# Patient Record
Sex: MALE | Race: WHITE | NOT HISPANIC OR LATINO | Employment: PART TIME | URBAN - METROPOLITAN AREA
[De-identification: names, ages, dates, MRNs, and addresses within clinical notes are randomized per-mention and may not be internally consistent; named-entity substitution may affect disease eponyms.]

---

## 2017-02-08 ENCOUNTER — HOSPITAL ENCOUNTER (OUTPATIENT)
Dept: ONCOLOGY | Facility: CLINIC | Age: 57
Discharge: HOME | End: 2017-02-08
Attending: INTERNAL MEDICINE | Admitting: INTERNAL MEDICINE

## 2017-03-07 ENCOUNTER — HOSPITAL ENCOUNTER (OUTPATIENT)
Dept: LAB | Facility: HOSPITAL | Age: 57
Discharge: HOME | End: 2017-03-07
Attending: FAMILY MEDICINE | Admitting: FAMILY MEDICINE

## 2017-03-07 LAB
ALBUMIN SERPL-MCNC: 2.4 G/DL (ref 3.5–4.8)
ALBUMIN/GLOB SERPL: 0.6 {RATIO} (ref 1–1.7)
ALP SERPL-CCNC: 163 IU/L (ref 32–91)
ALT SERPL-CCNC: 24 IU/L (ref 17–63)
ANION GAP SERPL CALC-SCNC: 12.5 MMOL/L (ref 10–20)
AST SERPL-CCNC: 24 IU/L (ref 15–41)
BILIRUB SERPL-MCNC: 0.5 MG/DL (ref 0.3–1.2)
BNP SERPL-MCNC: 3078 PG/ML
BUN SERPL-MCNC: 57 MG/DL (ref 8–20)
BUN/CREAT SERPL: 30 (ref 6.2–20.3)
CALCIUM SERPL-MCNC: 8.6 MG/DL (ref 8.9–10.3)
CHLORIDE SERPL-SCNC: 99 MMOL/L (ref 101–111)
CONV CO2: 25 MMOL/L (ref 22–32)
CONV TOTAL PROTEIN: 6.1 G/DL (ref 6.1–7.9)
CREAT UR-MCNC: 1.9 MG/DL (ref 0.7–1.2)
GLOBULIN UR ELPH-MCNC: 3.7 G/DL (ref 2.5–3.8)
GLUCOSE SERPL-MCNC: 257 MG/DL (ref 65–99)
POTASSIUM SERPL-SCNC: 4.5 MMOL/L (ref 3.6–5.1)
SODIUM SERPL-SCNC: 132 MMOL/L (ref 136–144)

## 2017-04-04 ENCOUNTER — HOSPITAL ENCOUNTER (OUTPATIENT)
Dept: LAB | Facility: HOSPITAL | Age: 57
Setting detail: SPECIMEN
Discharge: HOME | End: 2017-04-04
Attending: INTERNAL MEDICINE | Admitting: INTERNAL MEDICINE

## 2017-04-04 LAB
ALBUMIN SERPL-MCNC: 2.9 G/DL (ref 3.5–4.8)
ALBUMIN/GLOB SERPL: 0.7 {RATIO} (ref 1–1.7)
ALP SERPL-CCNC: 121 IU/L (ref 32–91)
ALT SERPL-CCNC: 23 IU/L (ref 17–63)
ANION GAP SERPL CALC-SCNC: 14.1 MMOL/L (ref 10–20)
AST SERPL-CCNC: 24 IU/L (ref 15–41)
BILIRUB SERPL-MCNC: 0.4 MG/DL (ref 0.3–1.2)
BUN SERPL-MCNC: 60 MG/DL (ref 8–20)
BUN/CREAT SERPL: 30 (ref 6.2–20.3)
CALCIUM SERPL-MCNC: 9.1 MG/DL (ref 8.9–10.3)
CHLORIDE SERPL-SCNC: 100 MMOL/L (ref 101–111)
CHOLEST SERPL-MCNC: 149 MG/DL
CHOLEST/HDLC SERPL: 3.7 {RATIO}
CONV CO2: 27 MMOL/L (ref 22–32)
CONV LDL CHOLESTEROL DIRECT: 98 MG/DL (ref 0–100)
CONV TOTAL PROTEIN: 6.8 G/DL (ref 6.1–7.9)
CREAT UR-MCNC: 2 MG/DL (ref 0.7–1.2)
GLOBULIN UR ELPH-MCNC: 3.9 G/DL (ref 2.5–3.8)
GLUCOSE SERPL-MCNC: 156 MG/DL (ref 65–99)
HDLC SERPL-MCNC: 40 MG/DL
LDLC/HDLC SERPL: 2.4 {RATIO}
LIPID INTERPRETATION: NORMAL
POTASSIUM SERPL-SCNC: 4.1 MMOL/L (ref 3.6–5.1)
SODIUM SERPL-SCNC: 137 MMOL/L (ref 136–144)
TRIGL SERPL-MCNC: 78 MG/DL
TSH SERPL-ACNC: 3.31 UIU/ML (ref 0.34–5.6)
VLDLC SERPL CALC-MCNC: 10.8 MG/DL

## 2017-04-19 ENCOUNTER — HOSPITAL ENCOUNTER (OUTPATIENT)
Dept: LAB | Facility: HOSPITAL | Age: 57
Discharge: HOME | End: 2017-04-19
Attending: INTERNAL MEDICINE | Admitting: INTERNAL MEDICINE

## 2017-04-19 LAB
ALBUMIN SERPL-MCNC: 2.9 G/DL (ref 3.5–4.8)
ALBUMIN/GLOB SERPL: 1 {RATIO} (ref 1–1.7)
ALP SERPL-CCNC: 118 IU/L (ref 32–91)
ALT SERPL-CCNC: 49 IU/L (ref 17–63)
ANION GAP SERPL CALC-SCNC: 13.5 MMOL/L (ref 10–20)
AST SERPL-CCNC: 50 IU/L (ref 15–41)
BASOPHILS # BLD AUTO: 0 10*3/UL (ref 0–0.2)
BASOPHILS NFR BLD AUTO: 1 % (ref 0–2)
BILIRUB SERPL-MCNC: 0.6 MG/DL (ref 0.3–1.2)
BUN SERPL-MCNC: 59 MG/DL (ref 8–20)
BUN/CREAT SERPL: 31.1 (ref 6.2–20.3)
CALCIUM SERPL-MCNC: 9 MG/DL (ref 8.9–10.3)
CHLORIDE SERPL-SCNC: 88 MMOL/L (ref 101–111)
CONV CO2: 29 MMOL/L (ref 22–32)
CONV TOTAL PROTEIN: 5.9 G/DL (ref 6.1–7.9)
CREAT 24H UR-MCNC: 32.4 MG/DL
CREAT UR-MCNC: 1.9 MG/DL (ref 0.7–1.2)
DIFFERENTIAL METHOD BLD: (no result)
EOSINOPHIL # BLD AUTO: 0.1 10*3/UL (ref 0–0.3)
EOSINOPHIL # BLD AUTO: 3 % (ref 0–3)
ERYTHROCYTE [DISTWIDTH] IN BLOOD BY AUTOMATED COUNT: 20.8 % (ref 11.5–14.5)
GLOBULIN UR ELPH-MCNC: 3 G/DL (ref 2.5–3.8)
GLUCOSE SERPL-MCNC: 325 MG/DL (ref 65–99)
HCT VFR BLD AUTO: 34.4 % (ref 40–54)
HGB BLD-MCNC: 11.6 G/DL (ref 14–18)
LYMPHOCYTES # BLD AUTO: 0.6 10*3/UL (ref 0.8–4.8)
LYMPHOCYTES NFR BLD AUTO: 11 % (ref 18–42)
MAGNESIUM SERPL-MCNC: 2 MG/DL (ref 1.8–2.5)
MCH RBC QN AUTO: 29.1 PG (ref 26–32)
MCHC RBC AUTO-ENTMCNC: 33.8 G/DL (ref 32–36)
MCV RBC AUTO: 86.3 FL (ref 80–94)
MONOCYTES # BLD AUTO: 0.6 10*3/UL (ref 0.1–1.3)
MONOCYTES NFR BLD AUTO: 12 % (ref 2–11)
NEUTROPHILS # BLD AUTO: 3.7 10*3/UL (ref 2.3–8.6)
NEUTROPHILS NFR BLD AUTO: 73 % (ref 50–75)
NRBC BLD AUTO-RTO: 0 /100{WBCS}
NRBC/RBC NFR BLD MANUAL: 0 10*3/UL
PHOSPHATE SERPL-MCNC: 3 MG/DL (ref 2.4–4.7)
PLATELET # BLD AUTO: 221 10*3/UL (ref 150–450)
PMV BLD AUTO: 9 FL (ref 7.4–10.4)
POTASSIUM SERPL-SCNC: 3.5 MMOL/L (ref 3.6–5.1)
PROT UR-MCNC: 76 MG/DL
PTH-INTACT SERPL-MCNC: 49 PG/ML (ref 11–72)
RBC # BLD AUTO: 3.99 10*6/UL (ref 4.6–6)
SODIUM SERPL-SCNC: 127 MMOL/L (ref 136–144)
WBC # BLD AUTO: 5.1 10*3/UL (ref 4.5–11.5)

## 2017-05-10 ENCOUNTER — HOSPITAL ENCOUNTER (OUTPATIENT)
Dept: ONCOLOGY | Facility: CLINIC | Age: 57
Discharge: HOME | End: 2017-05-10
Attending: INTERNAL MEDICINE | Admitting: INTERNAL MEDICINE

## 2017-05-10 LAB
ANION GAP SERPL CALC-SCNC: 11.3 MMOL/L (ref 10–20)
BUN SERPL-MCNC: 53 MG/DL (ref 8–20)
BUN/CREAT SERPL: 27.9 (ref 6.2–20.3)
CALCIUM SERPL-MCNC: 9.1 MG/DL (ref 8.9–10.3)
CHLORIDE SERPL-SCNC: 101 MMOL/L (ref 101–111)
CONV CO2: 26 MMOL/L (ref 22–32)
CREAT UR-MCNC: 1.9 MG/DL (ref 0.7–1.2)
FERRITIN SERPL-MCNC: 78 NG/ML (ref 24–336)
FOLATE SERPL-MCNC: >24.8 NG/ML (ref 5.9–24.8)
GLUCOSE SERPL-MCNC: 155 MG/DL (ref 65–99)
IRON SATN MFR SERPL: 15 % (ref 20–50)
IRON SERPL-MCNC: 53 UG/DL (ref 45–182)
LDH SERPL-CCNC: 186 IU/L (ref 98–192)
MAGNESIUM UR-MCNC: 2.28 % (ref 0.5–1.5)
POTASSIUM SERPL-SCNC: 4.3 MMOL/L (ref 3.6–5.1)
RETICS/RBC NFR MANUAL: 0.07 10*6/UL
SODIUM SERPL-SCNC: 134 MMOL/L (ref 136–144)
TIBC SERPL-MCNC: 344 UG/DL (ref 228–428)
VIT B12 SERPL-MCNC: 882 PG/ML (ref 180–914)

## 2017-05-12 LAB
ALBUMIN SERPL-MCNC: 3.2 G/DL (ref 3.5–4.8)
ALPHA1 GLOB FLD ELPH-MCNC: 0.2 GM/DL (ref 0.1–0.4)
ALPHA2 GLOB SERPL ELPH-MCNC: 0.6 GM/DL (ref 0.5–1)
B-GLOBULIN SERPL ELPH-MCNC: 0.8 GM/DL (ref 0.7–1.4)
CONV TOTAL PROTEIN: 5.9 G/DL (ref 6.1–7.9)
GAMMA GLOB SERPL ELPH-MCNC: 1.1 GM/DL (ref 0.6–1.6)
HAPTOGLOB SERPL-MCNC: 62 MG/DL (ref 36–195)
INSULIN SERPL-ACNC: ABNORMAL U[IU]/ML

## 2017-05-15 LAB — PROT PATTERN SERPL IFE-IMP: NORMAL

## 2017-05-31 ENCOUNTER — HOSPITAL ENCOUNTER (OUTPATIENT)
Dept: ONCOLOGY | Facility: CLINIC | Age: 57
Discharge: HOME | End: 2017-05-31
Attending: INTERNAL MEDICINE | Admitting: INTERNAL MEDICINE

## 2017-08-30 ENCOUNTER — HOSPITAL ENCOUNTER (OUTPATIENT)
Dept: LAB | Facility: HOSPITAL | Age: 57
Discharge: HOME | End: 2017-08-30
Attending: INTERNAL MEDICINE | Admitting: INTERNAL MEDICINE

## 2017-08-30 LAB
ALBUMIN SERPL-MCNC: 2.1 G/DL (ref 3.5–4.8)
ALBUMIN/GLOB SERPL: 0.6 {RATIO} (ref 1–1.7)
ALP SERPL-CCNC: 194 IU/L (ref 32–91)
ALT SERPL-CCNC: 20 IU/L (ref 17–63)
ANION GAP SERPL CALC-SCNC: 12.1 MMOL/L (ref 10–20)
AST SERPL-CCNC: 24 IU/L (ref 15–41)
BILIRUB SERPL-MCNC: 0.4 MG/DL (ref 0.3–1.2)
BILIRUB UR QL STRIP: NEGATIVE MG/DL
BUN SERPL-MCNC: 45 MG/DL (ref 8–20)
BUN/CREAT SERPL: 26.5 (ref 6.2–20.3)
CALCIUM SERPL-MCNC: 8.3 MG/DL (ref 8.9–10.3)
CASTS URNS QL MICRO: ABNORMAL /[LPF]
CHLORIDE SERPL-SCNC: 96 MMOL/L (ref 101–111)
COLOR UR: YELLOW
CONV BACTERIA IN URINE MICRO: NEGATIVE
CONV CLARITY OF URINE: CLEAR
CONV CO2: 28 MMOL/L (ref 22–32)
CONV HYALINE CASTS IN URINE MICRO: 1 /[LPF] (ref 0–5)
CONV PROTEIN IN URINE BY AUTOMATED TEST STRIP: 100 MG/DL
CONV SMALL ROUND CELLS: ABNORMAL /[HPF]
CONV TOTAL PROTEIN: 5.8 G/DL (ref 6.1–7.9)
CONV UROBILINOGEN IN URINE BY AUTOMATED TEST STRIP: 0.2 MG/DL
CREAT 24H UR-MCNC: 38 MG/DL
CREAT UR-MCNC: 1.7 MG/DL (ref 0.7–1.2)
CULTURE INDICATED?: ABNORMAL
ERYTHROCYTE [DISTWIDTH] IN BLOOD BY AUTOMATED COUNT: 15.9 % (ref 11.5–14.5)
GLOBULIN UR ELPH-MCNC: 3.7 G/DL (ref 2.5–3.8)
GLUCOSE SERPL-MCNC: 294 MG/DL (ref 65–99)
GLUCOSE UR QL: 250 MG/DL
HCT VFR BLD AUTO: 33.9 % (ref 40–54)
HGB BLD-MCNC: 11.2 G/DL (ref 14–18)
HGB UR QL STRIP: NEGATIVE
KETONES UR QL STRIP: NEGATIVE MG/DL
LEUKOCYTE ESTERASE UR QL STRIP: NEGATIVE
MAGNESIUM SERPL-MCNC: 1.9 MG/DL (ref 1.8–2.5)
MCH RBC QN AUTO: 28.8 PG (ref 26–32)
MCHC RBC AUTO-ENTMCNC: 33.2 G/DL (ref 32–36)
MCV RBC AUTO: 86.6 FL (ref 80–94)
NITRITE UR QL STRIP: NEGATIVE
PH UR STRIP.AUTO: 7 [PH] (ref 4.5–8)
PHOSPHATE SERPL-MCNC: 3.8 MG/DL (ref 2.4–4.7)
PLATELET # BLD AUTO: 223 10*3/UL (ref 150–450)
PMV BLD AUTO: 9 FL (ref 7.4–10.4)
POTASSIUM SERPL-SCNC: 4.1 MMOL/L (ref 3.6–5.1)
PROT UR-MCNC: ABNORMAL MG/DL
PROT/CREAT UR: 11.6 MG/MG (ref 0–22)
PTH-INTACT SERPL-MCNC: 100 PG/ML (ref 11–72)
RBC # BLD AUTO: 3.91 10*6/UL (ref 4.6–6)
RBC #/AREA URNS HPF: 4 /[HPF] (ref 0–3)
SODIUM SERPL-SCNC: 132 MMOL/L (ref 136–144)
SP GR UR: 1.01 (ref 1–1.03)
SPERM URNS QL MICRO: ABNORMAL /[HPF]
SQUAMOUS SPT QL MICRO: 0 /[HPF] (ref 0–5)
UNIDENT CRYS URNS QL MICRO: ABNORMAL /[HPF]
WBC # BLD AUTO: 6.4 10*3/UL (ref 4.5–11.5)
WBC #/AREA URNS HPF: 1 /[HPF] (ref 0–5)
YEAST SPEC QL WET PREP: ABNORMAL /[HPF]

## 2017-10-03 ENCOUNTER — HOSPITAL ENCOUNTER (OUTPATIENT)
Dept: LAB | Facility: HOSPITAL | Age: 57
Setting detail: SPECIMEN
Discharge: HOME | End: 2017-10-03
Attending: INTERNAL MEDICINE | Admitting: INTERNAL MEDICINE

## 2017-10-03 LAB
ALBUMIN SERPL-MCNC: 2.3 G/DL (ref 3.5–4.8)
ALBUMIN/GLOB SERPL: 0.6 {RATIO} (ref 1–1.7)
ALP SERPL-CCNC: 218 IU/L (ref 32–91)
ALT SERPL-CCNC: 17 IU/L (ref 17–63)
ANION GAP SERPL CALC-SCNC: 13.9 MMOL/L (ref 10–20)
AST SERPL-CCNC: 23 IU/L (ref 15–41)
BILIRUB SERPL-MCNC: 0.6 MG/DL (ref 0.3–1.2)
BUN SERPL-MCNC: 42 MG/DL (ref 8–20)
BUN/CREAT SERPL: 24.7 (ref 6.2–20.3)
CALCIUM SERPL-MCNC: 8.7 MG/DL (ref 8.9–10.3)
CHLORIDE SERPL-SCNC: 99 MMOL/L (ref 101–111)
CONV CO2: 27 MMOL/L (ref 22–32)
CONV TOTAL PROTEIN: 6.3 G/DL (ref 6.1–7.9)
CREAT UR-MCNC: 1.7 MG/DL (ref 0.7–1.2)
GLOBULIN UR ELPH-MCNC: 4 G/DL (ref 2.5–3.8)
GLUCOSE SERPL-MCNC: 152 MG/DL (ref 65–99)
POTASSIUM SERPL-SCNC: 3.9 MMOL/L (ref 3.6–5.1)
SODIUM SERPL-SCNC: 136 MMOL/L (ref 136–144)
TSH SERPL-ACNC: 4.01 UIU/ML (ref 0.34–5.6)

## 2017-10-06 ENCOUNTER — OFFICE (AMBULATORY)
Dept: URBAN - METROPOLITAN AREA CLINIC 64 | Facility: CLINIC | Age: 57
End: 2017-10-06

## 2017-10-06 VITALS
WEIGHT: 168 LBS | SYSTOLIC BLOOD PRESSURE: 121 MMHG | HEART RATE: 79 BPM | HEIGHT: 69 IN | DIASTOLIC BLOOD PRESSURE: 75 MMHG

## 2017-10-06 DIAGNOSIS — R14.0 ABDOMINAL DISTENSION (GASEOUS): ICD-10-CM

## 2017-10-06 DIAGNOSIS — K59.00 CONSTIPATION, UNSPECIFIED: ICD-10-CM

## 2017-10-06 PROCEDURE — 99213 OFFICE O/P EST LOW 20 MIN: CPT | Performed by: INTERNAL MEDICINE

## 2017-11-01 ENCOUNTER — ON CAMPUS - OUTPATIENT (AMBULATORY)
Dept: URBAN - METROPOLITAN AREA HOSPITAL 2 | Facility: HOSPITAL | Age: 57
End: 2017-11-01

## 2017-11-01 VITALS
RESPIRATION RATE: 15 BRPM | HEIGHT: 69 IN | RESPIRATION RATE: 14 BRPM | SYSTOLIC BLOOD PRESSURE: 118 MMHG | HEART RATE: 79 BPM | DIASTOLIC BLOOD PRESSURE: 102 MMHG | HEART RATE: 71 BPM | SYSTOLIC BLOOD PRESSURE: 87 MMHG | OXYGEN SATURATION: 94 % | DIASTOLIC BLOOD PRESSURE: 61 MMHG | HEART RATE: 69 BPM | OXYGEN SATURATION: 100 % | RESPIRATION RATE: 16 BRPM | DIASTOLIC BLOOD PRESSURE: 89 MMHG | SYSTOLIC BLOOD PRESSURE: 133 MMHG | SYSTOLIC BLOOD PRESSURE: 132 MMHG | SYSTOLIC BLOOD PRESSURE: 110 MMHG | RESPIRATION RATE: 18 BRPM | RESPIRATION RATE: 19 BRPM | SYSTOLIC BLOOD PRESSURE: 126 MMHG | HEART RATE: 81 BPM | SYSTOLIC BLOOD PRESSURE: 102 MMHG | SYSTOLIC BLOOD PRESSURE: 91 MMHG | HEART RATE: 89 BPM | HEART RATE: 68 BPM | DIASTOLIC BLOOD PRESSURE: 70 MMHG | HEART RATE: 72 BPM | SYSTOLIC BLOOD PRESSURE: 142 MMHG | HEART RATE: 91 BPM | WEIGHT: 163 LBS | OXYGEN SATURATION: 99 % | HEART RATE: 86 BPM | HEART RATE: 76 BPM | DIASTOLIC BLOOD PRESSURE: 74 MMHG | DIASTOLIC BLOOD PRESSURE: 60 MMHG | SYSTOLIC BLOOD PRESSURE: 161 MMHG | HEART RATE: 74 BPM | DIASTOLIC BLOOD PRESSURE: 88 MMHG | SYSTOLIC BLOOD PRESSURE: 108 MMHG | HEART RATE: 85 BPM | OXYGEN SATURATION: 92 % | HEART RATE: 67 BPM | DIASTOLIC BLOOD PRESSURE: 66 MMHG | OXYGEN SATURATION: 98 % | SYSTOLIC BLOOD PRESSURE: 103 MMHG | DIASTOLIC BLOOD PRESSURE: 86 MMHG | SYSTOLIC BLOOD PRESSURE: 90 MMHG | DIASTOLIC BLOOD PRESSURE: 69 MMHG | RESPIRATION RATE: 17 BRPM | DIASTOLIC BLOOD PRESSURE: 58 MMHG | SYSTOLIC BLOOD PRESSURE: 96 MMHG | OXYGEN SATURATION: 97 % | DIASTOLIC BLOOD PRESSURE: 63 MMHG | TEMPERATURE: 97.4 F

## 2017-11-01 DIAGNOSIS — I86.4 GASTRIC VARICES: ICD-10-CM

## 2017-11-01 DIAGNOSIS — K22.10 ULCER OF ESOPHAGUS WITHOUT BLEEDING: ICD-10-CM

## 2017-11-01 DIAGNOSIS — K92.89 OTHER SPECIFIED DISEASES OF THE DIGESTIVE SYSTEM: ICD-10-CM

## 2017-11-01 DIAGNOSIS — K59.00 CONSTIPATION, UNSPECIFIED: ICD-10-CM

## 2017-11-01 DIAGNOSIS — R10.84 GENERALIZED ABDOMINAL PAIN: ICD-10-CM

## 2017-11-01 DIAGNOSIS — R14.0 ABDOMINAL DISTENSION (GASEOUS): ICD-10-CM

## 2017-11-01 PROCEDURE — 43235 EGD DIAGNOSTIC BRUSH WASH: CPT | Performed by: INTERNAL MEDICINE

## 2017-11-01 PROCEDURE — 45378 DIAGNOSTIC COLONOSCOPY: CPT | Performed by: INTERNAL MEDICINE

## 2017-11-01 RX ADMIN — HYOSCYAMINE SULFATE 0.12 MG: 0.12 TABLET, ORALLY DISINTEGRATING ORAL; SUBLINGUAL at 15:06

## 2017-11-01 RX ADMIN — PROPOFOL: 10 INJECTION, EMULSION INTRAVENOUS at 13:53

## 2017-11-08 ENCOUNTER — HOSPITAL ENCOUNTER (OUTPATIENT)
Dept: ULTRASOUND IMAGING | Facility: HOSPITAL | Age: 57
Discharge: HOME | End: 2017-11-08
Attending: INTERNAL MEDICINE | Admitting: INTERNAL MEDICINE

## 2017-11-27 ENCOUNTER — OFFICE (AMBULATORY)
Dept: URBAN - METROPOLITAN AREA CLINIC 64 | Facility: CLINIC | Age: 57
End: 2017-11-27

## 2017-11-27 VITALS
SYSTOLIC BLOOD PRESSURE: 130 MMHG | WEIGHT: 163 LBS | HEIGHT: 69 IN | HEART RATE: 85 BPM | DIASTOLIC BLOOD PRESSURE: 84 MMHG

## 2017-11-27 DIAGNOSIS — R10.84 GENERALIZED ABDOMINAL PAIN: ICD-10-CM

## 2017-11-27 DIAGNOSIS — I86.4 GASTRIC VARICES: ICD-10-CM

## 2017-11-27 DIAGNOSIS — R14.0 ABDOMINAL DISTENSION (GASEOUS): ICD-10-CM

## 2017-11-27 DIAGNOSIS — K59.00 CONSTIPATION, UNSPECIFIED: ICD-10-CM

## 2017-11-27 LAB
CBC WITH DIFFERENTIAL/PLATELET: BASO (ABSOLUTE): 0 X10E3/UL (ref 0–0.2)
CBC WITH DIFFERENTIAL/PLATELET: BASOS: 1 %
CBC WITH DIFFERENTIAL/PLATELET: EOS (ABSOLUTE): 0.2 X10E3/UL (ref 0–0.4)
CBC WITH DIFFERENTIAL/PLATELET: EOS: 2 %
CBC WITH DIFFERENTIAL/PLATELET: HEMATOCRIT: 33 % — LOW (ref 37.5–51)
CBC WITH DIFFERENTIAL/PLATELET: HEMATOLOGY COMMENTS: (no result)
CBC WITH DIFFERENTIAL/PLATELET: HEMOGLOBIN: 10.6 G/DL — LOW (ref 12.6–17.7)
CBC WITH DIFFERENTIAL/PLATELET: IMMATURE CELLS: (no result)
CBC WITH DIFFERENTIAL/PLATELET: IMMATURE GRANS (ABS): 0 X10E3/UL (ref 0–0.1)
CBC WITH DIFFERENTIAL/PLATELET: IMMATURE GRANULOCYTES: 0 %
CBC WITH DIFFERENTIAL/PLATELET: LYMPHS (ABSOLUTE): 1 X10E3/UL (ref 0.7–3.1)
CBC WITH DIFFERENTIAL/PLATELET: LYMPHS: 12 %
CBC WITH DIFFERENTIAL/PLATELET: MCH: 27.1 PG (ref 26.6–33)
CBC WITH DIFFERENTIAL/PLATELET: MCHC: 32.1 G/DL (ref 31.5–35.7)
CBC WITH DIFFERENTIAL/PLATELET: MCV: 84 FL (ref 79–97)
CBC WITH DIFFERENTIAL/PLATELET: MONOCYTES(ABSOLUTE): 0.7 X10E3/UL (ref 0.1–0.9)
CBC WITH DIFFERENTIAL/PLATELET: MONOCYTES: 9 %
CBC WITH DIFFERENTIAL/PLATELET: NEUTROPHILS (ABSOLUTE): 6.2 X10E3/UL (ref 1.4–7)
CBC WITH DIFFERENTIAL/PLATELET: NEUTROPHILS: 76 %
CBC WITH DIFFERENTIAL/PLATELET: NRBC: (no result)
CBC WITH DIFFERENTIAL/PLATELET: PLATELETS: 265 X10E3/UL (ref 150–379)
CBC WITH DIFFERENTIAL/PLATELET: RBC: 3.91 X10E6/UL — LOW (ref 4.14–5.8)
CBC WITH DIFFERENTIAL/PLATELET: RDW: 19.6 % — HIGH (ref 12.3–15.4)
CBC WITH DIFFERENTIAL/PLATELET: WBC: 8.2 X10E3/UL (ref 3.4–10.8)
COMP. METABOLIC PANEL (14): A/G RATIO: 1 — LOW (ref 1.2–2.2)
COMP. METABOLIC PANEL (14): ALBUMIN, SERUM: 3.1 G/DL — LOW (ref 3.5–5.5)
COMP. METABOLIC PANEL (14): ALKALINE PHOSPHATASE, S: 213 IU/L — HIGH (ref 39–117)
COMP. METABOLIC PANEL (14): ALT (SGPT): 22 IU/L (ref 0–44)
COMP. METABOLIC PANEL (14): AST (SGOT): 33 IU/L (ref 0–40)
COMP. METABOLIC PANEL (14): BILIRUBIN, TOTAL: 0.5 MG/DL (ref 0–1.2)
COMP. METABOLIC PANEL (14): BUN/CREATININE RATIO: 26 — HIGH (ref 9–20)
COMP. METABOLIC PANEL (14): BUN: 46 MG/DL — HIGH (ref 6–24)
COMP. METABOLIC PANEL (14): CALCIUM, SERUM: 8.5 MG/DL — LOW (ref 8.7–10.2)
COMP. METABOLIC PANEL (14): CARBON DIOXIDE, TOTAL: 25 MMOL/L (ref 18–29)
COMP. METABOLIC PANEL (14): CHLORIDE, SERUM: 97 MMOL/L (ref 96–106)
COMP. METABOLIC PANEL (14): CREATININE, SERUM: 1.77 MG/DL — HIGH (ref 0.76–1.27)
COMP. METABOLIC PANEL (14): EGFR IF AFRICN AM: 49 ML/MIN/1.73 — LOW (ref 59–?)
COMP. METABOLIC PANEL (14): EGFR IF NONAFRICN AM: 42 ML/MIN/1.73 — LOW (ref 59–?)
COMP. METABOLIC PANEL (14): GLOBULIN, TOTAL: 3.1 G/DL (ref 1.5–4.5)
COMP. METABOLIC PANEL (14): GLUCOSE, SERUM: 202 MG/DL — HIGH (ref 65–99)
COMP. METABOLIC PANEL (14): POTASSIUM, SERUM: 5 MMOL/L (ref 3.5–5.2)
COMP. METABOLIC PANEL (14): PROTEIN, TOTAL, SERUM: 6.2 G/DL (ref 6–8.5)
COMP. METABOLIC PANEL (14): SODIUM, SERUM: 137 MMOL/L (ref 134–144)
PROTHROMBIN TIME (PT): INR: 1.1 (ref 0.8–1.2)
PROTHROMBIN TIME (PT): PROTHROMBIN TIME: 11.5 SEC (ref 9.1–12)

## 2017-11-27 PROCEDURE — 99214 OFFICE O/P EST MOD 30 MIN: CPT | Performed by: INTERNAL MEDICINE

## 2017-11-29 ENCOUNTER — HOSPITAL ENCOUNTER (OUTPATIENT)
Dept: NUCLEAR MEDICINE | Facility: HOSPITAL | Age: 57
Discharge: HOME | End: 2017-11-29
Attending: INTERNAL MEDICINE | Admitting: INTERNAL MEDICINE

## 2017-12-06 ENCOUNTER — HOSPITAL ENCOUNTER (OUTPATIENT)
Dept: LAB | Facility: HOSPITAL | Age: 57
Discharge: HOME | End: 2017-12-06
Attending: INTERNAL MEDICINE | Admitting: INTERNAL MEDICINE

## 2017-12-06 LAB
ALBUMIN SERPL-MCNC: 2.6 G/DL (ref 3.5–4.8)
ALBUMIN/GLOB SERPL: 0.7 {RATIO} (ref 1–1.7)
ALP SERPL-CCNC: 166 IU/L (ref 32–91)
ALT SERPL-CCNC: 23 IU/L (ref 17–63)
ANION GAP SERPL CALC-SCNC: 12.4 MMOL/L (ref 10–20)
AST SERPL-CCNC: 33 IU/L (ref 15–41)
BASOPHILS # BLD AUTO: 0 10*3/UL (ref 0–0.2)
BASOPHILS NFR BLD AUTO: 1 % (ref 0–2)
BILIRUB SERPL-MCNC: 0.5 MG/DL (ref 0.3–1.2)
BUN SERPL-MCNC: 41 MG/DL (ref 8–20)
BUN/CREAT SERPL: 22.8 (ref 6.2–20.3)
CALCIUM SERPL-MCNC: 8.6 MG/DL (ref 8.9–10.3)
CHLORIDE SERPL-SCNC: 97 MMOL/L (ref 101–111)
CONV CO2: 26 MMOL/L (ref 22–32)
CONV TOTAL PROTEIN: 6.1 G/DL (ref 6.1–7.9)
CREAT UR-MCNC: 1.8 MG/DL (ref 0.7–1.2)
DIFFERENTIAL METHOD BLD: (no result)
EOSINOPHIL # BLD AUTO: 0.2 10*3/UL (ref 0–0.3)
EOSINOPHIL # BLD AUTO: 3 % (ref 0–3)
ERYTHROCYTE [DISTWIDTH] IN BLOOD BY AUTOMATED COUNT: 21.4 % (ref 11.5–14.5)
GLOBULIN UR ELPH-MCNC: 3.5 G/DL (ref 2.5–3.8)
GLUCOSE SERPL-MCNC: 147 MG/DL (ref 65–99)
HCT VFR BLD AUTO: 33.9 % (ref 40–54)
HGB BLD-MCNC: 11 G/DL (ref 14–18)
LYMPHOCYTES # BLD AUTO: 0.8 10*3/UL (ref 0.8–4.8)
LYMPHOCYTES NFR BLD AUTO: 14 % (ref 18–42)
MAGNESIUM SERPL-MCNC: 1.7 MG/DL (ref 1.8–2.5)
MCH RBC QN AUTO: 27.9 PG (ref 26–32)
MCHC RBC AUTO-ENTMCNC: 32.5 G/DL (ref 32–36)
MCV RBC AUTO: 85.9 FL (ref 80–94)
MONOCYTES # BLD AUTO: 0.7 10*3/UL (ref 0.1–1.3)
MONOCYTES NFR BLD AUTO: 12 % (ref 2–11)
NEUTROPHILS # BLD AUTO: 3.9 10*3/UL (ref 2.3–8.6)
NEUTROPHILS NFR BLD AUTO: 70 % (ref 50–75)
NRBC BLD AUTO-RTO: 0 /100{WBCS}
NRBC/RBC NFR BLD MANUAL: 0 10*3/UL
PHOSPHATE SERPL-MCNC: 3.9 MG/DL (ref 2.4–4.7)
PLATELET # BLD AUTO: 205 10*3/UL (ref 150–450)
PMV BLD AUTO: 8.6 FL (ref 7.4–10.4)
POTASSIUM SERPL-SCNC: 4.4 MMOL/L (ref 3.6–5.1)
PTH-INTACT SERPL-MCNC: 126 PG/ML (ref 11–72)
RBC # BLD AUTO: 3.95 10*6/UL (ref 4.6–6)
SODIUM SERPL-SCNC: 131 MMOL/L (ref 136–144)
WBC # BLD AUTO: 5.5 10*3/UL (ref 4.5–11.5)

## 2018-01-02 ENCOUNTER — OFFICE (AMBULATORY)
Dept: URBAN - METROPOLITAN AREA CLINIC 64 | Facility: CLINIC | Age: 58
End: 2018-01-02

## 2018-01-02 VITALS
SYSTOLIC BLOOD PRESSURE: 109 MMHG | HEART RATE: 77 BPM | WEIGHT: 154 LBS | DIASTOLIC BLOOD PRESSURE: 68 MMHG | HEIGHT: 69 IN

## 2018-01-02 DIAGNOSIS — R14.0 ABDOMINAL DISTENSION (GASEOUS): ICD-10-CM

## 2018-01-02 DIAGNOSIS — K59.00 CONSTIPATION, UNSPECIFIED: ICD-10-CM

## 2018-01-02 PROCEDURE — 99214 OFFICE O/P EST MOD 30 MIN: CPT | Performed by: INTERNAL MEDICINE

## 2018-01-02 RX ORDER — ALUMINUM ZIRCONIUM OCTACHLOROHYDREX GLY 16 G/100G
4 GEL TOPICAL
Qty: 30 | Refills: 2 | Status: COMPLETED
Start: 2018-01-02 | End: 2018-02-27

## 2018-01-02 RX ORDER — POLYETHYLENE GLYCOL 3350 17 G/17G
8.5 POWDER, FOR SOLUTION ORAL
Refills: 0 | Status: COMPLETED
End: 2018-01-02

## 2018-01-24 ENCOUNTER — HOSPITAL ENCOUNTER (OUTPATIENT)
Dept: LAB | Facility: HOSPITAL | Age: 58
Discharge: HOME | End: 2018-01-24
Attending: INTERNAL MEDICINE | Admitting: INTERNAL MEDICINE

## 2018-01-24 LAB
ALBUMIN SERPL-MCNC: 3.5 G/DL (ref 3.5–4.8)
ALBUMIN/GLOB SERPL: 1.1 {RATIO} (ref 1–1.7)
ALP SERPL-CCNC: 112 IU/L (ref 32–91)
ALT SERPL-CCNC: 51 IU/L (ref 17–63)
ANION GAP SERPL CALC-SCNC: 12.2 MMOL/L (ref 10–20)
AST SERPL-CCNC: 48 IU/L (ref 15–41)
BILIRUB SERPL-MCNC: 0.8 MG/DL (ref 0.3–1.2)
BUN SERPL-MCNC: 67 MG/DL (ref 8–20)
BUN/CREAT SERPL: 25.8 (ref 6.2–20.3)
CALCIUM SERPL-MCNC: 9.3 MG/DL (ref 8.9–10.3)
CHLORIDE SERPL-SCNC: 98 MMOL/L (ref 101–111)
CONV CO2: 26 MMOL/L (ref 22–32)
CONV TOTAL PROTEIN: 6.7 G/DL (ref 6.1–7.9)
CREAT 24H UR-MCNC: 42 MG/DL
CREAT UR-MCNC: 2.6 MG/DL (ref 0.7–1.2)
ERYTHROCYTE [DISTWIDTH] IN BLOOD BY AUTOMATED COUNT: 18 % (ref 11.5–14.5)
GLOBULIN UR ELPH-MCNC: 3.2 G/DL (ref 2.5–3.8)
GLUCOSE SERPL-MCNC: 200 MG/DL (ref 65–99)
HCT VFR BLD AUTO: 33 % (ref 40–54)
HGB BLD-MCNC: 11.2 G/DL (ref 14–18)
MAGNESIUM SERPL-MCNC: 2 MG/DL (ref 1.8–2.5)
MCH RBC QN AUTO: 29.3 PG (ref 26–32)
MCHC RBC AUTO-ENTMCNC: 33.9 G/DL (ref 32–36)
MCV RBC AUTO: 86.4 FL (ref 80–94)
PHOSPHATE SERPL-MCNC: 4.6 MG/DL (ref 2.4–4.7)
PLATELET # BLD AUTO: 248 10*3/UL (ref 150–450)
PMV BLD AUTO: 8.2 FL (ref 7.4–10.4)
POTASSIUM SERPL-SCNC: 5.2 MMOL/L (ref 3.6–5.1)
PROT UR-MCNC: 45 MG/DL
PROT/CREAT UR: 1.1 MG/MG (ref 0–22)
RBC # BLD AUTO: 3.82 10*6/UL (ref 4.6–6)
SODIUM SERPL-SCNC: 131 MMOL/L (ref 136–144)
WBC # BLD AUTO: 5.1 10*3/UL (ref 4.5–11.5)

## 2018-02-27 ENCOUNTER — OFFICE (AMBULATORY)
Dept: URBAN - METROPOLITAN AREA CLINIC 64 | Facility: CLINIC | Age: 58
End: 2018-02-27
Payer: COMMERCIAL

## 2018-02-27 VITALS
WEIGHT: 159 LBS | SYSTOLIC BLOOD PRESSURE: 132 MMHG | HEIGHT: 69 IN | HEART RATE: 91 BPM | DIASTOLIC BLOOD PRESSURE: 84 MMHG

## 2018-02-27 DIAGNOSIS — R14.0 ABDOMINAL DISTENSION (GASEOUS): ICD-10-CM

## 2018-02-27 DIAGNOSIS — K59.00 CONSTIPATION, UNSPECIFIED: ICD-10-CM

## 2018-02-27 PROCEDURE — 99213 OFFICE O/P EST LOW 20 MIN: CPT | Performed by: INTERNAL MEDICINE

## 2018-04-02 ENCOUNTER — HOSPITAL ENCOUNTER (OUTPATIENT)
Dept: LAB | Facility: HOSPITAL | Age: 58
Setting detail: SPECIMEN
Discharge: HOME | End: 2018-04-02
Attending: INTERNAL MEDICINE | Admitting: INTERNAL MEDICINE

## 2018-04-02 LAB
ALBUMIN SERPL-MCNC: 3.9 G/DL (ref 3.5–4.8)
ALBUMIN/GLOB SERPL: 1.3 {RATIO} (ref 1–1.7)
ALP SERPL-CCNC: 102 IU/L (ref 32–91)
ALT SERPL-CCNC: 25 IU/L (ref 17–63)
ANION GAP SERPL CALC-SCNC: 12 MMOL/L (ref 10–20)
AST SERPL-CCNC: 32 IU/L (ref 15–41)
BILIRUB SERPL-MCNC: 0.4 MG/DL (ref 0.3–1.2)
BUN SERPL-MCNC: 48 MG/DL (ref 8–20)
BUN/CREAT SERPL: 20.9 (ref 6.2–20.3)
CALCIUM SERPL-MCNC: 9.4 MG/DL (ref 8.9–10.3)
CHLORIDE SERPL-SCNC: 101 MMOL/L (ref 101–111)
CHOLEST SERPL-MCNC: 157 MG/DL
CHOLEST/HDLC SERPL: 3.7 {RATIO}
CONV CO2: 27 MMOL/L (ref 22–32)
CONV LDL CHOLESTEROL DIRECT: 97 MG/DL (ref 0–100)
CONV TOTAL PROTEIN: 7 G/DL (ref 6.1–7.9)
CREAT UR-MCNC: 2.3 MG/DL (ref 0.7–1.2)
GLOBULIN UR ELPH-MCNC: 3.1 G/DL (ref 2.5–3.8)
GLUCOSE SERPL-MCNC: 91 MG/DL (ref 65–99)
HDLC SERPL-MCNC: 42 MG/DL
LDLC/HDLC SERPL: 2.3 {RATIO}
LIPID INTERPRETATION: NORMAL
POTASSIUM SERPL-SCNC: 4 MMOL/L (ref 3.6–5.1)
SODIUM SERPL-SCNC: 136 MMOL/L (ref 136–144)
TRIGL SERPL-MCNC: 105 MG/DL
VLDLC SERPL CALC-MCNC: 17.6 MG/DL

## 2018-04-18 ENCOUNTER — HOSPITAL ENCOUNTER (OUTPATIENT)
Dept: LAB | Facility: HOSPITAL | Age: 58
Discharge: HOME | End: 2018-04-18
Attending: INTERNAL MEDICINE | Admitting: INTERNAL MEDICINE

## 2018-04-18 LAB
ALBUMIN SERPL-MCNC: 3.5 G/DL (ref 3.5–4.8)
ALBUMIN/GLOB SERPL: 1.2 {RATIO} (ref 1–1.7)
ALP SERPL-CCNC: 96 IU/L (ref 32–91)
ALT SERPL-CCNC: 25 IU/L (ref 17–63)
ANION GAP SERPL CALC-SCNC: 10.2 MMOL/L (ref 10–20)
AST SERPL-CCNC: 31 IU/L (ref 15–41)
BILIRUB SERPL-MCNC: 0.6 MG/DL (ref 0.3–1.2)
BILIRUB UR QL STRIP: NEGATIVE MG/DL
BUN SERPL-MCNC: 51 MG/DL (ref 8–20)
BUN/CREAT SERPL: 22.2 (ref 6.2–20.3)
CALCIUM SERPL-MCNC: 9.3 MG/DL (ref 8.9–10.3)
CASTS URNS QL MICRO: ABNORMAL /[LPF]
CHLORIDE SERPL-SCNC: 104 MMOL/L (ref 101–111)
COLOR UR: YELLOW
CONV BACTERIA IN URINE MICRO: NEGATIVE
CONV CLARITY OF URINE: CLEAR
CONV CO2: 27 MMOL/L (ref 22–32)
CONV HYALINE CASTS IN URINE MICRO: 0 /[LPF] (ref 0–5)
CONV PROTEIN IN URINE BY AUTOMATED TEST STRIP: 100 MG/DL
CONV SMALL ROUND CELLS: ABNORMAL /[HPF]
CONV TOTAL PROTEIN: 6.5 G/DL (ref 6.1–7.9)
CONV UROBILINOGEN IN URINE BY AUTOMATED TEST STRIP: 0.2 MG/DL
CREAT 24H UR-MCNC: 41.6 MG/DL
CREAT UR-MCNC: 2.3 MG/DL (ref 0.7–1.2)
CULTURE INDICATED?: ABNORMAL
ERYTHROCYTE [DISTWIDTH] IN BLOOD BY AUTOMATED COUNT: 13.1 % (ref 11.5–14.5)
GLOBULIN UR ELPH-MCNC: 3 G/DL (ref 2.5–3.8)
GLUCOSE SERPL-MCNC: 86 MG/DL (ref 65–99)
GLUCOSE UR QL: NEGATIVE MG/DL
HCT VFR BLD AUTO: 34.6 % (ref 40–54)
HGB BLD-MCNC: 11.7 G/DL (ref 14–18)
HGB UR QL STRIP: NEGATIVE
KETONES UR QL STRIP: NEGATIVE MG/DL
LEUKOCYTE ESTERASE UR QL STRIP: NEGATIVE
MAGNESIUM SERPL-MCNC: 2 MG/DL (ref 1.8–2.5)
MCH RBC QN AUTO: 32.6 PG (ref 26–32)
MCHC RBC AUTO-ENTMCNC: 33.9 G/DL (ref 32–36)
MCV RBC AUTO: 96.2 FL (ref 80–94)
NITRITE UR QL STRIP: NEGATIVE
PH UR STRIP.AUTO: 6 [PH] (ref 4.5–8)
PHOSPHATE SERPL-MCNC: 4.3 MG/DL (ref 2.4–4.7)
PLATELET # BLD AUTO: 248 10*3/UL (ref 150–450)
PMV BLD AUTO: 8.3 FL (ref 7.4–10.4)
POTASSIUM SERPL-SCNC: 4.2 MMOL/L (ref 3.6–5.1)
PROT UR-MCNC: 65 MG/DL
PROT/CREAT UR: 1.6 MG/MG (ref 0–22)
RBC # BLD AUTO: 3.6 10*6/UL (ref 4.6–6)
RBC #/AREA URNS HPF: 1 /[HPF] (ref 0–3)
SODIUM SERPL-SCNC: 137 MMOL/L (ref 136–144)
SP GR UR: 1.01 (ref 1–1.03)
SPERM URNS QL MICRO: ABNORMAL /[HPF]
SQUAMOUS SPT QL MICRO: 0 /[HPF] (ref 0–5)
UNIDENT CRYS URNS QL MICRO: ABNORMAL /[HPF]
WBC # BLD AUTO: 6.3 10*3/UL (ref 4.5–11.5)
WBC #/AREA URNS HPF: 0 /[HPF] (ref 0–5)
YEAST SPEC QL WET PREP: ABNORMAL /[HPF]

## 2018-07-19 ENCOUNTER — HOSPITAL ENCOUNTER (OUTPATIENT)
Dept: LAB | Facility: HOSPITAL | Age: 58
Discharge: HOME | End: 2018-07-19
Attending: INTERNAL MEDICINE | Admitting: INTERNAL MEDICINE

## 2018-07-19 LAB
ALBUMIN SERPL-MCNC: 3.6 G/DL (ref 3.5–4.8)
ALBUMIN/GLOB SERPL: 1.1 {RATIO} (ref 1–1.7)
ALP SERPL-CCNC: 87 IU/L (ref 32–91)
ALT SERPL-CCNC: 24 IU/L (ref 17–63)
ANION GAP SERPL CALC-SCNC: 12.7 MMOL/L (ref 10–20)
AST SERPL-CCNC: 32 IU/L (ref 15–41)
BILIRUB SERPL-MCNC: 0.6 MG/DL (ref 0.3–1.2)
BILIRUB UR QL STRIP: NEGATIVE MG/DL
BUN SERPL-MCNC: 60 MG/DL (ref 8–20)
BUN/CREAT SERPL: 25 (ref 6.2–20.3)
CALCIUM SERPL-MCNC: 9.1 MG/DL (ref 8.9–10.3)
CASTS URNS QL MICRO: ABNORMAL /[LPF]
CHLORIDE SERPL-SCNC: 100 MMOL/L (ref 101–111)
COLOR UR: YELLOW
CONV BACTERIA IN URINE MICRO: NEGATIVE
CONV CLARITY OF URINE: CLEAR
CONV CO2: 26 MMOL/L (ref 22–32)
CONV HYALINE CASTS IN URINE MICRO: 1 /[LPF] (ref 0–5)
CONV PROTEIN IN URINE BY AUTOMATED TEST STRIP: 100 MG/DL
CONV SMALL ROUND CELLS: ABNORMAL /[HPF]
CONV TOTAL PROTEIN: 6.8 G/DL (ref 6.1–7.9)
CONV UROBILINOGEN IN URINE BY AUTOMATED TEST STRIP: 0.2 MG/DL
CREAT 24H UR-MCNC: 40.4 MG/DL
CREAT UR-MCNC: 2.4 MG/DL (ref 0.7–1.2)
CULTURE INDICATED?: ABNORMAL
ERYTHROCYTE [DISTWIDTH] IN BLOOD BY AUTOMATED COUNT: 14.7 % (ref 11.5–14.5)
GLOBULIN UR ELPH-MCNC: 3.2 G/DL (ref 2.5–3.8)
GLUCOSE SERPL-MCNC: 93 MG/DL (ref 65–99)
GLUCOSE UR QL: NEGATIVE MG/DL
HCT VFR BLD AUTO: 35.3 % (ref 40–54)
HGB BLD-MCNC: 12 G/DL (ref 14–18)
HGB UR QL STRIP: NEGATIVE
KETONES UR QL STRIP: NEGATIVE MG/DL
LEUKOCYTE ESTERASE UR QL STRIP: NEGATIVE
MAGNESIUM SERPL-MCNC: 2.1 MG/DL (ref 1.8–2.5)
MCH RBC QN AUTO: 31.5 PG (ref 26–32)
MCHC RBC AUTO-ENTMCNC: 33.9 G/DL (ref 32–36)
MCV RBC AUTO: 92.9 FL (ref 80–94)
NITRITE UR QL STRIP: NEGATIVE
PH UR STRIP.AUTO: 5 [PH] (ref 4.5–8)
PHOSPHATE SERPL-MCNC: 4.3 MG/DL (ref 2.4–4.7)
PLATELET # BLD AUTO: 226 10*3/UL (ref 150–450)
PMV BLD AUTO: 8.5 FL (ref 7.4–10.4)
POTASSIUM SERPL-SCNC: 5.7 MMOL/L (ref 3.6–5.1)
PROT UR-MCNC: 44 MG/DL
PROT/CREAT UR: 1.1 MG/MG (ref 0–22)
RBC # BLD AUTO: 3.8 10*6/UL (ref 4.6–6)
RBC #/AREA URNS HPF: 0 /[HPF] (ref 0–3)
SODIUM SERPL-SCNC: 133 MMOL/L (ref 136–144)
SP GR UR: 1.01 (ref 1–1.03)
SPERM URNS QL MICRO: ABNORMAL /[HPF]
SQUAMOUS SPT QL MICRO: 0 /[HPF] (ref 0–5)
UNIDENT CRYS URNS QL MICRO: ABNORMAL /[HPF]
WBC # BLD AUTO: 5.6 10*3/UL (ref 4.5–11.5)
WBC #/AREA URNS HPF: 0 /[HPF] (ref 0–5)
YEAST SPEC QL WET PREP: ABNORMAL /[HPF]

## 2018-08-01 ENCOUNTER — HOSPITAL ENCOUNTER (OUTPATIENT)
Dept: LAB | Facility: HOSPITAL | Age: 58
Discharge: HOME | End: 2018-08-01
Attending: INTERNAL MEDICINE | Admitting: INTERNAL MEDICINE

## 2018-08-01 LAB
ANION GAP SERPL CALC-SCNC: 12.4 MMOL/L (ref 10–20)
BUN SERPL-MCNC: 53 MG/DL (ref 8–20)
BUN/CREAT SERPL: 25.2 (ref 6.2–20.3)
CALCIUM SERPL-MCNC: 9.3 MG/DL (ref 8.9–10.3)
CHLORIDE SERPL-SCNC: 102 MMOL/L (ref 101–111)
CONV CO2: 26 MMOL/L (ref 22–32)
CREAT UR-MCNC: 2.1 MG/DL (ref 0.7–1.2)
GLUCOSE SERPL-MCNC: 109 MG/DL (ref 65–99)
POTASSIUM SERPL-SCNC: 4.4 MMOL/L (ref 3.6–5.1)
SODIUM SERPL-SCNC: 136 MMOL/L (ref 136–144)

## 2018-08-08 ENCOUNTER — HOSPITAL ENCOUNTER (OUTPATIENT)
Dept: OTHER | Facility: HOSPITAL | Age: 58
Discharge: HOME | End: 2018-08-08
Attending: INTERNAL MEDICINE | Admitting: INTERNAL MEDICINE

## 2018-08-22 ENCOUNTER — HOSPITAL ENCOUNTER (OUTPATIENT)
Dept: LAB | Facility: HOSPITAL | Age: 58
Discharge: HOME | End: 2018-08-22
Attending: INTERNAL MEDICINE | Admitting: INTERNAL MEDICINE

## 2018-08-22 LAB
ANION GAP SERPL CALC-SCNC: 10.4 MMOL/L (ref 10–20)
BILIRUB UR QL STRIP: NEGATIVE MG/DL
BUN SERPL-MCNC: 62 MG/DL (ref 8–20)
BUN/CREAT SERPL: 28.2 (ref 6.2–20.3)
CALCIUM SERPL-MCNC: 9.1 MG/DL (ref 8.9–10.3)
CASTS URNS QL MICRO: ABNORMAL /[LPF]
CHLORIDE SERPL-SCNC: 101 MMOL/L (ref 101–111)
COLOR UR: YELLOW
CONV BACTERIA IN URINE MICRO: NEGATIVE
CONV CLARITY OF URINE: CLEAR
CONV CO2: 28 MMOL/L (ref 22–32)
CONV HYALINE CASTS IN URINE MICRO: 0 /[LPF] (ref 0–5)
CONV PROTEIN IN URINE BY AUTOMATED TEST STRIP: 30 MG/DL
CONV SMALL ROUND CELLS: ABNORMAL /[HPF]
CONV UROBILINOGEN IN URINE BY AUTOMATED TEST STRIP: 0.2 MG/DL
CREAT 24H UR-MCNC: 21.5 MG/DL
CREAT UR-MCNC: 2.2 MG/DL (ref 0.7–1.2)
CULTURE INDICATED?: ABNORMAL
GLUCOSE SERPL-MCNC: 74 MG/DL (ref 65–99)
GLUCOSE UR QL: NEGATIVE MG/DL
HGB UR QL STRIP: NEGATIVE
KETONES UR QL STRIP: NEGATIVE MG/DL
LEUKOCYTE ESTERASE UR QL STRIP: NEGATIVE
NITRITE UR QL STRIP: NEGATIVE
PH UR STRIP.AUTO: 6 [PH] (ref 4.5–8)
POTASSIUM SERPL-SCNC: 4.4 MMOL/L (ref 3.6–5.1)
PROT UR-MCNC: 36 MG/DL
PROT/CREAT UR: 1.7 MG/MG (ref 0–22)
RBC #/AREA URNS HPF: 1 /[HPF] (ref 0–3)
SODIUM SERPL-SCNC: 135 MMOL/L (ref 136–144)
SP GR UR: 1 (ref 1–1.03)
SPERM URNS QL MICRO: ABNORMAL /[HPF]
SQUAMOUS SPT QL MICRO: 0 /[HPF] (ref 0–5)
UNIDENT CRYS URNS QL MICRO: ABNORMAL /[HPF]
WBC #/AREA URNS HPF: 0 /[HPF] (ref 0–5)
YEAST SPEC QL WET PREP: ABNORMAL /[HPF]

## 2018-10-10 ENCOUNTER — HOSPITAL ENCOUNTER (OUTPATIENT)
Dept: LAB | Facility: HOSPITAL | Age: 58
Setting detail: SPECIMEN
Discharge: HOME | End: 2018-10-10
Attending: INTERNAL MEDICINE | Admitting: INTERNAL MEDICINE

## 2018-10-10 LAB
ALBUMIN SERPL-MCNC: 3.4 G/DL (ref 3.5–4.8)
ALBUMIN/GLOB SERPL: 1.1 {RATIO} (ref 1–1.7)
ALP SERPL-CCNC: 104 IU/L (ref 32–91)
ALT SERPL-CCNC: 27 IU/L (ref 17–63)
ANION GAP SERPL CALC-SCNC: 11 MMOL/L (ref 10–20)
AST SERPL-CCNC: 32 IU/L (ref 15–41)
BILIRUB SERPL-MCNC: 0.7 MG/DL (ref 0.3–1.2)
BUN SERPL-MCNC: 53 MG/DL (ref 8–20)
BUN/CREAT SERPL: 24.1 (ref 6.2–20.3)
CALCIUM SERPL-MCNC: 8.8 MG/DL (ref 8.9–10.3)
CHLORIDE SERPL-SCNC: 103 MMOL/L (ref 101–111)
CONV CO2: 27 MMOL/L (ref 22–32)
CONV TOTAL PROTEIN: 6.6 G/DL (ref 6.1–7.9)
CREAT UR-MCNC: 2.2 MG/DL (ref 0.7–1.2)
GLOBULIN UR ELPH-MCNC: 3.2 G/DL (ref 2.5–3.8)
GLUCOSE SERPL-MCNC: 88 MG/DL (ref 65–99)
HBA1C MFR BLD: 6.9 % (ref 0–5.6)
POTASSIUM SERPL-SCNC: 4 MMOL/L (ref 3.6–5.1)
SODIUM SERPL-SCNC: 137 MMOL/L (ref 136–144)

## 2018-11-14 ENCOUNTER — OFFICE (AMBULATORY)
Dept: URBAN - METROPOLITAN AREA CLINIC 64 | Facility: CLINIC | Age: 58
End: 2018-11-14

## 2018-11-14 VITALS
DIASTOLIC BLOOD PRESSURE: 97 MMHG | WEIGHT: 166 LBS | SYSTOLIC BLOOD PRESSURE: 145 MMHG | HEART RATE: 96 BPM | HEIGHT: 69 IN

## 2018-11-14 DIAGNOSIS — R14.0 ABDOMINAL DISTENSION (GASEOUS): ICD-10-CM

## 2018-11-14 DIAGNOSIS — R11.2 NAUSEA WITH VOMITING, UNSPECIFIED: ICD-10-CM

## 2018-11-14 DIAGNOSIS — Z72.0 TOBACCO USE: ICD-10-CM

## 2018-11-14 DIAGNOSIS — K59.00 CONSTIPATION, UNSPECIFIED: ICD-10-CM

## 2018-11-14 DIAGNOSIS — R10.10 UPPER ABDOMINAL PAIN, UNSPECIFIED: ICD-10-CM

## 2018-11-14 PROCEDURE — 99214 OFFICE O/P EST MOD 30 MIN: CPT | Performed by: NURSE PRACTITIONER

## 2018-11-14 RX ORDER — RIFAXIMIN 550 MG/1
1650 TABLET ORAL
Qty: 42 | Refills: 2 | Status: COMPLETED
Start: 2018-11-14 | End: 2018-12-26

## 2018-11-14 RX ORDER — ONDANSETRON HYDROCHLORIDE 4 MG/1
16 TABLET, FILM COATED ORAL
Qty: 40 | Refills: 0 | Status: COMPLETED
Start: 2018-11-14 | End: 2023-07-07

## 2018-11-20 ENCOUNTER — HOSPITAL ENCOUNTER (OUTPATIENT)
Dept: OTHER | Facility: HOSPITAL | Age: 58
Discharge: HOME | End: 2018-11-20
Attending: NURSE PRACTITIONER | Admitting: NURSE PRACTITIONER

## 2018-11-20 LAB
ALBUMIN SERPL-MCNC: 2.8 G/DL (ref 3.5–4.8)
ALBUMIN/GLOB SERPL: 1 {RATIO} (ref 1–1.7)
ALP SERPL-CCNC: 121 IU/L (ref 32–91)
ALT SERPL-CCNC: 55 IU/L (ref 17–63)
ANION GAP SERPL CALC-SCNC: 13.4 MMOL/L (ref 10–20)
AST SERPL-CCNC: 43 IU/L (ref 15–41)
BILIRUB SERPL-MCNC: 1 MG/DL (ref 0.3–1.2)
BUN SERPL-MCNC: 43 MG/DL (ref 8–20)
BUN/CREAT SERPL: 16.5 (ref 6.2–20.3)
CALCIUM SERPL-MCNC: 8.4 MG/DL (ref 8.9–10.3)
CHLORIDE SERPL-SCNC: 98 MMOL/L (ref 101–111)
CONV CO2: 27 MMOL/L (ref 22–32)
CONV TOTAL PROTEIN: 5.7 G/DL (ref 6.1–7.9)
CREAT 24H UR-MCNC: 86.3 MG/DL
CREAT UR-MCNC: 2.6 MG/DL (ref 0.7–1.2)
ERYTHROCYTE [DISTWIDTH] IN BLOOD BY AUTOMATED COUNT: 14.5 % (ref 11.5–14.5)
GLOBULIN UR ELPH-MCNC: 2.9 G/DL (ref 2.5–3.8)
GLUCOSE SERPL-MCNC: 180 MG/DL (ref 65–99)
HCT VFR BLD AUTO: 37 % (ref 40–54)
HGB BLD-MCNC: 12.1 G/DL (ref 14–18)
MAGNESIUM SERPL-MCNC: 2.1 MG/DL (ref 1.8–2.5)
MCH RBC QN AUTO: 30 PG (ref 26–32)
MCHC RBC AUTO-ENTMCNC: 32.8 G/DL (ref 32–36)
MCV RBC AUTO: 91.5 FL (ref 80–94)
PHOSPHATE SERPL-MCNC: 3.5 MG/DL (ref 2.4–4.7)
PLATELET # BLD AUTO: 245 10*3/UL (ref 150–450)
PMV BLD AUTO: 8.9 FL (ref 7.4–10.4)
POTASSIUM SERPL-SCNC: 4.4 MMOL/L (ref 3.6–5.1)
PROT UR-MCNC: ABNORMAL MG/DL
PROT/CREAT UR: 3.2 MG/MG (ref 0–22)
RBC # BLD AUTO: 4.04 10*6/UL (ref 4.6–6)
SODIUM SERPL-SCNC: 134 MMOL/L (ref 136–144)
WBC # BLD AUTO: 6.5 10*3/UL (ref 4.5–11.5)

## 2018-11-28 ENCOUNTER — HOSPITAL ENCOUNTER (OUTPATIENT)
Dept: CT IMAGING | Facility: HOSPITAL | Age: 58
Discharge: HOME | End: 2018-11-28
Attending: NURSE PRACTITIONER | Admitting: NURSE PRACTITIONER

## 2018-11-28 LAB
ALBUMIN SERPL-MCNC: 2.5 G/DL (ref 3.5–4.8)
ALBUMIN/GLOB SERPL: 0.8 {RATIO} (ref 1–1.7)
ALP SERPL-CCNC: 120 IU/L (ref 32–91)
ALT SERPL-CCNC: 20 IU/L (ref 17–63)
ANION GAP SERPL CALC-SCNC: 9.9 MMOL/L (ref 10–20)
AST SERPL-CCNC: 26 IU/L (ref 15–41)
BASOPHILS # BLD AUTO: 0.1 10*3/UL (ref 0–0.2)
BASOPHILS NFR BLD AUTO: 1 % (ref 0–2)
BILIRUB SERPL-MCNC: 0.6 MG/DL (ref 0.3–1.2)
BUN SERPL-MCNC: 32 MG/DL (ref 8–20)
BUN/CREAT SERPL: 15.2 (ref 6.2–20.3)
CALCIUM SERPL-MCNC: 8.4 MG/DL (ref 8.9–10.3)
CHLORIDE SERPL-SCNC: 102 MMOL/L (ref 101–111)
CONV CO2: 27 MMOL/L (ref 22–32)
CONV TOTAL PROTEIN: 5.7 G/DL (ref 6.1–7.9)
CREAT UR-MCNC: 2.1 MG/DL (ref 0.7–1.2)
DIFFERENTIAL METHOD BLD: (no result)
EOSINOPHIL # BLD AUTO: 0.3 10*3/UL (ref 0–0.3)
EOSINOPHIL # BLD AUTO: 4 % (ref 0–3)
ERYTHROCYTE [DISTWIDTH] IN BLOOD BY AUTOMATED COUNT: 14.2 % (ref 11.5–14.5)
GLOBULIN UR ELPH-MCNC: 3.2 G/DL (ref 2.5–3.8)
GLUCOSE SERPL-MCNC: 186 MG/DL (ref 65–99)
HCT VFR BLD AUTO: 35.4 % (ref 40–54)
HGB BLD-MCNC: 11.8 G/DL (ref 14–18)
LIPASE SERPL-CCNC: 27 U/L (ref 22–51)
LYMPHOCYTES # BLD AUTO: 0.6 10*3/UL (ref 0.8–4.8)
LYMPHOCYTES NFR BLD AUTO: 9 % (ref 18–42)
MCH RBC QN AUTO: 29.9 PG (ref 26–32)
MCHC RBC AUTO-ENTMCNC: 33.2 G/DL (ref 32–36)
MCV RBC AUTO: 89.9 FL (ref 80–94)
MONOCYTES # BLD AUTO: 0.8 10*3/UL (ref 0.1–1.3)
MONOCYTES NFR BLD AUTO: 12 % (ref 2–11)
NEUTROPHILS # BLD AUTO: 5.3 10*3/UL (ref 2.3–8.6)
NEUTROPHILS NFR BLD AUTO: 74 % (ref 50–75)
NRBC BLD AUTO-RTO: 0 /100{WBCS}
NRBC/RBC NFR BLD MANUAL: 0 10*3/UL
PLATELET # BLD AUTO: 194 10*3/UL (ref 150–450)
PMV BLD AUTO: 9.2 FL (ref 7.4–10.4)
POTASSIUM SERPL-SCNC: 3.9 MMOL/L (ref 3.6–5.1)
RBC # BLD AUTO: 3.94 10*6/UL (ref 4.6–6)
SODIUM SERPL-SCNC: 135 MMOL/L (ref 136–144)
WBC # BLD AUTO: 7.1 10*3/UL (ref 4.5–11.5)

## 2018-12-26 ENCOUNTER — OFFICE (AMBULATORY)
Dept: URBAN - METROPOLITAN AREA CLINIC 64 | Facility: CLINIC | Age: 58
End: 2018-12-26

## 2018-12-26 VITALS
WEIGHT: 170 LBS | HEART RATE: 92 BPM | SYSTOLIC BLOOD PRESSURE: 137 MMHG | DIASTOLIC BLOOD PRESSURE: 95 MMHG | HEIGHT: 69 IN

## 2018-12-26 DIAGNOSIS — L28.2 OTHER PRURIGO: ICD-10-CM

## 2018-12-26 DIAGNOSIS — R11.2 NAUSEA WITH VOMITING, UNSPECIFIED: ICD-10-CM

## 2018-12-26 DIAGNOSIS — R14.0 ABDOMINAL DISTENSION (GASEOUS): ICD-10-CM

## 2018-12-26 DIAGNOSIS — R93.3 ABNORMAL FINDINGS ON DIAGNOSTIC IMAGING OF OTHER PARTS OF DI: ICD-10-CM

## 2018-12-26 DIAGNOSIS — R10.10 UPPER ABDOMINAL PAIN, UNSPECIFIED: ICD-10-CM

## 2018-12-26 PROCEDURE — 99214 OFFICE O/P EST MOD 30 MIN: CPT | Performed by: NURSE PRACTITIONER

## 2018-12-26 RX ORDER — FAMOTIDINE 40 MG/1
TABLET, FILM COATED ORAL
Qty: 30 | Refills: 10 | Status: COMPLETED
Start: 2018-12-26 | End: 2023-07-07

## 2018-12-26 RX ORDER — PANTOPRAZOLE SODIUM 40 MG/1
40 TABLET, DELAYED RELEASE ORAL
Qty: 30 | Refills: 11 | Status: COMPLETED
Start: 2016-12-30 | End: 2018-12-26

## 2019-01-22 ENCOUNTER — HOSPITAL ENCOUNTER (OUTPATIENT)
Dept: LAB | Facility: HOSPITAL | Age: 59
Discharge: HOME | End: 2019-01-22
Attending: INTERNAL MEDICINE | Admitting: INTERNAL MEDICINE

## 2019-01-22 LAB
ALBUMIN SERPL-MCNC: 2.5 G/DL (ref 3.5–4.8)
ALBUMIN/GLOB SERPL: 0.8 {RATIO} (ref 1–1.7)
ALP SERPL-CCNC: 105 IU/L (ref 32–91)
ALT SERPL-CCNC: 21 IU/L (ref 17–63)
ANION GAP SERPL CALC-SCNC: 16.1 MMOL/L (ref 10–20)
AST SERPL-CCNC: 32 IU/L (ref 15–41)
BILIRUB SERPL-MCNC: 0.3 MG/DL (ref 0.3–1.2)
BUN SERPL-MCNC: 47 MG/DL (ref 8–20)
BUN/CREAT SERPL: 22.4 (ref 6.2–20.3)
CALCIUM SERPL-MCNC: 8.1 MG/DL (ref 8.9–10.3)
CHLORIDE SERPL-SCNC: 96 MMOL/L (ref 101–111)
CONV CO2: 24 MMOL/L (ref 22–32)
CONV TOTAL PROTEIN: 5.8 G/DL (ref 6.1–7.9)
CREAT 24H UR-MCNC: 35.5 MG/DL
CREAT UR-MCNC: 2.1 MG/DL (ref 0.7–1.2)
ERYTHROCYTE [DISTWIDTH] IN BLOOD BY AUTOMATED COUNT: 16.8 % (ref 11.5–14.5)
GLOBULIN UR ELPH-MCNC: 3.3 G/DL (ref 2.5–3.8)
GLUCOSE SERPL-MCNC: 222 MG/DL (ref 65–99)
HCT VFR BLD AUTO: 35.8 % (ref 40–54)
HGB BLD-MCNC: 11.4 G/DL (ref 14–18)
MAGNESIUM SERPL-MCNC: 2 MG/DL (ref 1.8–2.5)
MCH RBC QN AUTO: 26.8 PG (ref 26–32)
MCHC RBC AUTO-ENTMCNC: 31.9 G/DL (ref 32–36)
MCV RBC AUTO: 83.9 FL (ref 80–94)
PHOSPHATE SERPL-MCNC: 4.1 MG/DL (ref 2.4–4.7)
PLATELET # BLD AUTO: 201 10*3/UL (ref 150–450)
PMV BLD AUTO: 8.5 FL (ref 7.4–10.4)
POTASSIUM SERPL-SCNC: 4.1 MMOL/L (ref 3.6–5.1)
PROT UR-MCNC: 146 MG/DL
PROT/CREAT UR: 4.1 MG/MG (ref 0–22)
RBC # BLD AUTO: 4.26 10*6/UL (ref 4.6–6)
SODIUM SERPL-SCNC: 132 MMOL/L (ref 136–144)
WBC # BLD AUTO: 5.6 10*3/UL (ref 4.5–11.5)

## 2019-01-28 ENCOUNTER — OFFICE (AMBULATORY)
Dept: URBAN - METROPOLITAN AREA CLINIC 64 | Facility: CLINIC | Age: 59
End: 2019-01-28

## 2019-01-28 VITALS
WEIGHT: 165 LBS | DIASTOLIC BLOOD PRESSURE: 89 MMHG | HEART RATE: 89 BPM | SYSTOLIC BLOOD PRESSURE: 139 MMHG | HEIGHT: 69 IN

## 2019-01-28 DIAGNOSIS — R14.0 ABDOMINAL DISTENSION (GASEOUS): ICD-10-CM

## 2019-01-28 DIAGNOSIS — R43.2 PARAGEUSIA: ICD-10-CM

## 2019-01-28 DIAGNOSIS — R93.3 ABNORMAL FINDINGS ON DIAGNOSTIC IMAGING OF OTHER PARTS OF DI: ICD-10-CM

## 2019-01-28 DIAGNOSIS — K59.00 CONSTIPATION, UNSPECIFIED: ICD-10-CM

## 2019-01-28 DIAGNOSIS — R11.2 NAUSEA WITH VOMITING, UNSPECIFIED: ICD-10-CM

## 2019-01-28 DIAGNOSIS — R10.84 GENERALIZED ABDOMINAL PAIN: ICD-10-CM

## 2019-01-28 PROCEDURE — 99214 OFFICE O/P EST MOD 30 MIN: CPT | Performed by: INTERNAL MEDICINE

## 2019-01-28 RX ORDER — ZINC GLUCONATE 50 MG
TABLET ORAL
Qty: 30 | Refills: 4 | Status: COMPLETED
End: 2023-07-07

## 2019-04-10 ENCOUNTER — HOSPITAL ENCOUNTER (OUTPATIENT)
Dept: LAB | Facility: HOSPITAL | Age: 59
Setting detail: SPECIMEN
Discharge: HOME | End: 2019-04-10
Attending: INTERNAL MEDICINE | Admitting: INTERNAL MEDICINE

## 2019-04-10 LAB
ALBUMIN SERPL-MCNC: 3.1 G/DL (ref 3.5–4.8)
ALBUMIN/GLOB SERPL: 0.9 {RATIO} (ref 1–1.7)
ALP SERPL-CCNC: 92 IU/L (ref 32–91)
ALT SERPL-CCNC: 19 IU/L (ref 17–63)
ANION GAP SERPL CALC-SCNC: 18.2 MMOL/L (ref 10–20)
AST SERPL-CCNC: 22 IU/L (ref 15–41)
BASOPHILS # BLD AUTO: 0.1 10*3/UL (ref 0–0.2)
BASOPHILS NFR BLD AUTO: 2 % (ref 0–2)
BILIRUB SERPL-MCNC: 0.6 MG/DL (ref 0.3–1.2)
BUN SERPL-MCNC: 50 MG/DL (ref 8–20)
BUN/CREAT SERPL: 19.2 (ref 6.2–20.3)
CALCIUM SERPL-MCNC: 8.8 MG/DL (ref 8.9–10.3)
CHLORIDE SERPL-SCNC: 101 MMOL/L (ref 101–111)
CHOLEST SERPL-MCNC: 139 MG/DL
CHOLEST/HDLC SERPL: 3.5 {RATIO}
CONV CO2: 22 MMOL/L (ref 22–32)
CONV LDL CHOLESTEROL DIRECT: 91 MG/DL (ref 0–100)
CONV MICROALBUM.,U,RANDOM: ABNORMAL MG/L
CONV TOTAL PROTEIN: 6.6 G/DL (ref 6.1–7.9)
CREAT 24H UR-MCNC: 71.1 MG/DL
CREAT UR-MCNC: 2.6 MG/DL (ref 0.7–1.2)
DIFFERENTIAL METHOD BLD: (no result)
EOSINOPHIL # BLD AUTO: 0.2 10*3/UL (ref 0–0.3)
EOSINOPHIL # BLD AUTO: 4 % (ref 0–3)
ERYTHROCYTE [DISTWIDTH] IN BLOOD BY AUTOMATED COUNT: 21.9 % (ref 11.5–14.5)
GLOBULIN UR ELPH-MCNC: 3.5 G/DL (ref 2.5–3.8)
GLUCOSE SERPL-MCNC: 170 MG/DL (ref 65–99)
HCT VFR BLD AUTO: 37.2 % (ref 40–54)
HDLC SERPL-MCNC: 40 MG/DL
HGB BLD-MCNC: 12.1 G/DL (ref 14–18)
LDLC/HDLC SERPL: 2.3 {RATIO}
LIPID INTERPRETATION: NORMAL
LYMPHOCYTES # BLD AUTO: 1 10*3/UL (ref 0.8–4.8)
LYMPHOCYTES NFR BLD AUTO: 16 % (ref 18–42)
MAGNESIUM SERPL-MCNC: 2.1 MG/DL (ref 1.8–2.5)
MCH RBC QN AUTO: 28.8 PG (ref 26–32)
MCHC RBC AUTO-ENTMCNC: 32.6 G/DL (ref 32–36)
MCV RBC AUTO: 88.4 FL (ref 80–94)
MICROALBUMIN/CREAT UR: 1827 UG/MG
MONOCYTES # BLD AUTO: 0.6 10*3/UL (ref 0.1–1.3)
MONOCYTES NFR BLD AUTO: 10 % (ref 2–11)
NEUTROPHILS # BLD AUTO: 4.3 10*3/UL (ref 2.3–8.6)
NEUTROPHILS NFR BLD AUTO: 68 % (ref 50–75)
NRBC BLD AUTO-RTO: 0 /100{WBCS}
NRBC/RBC NFR BLD MANUAL: 0 10*3/UL
PHOSPHATE SERPL-MCNC: 5 MG/DL (ref 2.4–4.7)
PLATELET # BLD AUTO: 229 10*3/UL (ref 150–450)
PMV BLD AUTO: 8.7 FL (ref 7.4–10.4)
POTASSIUM SERPL-SCNC: 4.2 MMOL/L (ref 3.6–5.1)
RBC # BLD AUTO: 4.21 10*6/UL (ref 4.6–6)
SODIUM SERPL-SCNC: 137 MMOL/L (ref 136–144)
TRIGL SERPL-MCNC: 57 MG/DL
VLDLC SERPL CALC-MCNC: 8.6 MG/DL
WBC # BLD AUTO: 6.3 10*3/UL (ref 4.5–11.5)

## 2019-06-14 ENCOUNTER — HOSPITAL ENCOUNTER (OUTPATIENT)
Dept: CARDIOLOGY | Facility: HOSPITAL | Age: 59
Discharge: HOME | End: 2019-06-14
Attending: FAMILY MEDICINE | Admitting: FAMILY MEDICINE

## 2019-06-27 ENCOUNTER — CLINICAL SUPPORT NO REQUIREMENTS (OUTPATIENT)
Dept: CARDIOLOGY | Facility: CLINIC | Age: 59
End: 2019-06-27

## 2019-06-27 DIAGNOSIS — I42.0 CARDIOMYOPATHY, DILATED (HCC): Primary | ICD-10-CM

## 2019-06-27 DIAGNOSIS — Z95.810 ICD (IMPLANTABLE CARDIOVERTER-DEFIBRILLATOR), DUAL, IN SITU: ICD-10-CM

## 2019-06-27 PROCEDURE — 93295 DEV INTERROG REMOTE 1/2/MLT: CPT | Performed by: NURSE PRACTITIONER

## 2019-06-27 PROCEDURE — 93296 REM INTERROG EVL PM/IDS: CPT | Performed by: NURSE PRACTITIONER

## 2019-06-27 RX ORDER — TICAGRELOR 90 MG/1
TABLET ORAL
Qty: 180 TABLET | Refills: 3 | Status: SHIPPED | OUTPATIENT
Start: 2019-06-27 | End: 2020-07-13

## 2019-06-28 PROBLEM — Z95.810 ICD (IMPLANTABLE CARDIOVERTER-DEFIBRILLATOR), DUAL, IN SITU: Status: ACTIVE | Noted: 2019-06-28

## 2019-06-28 PROBLEM — I42.0 CARDIOMYOPATHY, DILATED: Status: ACTIVE | Noted: 2019-06-28

## 2019-06-28 NOTE — PROGRESS NOTES
REMOTE DEVICE INTERROGATION    Device Company: [employer]/S ICD    Battery Stable.  Time to SARTHAK very stable    Presenting EGM: Nonapplicable    Arrhythmia Logbook Reviewed: No VT or VF events    Continue remote device interrogations every 3 months.

## 2019-07-01 ENCOUNTER — TELEPHONE (OUTPATIENT)
Dept: CARDIOLOGY | Facility: CLINIC | Age: 59
End: 2019-07-01

## 2019-07-01 NOTE — TELEPHONE ENCOUNTER
Patient called because wants to verify the correct blood pressure medicines that he is supposed to be on. He said the mail order pharmacy dropped his meds off today and some of them didn't sound right.

## 2019-07-17 ENCOUNTER — LAB (OUTPATIENT)
Dept: LAB | Facility: HOSPITAL | Age: 59
End: 2019-07-17

## 2019-07-17 ENCOUNTER — TRANSCRIBE ORDERS (OUTPATIENT)
Dept: ADMINISTRATIVE | Facility: HOSPITAL | Age: 59
End: 2019-07-17

## 2019-07-17 DIAGNOSIS — N18.4 CHRONIC KIDNEY DISEASE, STAGE IV (SEVERE) (HCC): Primary | ICD-10-CM

## 2019-07-17 DIAGNOSIS — N18.4 CHRONIC KIDNEY DISEASE, STAGE IV (SEVERE) (HCC): ICD-10-CM

## 2019-07-17 LAB
ALBUMIN SERPL-MCNC: 3.4 G/DL (ref 3.5–4.8)
ALBUMIN/GLOB SERPL: 1 G/DL (ref 1–1.7)
ALP SERPL-CCNC: 72 U/L (ref 32–91)
ALT SERPL W P-5'-P-CCNC: 19 U/L (ref 17–63)
ANION GAP SERPL CALCULATED.3IONS-SCNC: 12.7 MMOL/L (ref 5–15)
AST SERPL-CCNC: 26 U/L (ref 15–41)
BASOPHILS # BLD AUTO: 0.1 10*3/MM3 (ref 0–0.2)
BASOPHILS NFR BLD AUTO: 1.2 % (ref 0–1.5)
BILIRUB SERPL-MCNC: 0.7 MG/DL (ref 0.3–1.2)
BUN BLD-MCNC: 44 MG/DL (ref 8–20)
BUN/CREAT SERPL: 14.2 (ref 6.2–20.3)
CALCIUM SPEC-SCNC: 8.9 MG/DL (ref 8.9–10.3)
CHLORIDE SERPL-SCNC: 104 MMOL/L (ref 101–111)
CO2 SERPL-SCNC: 25 MMOL/L (ref 22–32)
CREAT BLD-MCNC: 3.1 MG/DL (ref 0.7–1.2)
CREAT UR-MCNC: 80.5 MG/DL
DEPRECATED RDW RBC AUTO: 56.4 FL (ref 37–54)
EOSINOPHIL # BLD AUTO: 0.2 10*3/MM3 (ref 0–0.4)
EOSINOPHIL NFR BLD AUTO: 3.7 % (ref 0.3–6.2)
ERYTHROCYTE [DISTWIDTH] IN BLOOD BY AUTOMATED COUNT: 17.9 % (ref 12.3–15.4)
GFR SERPL CREATININE-BSD FRML MDRD: 21 ML/MIN/1.73
GLOBULIN UR ELPH-MCNC: 3.5 GM/DL (ref 2.5–3.8)
GLUCOSE BLD-MCNC: 103 MG/DL (ref 65–99)
HCT VFR BLD AUTO: 38 % (ref 37.5–51)
HGB BLD-MCNC: 12.5 G/DL (ref 13–17.7)
LYMPHOCYTES # BLD AUTO: 1.1 10*3/MM3 (ref 0.7–3.1)
LYMPHOCYTES NFR BLD AUTO: 17.2 % (ref 19.6–45.3)
MAGNESIUM SERPL-MCNC: 2.2 MG/DL (ref 1.8–2.5)
MCH RBC QN AUTO: 29.6 PG (ref 26.6–33)
MCHC RBC AUTO-ENTMCNC: 33 G/DL (ref 31.5–35.7)
MCV RBC AUTO: 89.8 FL (ref 79–97)
MONOCYTES # BLD AUTO: 0.6 10*3/MM3 (ref 0.1–0.9)
MONOCYTES NFR BLD AUTO: 9 % (ref 5–12)
NEUTROPHILS # BLD AUTO: 4.3 10*3/MM3 (ref 1.7–7)
NEUTROPHILS NFR BLD AUTO: 68.9 % (ref 42.7–76)
NRBC BLD AUTO-RTO: 0.1 /100 WBC (ref 0–0.2)
PHOSPHATE SERPL-MCNC: 4.2 MG/DL (ref 2.4–4.7)
PLATELET # BLD AUTO: 213 10*3/MM3 (ref 140–450)
PMV BLD AUTO: 8.5 FL (ref 6–12)
POTASSIUM BLD-SCNC: 3.7 MMOL/L (ref 3.6–5.1)
PROT SERPL-MCNC: 6.9 G/DL (ref 6.1–7.9)
PROT UR-MCNC: 98 MG/DL
PROT/CREAT UR: 1217.4 MG/G CREA (ref 0–200)
PTH-INTACT SERPL-MCNC: 64 PG/ML (ref 11–72)
RBC # BLD AUTO: 4.23 10*6/MM3 (ref 4.14–5.8)
SODIUM BLD-SCNC: 138 MMOL/L (ref 136–144)
WBC NRBC COR # BLD: 6.2 10*3/MM3 (ref 3.4–10.8)

## 2019-07-17 PROCEDURE — 84100 ASSAY OF PHOSPHORUS: CPT

## 2019-07-17 PROCEDURE — 83970 ASSAY OF PARATHORMONE: CPT

## 2019-07-17 PROCEDURE — 82570 ASSAY OF URINE CREATININE: CPT

## 2019-07-17 PROCEDURE — 36415 COLL VENOUS BLD VENIPUNCTURE: CPT

## 2019-07-17 PROCEDURE — 84156 ASSAY OF PROTEIN URINE: CPT

## 2019-07-17 PROCEDURE — 80053 COMPREHEN METABOLIC PANEL: CPT

## 2019-07-17 PROCEDURE — 85025 COMPLETE CBC W/AUTO DIFF WBC: CPT

## 2019-07-17 PROCEDURE — 83735 ASSAY OF MAGNESIUM: CPT

## 2019-07-22 RX ORDER — HYDRALAZINE HYDROCHLORIDE 25 MG/1
TABLET, FILM COATED ORAL
Qty: 180 TABLET | Refills: 1 | Status: SHIPPED | OUTPATIENT
Start: 2019-07-22 | End: 2019-07-31

## 2019-07-29 PROBLEM — E10.49 TYPE 1 DIABETES MELLITUS WITH OTHER DIABETIC NEUROLOGICAL COMPLICATION: Status: ACTIVE | Noted: 2017-01-03

## 2019-09-11 ENCOUNTER — OFFICE VISIT (OUTPATIENT)
Dept: CARDIOLOGY | Facility: CLINIC | Age: 59
End: 2019-09-11

## 2019-09-11 VITALS
HEART RATE: 61 BPM | BODY MASS INDEX: 25.18 KG/M2 | HEIGHT: 69 IN | WEIGHT: 170 LBS | DIASTOLIC BLOOD PRESSURE: 74 MMHG | SYSTOLIC BLOOD PRESSURE: 114 MMHG

## 2019-09-11 DIAGNOSIS — I42.0 CARDIOMYOPATHY, DILATED (HCC): Primary | ICD-10-CM

## 2019-09-11 DIAGNOSIS — I50.9 CONGESTIVE HEART FAILURE, UNSPECIFIED HF CHRONICITY, UNSPECIFIED HEART FAILURE TYPE (HCC): ICD-10-CM

## 2019-09-11 DIAGNOSIS — I25.119 CORONARY ARTERY DISEASE WITH ANGINA PECTORIS, UNSPECIFIED VESSEL OR LESION TYPE, UNSPECIFIED WHETHER NATIVE OR TRANSPLANTED HEART (HCC): ICD-10-CM

## 2019-09-11 DIAGNOSIS — Z95.810 ICD (IMPLANTABLE CARDIOVERTER-DEFIBRILLATOR), DUAL, IN SITU: ICD-10-CM

## 2019-09-11 DIAGNOSIS — E10.49 TYPE 1 DIABETES MELLITUS WITH OTHER DIABETIC NEUROLOGICAL COMPLICATION (HCC): ICD-10-CM

## 2019-09-11 PROCEDURE — 99214 OFFICE O/P EST MOD 30 MIN: CPT | Performed by: INTERNAL MEDICINE
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
## 2019-09-25 ENCOUNTER — LAB (OUTPATIENT)
Dept: LAB | Facility: HOSPITAL | Age: 59
End: 2019-09-25

## 2019-09-25 ENCOUNTER — TRANSCRIBE ORDERS (OUTPATIENT)
Dept: ADMINISTRATIVE | Facility: HOSPITAL | Age: 59
End: 2019-09-25

## 2019-09-25 DIAGNOSIS — N18.4 CHRONIC KIDNEY DISEASE, STAGE IV (SEVERE) (HCC): ICD-10-CM

## 2019-09-25 DIAGNOSIS — N18.4 CHRONIC KIDNEY DISEASE, STAGE IV (SEVERE) (HCC): Primary | ICD-10-CM

## 2019-09-25 LAB
ALBUMIN SERPL-MCNC: 3.5 G/DL (ref 3.5–4.8)
ALBUMIN/GLOB SERPL: 1 G/DL (ref 1–1.7)
ALP SERPL-CCNC: 80 U/L (ref 32–91)
ALT SERPL W P-5'-P-CCNC: 25 U/L (ref 17–63)
ANION GAP SERPL CALCULATED.3IONS-SCNC: 12.1 MMOL/L (ref 5–15)
AST SERPL-CCNC: 27 U/L (ref 15–41)
BACTERIA UR QL AUTO: ABNORMAL /HPF
BASOPHILS # BLD AUTO: 0.1 10*3/MM3 (ref 0–0.2)
BASOPHILS NFR BLD AUTO: 0.9 % (ref 0–1.5)
BILIRUB SERPL-MCNC: 0.7 MG/DL (ref 0.3–1.2)
BILIRUB UR QL STRIP: NEGATIVE
BUN BLD-MCNC: 43 MG/DL (ref 8–20)
BUN/CREAT SERPL: 15.4 (ref 6.2–20.3)
CALCIUM SPEC-SCNC: 8.7 MG/DL (ref 8.9–10.3)
CHLORIDE SERPL-SCNC: 102 MMOL/L (ref 101–111)
CLARITY UR: CLEAR
CO2 SERPL-SCNC: 27 MMOL/L (ref 22–32)
COLOR UR: YELLOW
CREAT BLD-MCNC: 2.8 MG/DL (ref 0.7–1.2)
CREAT UR-MCNC: 55.7 MG/DL
DEPRECATED RDW RBC AUTO: 54.7 FL (ref 37–54)
EOSINOPHIL # BLD AUTO: 0.3 10*3/MM3 (ref 0–0.4)
EOSINOPHIL NFR BLD AUTO: 5.1 % (ref 0.3–6.2)
ERYTHROCYTE [DISTWIDTH] IN BLOOD BY AUTOMATED COUNT: 17.1 % (ref 12.3–15.4)
GFR SERPL CREATININE-BSD FRML MDRD: 24 ML/MIN/1.73
GLOBULIN UR ELPH-MCNC: 3.5 GM/DL (ref 2.5–3.8)
GLUCOSE BLD-MCNC: 152 MG/DL (ref 65–99)
GLUCOSE UR STRIP-MCNC: NEGATIVE MG/DL
HCT VFR BLD AUTO: 36.7 % (ref 37.5–51)
HGB BLD-MCNC: 12.5 G/DL (ref 13–17.7)
HGB UR QL STRIP.AUTO: NEGATIVE
HYALINE CASTS UR QL AUTO: ABNORMAL /LPF
KETONES UR QL STRIP: NEGATIVE
LEUKOCYTE ESTERASE UR QL STRIP.AUTO: NEGATIVE
LYMPHOCYTES # BLD AUTO: 1.3 10*3/MM3 (ref 0.7–3.1)
LYMPHOCYTES NFR BLD AUTO: 19.6 % (ref 19.6–45.3)
MAGNESIUM SERPL-MCNC: 2.1 MG/DL (ref 1.8–2.5)
MCH RBC QN AUTO: 31.4 PG (ref 26.6–33)
MCHC RBC AUTO-ENTMCNC: 34 G/DL (ref 31.5–35.7)
MCV RBC AUTO: 92.3 FL (ref 79–97)
MONOCYTES # BLD AUTO: 0.8 10*3/MM3 (ref 0.1–0.9)
MONOCYTES NFR BLD AUTO: 12.3 % (ref 5–12)
NEUTROPHILS # BLD AUTO: 4.2 10*3/MM3 (ref 1.7–7)
NEUTROPHILS NFR BLD AUTO: 62.1 % (ref 42.7–76)
NITRITE UR QL STRIP: NEGATIVE
NRBC BLD AUTO-RTO: 0.1 /100 WBC (ref 0–0.2)
PH UR STRIP.AUTO: 6 [PH] (ref 5–8)
PHOSPHATE SERPL-MCNC: 3.9 MG/DL (ref 2.4–4.7)
PLATELET # BLD AUTO: 191 10*3/MM3 (ref 140–450)
PMV BLD AUTO: 8.6 FL (ref 6–12)
POTASSIUM BLD-SCNC: 4.1 MMOL/L (ref 3.6–5.1)
PROT SERPL-MCNC: 7 G/DL (ref 6.1–7.9)
PROT UR QL STRIP: ABNORMAL
PROT UR-MCNC: 61 MG/DL
PROT/CREAT UR: 1095.2 MG/G CREA (ref 0–200)
PTH-INTACT SERPL-MCNC: 155 PG/ML (ref 11–72)
RBC # BLD AUTO: 3.98 10*6/MM3 (ref 4.14–5.8)
RBC # UR: ABNORMAL /HPF
REF LAB TEST METHOD: ABNORMAL
SODIUM BLD-SCNC: 137 MMOL/L (ref 136–144)
SP GR UR STRIP: 1.01 (ref 1–1.03)
SQUAMOUS #/AREA URNS HPF: ABNORMAL /HPF
UROBILINOGEN UR QL STRIP: ABNORMAL
WBC NRBC COR # BLD: 6.8 10*3/MM3 (ref 3.4–10.8)
WBC UR QL AUTO: ABNORMAL /HPF

## 2019-09-25 PROCEDURE — 85025 COMPLETE CBC W/AUTO DIFF WBC: CPT

## 2019-09-25 PROCEDURE — 36415 COLL VENOUS BLD VENIPUNCTURE: CPT

## 2019-09-25 PROCEDURE — 84100 ASSAY OF PHOSPHORUS: CPT

## 2019-09-25 PROCEDURE — 83970 ASSAY OF PARATHORMONE: CPT

## 2019-09-25 PROCEDURE — 81001 URINALYSIS AUTO W/SCOPE: CPT

## 2019-09-25 PROCEDURE — 84156 ASSAY OF PROTEIN URINE: CPT

## 2019-09-25 PROCEDURE — 83735 ASSAY OF MAGNESIUM: CPT

## 2019-09-25 PROCEDURE — 80053 COMPREHEN METABOLIC PANEL: CPT

## 2019-09-25 PROCEDURE — 82570 ASSAY OF URINE CREATININE: CPT

## 2019-09-26 DIAGNOSIS — E10.49 TYPE 1 DIABETES MELLITUS WITH OTHER DIABETIC NEUROLOGICAL COMPLICATION (HCC): Primary | ICD-10-CM

## 2019-09-30 RX ORDER — METOPROLOL SUCCINATE 25 MG/1
TABLET, EXTENDED RELEASE ORAL
Qty: 90 TABLET | Refills: 4 | Status: SHIPPED | OUTPATIENT
Start: 2019-09-30 | End: 2020-11-11

## 2019-10-08 ENCOUNTER — LAB (OUTPATIENT)
Dept: LAB | Facility: HOSPITAL | Age: 59
End: 2019-10-08

## 2019-10-08 DIAGNOSIS — E10.49 TYPE 1 DIABETES MELLITUS WITH OTHER DIABETIC NEUROLOGICAL COMPLICATION (HCC): ICD-10-CM

## 2019-10-08 LAB
ALBUMIN SERPL-MCNC: 3.8 G/DL (ref 3.5–4.8)
ALBUMIN UR-MCNC: 556 MG/L
ALBUMIN/GLOB SERPL: 1.2 G/DL (ref 1–1.7)
ALP SERPL-CCNC: 85 U/L (ref 32–91)
ALT SERPL W P-5'-P-CCNC: 25 U/L (ref 17–63)
ANION GAP SERPL CALCULATED.3IONS-SCNC: 15 MMOL/L (ref 5–15)
ARTICHOKE IGE QN: 91 MG/DL (ref 0–100)
AST SERPL-CCNC: 27 U/L (ref 15–41)
BILIRUB SERPL-MCNC: 0.5 MG/DL (ref 0.3–1.2)
BUN BLD-MCNC: 52 MG/DL (ref 8–20)
BUN/CREAT SERPL: 17.3 (ref 6.2–20.3)
CALCIUM SPEC-SCNC: 9.3 MG/DL (ref 8.9–10.3)
CHLORIDE SERPL-SCNC: 100 MMOL/L (ref 101–111)
CHOLEST SERPL-MCNC: 140 MG/DL
CO2 SERPL-SCNC: 28 MMOL/L (ref 22–32)
CREAT BLD-MCNC: 3 MG/DL (ref 0.7–1.2)
CREAT UR-MCNC: 71 MG/DL
GFR SERPL CREATININE-BSD FRML MDRD: 22 ML/MIN/1.73
GLOBULIN UR ELPH-MCNC: 3.1 GM/DL (ref 2.5–3.8)
GLUCOSE BLD-MCNC: 87 MG/DL (ref 65–99)
HDLC SERPL QL: 3.5
HDLC SERPL-MCNC: 40 MG/DL
LDLC/HDLC SERPL: 1.88 {RATIO}
MICROALBUMIN/CREAT UR: 783.1 UG/MG
POTASSIUM BLD-SCNC: 4 MMOL/L (ref 3.6–5.1)
PROT SERPL-MCNC: 6.9 G/DL (ref 6.1–7.9)
SODIUM BLD-SCNC: 139 MMOL/L (ref 136–144)
TRIGL SERPL-MCNC: 125 MG/DL
VLDLC SERPL-MCNC: 25 MG/DL

## 2019-10-08 PROCEDURE — 82043 UR ALBUMIN QUANTITATIVE: CPT

## 2019-10-08 PROCEDURE — 83036 HEMOGLOBIN GLYCOSYLATED A1C: CPT

## 2019-10-08 PROCEDURE — 82570 ASSAY OF URINE CREATININE: CPT

## 2019-10-08 PROCEDURE — 36415 COLL VENOUS BLD VENIPUNCTURE: CPT

## 2019-10-08 PROCEDURE — 80061 LIPID PANEL: CPT

## 2019-10-08 PROCEDURE — 80053 COMPREHEN METABOLIC PANEL: CPT

## 2019-10-09 LAB — HBA1C MFR BLD: 7.2 % (ref 3.5–5.6)

## 2019-10-15 ENCOUNTER — OFFICE VISIT (OUTPATIENT)
Dept: ENDOCRINOLOGY | Facility: CLINIC | Age: 59
End: 2019-10-15

## 2019-10-15 VITALS
SYSTOLIC BLOOD PRESSURE: 120 MMHG | DIASTOLIC BLOOD PRESSURE: 68 MMHG | HEART RATE: 64 BPM | BODY MASS INDEX: 26.07 KG/M2 | WEIGHT: 176 LBS | OXYGEN SATURATION: 99 % | HEIGHT: 69 IN

## 2019-10-15 DIAGNOSIS — Z46.81 INSULIN PUMP TITRATION: ICD-10-CM

## 2019-10-15 DIAGNOSIS — E10.49 TYPE 1 DIABETES MELLITUS WITH OTHER DIABETIC NEUROLOGICAL COMPLICATION (HCC): Primary | ICD-10-CM

## 2019-10-15 DIAGNOSIS — E78.49 OTHER HYPERLIPIDEMIA: ICD-10-CM

## 2019-10-15 LAB — GLUCOSE BLDC GLUCOMTR-MCNC: 284 MG/DL (ref 70–130)

## 2019-10-15 PROCEDURE — 99214 OFFICE O/P EST MOD 30 MIN: CPT | Performed by: INTERNAL MEDICINE

## 2019-10-15 PROCEDURE — 90674 CCIIV4 VAC NO PRSV 0.5 ML IM: CPT | Performed by: INTERNAL MEDICINE

## 2019-10-15 PROCEDURE — 82962 GLUCOSE BLOOD TEST: CPT | Performed by: INTERNAL MEDICINE

## 2019-10-15 PROCEDURE — 90471 IMMUNIZATION ADMIN: CPT | Performed by: INTERNAL MEDICINE

## 2019-10-15 NOTE — PATIENT INSTRUCTIONS
Keep up the good work!  Flu shot given today.  Contact tech support for them to check if pump is working correctly.  Contin ue same pump settings for now.  Call if blood sugars are running under 100 or over 200.  F/u in 6 months, with labs prior.

## 2019-10-16 PROBLEM — Z46.81 INSULIN PUMP TITRATION: Status: ACTIVE | Noted: 2017-06-01

## 2019-10-16 PROBLEM — E78.49 OTHER HYPERLIPIDEMIA: Status: ACTIVE | Noted: 2019-10-16

## 2019-10-17 ENCOUNTER — TELEPHONE (OUTPATIENT)
Dept: ENDOCRINOLOGY | Facility: CLINIC | Age: 59
End: 2019-10-17

## 2019-10-17 NOTE — PROGRESS NOTES
[facility]        Patient Care Team:  Erika Hernandez MD as PCP - General  Erika Hernandez MD as PCP - Family Medicine    Chief Complaint:    Chief Complaint   Patient presents with   • Diabetes     type 1 12a         Subjective   Here for diabetes f/u  Blood sugars up & down  Thinks the pump is not delivering insulin correctly some times  Checks blood sugars 4x/d for the last 3 months. Adjusts insulin dose based on results  Exercise program: very active    Interval History:     Patient Complaints: none  Patient Denies:  Severe hypoglycemia  History taken from: patient    Review of Systems:   Review of Systems   Eyes: Negative for blurred vision.   Gastrointestinal: Negative for nausea.   Endocrine: Negative for polyuria.   Neurological: Negative for headache.     Gained 10 lb since last visit    Objective     Vital Signs      Vitals:    10/15/19 1320   BP: 120/68   Pulse: 64   SpO2: 99%         Physical Exam:     General Appearance:    Alert, cooperative, in no acute distress   Head:    Normocephalic, without obvious abnormality, atraumatic   Eyes:            Lids and lashes normal, conjunctivae and sclerae normal, no   icterus, no pallor, corneas clear, PERRLA   Throat:   No oral lesions,  oral mucosa moist. Missing teeth   Neck:   No adenopathy, supple,  no thyromegaly, no   carotid bruit   Lungs:     Clear    Heart:    Regular rhythm and normal rate   Chest Wall:    No abnormalities observed   Abdomen:     Normal bowel sounds, soft                 Extremities:   Hammer toes, trace edema, toe nails need trimming               Pulses:   Pulses palpable and equal bilaterally   Skin:   Dry   Neurologic:  DTR absent in ankles, absent vibratory sense in toes, able to feel the 10g monofilament, except in toes ( same as 1y ago )            Results Review:    I have reviewed the patient's new clinical results, labs & imaging.    Medication Review:   Prior to Admission medications    Medication Sig  Start Date End Date Taking? Authorizing Provider   acetone, urine, test (KETOSTIX) strip Take As Directed. 1/16/18  Yes Giacomo Cheema MD   AMITIZA 8 MCG capsule 1 capsule 2 (Two) Times a Day With Meals. 7/3/19  Yes Giacomo Cheema MD   aspirin (ADULT ASPIRIN EC LOW STRENGTH) 81 MG EC tablet 1 tablet Daily. 6/29/16  Yes Giacomo Cheema MD   B Complex Vitamins (VITAMIN B COMPLEX PO) 1 tablet Daily. 11/15/17  Yes Giacomo Cheema MD   BRILINTA 90 MG tablet tablet TAKE 1 TABLET TWICE A DAY 6/27/19  Yes Rachid Sheriff MD   bumetanide (BUMEX) 2 MG tablet 1 tablet 2 (Two) Times a Day. 7/9/19  Yes Giacomo Cheema MD   calcitriol (ROCALTROL) 0.25 MCG capsule 1 capsule. Take one every other day 4/17/19  Yes Giacomo Cheema MD   Cholecalciferol 1000 units capsule 1 capsule Daily. 5000units daily 12/7/16  Yes Giacomo Cheema MD   famotidine (PEPCID) 40 MG tablet 1 tablet Daily. 4/17/19  Yes Giacomo Cheema MD   glucose blood test strip 1 each by Other route As Needed ([employer] test strips.. use to check blood ugar four times daily). Use as instructed   Yes Giacomo Cheema MD   HUMALOG 100 UNIT/ML injection Take As Directed. Use in insulin pump max daily dose 50 units dx:e10.65 5/13/19  Yes Giacomo Cheema MD   hydrALAZINE (APRESOLINE) 25 MG tablet 1 tablet Every 12 (Twelve) Hours. 1/15/18  Yes Giacomo Cheema MD   isosorbide mononitrate (IMDUR) 30 MG 24 hr tablet 1 tablet Daily. 6/26/19  Yes Giacomo Cheema MD   metoprolol succinate XL (TOPROL-XL) 25 MG 24 hr tablet TAKE 1 TABLET DAILY 9/30/19  Yes Rachid Sheriff MD   Multiple Vitamin (DAILY-VITAMIN) tablet 1 tablet Daily. 10/10/17  Yes Giacomo Cheema MD   Omega-3 Fatty Acids (FISH OIL) 1000 MG capsule capsule 1 capsule Daily. 10/10/17  Yes Giacomo Cheema MD   simvastatin (ZOCOR) 40 MG tablet 1 tablet Daily. 5/29/15  Yes Giacomo Cheema MD   spironolactone (ALDACTONE) 25 MG tablet 1 tablet  Daily. Half tablet daily 2/1/18  Yes Provider, MD Giacomo       Lab Results (most recent)     Procedure Component Value Units Date/Time    POC Glucose Fingerstick [[id number]]  (Abnormal) Collected:  10/15/19 1318    Specimen:  Blood Updated:  10/15/19 1320     Glucose 284 mg/dL      Comment: ate@11am this morning, 2 hours ago               Lab Results   Component Value Date    HGBA1C 7.2 (H) 10/08/2019    HGBA1C 6.9 (H) 10/10/2018      Lab Results   Component Value Date    GLUCOSE 87 10/08/2019    BUN 52 (H) 10/08/2019    CREATININE 3.00 (H) 10/08/2019    EGFRIFNONA 22 (L) 10/08/2019    EGFRIFAFRI 35 (L) 07/16/2016    BCR 17.3 10/08/2019    K 4.0 10/08/2019    CO2 28.0 10/08/2019    CALCIUM 9.3 10/08/2019    ALBUMIN 3.80 10/08/2019    LABIL2 0.9 (L) 04/10/2019    AST 27 10/08/2019    ALT 25 10/08/2019    CHOL 140 10/08/2019    LDL 91 10/08/2019    HDL 40 10/08/2019    TRIG 125 10/08/2019     Lab Results   Component Value Date    TSH 4.01 10/03/2017       Assessment/Plan     Earnest was seen today for diabetes.    Diagnoses and all orders for this visit:    Type 1 diabetes mellitus with other diabetic neurological complication (CMS/[facility])  -     POC Glucose Fingerstick  -     Hemoglobin A1c; Future    Insulin pump titration    Other orders  -     Flucelvax Quad=>4Years (Vial)    Wearing an OmniPod insulin pump since 6/2017    Flu shot given today.  Contact tech support for them to check if pump is working correctly.  Continue same pump settings for now.  Call if blood sugars are running under 100 or over 200.  Will order DexCom CGMS.        Shirley Guido MD  10/16/19  10:14 PM

## 2019-10-21 ENCOUNTER — TELEPHONE (OUTPATIENT)
Dept: ENDOCRINOLOGY | Facility: CLINIC | Age: 59
End: 2019-10-21

## 2019-10-21 NOTE — TELEPHONE ENCOUNTER
Pt is five days short of insulin requesting a vial of insulin. Put a vial up for him. Pt notified.

## 2019-10-21 NOTE — TELEPHONE ENCOUNTER
THE PATIENT CALLED TO GET A REFILL FOR HIS HUMALOG  AT HIS LOCAL [facility].  PATIENT SAID HE NEEDED  FIVE DAYS WORTH OF MEDICATION.

## 2019-10-24 ENCOUNTER — APPOINTMENT (OUTPATIENT)
Dept: VASCULAR SURGERY | Facility: HOSPITAL | Age: 59
End: 2019-10-24

## 2019-10-24 PROCEDURE — G0463 HOSPITAL OUTPT CLINIC VISIT: HCPCS

## 2019-10-28 ENCOUNTER — TRANSCRIBE ORDERS (OUTPATIENT)
Dept: ADMINISTRATIVE | Facility: HOSPITAL | Age: 59
End: 2019-10-28

## 2019-10-28 DIAGNOSIS — I73.9 PERIPHERAL VASCULAR DISEASE, UNSPECIFIED (HCC): Primary | ICD-10-CM

## 2019-10-31 ENCOUNTER — TELEPHONE (OUTPATIENT)
Dept: ENDOCRINOLOGY | Facility: CLINIC | Age: 59
End: 2019-10-31

## 2019-11-13 ENCOUNTER — TELEPHONE (OUTPATIENT)
Dept: ENDOCRINOLOGY | Facility: CLINIC | Age: 59
End: 2019-11-13

## 2019-11-13 NOTE — TELEPHONE ENCOUNTER
Pt called on 11/8 saying he had a new PDM for his Omnipod set up. Suggested he could call back on 11/11 for assistance from Educator or Omnipod customer support might be able to help.   Did not hear back from pt  Called today to followup and no answer.  Left vm to call Paula to let us know if issues resolved or if he still needs assistance

## 2019-12-26 ENCOUNTER — CLINICAL SUPPORT NO REQUIREMENTS (OUTPATIENT)
Dept: CARDIOLOGY | Facility: CLINIC | Age: 59
End: 2019-12-26

## 2019-12-26 DIAGNOSIS — Z95.810 ICD (IMPLANTABLE CARDIOVERTER-DEFIBRILLATOR), DUAL, IN SITU: ICD-10-CM

## 2019-12-26 DIAGNOSIS — I42.0 CARDIOMYOPATHY, DILATED (HCC): Primary | ICD-10-CM

## 2020-01-09 PROCEDURE — 93296 REM INTERROG EVL PM/IDS: CPT | Performed by: NURSE PRACTITIONER

## 2020-01-09 PROCEDURE — 93295 DEV INTERROG REMOTE 1/2/MLT: CPT | Performed by: NURSE PRACTITIONER

## 2020-01-09 NOTE — PROGRESS NOTES
REMOTE DEVICE INTERROGATION    Remote device interrogation is reviewed.     Device Company: [employer]    Battery Stable.  Time to SARTHAK 65% battery remaining    Presenting EGM: Not available on this device    Arrhythmia Logbook Reviewed: No events    Device function appears Stable    Continue remote device interrogations every 3 months.

## 2020-01-10 RX ORDER — CALCIUM CARB/VITAMIN D3/VIT K1 500-100-40
TABLET,CHEWABLE ORAL
Qty: 10 EACH | Refills: 2 | Status: SHIPPED | OUTPATIENT
Start: 2020-01-10

## 2020-01-10 NOTE — TELEPHONE ENCOUNTER
Pt called asking for syringes if pump not working properly and needs to give injection with syringe  Phone note scanned

## 2020-01-20 RX ORDER — HYDRALAZINE HYDROCHLORIDE 25 MG/1
TABLET, FILM COATED ORAL
Qty: 180 TABLET | Refills: 4 | Status: SHIPPED | OUTPATIENT
Start: 2020-01-20 | End: 2020-11-10 | Stop reason: SDUPTHER

## 2020-01-22 ENCOUNTER — LAB (OUTPATIENT)
Dept: LAB | Facility: HOSPITAL | Age: 60
End: 2020-01-22

## 2020-01-22 ENCOUNTER — TRANSCRIBE ORDERS (OUTPATIENT)
Dept: ADMINISTRATIVE | Facility: HOSPITAL | Age: 60
End: 2020-01-22

## 2020-01-22 DIAGNOSIS — E10.49 TYPE 1 DIABETES MELLITUS WITH OTHER DIABETIC NEUROLOGICAL COMPLICATION (HCC): ICD-10-CM

## 2020-01-22 DIAGNOSIS — N18.30 CHRONIC KIDNEY DISEASE, STAGE III (MODERATE) (HCC): Primary | ICD-10-CM

## 2020-01-22 DIAGNOSIS — N18.30 CHRONIC KIDNEY DISEASE, STAGE III (MODERATE) (HCC): ICD-10-CM

## 2020-01-22 LAB
ALBUMIN SERPL-MCNC: 3.6 G/DL (ref 3.5–5.2)
ALBUMIN/GLOB SERPL: 1.1 G/DL
ALP SERPL-CCNC: 91 U/L (ref 39–117)
ALT SERPL W P-5'-P-CCNC: 18 U/L (ref 1–41)
ANION GAP SERPL CALCULATED.3IONS-SCNC: 14.1 MMOL/L (ref 5–15)
AST SERPL-CCNC: 24 U/L (ref 1–40)
BACTERIA UR QL AUTO: NORMAL /HPF
BILIRUB SERPL-MCNC: 0.2 MG/DL (ref 0.2–1.2)
BILIRUB UR QL STRIP: NEGATIVE
BUN BLD-MCNC: 48 MG/DL (ref 6–20)
BUN/CREAT SERPL: 18.7 (ref 7–25)
CALCIUM SPEC-SCNC: 9.1 MG/DL (ref 8.6–10.5)
CHLORIDE SERPL-SCNC: 95 MMOL/L (ref 98–107)
CLARITY UR: CLEAR
CO2 SERPL-SCNC: 23.9 MMOL/L (ref 22–29)
COLOR UR: YELLOW
CREAT BLD-MCNC: 2.57 MG/DL (ref 0.76–1.27)
CREAT UR-MCNC: 53.9 MG/DL
DEPRECATED RDW RBC AUTO: 45.1 FL (ref 37–54)
ERYTHROCYTE [DISTWIDTH] IN BLOOD BY AUTOMATED COUNT: 12.9 % (ref 12.3–15.4)
GFR SERPL CREATININE-BSD FRML MDRD: 26 ML/MIN/1.73
GLOBULIN UR ELPH-MCNC: 3.2 GM/DL
GLUCOSE BLD-MCNC: 242 MG/DL (ref 65–99)
GLUCOSE UR STRIP-MCNC: ABNORMAL MG/DL
HBA1C MFR BLD: 7.3 % (ref 3.5–5.6)
HCT VFR BLD AUTO: 33.5 % (ref 37.5–51)
HGB BLD-MCNC: 11.2 G/DL (ref 13–17.7)
HGB UR QL STRIP.AUTO: NEGATIVE
HYALINE CASTS UR QL AUTO: NORMAL /LPF
KETONES UR QL STRIP: NEGATIVE
LEUKOCYTE ESTERASE UR QL STRIP.AUTO: NEGATIVE
MCH RBC QN AUTO: 32.2 PG (ref 26.6–33)
MCHC RBC AUTO-ENTMCNC: 33.4 G/DL (ref 31.5–35.7)
MCV RBC AUTO: 96.3 FL (ref 79–97)
NITRITE UR QL STRIP: NEGATIVE
PH UR STRIP.AUTO: 6 [PH] (ref 5–8)
PHOSPHATE SERPL-MCNC: 3.5 MG/DL (ref 2.5–4.5)
PLATELET # BLD AUTO: 233 10*3/MM3 (ref 140–450)
PMV BLD AUTO: 10.4 FL (ref 6–12)
POTASSIUM BLD-SCNC: 4.6 MMOL/L (ref 3.5–5.2)
PROT SERPL-MCNC: 6.8 G/DL (ref 6–8.5)
PROT UR QL STRIP: ABNORMAL
PROT UR-MCNC: 79 MG/DL
PROT/CREAT UR: 1465.7 MG/G CREA (ref 0–200)
PTH-INTACT SERPL-MCNC: 53.3 PG/ML (ref 15–65)
RBC # BLD AUTO: 3.48 10*6/MM3 (ref 4.14–5.8)
RBC # UR: NORMAL /HPF
REF LAB TEST METHOD: NORMAL
SODIUM BLD-SCNC: 133 MMOL/L (ref 136–145)
SP GR UR STRIP: 1.01 (ref 1–1.03)
SQUAMOUS #/AREA URNS HPF: NORMAL /HPF
UROBILINOGEN UR QL STRIP: ABNORMAL
WBC NRBC COR # BLD: 7.4 10*3/MM3 (ref 3.4–10.8)
WBC UR QL AUTO: NORMAL /HPF

## 2020-01-22 PROCEDURE — 85027 COMPLETE CBC AUTOMATED: CPT

## 2020-01-22 PROCEDURE — 81001 URINALYSIS AUTO W/SCOPE: CPT

## 2020-01-22 PROCEDURE — 80053 COMPREHEN METABOLIC PANEL: CPT

## 2020-01-22 PROCEDURE — 83036 HEMOGLOBIN GLYCOSYLATED A1C: CPT

## 2020-01-22 PROCEDURE — 84100 ASSAY OF PHOSPHORUS: CPT

## 2020-01-22 PROCEDURE — 36415 COLL VENOUS BLD VENIPUNCTURE: CPT

## 2020-01-22 PROCEDURE — 83970 ASSAY OF PARATHORMONE: CPT

## 2020-01-22 PROCEDURE — 84156 ASSAY OF PROTEIN URINE: CPT

## 2020-01-22 PROCEDURE — 82570 ASSAY OF URINE CREATININE: CPT

## 2020-03-09 PROBLEM — E78.2 MIXED HYPERLIPIDEMIA: Status: ACTIVE | Noted: 2020-03-09

## 2020-03-11 ENCOUNTER — LAB (OUTPATIENT)
Dept: LAB | Facility: HOSPITAL | Age: 60
End: 2020-03-11

## 2020-03-11 ENCOUNTER — OFFICE VISIT (OUTPATIENT)
Dept: CARDIOLOGY | Facility: CLINIC | Age: 60
End: 2020-03-11

## 2020-03-11 ENCOUNTER — TRANSCRIBE ORDERS (OUTPATIENT)
Dept: ADMINISTRATIVE | Facility: HOSPITAL | Age: 60
End: 2020-03-11

## 2020-03-11 VITALS
BODY MASS INDEX: 27.4 KG/M2 | WEIGHT: 185 LBS | DIASTOLIC BLOOD PRESSURE: 76 MMHG | HEART RATE: 70 BPM | SYSTOLIC BLOOD PRESSURE: 120 MMHG | HEIGHT: 69 IN

## 2020-03-11 DIAGNOSIS — E10.49 TYPE 1 DIABETES MELLITUS WITH OTHER DIABETIC NEUROLOGICAL COMPLICATION (HCC): ICD-10-CM

## 2020-03-11 DIAGNOSIS — N18.30 CHRONIC KIDNEY DISEASE, STAGE III (MODERATE) (HCC): Primary | ICD-10-CM

## 2020-03-11 DIAGNOSIS — I42.0 CARDIOMYOPATHY, DILATED (HCC): Primary | ICD-10-CM

## 2020-03-11 DIAGNOSIS — I50.9 CONGESTIVE HEART FAILURE, UNSPECIFIED HF CHRONICITY, UNSPECIFIED HEART FAILURE TYPE (HCC): ICD-10-CM

## 2020-03-11 DIAGNOSIS — N18.30 CHRONIC KIDNEY DISEASE, STAGE III (MODERATE) (HCC): ICD-10-CM

## 2020-03-11 DIAGNOSIS — E78.2 MIXED HYPERLIPIDEMIA: ICD-10-CM

## 2020-03-11 DIAGNOSIS — I25.10 CORONARY ARTERY DISEASE INVOLVING NATIVE CORONARY ARTERY OF NATIVE HEART WITHOUT ANGINA PECTORIS: ICD-10-CM

## 2020-03-11 DIAGNOSIS — Z95.810 ICD (IMPLANTABLE CARDIOVERTER-DEFIBRILLATOR), DUAL, IN SITU: ICD-10-CM

## 2020-03-11 LAB
ALBUMIN SERPL-MCNC: 3.7 G/DL (ref 3.5–5.2)
ALBUMIN/GLOB SERPL: 1 G/DL
ALP SERPL-CCNC: 96 U/L (ref 39–117)
ALT SERPL W P-5'-P-CCNC: 22 U/L (ref 1–41)
ANION GAP SERPL CALCULATED.3IONS-SCNC: 12.8 MMOL/L (ref 5–15)
AST SERPL-CCNC: 23 U/L (ref 1–40)
BACTERIA UR QL AUTO: NORMAL /HPF
BILIRUB SERPL-MCNC: 0.3 MG/DL (ref 0.2–1.2)
BILIRUB UR QL STRIP: NEGATIVE
BUN BLD-MCNC: 54 MG/DL (ref 6–20)
BUN/CREAT SERPL: 20.2 (ref 7–25)
CALCIUM SPEC-SCNC: 9 MG/DL (ref 8.6–10.5)
CHLORIDE SERPL-SCNC: 98 MMOL/L (ref 98–107)
CLARITY UR: CLEAR
CO2 SERPL-SCNC: 22.2 MMOL/L (ref 22–29)
COLOR UR: YELLOW
CREAT BLD-MCNC: 2.67 MG/DL (ref 0.76–1.27)
CREAT UR-MCNC: 49.6 MG/DL
DEPRECATED RDW RBC AUTO: 42.7 FL (ref 37–54)
ERYTHROCYTE [DISTWIDTH] IN BLOOD BY AUTOMATED COUNT: 12.8 % (ref 12.3–15.4)
GFR SERPL CREATININE-BSD FRML MDRD: 25 ML/MIN/1.73
GLOBULIN UR ELPH-MCNC: 3.6 GM/DL
GLUCOSE BLD-MCNC: 221 MG/DL (ref 65–99)
GLUCOSE UR STRIP-MCNC: ABNORMAL MG/DL
HCT VFR BLD AUTO: 35.3 % (ref 37.5–51)
HGB BLD-MCNC: 12 G/DL (ref 13–17.7)
HGB UR QL STRIP.AUTO: NEGATIVE
HYALINE CASTS UR QL AUTO: NORMAL /LPF
KETONES UR QL STRIP: NEGATIVE
LEUKOCYTE ESTERASE UR QL STRIP.AUTO: NEGATIVE
MAGNESIUM SERPL-MCNC: 2.3 MG/DL (ref 1.6–2.6)
MCH RBC QN AUTO: 31.5 PG (ref 26.6–33)
MCHC RBC AUTO-ENTMCNC: 34 G/DL (ref 31.5–35.7)
MCV RBC AUTO: 92.7 FL (ref 79–97)
NITRITE UR QL STRIP: NEGATIVE
PH UR STRIP.AUTO: 6 [PH] (ref 5–8)
PHOSPHATE SERPL-MCNC: 3.5 MG/DL (ref 2.5–4.5)
PLATELET # BLD AUTO: 258 10*3/MM3 (ref 140–450)
PMV BLD AUTO: 10.2 FL (ref 6–12)
POTASSIUM BLD-SCNC: 4.3 MMOL/L (ref 3.5–5.2)
PROT SERPL-MCNC: 7.3 G/DL (ref 6–8.5)
PROT UR QL STRIP: ABNORMAL
PROT UR-MCNC: 66 MG/DL
PROT/CREAT UR: 1330.6 MG/G CREA (ref 0–200)
PTH-INTACT SERPL-MCNC: 60.3 PG/ML (ref 15–65)
RBC # BLD AUTO: 3.81 10*6/MM3 (ref 4.14–5.8)
RBC # UR: NORMAL /HPF
REF LAB TEST METHOD: NORMAL
SODIUM BLD-SCNC: 133 MMOL/L (ref 136–145)
SP GR UR STRIP: 1.01 (ref 1–1.03)
SQUAMOUS #/AREA URNS HPF: NORMAL /HPF
UROBILINOGEN UR QL STRIP: ABNORMAL
WBC NRBC COR # BLD: 9.79 10*3/MM3 (ref 3.4–10.8)
WBC UR QL AUTO: NORMAL /HPF

## 2020-03-11 PROCEDURE — 83735 ASSAY OF MAGNESIUM: CPT

## 2020-03-11 PROCEDURE — 81001 URINALYSIS AUTO W/SCOPE: CPT

## 2020-03-11 PROCEDURE — 83970 ASSAY OF PARATHORMONE: CPT

## 2020-03-11 PROCEDURE — 84156 ASSAY OF PROTEIN URINE: CPT

## 2020-03-11 PROCEDURE — 36415 COLL VENOUS BLD VENIPUNCTURE: CPT

## 2020-03-11 PROCEDURE — 85027 COMPLETE CBC AUTOMATED: CPT

## 2020-03-11 PROCEDURE — 99214 OFFICE O/P EST MOD 30 MIN: CPT | Performed by: INTERNAL MEDICINE

## 2020-03-11 PROCEDURE — 84100 ASSAY OF PHOSPHORUS: CPT

## 2020-03-11 PROCEDURE — 82570 ASSAY OF URINE CREATININE: CPT

## 2020-03-11 PROCEDURE — 80053 COMPREHEN METABOLIC PANEL: CPT

## 2020-03-11 RX ORDER — SPIRONOLACTONE 25 MG/1
25 TABLET ORAL DAILY
Status: ON HOLD | COMMUNITY
End: 2022-12-28
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
## 2020-04-09 ENCOUNTER — CLINICAL SUPPORT NO REQUIREMENTS (OUTPATIENT)
Dept: CARDIOLOGY | Facility: CLINIC | Age: 60
End: 2020-04-09

## 2020-04-09 DIAGNOSIS — Z95.810 ICD (IMPLANTABLE CARDIOVERTER-DEFIBRILLATOR), DUAL, IN SITU: ICD-10-CM

## 2020-04-09 DIAGNOSIS — I42.0 CARDIOMYOPATHY, DILATED (HCC): Primary | ICD-10-CM

## 2020-04-09 PROCEDURE — 93296 REM INTERROG EVL PM/IDS: CPT | Performed by: NURSE PRACTITIONER

## 2020-04-09 PROCEDURE — 93295 DEV INTERROG REMOTE 1/2/MLT: CPT | Performed by: NURSE PRACTITIONER

## 2020-04-09 NOTE — PROGRESS NOTES
REMOTE DEVICE INTERROGATION    Received and reviewed on:   April 9, 2020    Remote device interrogation is reviewed.     Device Company: [employer]    Battery Stable.  Time to SARTHAK 61% battery remaining    Presenting EGM: Not available on this device    Arrhythmia Logbook Reviewed: No events    Device function appears Stable    Continue remote device interrogations every 3 months.

## 2020-04-15 ENCOUNTER — LAB (OUTPATIENT)
Dept: LAB | Facility: HOSPITAL | Age: 60
End: 2020-04-15

## 2020-04-15 DIAGNOSIS — N18.30 KIDNEY DISEASE, CHRONIC, STAGE III (GFR 30-59 ML/MIN) (HCC): Primary | ICD-10-CM

## 2020-04-15 DIAGNOSIS — E10.49 TYPE 1 DIABETES MELLITUS WITH OTHER DIABETIC NEUROLOGICAL COMPLICATION (HCC): ICD-10-CM

## 2020-04-15 DIAGNOSIS — E78.2 MIXED HYPERLIPIDEMIA: ICD-10-CM

## 2020-04-15 LAB
ALBUMIN SERPL-MCNC: 3.7 G/DL (ref 3.5–5.2)
ALBUMIN/GLOB SERPL: 1 G/DL
ALP SERPL-CCNC: 89 U/L (ref 39–117)
ALT SERPL W P-5'-P-CCNC: 16 U/L (ref 1–41)
ANION GAP SERPL CALCULATED.3IONS-SCNC: 13.7 MMOL/L (ref 5–15)
AST SERPL-CCNC: 23 U/L (ref 1–40)
BACTERIA UR QL AUTO: NORMAL /HPF
BASOPHILS # BLD AUTO: 0.07 10*3/MM3 (ref 0–0.2)
BASOPHILS NFR BLD AUTO: 0.9 % (ref 0–1.5)
BILIRUB SERPL-MCNC: 0.4 MG/DL (ref 0.2–1.2)
BILIRUB UR QL STRIP: NEGATIVE
BUN BLD-MCNC: 49 MG/DL (ref 6–20)
BUN/CREAT SERPL: 18.4 (ref 7–25)
CALCIUM SPEC-SCNC: 8.8 MG/DL (ref 8.6–10.5)
CHLORIDE SERPL-SCNC: 101 MMOL/L (ref 98–107)
CLARITY UR: CLEAR
CO2 SERPL-SCNC: 23.3 MMOL/L (ref 22–29)
COLOR UR: YELLOW
CREAT BLD-MCNC: 2.67 MG/DL (ref 0.76–1.27)
CREAT UR-MCNC: 56.8 MG/DL
DEPRECATED RDW RBC AUTO: 45 FL (ref 37–54)
EOSINOPHIL # BLD AUTO: 0.35 10*3/MM3 (ref 0–0.4)
EOSINOPHIL NFR BLD AUTO: 4.7 % (ref 0.3–6.2)
ERYTHROCYTE [DISTWIDTH] IN BLOOD BY AUTOMATED COUNT: 13.3 % (ref 12.3–15.4)
GFR SERPL CREATININE-BSD FRML MDRD: 25 ML/MIN/1.73
GLOBULIN UR ELPH-MCNC: 3.8 GM/DL
GLUCOSE BLD-MCNC: 189 MG/DL (ref 65–99)
GLUCOSE UR STRIP-MCNC: NEGATIVE MG/DL
HCT VFR BLD AUTO: 35.5 % (ref 37.5–51)
HGB BLD-MCNC: 12.1 G/DL (ref 13–17.7)
HGB UR QL STRIP.AUTO: NEGATIVE
HYALINE CASTS UR QL AUTO: NORMAL /LPF
IMM GRANULOCYTES # BLD AUTO: 0.02 10*3/MM3 (ref 0–0.05)
IMM GRANULOCYTES NFR BLD AUTO: 0.3 % (ref 0–0.5)
KETONES UR QL STRIP: NEGATIVE
LEUKOCYTE ESTERASE UR QL STRIP.AUTO: NEGATIVE
LYMPHOCYTES # BLD AUTO: 1.12 10*3/MM3 (ref 0.7–3.1)
LYMPHOCYTES NFR BLD AUTO: 14.9 % (ref 19.6–45.3)
MAGNESIUM SERPL-MCNC: 2.2 MG/DL (ref 1.6–2.6)
MCH RBC QN AUTO: 31.8 PG (ref 26.6–33)
MCHC RBC AUTO-ENTMCNC: 34.1 G/DL (ref 31.5–35.7)
MCV RBC AUTO: 93.4 FL (ref 79–97)
MONOCYTES # BLD AUTO: 0.77 10*3/MM3 (ref 0.1–0.9)
MONOCYTES NFR BLD AUTO: 10.3 % (ref 5–12)
NEUTROPHILS # BLD AUTO: 5.18 10*3/MM3 (ref 1.7–7)
NEUTROPHILS NFR BLD AUTO: 68.9 % (ref 42.7–76)
NITRITE UR QL STRIP: NEGATIVE
NRBC BLD AUTO-RTO: 0 /100 WBC (ref 0–0.2)
PH UR STRIP.AUTO: 5.5 [PH] (ref 5–8)
PHOSPHATE SERPL-MCNC: 4.6 MG/DL (ref 2.5–4.5)
PLATELET # BLD AUTO: 228 10*3/MM3 (ref 140–450)
PMV BLD AUTO: 10.6 FL (ref 6–12)
POTASSIUM BLD-SCNC: 4.6 MMOL/L (ref 3.5–5.2)
PROT SERPL-MCNC: 7.5 G/DL (ref 6–8.5)
PROT UR QL STRIP: ABNORMAL
PROT UR-MCNC: 62 MG/DL
PROT/CREAT UR: 1091.5 MG/G CREA (ref 0–200)
PTH-INTACT SERPL-MCNC: 102 PG/ML (ref 15–65)
RBC # BLD AUTO: 3.8 10*6/MM3 (ref 4.14–5.8)
RBC # UR: NORMAL /HPF
REF LAB TEST METHOD: NORMAL
SODIUM BLD-SCNC: 138 MMOL/L (ref 136–145)
SP GR UR STRIP: 1.01 (ref 1–1.03)
SQUAMOUS #/AREA URNS HPF: NORMAL /HPF
UROBILINOGEN UR QL STRIP: ABNORMAL
WBC NRBC COR # BLD: 7.51 10*3/MM3 (ref 3.4–10.8)
WBC UR QL AUTO: NORMAL /HPF

## 2020-04-15 PROCEDURE — 83735 ASSAY OF MAGNESIUM: CPT | Performed by: INTERNAL MEDICINE

## 2020-04-15 PROCEDURE — 83970 ASSAY OF PARATHORMONE: CPT | Performed by: INTERNAL MEDICINE

## 2020-04-15 PROCEDURE — 84156 ASSAY OF PROTEIN URINE: CPT | Performed by: INTERNAL MEDICINE

## 2020-04-15 PROCEDURE — 82570 ASSAY OF URINE CREATININE: CPT | Performed by: INTERNAL MEDICINE

## 2020-04-15 PROCEDURE — 36415 COLL VENOUS BLD VENIPUNCTURE: CPT | Performed by: INTERNAL MEDICINE

## 2020-04-15 PROCEDURE — 84100 ASSAY OF PHOSPHORUS: CPT | Performed by: INTERNAL MEDICINE

## 2020-04-15 PROCEDURE — 85025 COMPLETE CBC W/AUTO DIFF WBC: CPT | Performed by: INTERNAL MEDICINE

## 2020-04-15 PROCEDURE — 81001 URINALYSIS AUTO W/SCOPE: CPT | Performed by: INTERNAL MEDICINE

## 2020-04-15 PROCEDURE — 80053 COMPREHEN METABOLIC PANEL: CPT | Performed by: INTERNAL MEDICINE

## 2020-04-20 DIAGNOSIS — E10.49 TYPE 1 DIABETES MELLITUS WITH OTHER DIABETIC NEUROLOGICAL COMPLICATION (HCC): Primary | ICD-10-CM

## 2020-04-20 DIAGNOSIS — E78.2 MIXED HYPERLIPIDEMIA: ICD-10-CM

## 2020-04-20 LAB — HBA1C MFR BLD: 7.5 % (ref 3.5–5.6)

## 2020-04-20 PROCEDURE — 83036 HEMOGLOBIN GLYCOSYLATED A1C: CPT

## 2020-04-20 PROCEDURE — 36415 COLL VENOUS BLD VENIPUNCTURE: CPT

## 2020-04-22 ENCOUNTER — TELEMEDICINE (OUTPATIENT)
Dept: ENDOCRINOLOGY | Facility: CLINIC | Age: 60
End: 2020-04-22

## 2020-04-22 VITALS — BODY MASS INDEX: 26.96 KG/M2 | WEIGHT: 182 LBS | HEIGHT: 69 IN

## 2020-04-22 DIAGNOSIS — Z46.81 INSULIN PUMP TITRATION: ICD-10-CM

## 2020-04-22 DIAGNOSIS — E10.65 UNCONTROLLED TYPE 1 DIABETES MELLITUS WITH HYPERGLYCEMIA (HCC): Primary | ICD-10-CM

## 2020-04-22 DIAGNOSIS — E78.2 MIXED HYPERLIPIDEMIA: ICD-10-CM

## 2020-04-22 DIAGNOSIS — E10.49 TYPE 1 DIABETES MELLITUS WITH OTHER DIABETIC NEUROLOGICAL COMPLICATION (HCC): Primary | ICD-10-CM

## 2020-04-22 PROCEDURE — 99213 OFFICE O/P EST LOW 20 MIN: CPT | Performed by: INTERNAL MEDICINE

## 2020-04-22 NOTE — PATIENT INSTRUCTIONS
Continue exercise.  Share blood sugars.  Continue meds.  Call if blood sugars are running under 100 or over 200.  F/u in 6 months, with fasting labs prior.

## 2020-04-23 ENCOUNTER — OFFICE VISIT (OUTPATIENT)
Dept: ENDOCRINOLOGY | Facility: CLINIC | Age: 60
End: 2020-04-23

## 2020-04-23 DIAGNOSIS — E10.49 TYPE 1 DIABETES MELLITUS WITH OTHER DIABETIC NEUROLOGICAL COMPLICATION (HCC): ICD-10-CM

## 2020-04-23 NOTE — PROGRESS NOTES
No charge put in for patient.  Spent less than 30 minutes with patient.  Downloaded Dexcom G6.  Per MD s/o, increased pt's 12 am basal rate to 0.85.  Discussed bleeding at insertion site when pt's injects his Dexcom G6 sensor.  Recommended to apply a cold compress for several minutes before inserting sensor to see if that helps reduce bleeding.  Wife erased Dexcom Clarity email invite to share sensor readings.  Resent email.  Pt to continue to try to download his  at home.  Dexcom reports scanned into visit.

## 2020-04-26 NOTE — PROGRESS NOTES
[facility]        Patient Care Team:  Erika Hernandez MD as PCP - General  Erika Hernandez MD as PCP - Family Medicine    Chief Complaint:    Chief Complaint   Patient presents with   • Diabetes     TYPE 1  basal rates; 12a 0.8, 2p 1.0, 5p 0.7 units/h         Subjective   Here for diabetes f/u  This is a telemedicine visit in view of the covid 19 pandemic, using phone only due to problems with connection  Blood sugars: higher in am  Insulin delivery per pump: Basal 40% / bolus 60%    Exercise program: walking    Interval History:     Patient Complaints: Problems with bleeding in the CGMS site  Patient Denies:  hypoglycemia  History taken from: patient    Review of Systems:   Review of Systems   Eyes: Negative for blurred vision.   Gastrointestinal: Negative for nausea.   Endocrine: Negative for polyuria.   Neurological: Negative for headache.     Gained 6 lb since last visit    Objective     Vital Signs    There were no vitals filed for this visit.      Physical Exam: not done. eVisit          Results Review:    I have reviewed the patient's new clinical results, labs & imaging.    Medication Review:   Prior to Admission medications    Medication Sig Start Date End Date Taking? Authorizing Provider   acetone, urine, test (KETOSTIX) strip Take As Directed. 1/16/18  Yes ProviderGiacomo MD   AMITIZA 8 MCG capsule 1 capsule 2 (Two) Times a Day With Meals. 7/3/19  Yes ProviderGiacomo MD   aspirin (ADULT ASPIRIN EC LOW STRENGTH) 81 MG EC tablet 1 tablet Daily. 6/29/16  Yes ProviderGiacomo MD   B Complex Vitamins (VITAMIN B COMPLEX PO) 1 tablet Daily. 11/15/17  Yes Giacomo Cheema MD   BRILINTA 90 MG tablet tablet TAKE 1 TABLET TWICE A DAY 6/27/19  Yes Rachid Sheriff MD   bumetanide (BUMEX) 2 MG tablet 1 tablet 2 (Two) Times a Day. 7/9/19  Yes ProviderGiacomo MD   calcitriol (ROCALTROL) 0.25 MCG capsule 1 capsule. Take one every other day 4/17/19  Yes Anette  "MD Giacomo   Cholecalciferol 1000 units capsule 1 capsule Daily. 5000units daily 12/7/16  Yes Giacomo Cheema MD   famotidine (PEPCID) 40 MG tablet 1 tablet Daily. 4/17/19  Yes Giacomo Cheema MD   glucose blood test strip 1 each by Other route As Needed (livongo test strips.. use to check blood ugar four times daily). Use as instructed   Yes Giacomo Cheema MD   insulin lispro (HumaLOG) 100 UNIT/ML injection USE IN INSULIN PUMP. MAXIMUM DOSE 50 UNITS DAILY. MN 0.8, 8 A.M. 0.65, 2 P.M. 0.9, 5 P.M. 0.5, 9 P.M. 0.5, Normal 4/2/20  Yes Shirley Guido MD   insulin lispro (HumaLOG) 100 UNIT/ML injection USE IN INSULIN PUMP. MAXIMUM DOSE 50 UNITS DAILY. MN 0.8, 8 A.M. 0.65, 2 P.M. 0.9, 5 P.M. 0.5, 9 P.M. 0.5 4/7/20  Yes Shirley Guido MD   Insulin Syringe 31G X 5/16\" 0.3 ML misc For us with insulin when pump not working properly 1/10/20  Yes Shirley Guido MD   isosorbide mononitrate (IMDUR) 30 MG 24 hr tablet 1 tablet Daily. 6/26/19  Yes Giacomo Cheema MD   metoprolol succinate XL (TOPROL-XL) 25 MG 24 hr tablet TAKE 1 TABLET DAILY 9/30/19  Yes Rachid Sheriff MD   Multiple Vitamin (DAILY-VITAMIN) tablet 1 tablet Daily. 10/10/17  Yes Giacomo Cheema MD   Omega-3 Fatty Acids (FISH OIL) 1000 MG capsule capsule 1 capsule Daily. 10/10/17  Yes Giacomo Cheema MD   simvastatin (ZOCOR) 40 MG tablet 1 tablet Daily. 5/29/15  Yes Giacomo Cheema MD   spironolactone (ALDACTONE) 25 MG tablet Take 25 mg by mouth Daily.   Yes Giacomo Cheema MD   hydrALAZINE (APRESOLINE) 25 MG tablet TAKE 1 TABLET TWICE A DAY 1/20/20   Rachid Sheriff MD       Lab Results (most recent)     None            Lab Results   Component Value Date    HGBA1C 7.5 (H) 04/20/2020    HGBA1C 7.3 (H) 01/22/2020    HGBA1C 7.2 (H) 10/08/2019      Lab Results   Component Value Date    GLUCOSE 189 (H) 04/15/2020    BUN 49 (H) 04/15/2020    CREATININE 2.67 (H) 04/15/2020    EGFRIFNONA 25 (L) 04/15/2020    " EGFRIFAFRI 35 (L) 07/16/2016    BCR 18.4 04/15/2020    K 4.6 04/15/2020    CO2 23.3 04/15/2020    CALCIUM 8.8 04/15/2020    ALBUMIN 3.70 04/15/2020    LABIL2 0.9 (L) 04/10/2019    AST 23 04/15/2020    ALT 16 04/15/2020    CHOL 140 10/08/2019    LDL 91 10/08/2019    HDL 40 10/08/2019    TRIG 125 10/08/2019     Lab Results   Component Value Date    TSH 4.01 10/03/2017     Blood sugar 103 mg/dl 4hpp at home during eVisit.    Assessment/Plan     Earnest was seen today for diabetes.    Diagnoses and all orders for this visit:    Type 1 diabetes mellitus with other diabetic neurological complication (CMS/[facility])  -     Hemoglobin A1c; Future  -     Microalbumin / Creatinine Urine Ratio - Urine, Clean Catch; Future    Mixed hyperlipidemia  -     Comprehensive Metabolic Panel; Future  -     Lipid Panel; Future  -     TSH; Future    Insulin pump titration    Wearing an OmniPod insulin pump since 6/2017. Also using DexCom G6.    Continue exercise.  Share blood sugars. Will have diabetes educator talk to pt re site issues.  Continue meds.  Call if blood sugars are running under 100 or over 200.    Spent 15 min talking to pt.    Shirley Guido MD  04/26/20  17:39

## 2020-04-28 ENCOUNTER — HOSPITAL ENCOUNTER (OUTPATIENT)
Dept: CARDIOLOGY | Facility: HOSPITAL | Age: 60
Discharge: HOME | End: 2020-04-28
Admitting: SURGERY

## 2020-04-28 ENCOUNTER — APPOINTMENT (OUTPATIENT)
Dept: VASCULAR SURGERY | Facility: HOSPITAL | Age: 60
End: 2020-04-28

## 2020-04-28 DIAGNOSIS — I73.9 PERIPHERAL VASCULAR DISEASE, UNSPECIFIED (HCC): ICD-10-CM

## 2020-04-28 LAB
BH CV LOWER ARTERIAL LEFT ABI RATIO: 0.65
BH CV LOWER ARTERIAL LEFT CALF RATIO: 0.78
BH CV LOWER ARTERIAL LEFT DORSALIS PEDIS SYS MAX: 89 MMHG
BH CV LOWER ARTERIAL LEFT GREAT TOE SYS MAX: 88 MMHG
BH CV LOWER ARTERIAL LEFT LOW THIGH SYS MAX: 159 MMHG
BH CV LOWER ARTERIAL LEFT POPLITEAL SYS MAX: 106 MMHG
BH CV LOWER ARTERIAL LEFT POST TIBIAL SYS MAX: 89 MMHG
BH CV LOWER ARTERIAL LEFT TBI RATIO: 0.65
BH CV LOWER ARTERIAL RIGHT ABI RATIO: 1.1
BH CV LOWER ARTERIAL RIGHT CALF RATIO: 1.18
BH CV LOWER ARTERIAL RIGHT DORSALIS PEDIS SYS MAX: 148 MMHG
BH CV LOWER ARTERIAL RIGHT GREAT TOE SYS MAX: 120 MMHG
BH CV LOWER ARTERIAL RIGHT LOW THIGH SYS MAX: 172 MMHG
BH CV LOWER ARTERIAL RIGHT POPLITEAL SYS MAX: 160 MMHG
BH CV LOWER ARTERIAL RIGHT POST TIBIAL SYS MAX: 149 MMHG
BH CV LOWER ARTERIAL RIGHT TBI RATIO: 0.88
UPPER ARTERIAL LEFT ARM BRACHIAL SYS MAX: 133 MMHG
UPPER ARTERIAL RIGHT ARM BRACHIAL SYS MAX: 136 MMHG

## 2020-04-28 PROCEDURE — 93923 UPR/LXTR ART STDY 3+ LVLS: CPT

## 2020-05-06 ENCOUNTER — TRANSCRIBE ORDERS (OUTPATIENT)
Dept: CARDIOLOGY | Facility: HOSPITAL | Age: 60
End: 2020-05-06

## 2020-05-06 DIAGNOSIS — I73.9 PERIPHERAL VASCULAR DISEASE, UNSPECIFIED (HCC): Primary | ICD-10-CM

## 2020-06-18 ENCOUNTER — LAB (OUTPATIENT)
Dept: LAB | Facility: HOSPITAL | Age: 60
End: 2020-06-18

## 2020-06-18 ENCOUNTER — TRANSCRIBE ORDERS (OUTPATIENT)
Dept: ADMINISTRATIVE | Facility: HOSPITAL | Age: 60
End: 2020-06-18

## 2020-06-18 DIAGNOSIS — N18.4 CHRONIC KIDNEY DISEASE, STAGE IV (SEVERE) (HCC): ICD-10-CM

## 2020-06-18 DIAGNOSIS — N18.4 CHRONIC KIDNEY DISEASE, STAGE IV (SEVERE) (HCC): Primary | ICD-10-CM

## 2020-06-18 DIAGNOSIS — E78.2 MIXED HYPERLIPIDEMIA: ICD-10-CM

## 2020-06-18 LAB
ALBUMIN SERPL-MCNC: 3.6 G/DL (ref 3.5–5.2)
ALBUMIN/GLOB SERPL: 1 G/DL
ALP SERPL-CCNC: 106 U/L (ref 39–117)
ALT SERPL W P-5'-P-CCNC: 18 U/L (ref 1–41)
ANION GAP SERPL CALCULATED.3IONS-SCNC: 10 MMOL/L (ref 5–15)
AST SERPL-CCNC: 19 U/L (ref 1–40)
BILIRUB SERPL-MCNC: 0.4 MG/DL (ref 0.2–1.2)
BILIRUB UR QL STRIP: NEGATIVE
BUN BLD-MCNC: 49 MG/DL (ref 6–20)
BUN/CREAT SERPL: 16.7 (ref 7–25)
CALCIUM SPEC-SCNC: 8.9 MG/DL (ref 8.6–10.5)
CHLORIDE SERPL-SCNC: 103 MMOL/L (ref 98–107)
CLARITY UR: CLEAR
CO2 SERPL-SCNC: 24 MMOL/L (ref 22–29)
COLOR UR: YELLOW
CREAT BLD-MCNC: 2.93 MG/DL (ref 0.76–1.27)
CREAT UR-MCNC: 62.3 MG/DL
DEPRECATED RDW RBC AUTO: 46.7 FL (ref 37–54)
ERYTHROCYTE [DISTWIDTH] IN BLOOD BY AUTOMATED COUNT: 13.6 % (ref 12.3–15.4)
GFR SERPL CREATININE-BSD FRML MDRD: 22 ML/MIN/1.73
GLOBULIN UR ELPH-MCNC: 3.6 GM/DL
GLUCOSE BLD-MCNC: 189 MG/DL (ref 65–99)
GLUCOSE UR STRIP-MCNC: NEGATIVE MG/DL
HCT VFR BLD AUTO: 34.9 % (ref 37.5–51)
HGB BLD-MCNC: 11.8 G/DL (ref 13–17.7)
HGB UR QL STRIP.AUTO: NEGATIVE
KETONES UR QL STRIP: NEGATIVE
LEUKOCYTE ESTERASE UR QL STRIP.AUTO: NEGATIVE
MAGNESIUM SERPL-MCNC: 2.2 MG/DL (ref 1.6–2.6)
MCH RBC QN AUTO: 31.6 PG (ref 26.6–33)
MCHC RBC AUTO-ENTMCNC: 33.8 G/DL (ref 31.5–35.7)
MCV RBC AUTO: 93.3 FL (ref 79–97)
NITRITE UR QL STRIP: NEGATIVE
PH UR STRIP.AUTO: 5.5 [PH] (ref 5–8)
PHOSPHATE SERPL-MCNC: 3.2 MG/DL (ref 2.5–4.5)
PLATELET # BLD AUTO: 216 10*3/MM3 (ref 140–450)
PMV BLD AUTO: 10.9 FL (ref 6–12)
POTASSIUM BLD-SCNC: 4.1 MMOL/L (ref 3.5–5.2)
PROT SERPL-MCNC: 7.2 G/DL (ref 6–8.5)
PROT UR QL STRIP: ABNORMAL
PROT UR-MCNC: 54 MG/DL
PROT/CREAT UR: 866.8 MG/G CREA (ref 0–200)
PTH-INTACT SERPL-MCNC: 87.4 PG/ML (ref 15–65)
RBC # BLD AUTO: 3.74 10*6/MM3 (ref 4.14–5.8)
SODIUM BLD-SCNC: 137 MMOL/L (ref 136–145)
SP GR UR STRIP: 1.01 (ref 1–1.03)
UROBILINOGEN UR QL STRIP: ABNORMAL
WBC NRBC COR # BLD: 7.17 10*3/MM3 (ref 3.4–10.8)

## 2020-06-18 PROCEDURE — 84100 ASSAY OF PHOSPHORUS: CPT

## 2020-06-18 PROCEDURE — 80053 COMPREHEN METABOLIC PANEL: CPT

## 2020-06-18 PROCEDURE — 83970 ASSAY OF PARATHORMONE: CPT

## 2020-06-18 PROCEDURE — 84156 ASSAY OF PROTEIN URINE: CPT

## 2020-06-18 PROCEDURE — 82570 ASSAY OF URINE CREATININE: CPT

## 2020-06-18 PROCEDURE — 81003 URINALYSIS AUTO W/O SCOPE: CPT

## 2020-06-18 PROCEDURE — 83735 ASSAY OF MAGNESIUM: CPT

## 2020-06-18 PROCEDURE — 36415 COLL VENOUS BLD VENIPUNCTURE: CPT

## 2020-06-18 PROCEDURE — 85027 COMPLETE CBC AUTOMATED: CPT

## 2020-07-10 ENCOUNTER — CLINICAL SUPPORT NO REQUIREMENTS (OUTPATIENT)
Dept: CARDIOLOGY | Facility: CLINIC | Age: 60
End: 2020-07-10

## 2020-07-10 DIAGNOSIS — I42.0 CARDIOMYOPATHY, DILATED (HCC): Primary | ICD-10-CM

## 2020-07-10 DIAGNOSIS — Z95.810 ICD (IMPLANTABLE CARDIOVERTER-DEFIBRILLATOR), DUAL, IN SITU: ICD-10-CM

## 2020-07-10 PROCEDURE — 93295 DEV INTERROG REMOTE 1/2/MLT: CPT | Performed by: NURSE PRACTITIONER

## 2020-07-10 PROCEDURE — 93296 REM INTERROG EVL PM/IDS: CPT | Performed by: NURSE PRACTITIONER

## 2020-07-13 RX ORDER — TICAGRELOR 90 MG/1
TABLET ORAL
Qty: 180 TABLET | Refills: 3 | Status: SHIPPED | OUTPATIENT
Start: 2020-07-13 | End: 2021-04-28 | Stop reason: SDUPTHER

## 2020-07-16 NOTE — PROGRESS NOTES
REMOTE DEVICE INTERROGATION    Received and reviewed on:   7/10/2020    Remote device interrogation is reviewed.     Device Company: [email or web address]    Battery Stable.  Time to SARTHAK 59% battery longevity remaining    Presenting EGM: Not available on this device    Arrhythmia Logbook Reviewed: No VT or VF episodes.    Device function appears Stable    Continue remote device interrogations every 3 months.

## 2020-09-16 ENCOUNTER — TRANSCRIBE ORDERS (OUTPATIENT)
Dept: ADMINISTRATIVE | Facility: HOSPITAL | Age: 60
End: 2020-09-16

## 2020-09-16 ENCOUNTER — LAB (OUTPATIENT)
Dept: LAB | Facility: HOSPITAL | Age: 60
End: 2020-09-16

## 2020-09-16 DIAGNOSIS — N18.30 CHRONIC KIDNEY DISEASE, STAGE III (MODERATE) (HCC): Primary | ICD-10-CM

## 2020-09-16 DIAGNOSIS — N18.4 CHRONIC KIDNEY DISEASE, STAGE IV (SEVERE) (HCC): ICD-10-CM

## 2020-09-16 DIAGNOSIS — N18.30 CHRONIC KIDNEY DISEASE, STAGE III (MODERATE) (HCC): ICD-10-CM

## 2020-09-16 LAB
ALBUMIN SERPL-MCNC: 4 G/DL (ref 3.5–5.2)
ALBUMIN/GLOB SERPL: 1.3 G/DL
ALP SERPL-CCNC: 103 U/L (ref 39–117)
ALT SERPL W P-5'-P-CCNC: 24 U/L (ref 1–41)
ANION GAP SERPL CALCULATED.3IONS-SCNC: 10.9 MMOL/L (ref 5–15)
AST SERPL-CCNC: 26 U/L (ref 1–40)
BACTERIA UR QL AUTO: NORMAL /HPF
BILIRUB SERPL-MCNC: 0.3 MG/DL (ref 0–1.2)
BILIRUB UR QL STRIP: NEGATIVE
BUN SERPL-MCNC: 54 MG/DL (ref 6–20)
BUN/CREAT SERPL: 20.5 (ref 7–25)
CALCIUM SPEC-SCNC: 8.8 MG/DL (ref 8.6–10.5)
CHLORIDE SERPL-SCNC: 100 MMOL/L (ref 98–107)
CLARITY UR: CLEAR
CO2 SERPL-SCNC: 24.1 MMOL/L (ref 22–29)
COLOR UR: YELLOW
CREAT SERPL-MCNC: 2.64 MG/DL (ref 0.76–1.27)
CREAT UR-MCNC: 42 MG/DL
DEPRECATED RDW RBC AUTO: 43.9 FL (ref 37–54)
ERYTHROCYTE [DISTWIDTH] IN BLOOD BY AUTOMATED COUNT: 12.8 % (ref 12.3–15.4)
GFR SERPL CREATININE-BSD FRML MDRD: 25 ML/MIN/1.73
GLOBULIN UR ELPH-MCNC: 3.2 GM/DL
GLUCOSE SERPL-MCNC: 254 MG/DL (ref 65–99)
GLUCOSE UR STRIP-MCNC: NEGATIVE MG/DL
HCT VFR BLD AUTO: 36.2 % (ref 37.5–51)
HGB BLD-MCNC: 12 G/DL (ref 13–17.7)
HGB UR QL STRIP.AUTO: NEGATIVE
HYALINE CASTS UR QL AUTO: NORMAL /LPF
KETONES UR QL STRIP: NEGATIVE
LEUKOCYTE ESTERASE UR QL STRIP.AUTO: NEGATIVE
MAGNESIUM SERPL-MCNC: 2.3 MG/DL (ref 1.6–2.6)
MCH RBC QN AUTO: 31.5 PG (ref 26.6–33)
MCHC RBC AUTO-ENTMCNC: 33.1 G/DL (ref 31.5–35.7)
MCV RBC AUTO: 95 FL (ref 79–97)
NITRITE UR QL STRIP: NEGATIVE
PH UR STRIP.AUTO: 6 [PH] (ref 5–8)
PHOSPHATE SERPL-MCNC: 3.4 MG/DL (ref 2.5–4.5)
PLATELET # BLD AUTO: 202 10*3/MM3 (ref 140–450)
PMV BLD AUTO: 10.8 FL (ref 6–12)
POTASSIUM SERPL-SCNC: 4.5 MMOL/L (ref 3.5–5.2)
PROT SERPL-MCNC: 7.2 G/DL (ref 6–8.5)
PROT UR QL STRIP: ABNORMAL
PROT UR-MCNC: 73.1 MG/DL
PTH-INTACT SERPL-MCNC: 110 PG/ML (ref 15–65)
RBC # BLD AUTO: 3.81 10*6/MM3 (ref 4.14–5.8)
RBC # UR: NORMAL /HPF
REF LAB TEST METHOD: NORMAL
SODIUM SERPL-SCNC: 135 MMOL/L (ref 136–145)
SP GR UR STRIP: 1.01 (ref 1–1.03)
SQUAMOUS #/AREA URNS HPF: NORMAL /HPF
UROBILINOGEN UR QL STRIP: ABNORMAL
WBC # BLD AUTO: 7.32 10*3/MM3 (ref 3.4–10.8)
WBC UR QL AUTO: NORMAL /HPF

## 2020-09-16 PROCEDURE — 36415 COLL VENOUS BLD VENIPUNCTURE: CPT

## 2020-09-16 PROCEDURE — 84156 ASSAY OF PROTEIN URINE: CPT

## 2020-09-16 PROCEDURE — 84100 ASSAY OF PHOSPHORUS: CPT

## 2020-09-16 PROCEDURE — 83735 ASSAY OF MAGNESIUM: CPT

## 2020-09-16 PROCEDURE — 82570 ASSAY OF URINE CREATININE: CPT

## 2020-09-16 PROCEDURE — 80053 COMPREHEN METABOLIC PANEL: CPT

## 2020-09-16 PROCEDURE — 81001 URINALYSIS AUTO W/SCOPE: CPT

## 2020-09-16 PROCEDURE — 83970 ASSAY OF PARATHORMONE: CPT

## 2020-09-16 PROCEDURE — 85027 COMPLETE CBC AUTOMATED: CPT

## 2020-09-24 ENCOUNTER — CLINICAL SUPPORT NO REQUIREMENTS (OUTPATIENT)
Dept: CARDIOLOGY | Facility: CLINIC | Age: 60
End: 2020-09-24

## 2020-09-24 ENCOUNTER — OFFICE VISIT (OUTPATIENT)
Dept: CARDIOLOGY | Facility: CLINIC | Age: 60
End: 2020-09-24

## 2020-09-24 VITALS
BODY MASS INDEX: 27.7 KG/M2 | WEIGHT: 187 LBS | HEART RATE: 77 BPM | SYSTOLIC BLOOD PRESSURE: 130 MMHG | HEIGHT: 69 IN | DIASTOLIC BLOOD PRESSURE: 80 MMHG

## 2020-09-24 DIAGNOSIS — I25.10 CORONARY ARTERY DISEASE INVOLVING NATIVE CORONARY ARTERY OF NATIVE HEART WITHOUT ANGINA PECTORIS: ICD-10-CM

## 2020-09-24 DIAGNOSIS — I42.0 CARDIOMYOPATHY, DILATED (HCC): Primary | ICD-10-CM

## 2020-09-24 DIAGNOSIS — Z95.810 ICD (IMPLANTABLE CARDIOVERTER-DEFIBRILLATOR), DUAL, IN SITU: ICD-10-CM

## 2020-09-24 DIAGNOSIS — I50.9 CONGESTIVE HEART FAILURE, UNSPECIFIED HF CHRONICITY, UNSPECIFIED HEART FAILURE TYPE (HCC): ICD-10-CM

## 2020-09-24 DIAGNOSIS — E78.2 MIXED HYPERLIPIDEMIA: ICD-10-CM

## 2020-09-24 PROCEDURE — 99214 OFFICE O/P EST MOD 30 MIN: CPT | Performed by: INTERNAL MEDICINE
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
## 2020-10-07 ENCOUNTER — LAB (OUTPATIENT)
Dept: LAB | Facility: HOSPITAL | Age: 60
End: 2020-10-07

## 2020-10-07 DIAGNOSIS — E10.49 TYPE 1 DIABETES MELLITUS WITH OTHER DIABETIC NEUROLOGICAL COMPLICATION (HCC): ICD-10-CM

## 2020-10-07 DIAGNOSIS — E78.2 MIXED HYPERLIPIDEMIA: ICD-10-CM

## 2020-10-07 LAB
ALBUMIN SERPL-MCNC: 4.1 G/DL (ref 3.5–5.2)
ALBUMIN UR-MCNC: 28.6 MG/DL
ALBUMIN/GLOB SERPL: 1.2 G/DL
ALP SERPL-CCNC: 102 U/L (ref 39–117)
ALT SERPL W P-5'-P-CCNC: 20 U/L (ref 1–41)
ANION GAP SERPL CALCULATED.3IONS-SCNC: 9.7 MMOL/L (ref 5–15)
AST SERPL-CCNC: 28 U/L (ref 1–40)
BILIRUB SERPL-MCNC: 0.4 MG/DL (ref 0–1.2)
BUN SERPL-MCNC: 65 MG/DL (ref 6–20)
BUN/CREAT SERPL: 25.2 (ref 7–25)
CALCIUM SPEC-SCNC: 8.9 MG/DL (ref 8.6–10.5)
CHLORIDE SERPL-SCNC: 103 MMOL/L (ref 98–107)
CHOLEST SERPL-MCNC: 120 MG/DL (ref 0–200)
CO2 SERPL-SCNC: 25.3 MMOL/L (ref 22–29)
CREAT SERPL-MCNC: 2.58 MG/DL (ref 0.76–1.27)
CREAT UR-MCNC: 40.6 MG/DL
GFR SERPL CREATININE-BSD FRML MDRD: 26 ML/MIN/1.73
GLOBULIN UR ELPH-MCNC: 3.3 GM/DL
GLUCOSE SERPL-MCNC: 82 MG/DL (ref 65–99)
HBA1C MFR BLD: 7.2 % (ref 3.5–5.6)
HDLC SERPL-MCNC: 38 MG/DL (ref 40–60)
LDLC SERPL CALC-MCNC: 67 MG/DL (ref 0–100)
LDLC/HDLC SERPL: 1.75 {RATIO}
MICROALBUMIN/CREAT UR: 704.4 MG/G
POTASSIUM SERPL-SCNC: 4 MMOL/L (ref 3.5–5.2)
PROT SERPL-MCNC: 7.4 G/DL (ref 6–8.5)
SODIUM SERPL-SCNC: 138 MMOL/L (ref 136–145)
TRIGL SERPL-MCNC: 77 MG/DL (ref 0–150)
TSH SERPL DL<=0.05 MIU/L-ACNC: 2.67 UIU/ML (ref 0.27–4.2)
VLDLC SERPL-MCNC: 15.4 MG/DL (ref 5–40)

## 2020-10-07 PROCEDURE — 82570 ASSAY OF URINE CREATININE: CPT

## 2020-10-07 PROCEDURE — 36415 COLL VENOUS BLD VENIPUNCTURE: CPT

## 2020-10-07 PROCEDURE — 83036 HEMOGLOBIN GLYCOSYLATED A1C: CPT

## 2020-10-07 PROCEDURE — 82043 UR ALBUMIN QUANTITATIVE: CPT

## 2020-10-07 PROCEDURE — 80053 COMPREHEN METABOLIC PANEL: CPT | Performed by: INTERNAL MEDICINE

## 2020-10-07 PROCEDURE — 84443 ASSAY THYROID STIM HORMONE: CPT

## 2020-10-07 PROCEDURE — 80061 LIPID PANEL: CPT

## 2020-10-14 ENCOUNTER — TELEMEDICINE (OUTPATIENT)
Dept: ENDOCRINOLOGY | Facility: CLINIC | Age: 60
End: 2020-10-14

## 2020-10-14 VITALS — WEIGHT: 186 LBS | HEIGHT: 69 IN | BODY MASS INDEX: 27.55 KG/M2

## 2020-10-14 DIAGNOSIS — E10.49 TYPE 1 DIABETES MELLITUS WITH OTHER DIABETIC NEUROLOGICAL COMPLICATION (HCC): Primary | ICD-10-CM

## 2020-10-14 DIAGNOSIS — E78.2 MIXED HYPERLIPIDEMIA: ICD-10-CM

## 2020-10-14 DIAGNOSIS — Z46.81 INSULIN PUMP TITRATION: ICD-10-CM

## 2020-10-14 PROCEDURE — 99442 PR PHYS/QHP TELEPHONE EVALUATION 11-20 MIN: CPT | Performed by: INTERNAL MEDICINE

## 2020-10-14 NOTE — PATIENT INSTRUCTIONS
Keep up the good work!  See eye doctor.  Continue exercise as able.  Continue meds.  Call if blood sugars are running under 100 or over 200.  F/u in 6 months, with labs prior.

## 2020-10-15 ENCOUNTER — TELEPHONE (OUTPATIENT)
Dept: ENDOCRINOLOGY | Facility: CLINIC | Age: 60
End: 2020-10-15

## 2020-10-15 NOTE — PROGRESS NOTES
[facility]        Patient Care Team:  Erika Hernandez MD as PCP - General  Erika Hernandez MD as PCP - Family Medicine    Chief Complaint:    Chief Complaint   Patient presents with   • Diabetes     Type 1 /  basal rates: 12a 0.85, 2p 1.0, 5p 0.70 units/h         Subjective   Here for diabetes f/u  This pt consented to this telemedicine visit in view of the covid 19 pandemic, using phone only due to problems with connection  Blood sugars: did not send records. Doesn't know how to share the DexCom  Checks blood sugars 4x/d for the last 3 months. Adjusts insulin dose per sliding scale based on results  Exercise program: walking some    Interval History:     Patient Complaints: L foot pain. Seeing a podiatrist  Patient Denies:  hypoglycemia  History taken from: patient    Review of Systems:   Review of Systems   Eyes: Negative for blurred vision.   Gastrointestinal: Negative for nausea.   Endocrine: Negative for polyuria.   Neurological: Negative for headache.     Gained 10 lb since last visit    Objective     Vital Signs    There were no vitals filed for this visit.      Physical Exam: not done. eVisit          Results Review:    I have reviewed the patient's new clinical results, labs & imaging.    Medication Review:   Prior to Admission medications    Medication Sig Start Date End Date Taking? Authorizing Provider   acetone, urine, test (KETOSTIX) strip Take As Directed. 1/16/18  Yes ProviderGiacomo MD   AMITIZA 8 MCG capsule 1 capsule 2 (Two) Times a Day With Meals. 7/3/19  Yes ProviderGiacomo MD   aspirin (ADULT ASPIRIN EC LOW STRENGTH) 81 MG EC tablet 1 tablet Daily. 6/29/16  Yes ProviderGiacomo MD   B Complex Vitamins (VITAMIN B COMPLEX PO) 1 tablet Daily. 11/15/17  Yes Giacomo Cheema MD   BRILINTA 90 MG tablet tablet TAKE 1 TABLET TWICE A DAY 7/13/20  Yes Rachid Sheriff MD   bumetanide (BUMEX) 2 MG tablet 1 tablet 2 (Two) Times a Day. 7/9/19  Yes Anette  "MD Giacomo   calcitriol (ROCALTROL) 0.25 MCG capsule 1 capsule. Take one every other day 4/17/19  Yes Giacomo Cheema MD   Cholecalciferol 1000 units capsule 1 capsule Daily. 5000units daily 12/7/16  Yes Giacomo Cheema MD   famotidine (PEPCID) 40 MG tablet 1 tablet Daily. 4/17/19  Yes Giacomo Cheema MD   glucose blood test strip 1 each by Other route As Needed (livongo test strips.. use to check blood ugar four times daily). Use as instructed   Yes Giacomo Cheema MD   hydrALAZINE (APRESOLINE) 25 MG tablet TAKE 1 TABLET TWICE A DAY 1/20/20  Yes Rachid Sheriff MD   insulin lispro (HumaLOG) 100 UNIT/ML injection USE IN INSULIN PUMP. MAXIMUM DOSE 50 UNITS DAILY. MN 0.8, 8 A.M. 0.65, 2 P.M. 0.9, 5 P.M. 0.5, 9 P.M. 0.5 4/7/20  Yes Shirley Guido MD   Insulin Syringe 31G X 5/16\" 0.3 ML misc For us with insulin when pump not working properly 1/10/20  Yes Shirley Guido MD   isosorbide mononitrate (IMDUR) 30 MG 24 hr tablet 1 tablet Daily. 6/26/19  Yes Giacomo Cheema MD   metoprolol succinate XL (TOPROL-XL) 25 MG 24 hr tablet TAKE 1 TABLET DAILY 9/30/19  Yes Rachid Sheriff MD   Multiple Vitamin (DAILY-VITAMIN) tablet 1 tablet Daily. 10/10/17  Yes Giacomo Cheema MD   Omega-3 Fatty Acids (FISH OIL) 1000 MG capsule capsule 1 capsule Daily. 10/10/17  Yes Giacomo Cheema MD   simvastatin (ZOCOR) 40 MG tablet 1 tablet Daily. 5/29/15  Yes Giacomo Cheema MD   spironolactone (ALDACTONE) 25 MG tablet Take 25 mg by mouth Daily.   Yes Giacomo Cheema MD       Lab Results (most recent)     None          Lab Results   Component Value Date    HGBA1C 7.2 (H) 10/07/2020    HGBA1C 7.5 (H) 04/20/2020    HGBA1C 7.3 (H) 01/22/2020      Lab Results   Component Value Date    GLUCOSE 82 10/07/2020    BUN 65 (H) 10/07/2020    CREATININE 2.58 (H) 10/07/2020    EGFRIFNONA 26 (L) 10/07/2020    EGFRIFAFRI 35 (L) 07/16/2016    BCR 25.2 (H) 10/07/2020    K 4.0 10/07/2020    CO2 25.3 " 10/07/2020    CALCIUM 8.9 10/07/2020    ALBUMIN 4.10 10/07/2020    LABIL2 0.9 (L) 04/10/2019    AST 28 10/07/2020    ALT 20 10/07/2020    CHOL 120 10/07/2020    LDL 67 10/07/2020    HDL 38 (L) 10/07/2020    TRIG 77 10/07/2020     Lab Results   Component Value Date    TSH 2.670 10/07/2020     Microalb/cr ratio 704.4    Assessment/Plan     Diagnoses and all orders for this visit:    1. 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 Mixed hyperlipidemia - improved    3. Insulin pump titration    Wearing an OmniPod insulin pump since 6/2017. Also using DexCom G6.    See eye doctor.  Continue exercise as able.  Continue chol meds & same pump settings.  Call if blood sugars are running under 100 or over 200.  Will have DM educator contact pt re bleeding on DexCom placement site.    Spent 20 min talking to pt.    Shirley Guido MD  10/14/20  21:19 EDT

## 2020-11-02 ENCOUNTER — HOSPITAL ENCOUNTER (OUTPATIENT)
Dept: CARDIOLOGY | Facility: HOSPITAL | Age: 60
Discharge: HOME | End: 2020-11-02
Admitting: PHYSICIAN ASSISTANT

## 2020-11-02 DIAGNOSIS — I73.9 PERIPHERAL VASCULAR DISEASE, UNSPECIFIED (HCC): ICD-10-CM

## 2020-11-02 LAB
BH CV LOWER ARTERIAL LEFT ABI RATIO: 0.73
BH CV LOWER ARTERIAL LEFT DORSALIS PEDIS SYS MAX: 94 MMHG
BH CV LOWER ARTERIAL LEFT GREAT TOE SYS MAX: 75 MMHG
BH CV LOWER ARTERIAL LEFT LOW THIGH SYS MAX: 122 MMHG
BH CV LOWER ARTERIAL LEFT POPLITEAL SYS MAX: 99 MMHG
BH CV LOWER ARTERIAL LEFT POST TIBIAL SYS MAX: 92 MMHG
BH CV LOWER ARTERIAL LEFT TBI RATIO: 0.58
BH CV LOWER ARTERIAL RIGHT ABI RATIO: 1.16
BH CV LOWER ARTERIAL RIGHT DORSALIS PEDIS SYS MAX: 149 MMHG
BH CV LOWER ARTERIAL RIGHT GREAT TOE SYS MAX: 102 MMHG
BH CV LOWER ARTERIAL RIGHT LOW THIGH SYS MAX: 180 MMHG
BH CV LOWER ARTERIAL RIGHT POPLITEAL SYS MAX: 154 MMHG
BH CV LOWER ARTERIAL RIGHT POST TIBIAL SYS MAX: 147 MMHG
BH CV LOWER ARTERIAL RIGHT TBI RATIO: 0.79
UPPER ARTERIAL LEFT ARM BRACHIAL SYS MAX: 129 MMHG
UPPER ARTERIAL RIGHT ARM BRACHIAL SYS MAX: 125 MMHG

## 2020-11-02 PROCEDURE — 93923 UPR/LXTR ART STDY 3+ LVLS: CPT

## 2020-11-05 ENCOUNTER — APPOINTMENT (OUTPATIENT)
Dept: VASCULAR SURGERY | Facility: HOSPITAL | Age: 60
End: 2020-11-05

## 2020-11-10 ENCOUNTER — TELEPHONE (OUTPATIENT)
Dept: ENDOCRINOLOGY | Facility: CLINIC | Age: 60
End: 2020-11-10

## 2020-11-10 NOTE — TELEPHONE ENCOUNTER
Patient called and needs a refill sent to [facility] on Simvastatin (ZOCOR) 40 mg and his metoprolol succinate xl 25 mg and hydralazine 25 mg

## 2020-11-11 RX ORDER — SIMVASTATIN 40 MG
40 TABLET ORAL DAILY
Qty: 90 TABLET | Refills: 1 | Status: SHIPPED | OUTPATIENT
Start: 2020-11-11 | End: 2021-04-21

## 2020-11-11 RX ORDER — HYDRALAZINE HYDROCHLORIDE 25 MG/1
25 TABLET, FILM COATED ORAL 2 TIMES DAILY
Qty: 180 TABLET | Refills: 1 | Status: SHIPPED | OUTPATIENT
Start: 2020-11-11 | End: 2021-04-28 | Stop reason: SDUPTHER

## 2020-11-11 RX ORDER — METOPROLOL SUCCINATE 25 MG/1
TABLET, EXTENDED RELEASE ORAL
Qty: 90 TABLET | Refills: 3 | Status: SHIPPED | OUTPATIENT
Start: 2020-11-11 | End: 2021-04-28 | Stop reason: SDUPTHER

## 2020-11-25 ENCOUNTER — APPOINTMENT (OUTPATIENT)
Dept: VASCULAR SURGERY | Facility: HOSPITAL | Age: 60
End: 2020-11-25

## 2020-11-25 ENCOUNTER — TRANSCRIBE ORDERS (OUTPATIENT)
Dept: ADMINISTRATIVE | Facility: HOSPITAL | Age: 60
End: 2020-11-25

## 2020-11-25 DIAGNOSIS — I65.23 BILATERAL CAROTID ARTERY OCCLUSION: Primary | ICD-10-CM

## 2020-11-25 PROCEDURE — G0463 HOSPITAL OUTPT CLINIC VISIT: HCPCS

## 2020-12-03 ENCOUNTER — TRANSCRIBE ORDERS (OUTPATIENT)
Dept: ADMINISTRATIVE | Facility: HOSPITAL | Age: 60
End: 2020-12-03

## 2020-12-03 ENCOUNTER — LAB (OUTPATIENT)
Dept: LAB | Facility: HOSPITAL | Age: 60
End: 2020-12-03

## 2020-12-03 DIAGNOSIS — N18.4 CHRONIC KIDNEY DISEASE, STAGE IV (SEVERE) (HCC): ICD-10-CM

## 2020-12-03 DIAGNOSIS — I13.10 MALIGNANT HYPERTENSIVE HEART AND CHRONIC KIDNEY DISEASE STAGE III (HCC): Primary | ICD-10-CM

## 2020-12-03 DIAGNOSIS — N18.5 CHRONIC KIDNEY DISEASE, STAGE V (HCC): ICD-10-CM

## 2020-12-03 DIAGNOSIS — N18.30 MALIGNANT HYPERTENSIVE HEART AND CHRONIC KIDNEY DISEASE STAGE III (HCC): Primary | ICD-10-CM

## 2020-12-03 DIAGNOSIS — I13.10 MALIGNANT HYPERTENSIVE HEART AND CHRONIC KIDNEY DISEASE STAGE III (HCC): ICD-10-CM

## 2020-12-03 DIAGNOSIS — N18.30 MALIGNANT HYPERTENSIVE HEART AND CHRONIC KIDNEY DISEASE STAGE III (HCC): ICD-10-CM

## 2020-12-03 LAB
ALBUMIN SERPL-MCNC: 3.7 G/DL (ref 3.5–5.2)
ALBUMIN/GLOB SERPL: 1.1 G/DL
ALP SERPL-CCNC: 109 U/L (ref 39–117)
ALT SERPL W P-5'-P-CCNC: 19 U/L (ref 1–41)
ANION GAP SERPL CALCULATED.3IONS-SCNC: 8.3 MMOL/L (ref 5–15)
AST SERPL-CCNC: 22 U/L (ref 1–40)
BACTERIA UR QL AUTO: NORMAL /HPF
BASOPHILS # BLD AUTO: 0.05 10*3/MM3 (ref 0–0.2)
BASOPHILS NFR BLD AUTO: 0.8 % (ref 0–1.5)
BILIRUB CONJ SERPL-MCNC: <0.2 MG/DL (ref 0–0.3)
BILIRUB SERPL-MCNC: 0.2 MG/DL (ref 0–1.2)
BILIRUB UR QL STRIP: NEGATIVE
BUN SERPL-MCNC: 68 MG/DL (ref 6–20)
BUN/CREAT SERPL: 25.6 (ref 7–25)
CALCIUM SPEC-SCNC: 8.7 MG/DL (ref 8.6–10.5)
CHLORIDE SERPL-SCNC: 102 MMOL/L (ref 98–107)
CLARITY UR: CLEAR
CO2 SERPL-SCNC: 25.7 MMOL/L (ref 22–29)
COLOR UR: YELLOW
CREAT SERPL-MCNC: 2.66 MG/DL (ref 0.76–1.27)
CREAT UR-MCNC: 59 MG/DL
DEPRECATED RDW RBC AUTO: 43.6 FL (ref 37–54)
EOSINOPHIL # BLD AUTO: 0.24 10*3/MM3 (ref 0–0.4)
EOSINOPHIL NFR BLD AUTO: 3.7 % (ref 0.3–6.2)
ERYTHROCYTE [DISTWIDTH] IN BLOOD BY AUTOMATED COUNT: 12.4 % (ref 12.3–15.4)
GFR SERPL CREATININE-BSD FRML MDRD: 25 ML/MIN/1.73
GLOBULIN UR ELPH-MCNC: 3.4 GM/DL
GLUCOSE SERPL-MCNC: 212 MG/DL (ref 65–99)
GLUCOSE UR STRIP-MCNC: NEGATIVE MG/DL
HCT VFR BLD AUTO: 34.6 % (ref 37.5–51)
HGB BLD-MCNC: 11.5 G/DL (ref 13–17.7)
HGB UR QL STRIP.AUTO: NEGATIVE
HYALINE CASTS UR QL AUTO: NORMAL /LPF
IMM GRANULOCYTES # BLD AUTO: 0.03 10*3/MM3 (ref 0–0.05)
IMM GRANULOCYTES NFR BLD AUTO: 0.5 % (ref 0–0.5)
KETONES UR QL STRIP: NEGATIVE
LEUKOCYTE ESTERASE UR QL STRIP.AUTO: NEGATIVE
LYMPHOCYTES # BLD AUTO: 0.84 10*3/MM3 (ref 0.7–3.1)
LYMPHOCYTES NFR BLD AUTO: 12.9 % (ref 19.6–45.3)
MCH RBC QN AUTO: 31.7 PG (ref 26.6–33)
MCHC RBC AUTO-ENTMCNC: 33.2 G/DL (ref 31.5–35.7)
MCV RBC AUTO: 95.3 FL (ref 79–97)
MONOCYTES # BLD AUTO: 0.73 10*3/MM3 (ref 0.1–0.9)
MONOCYTES NFR BLD AUTO: 11.2 % (ref 5–12)
NEUTROPHILS NFR BLD AUTO: 4.63 10*3/MM3 (ref 1.7–7)
NEUTROPHILS NFR BLD AUTO: 70.9 % (ref 42.7–76)
NITRITE UR QL STRIP: NEGATIVE
NRBC BLD AUTO-RTO: 0 /100 WBC (ref 0–0.2)
PH UR STRIP.AUTO: 5.5 [PH] (ref 5–8)
PHOSPHATE SERPL-MCNC: 3.7 MG/DL (ref 2.5–4.5)
PLATELET # BLD AUTO: 185 10*3/MM3 (ref 140–450)
PMV BLD AUTO: 10.8 FL (ref 6–12)
POTASSIUM SERPL-SCNC: 4.3 MMOL/L (ref 3.5–5.2)
PROT SERPL-MCNC: 7.1 G/DL (ref 6–8.5)
PROT UR QL STRIP: ABNORMAL
PROT UR-MCNC: 40 MG/DL
PTH-INTACT SERPL-MCNC: 139 PG/ML (ref 15–65)
RBC # BLD AUTO: 3.63 10*6/MM3 (ref 4.14–5.8)
RBC # UR: NORMAL /HPF
REF LAB TEST METHOD: NORMAL
SODIUM SERPL-SCNC: 136 MMOL/L (ref 136–145)
SP GR UR STRIP: 1.01 (ref 1–1.03)
SQUAMOUS #/AREA URNS HPF: NORMAL /HPF
UROBILINOGEN UR QL STRIP: ABNORMAL
WBC # BLD AUTO: 6.52 10*3/MM3 (ref 3.4–10.8)
WBC UR QL AUTO: NORMAL /HPF

## 2020-12-03 PROCEDURE — 84156 ASSAY OF PROTEIN URINE: CPT

## 2020-12-03 PROCEDURE — 80053 COMPREHEN METABOLIC PANEL: CPT

## 2020-12-03 PROCEDURE — 83970 ASSAY OF PARATHORMONE: CPT

## 2020-12-03 PROCEDURE — 81001 URINALYSIS AUTO W/SCOPE: CPT

## 2020-12-03 PROCEDURE — 84100 ASSAY OF PHOSPHORUS: CPT

## 2020-12-03 PROCEDURE — 36415 COLL VENOUS BLD VENIPUNCTURE: CPT

## 2020-12-03 PROCEDURE — 82570 ASSAY OF URINE CREATININE: CPT

## 2020-12-03 PROCEDURE — 85025 COMPLETE CBC W/AUTO DIFF WBC: CPT

## 2020-12-03 PROCEDURE — 82248 BILIRUBIN DIRECT: CPT

## 2020-12-22 ENCOUNTER — HOSPITAL ENCOUNTER (EMERGENCY)
Facility: HOSPITAL | Age: 60
Discharge: HOME | End: 2020-12-22
Admitting: EMERGENCY MEDICINE

## 2020-12-22 VITALS
RESPIRATION RATE: 16 BRPM | OXYGEN SATURATION: 99 % | DIASTOLIC BLOOD PRESSURE: 59 MMHG | TEMPERATURE: 98.2 F | BODY MASS INDEX: 27.59 KG/M2 | SYSTOLIC BLOOD PRESSURE: 141 MMHG | WEIGHT: 186.29 LBS | HEART RATE: 78 BPM | HEIGHT: 69 IN

## 2020-12-22 DIAGNOSIS — L29.9 PRURITUS: ICD-10-CM

## 2020-12-22 DIAGNOSIS — T78.40XA ALLERGIC REACTION, INITIAL ENCOUNTER: Primary | ICD-10-CM

## 2020-12-22 PROCEDURE — 25010000002 METHYLPREDNISOLONE PER 125 MG: Performed by: NURSE PRACTITIONER

## 2020-12-22 PROCEDURE — 96375 TX/PRO/DX INJ NEW DRUG ADDON: CPT

## 2020-12-22 PROCEDURE — 99282 EMERGENCY DEPT VISIT SF MDM: CPT

## 2020-12-22 PROCEDURE — 96374 THER/PROPH/DIAG INJ IV PUSH: CPT

## 2020-12-22 PROCEDURE — 25010000002 DIPHENHYDRAMINE PER 50 MG: Performed by: NURSE PRACTITIONER

## 2020-12-22 RX ORDER — METHYLPREDNISOLONE SODIUM SUCCINATE 125 MG/2ML
125 INJECTION, POWDER, LYOPHILIZED, FOR SOLUTION INTRAMUSCULAR; INTRAVENOUS ONCE
Status: COMPLETED | OUTPATIENT
Start: 2020-12-22 | End: 2020-12-22

## 2020-12-22 RX ORDER — ONDANSETRON 4 MG/1
4 TABLET, ORALLY DISINTEGRATING ORAL EVERY 8 HOURS PRN
Qty: 20 TABLET | Refills: 0 | Status: SHIPPED | OUTPATIENT
Start: 2020-12-22 | End: 2021-04-21

## 2020-12-22 RX ORDER — SODIUM CHLORIDE 0.9 % (FLUSH) 0.9 %
10 SYRINGE (ML) INJECTION AS NEEDED
Status: DISCONTINUED | OUTPATIENT
Start: 2020-12-22 | End: 2020-12-22 | Stop reason: HOSPADM

## 2020-12-22 RX ORDER — DIPHENHYDRAMINE HYDROCHLORIDE 50 MG/ML
25 INJECTION INTRAMUSCULAR; INTRAVENOUS ONCE
Status: COMPLETED | OUTPATIENT
Start: 2020-12-22 | End: 2020-12-22

## 2020-12-22 RX ORDER — METHYLPREDNISOLONE 4 MG/1
TABLET ORAL
Qty: 21 TABLET | Refills: 0 | Status: SHIPPED | OUTPATIENT
Start: 2020-12-22 | End: 2021-04-21

## 2020-12-22 RX ORDER — FAMOTIDINE 10 MG/ML
20 INJECTION, SOLUTION INTRAVENOUS ONCE
Status: COMPLETED | OUTPATIENT
Start: 2020-12-22 | End: 2020-12-22

## 2020-12-22 RX ADMIN — FAMOTIDINE 20 MG: 10 INJECTION INTRAVENOUS at 07:30

## 2020-12-22 RX ADMIN — DIPHENHYDRAMINE HYDROCHLORIDE 25 MG: 50 INJECTION, SOLUTION INTRAMUSCULAR; INTRAVENOUS at 07:30

## 2020-12-22 RX ADMIN — METHYLPREDNISOLONE SODIUM SUCCINATE 125 MG: 125 INJECTION, POWDER, FOR SOLUTION INTRAMUSCULAR; INTRAVENOUS at 07:29
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
## 2020-12-22 NOTE — DISCHARGE INSTRUCTIONS
Use over-the-counter Benadryl and Pepcid as directed by  for the next 5 to 7 days.  Prescriptions as directed.  Follow-up with your family doctor.  Return for any new or worsening symptoms

## 2020-12-23 ENCOUNTER — APPOINTMENT (OUTPATIENT)
Dept: VASCULAR SURGERY | Facility: HOSPITAL | Age: 60
End: 2020-12-23

## 2020-12-23 ENCOUNTER — TRANSCRIBE ORDERS (OUTPATIENT)
Dept: ADMINISTRATIVE | Facility: HOSPITAL | Age: 60
End: 2020-12-23

## 2020-12-23 ENCOUNTER — HOSPITAL ENCOUNTER (OUTPATIENT)
Dept: CARDIOLOGY | Facility: HOSPITAL | Age: 60
Discharge: HOME | End: 2020-12-23

## 2020-12-23 DIAGNOSIS — I73.9 PERIPHERAL VASCULAR DISEASE, UNSPECIFIED (HCC): Primary | ICD-10-CM

## 2020-12-23 DIAGNOSIS — I65.23 BILATERAL CAROTID ARTERY OCCLUSION: ICD-10-CM

## 2020-12-23 LAB
BH CV XLRA MEAS LEFT BULB PSV: -54.1 CM/SEC
BH CV XLRA MEAS LEFT CCA RATIO VEL: -61.4 CM/SEC
BH CV XLRA MEAS LEFT DIST CCA EDV: 19.7 CM/SEC
BH CV XLRA MEAS LEFT DIST CCA PSV: 59 CM/SEC
BH CV XLRA MEAS LEFT DIST ICA EDV: -19.9 CM/SEC
BH CV XLRA MEAS LEFT DIST ICA PSV: -66.5 CM/SEC
BH CV XLRA MEAS LEFT ICA RATIO VEL: -66.5 CM/SEC
BH CV XLRA MEAS LEFT ICA/CCA RATIO: 1.1
BH CV XLRA MEAS LEFT PROX CCA EDV: -17.7 CM/SEC
BH CV XLRA MEAS LEFT PROX CCA PSV: -61.4 CM/SEC
BH CV XLRA MEAS LEFT PROX ECA PSV: -93.4 CM/SEC
BH CV XLRA MEAS LEFT PROX ICA EDV: -17.2 CM/SEC
BH CV XLRA MEAS LEFT PROX ICA PSV: -56 CM/SEC
BH CV XLRA MEAS LEFT PROX SCLA PSV: 139 CM/SEC
BH CV XLRA MEAS LEFT VERTEBRAL A PSV: -39.6 CM/SEC
BH CV XLRA MEAS RIGHT BULB PSV: -44.1 CM/SEC
BH CV XLRA MEAS RIGHT CCA RATIO VEL: 70.2 CM/SEC
BH CV XLRA MEAS RIGHT DIST CCA EDV: 21.7 CM/SEC
BH CV XLRA MEAS RIGHT DIST CCA PSV: 67.1 CM/SEC
BH CV XLRA MEAS RIGHT DIST ICA EDV: -22.4 CM/SEC
BH CV XLRA MEAS RIGHT DIST ICA PSV: -57.5 CM/SEC
BH CV XLRA MEAS RIGHT ICA RATIO VEL: -68.5 CM/SEC
BH CV XLRA MEAS RIGHT ICA/CCA RATIO: -0.98
BH CV XLRA MEAS RIGHT PROX CCA EDV: 17.4 CM/SEC
BH CV XLRA MEAS RIGHT PROX CCA PSV: 70.2 CM/SEC
BH CV XLRA MEAS RIGHT PROX ECA PSV: -96.9 CM/SEC
BH CV XLRA MEAS RIGHT PROX ICA EDV: -14.5 CM/SEC
BH CV XLRA MEAS RIGHT PROX ICA PSV: -68.5 CM/SEC
BH CV XLRA MEAS RIGHT PROX SCLA PSV: 97.2 CM/SEC
BH CV XLRA MEAS RIGHT VERTEBRAL A PSV: -54 CM/SEC

## 2020-12-23 PROCEDURE — 93880 EXTRACRANIAL BILAT STUDY: CPT

## 2020-12-23 PROCEDURE — G0463 HOSPITAL OUTPT CLINIC VISIT: HCPCS

## 2020-12-25 ENCOUNTER — HOSPITAL ENCOUNTER (EMERGENCY)
Facility: HOSPITAL | Age: 60
Discharge: LEFT BEFORE DISCHARGE | End: 2020-12-25
Attending: EMERGENCY MEDICINE | Admitting: EMERGENCY MEDICINE

## 2020-12-25 VITALS
RESPIRATION RATE: 18 BRPM | OXYGEN SATURATION: 98 % | TEMPERATURE: 98 F | HEART RATE: 84 BPM | DIASTOLIC BLOOD PRESSURE: 75 MMHG | BODY MASS INDEX: 26.96 KG/M2 | SYSTOLIC BLOOD PRESSURE: 148 MMHG | HEIGHT: 69 IN | WEIGHT: 182 LBS

## 2020-12-25 DIAGNOSIS — R21 RASH: Primary | ICD-10-CM

## 2020-12-25 DIAGNOSIS — N17.0 ACUTE KIDNEY INJURY (AKI) WITH ACUTE TUBULAR NECROSIS (ATN) (HCC): ICD-10-CM

## 2020-12-25 LAB
ALBUMIN SERPL-MCNC: 3.8 G/DL (ref 3.5–5.2)
ALBUMIN/GLOB SERPL: 1.3 G/DL
ALP SERPL-CCNC: 90 U/L (ref 39–117)
ALT SERPL W P-5'-P-CCNC: 23 U/L (ref 1–41)
ANION GAP SERPL CALCULATED.3IONS-SCNC: 12 MMOL/L (ref 5–15)
APTT PPP: 25 SECONDS (ref 24–31)
AST SERPL-CCNC: 24 U/L (ref 1–40)
BASOPHILS # BLD AUTO: 0 10*3/MM3 (ref 0–0.2)
BASOPHILS NFR BLD AUTO: 0.2 % (ref 0–1.5)
BILIRUB SERPL-MCNC: 0.4 MG/DL (ref 0–1.2)
BUN SERPL-MCNC: 80 MG/DL (ref 6–20)
BUN/CREAT SERPL: 27.2 (ref 7–25)
CALCIUM SPEC-SCNC: 8.8 MG/DL (ref 8.6–10.5)
CHLORIDE SERPL-SCNC: 96 MMOL/L (ref 98–107)
CO2 SERPL-SCNC: 23 MMOL/L (ref 22–29)
CREAT SERPL-MCNC: 2.94 MG/DL (ref 0.76–1.27)
DEPRECATED RDW RBC AUTO: 44.2 FL (ref 37–54)
EOSINOPHIL # BLD AUTO: 0.1 10*3/MM3 (ref 0–0.4)
EOSINOPHIL NFR BLD AUTO: 1 % (ref 0.3–6.2)
ERYTHROCYTE [DISTWIDTH] IN BLOOD BY AUTOMATED COUNT: 13.7 % (ref 12.3–15.4)
GFR SERPL CREATININE-BSD FRML MDRD: 22 ML/MIN/1.73
GLOBULIN UR ELPH-MCNC: 2.9 GM/DL
GLUCOSE SERPL-MCNC: 163 MG/DL (ref 65–99)
HCT VFR BLD AUTO: 35 % (ref 37.5–51)
HGB BLD-MCNC: 12 G/DL (ref 13–17.7)
INR PPP: 1.09 (ref 0.93–1.1)
LYMPHOCYTES # BLD AUTO: 0.9 10*3/MM3 (ref 0.7–3.1)
LYMPHOCYTES NFR BLD AUTO: 9.3 % (ref 19.6–45.3)
MCH RBC QN AUTO: 32.3 PG (ref 26.6–33)
MCHC RBC AUTO-ENTMCNC: 34.4 G/DL (ref 31.5–35.7)
MCV RBC AUTO: 94 FL (ref 79–97)
MONOCYTES # BLD AUTO: 0.9 10*3/MM3 (ref 0.1–0.9)
MONOCYTES NFR BLD AUTO: 8.8 % (ref 5–12)
NEUTROPHILS NFR BLD AUTO: 8.1 10*3/MM3 (ref 1.7–7)
NEUTROPHILS NFR BLD AUTO: 80.7 % (ref 42.7–76)
NRBC BLD AUTO-RTO: 0.1 /100 WBC (ref 0–0.2)
PLATELET # BLD AUTO: 214 10*3/MM3 (ref 140–450)
PMV BLD AUTO: 8.4 FL (ref 6–12)
POTASSIUM SERPL-SCNC: 3.9 MMOL/L (ref 3.5–5.2)
PROT SERPL-MCNC: 6.7 G/DL (ref 6–8.5)
PROTHROMBIN TIME: 11.9 SECONDS (ref 9.6–11.7)
RBC # BLD AUTO: 3.72 10*6/MM3 (ref 4.14–5.8)
SODIUM SERPL-SCNC: 131 MMOL/L (ref 136–145)
WBC # BLD AUTO: 10 10*3/MM3 (ref 3.4–10.8)

## 2020-12-25 PROCEDURE — 25010000002 DIPHENHYDRAMINE PER 50 MG: Performed by: EMERGENCY MEDICINE

## 2020-12-25 PROCEDURE — 96375 TX/PRO/DX INJ NEW DRUG ADDON: CPT

## 2020-12-25 PROCEDURE — 25010000002 METHYLPREDNISOLONE PER 125 MG: Performed by: EMERGENCY MEDICINE

## 2020-12-25 PROCEDURE — 85025 COMPLETE CBC W/AUTO DIFF WBC: CPT | Performed by: EMERGENCY MEDICINE

## 2020-12-25 PROCEDURE — 85610 PROTHROMBIN TIME: CPT | Performed by: EMERGENCY MEDICINE

## 2020-12-25 PROCEDURE — 80053 COMPREHEN METABOLIC PANEL: CPT | Performed by: EMERGENCY MEDICINE

## 2020-12-25 PROCEDURE — 99283 EMERGENCY DEPT VISIT LOW MDM: CPT

## 2020-12-25 PROCEDURE — 85730 THROMBOPLASTIN TIME PARTIAL: CPT | Performed by: EMERGENCY MEDICINE

## 2020-12-25 PROCEDURE — 96374 THER/PROPH/DIAG INJ IV PUSH: CPT

## 2020-12-25 RX ORDER — FAMOTIDINE 20 MG/1
20 TABLET, FILM COATED ORAL 2 TIMES DAILY
Qty: 14 TABLET | Refills: 0 | Status: SHIPPED | OUTPATIENT
Start: 2020-12-25 | End: 2021-04-21

## 2020-12-25 RX ORDER — METHYLPREDNISOLONE SODIUM SUCCINATE 125 MG/2ML
125 INJECTION, POWDER, LYOPHILIZED, FOR SOLUTION INTRAMUSCULAR; INTRAVENOUS ONCE
Status: COMPLETED | OUTPATIENT
Start: 2020-12-25 | End: 2020-12-25

## 2020-12-25 RX ORDER — SODIUM CHLORIDE 0.9 % (FLUSH) 0.9 %
10 SYRINGE (ML) INJECTION AS NEEDED
Status: DISCONTINUED | OUTPATIENT
Start: 2020-12-25 | End: 2020-12-25 | Stop reason: HOSPADM

## 2020-12-25 RX ORDER — PREDNISONE 10 MG/1
60 TABLET ORAL DAILY
Qty: 30 TABLET | Refills: 0 | Status: SHIPPED | OUTPATIENT
Start: 2020-12-25 | End: 2020-12-30

## 2020-12-25 RX ORDER — DIPHENHYDRAMINE HCL 50 MG
50 CAPSULE ORAL EVERY 6 HOURS PRN
Qty: 30 CAPSULE | Refills: 0 | Status: SHIPPED | OUTPATIENT
Start: 2020-12-25 | End: 2021-05-20

## 2020-12-25 RX ORDER — DIPHENHYDRAMINE HYDROCHLORIDE 50 MG/ML
50 INJECTION INTRAMUSCULAR; INTRAVENOUS ONCE
Status: COMPLETED | OUTPATIENT
Start: 2020-12-25 | End: 2020-12-25

## 2020-12-25 RX ADMIN — DIPHENHYDRAMINE HYDROCHLORIDE 50 MG: 50 INJECTION, SOLUTION INTRAMUSCULAR; INTRAVENOUS at 17:51

## 2020-12-25 RX ADMIN — SODIUM CHLORIDE 500 ML: 9 INJECTION, SOLUTION INTRAVENOUS at 18:27

## 2020-12-25 RX ADMIN — FAMOTIDINE 20 MG: 10 INJECTION INTRAVENOUS at 17:51

## 2020-12-25 RX ADMIN — METHYLPREDNISOLONE SODIUM SUCCINATE 125 MG: 125 INJECTION, POWDER, FOR SOLUTION INTRAMUSCULAR; INTRAVENOUS at 17:51
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
## 2020-12-26 NOTE — ED NOTES
"Pt refused admission. He sts \"I don't want to be admitted for a rash.\" Pt educated regarding benefits of admission and risks of leaving. Pt verbalized understanding. Pt advised to return to ed if he changes his mind about admission or if his symptoms worsen.      Laurie Hilario RN  12/25/20 5311    "

## 2020-12-26 NOTE — DISCHARGE INSTRUCTIONS
I have asked you to stay in the hospital overnight.  You have declined at this point time.  Please return immediately to the emergency department if you develop any worsening symptoms signs or concerns.

## 2021-02-09 ENCOUNTER — LAB (OUTPATIENT)
Dept: LAB | Facility: HOSPITAL | Age: 61
End: 2021-02-09

## 2021-02-09 ENCOUNTER — TRANSCRIBE ORDERS (OUTPATIENT)
Dept: ADMINISTRATIVE | Facility: HOSPITAL | Age: 61
End: 2021-02-09

## 2021-02-09 DIAGNOSIS — N18.4 CHRONIC KIDNEY DISEASE, STAGE IV (SEVERE) (HCC): ICD-10-CM

## 2021-02-09 DIAGNOSIS — N18.4 CHRONIC KIDNEY DISEASE, STAGE IV (SEVERE) (HCC): Primary | ICD-10-CM

## 2021-02-09 LAB
ALBUMIN SERPL-MCNC: 3.7 G/DL (ref 3.5–5.2)
ALBUMIN/GLOB SERPL: 1.2 G/DL
ALP SERPL-CCNC: 93 U/L (ref 39–117)
ALT SERPL W P-5'-P-CCNC: 20 U/L (ref 1–41)
ANION GAP SERPL CALCULATED.3IONS-SCNC: 11.8 MMOL/L (ref 5–15)
AST SERPL-CCNC: 23 U/L (ref 1–40)
BACTERIA UR QL AUTO: NORMAL /HPF
BILIRUB SERPL-MCNC: 0.3 MG/DL (ref 0–1.2)
BILIRUB UR QL STRIP: NEGATIVE
BUN SERPL-MCNC: 71 MG/DL (ref 6–20)
BUN/CREAT SERPL: 21.1 (ref 7–25)
CALCIUM SPEC-SCNC: 9.4 MG/DL (ref 8.6–10.5)
CHLORIDE SERPL-SCNC: 101 MMOL/L (ref 98–107)
CLARITY UR: CLEAR
CO2 SERPL-SCNC: 26.2 MMOL/L (ref 22–29)
COLOR UR: YELLOW
CREAT SERPL-MCNC: 3.37 MG/DL (ref 0.76–1.27)
CREAT UR-MCNC: 54.9 MG/DL
DEPRECATED RDW RBC AUTO: 46.2 FL (ref 37–54)
ERYTHROCYTE [DISTWIDTH] IN BLOOD BY AUTOMATED COUNT: 13 % (ref 12.3–15.4)
GFR SERPL CREATININE-BSD FRML MDRD: 19 ML/MIN/1.73
GLOBULIN UR ELPH-MCNC: 3.1 GM/DL
GLUCOSE SERPL-MCNC: 187 MG/DL (ref 65–99)
GLUCOSE UR STRIP-MCNC: NEGATIVE MG/DL
HCT VFR BLD AUTO: 37.6 % (ref 37.5–51)
HGB BLD-MCNC: 12.3 G/DL (ref 13–17.7)
HGB UR QL STRIP.AUTO: NEGATIVE
HYALINE CASTS UR QL AUTO: NORMAL /LPF
KETONES UR QL STRIP: NEGATIVE
LEUKOCYTE ESTERASE UR QL STRIP.AUTO: NEGATIVE
MAGNESIUM SERPL-MCNC: 2.3 MG/DL (ref 1.6–2.6)
MCH RBC QN AUTO: 31.6 PG (ref 26.6–33)
MCHC RBC AUTO-ENTMCNC: 32.7 G/DL (ref 31.5–35.7)
MCV RBC AUTO: 96.7 FL (ref 79–97)
NITRITE UR QL STRIP: NEGATIVE
PH UR STRIP.AUTO: 6 [PH] (ref 5–8)
PHOSPHATE SERPL-MCNC: 4.2 MG/DL (ref 2.5–4.5)
PLATELET # BLD AUTO: 191 10*3/MM3 (ref 140–450)
PMV BLD AUTO: 10.7 FL (ref 6–12)
POTASSIUM SERPL-SCNC: 4.8 MMOL/L (ref 3.5–5.2)
PROT SERPL-MCNC: 6.8 G/DL (ref 6–8.5)
PROT UR QL STRIP: ABNORMAL
PROT UR-MCNC: 33 MG/DL
PROT/CREAT UR: 601.1 MG/G CREA (ref 0–200)
PTH-INTACT SERPL-MCNC: 146 PG/ML (ref 15–65)
RBC # BLD AUTO: 3.89 10*6/MM3 (ref 4.14–5.8)
RBC # UR: NORMAL /HPF
REF LAB TEST METHOD: NORMAL
SODIUM SERPL-SCNC: 139 MMOL/L (ref 136–145)
SP GR UR STRIP: 1.01 (ref 1–1.03)
SQUAMOUS #/AREA URNS HPF: NORMAL /HPF
UROBILINOGEN UR QL STRIP: ABNORMAL
WBC # BLD AUTO: 6.44 10*3/MM3 (ref 3.4–10.8)
WBC UR QL AUTO: NORMAL /HPF

## 2021-02-09 PROCEDURE — 83970 ASSAY OF PARATHORMONE: CPT

## 2021-02-09 PROCEDURE — 84156 ASSAY OF PROTEIN URINE: CPT

## 2021-02-09 PROCEDURE — 83735 ASSAY OF MAGNESIUM: CPT

## 2021-02-09 PROCEDURE — 36415 COLL VENOUS BLD VENIPUNCTURE: CPT

## 2021-02-09 PROCEDURE — 80053 COMPREHEN METABOLIC PANEL: CPT

## 2021-02-09 PROCEDURE — 84100 ASSAY OF PHOSPHORUS: CPT

## 2021-02-09 PROCEDURE — 81001 URINALYSIS AUTO W/SCOPE: CPT

## 2021-02-09 PROCEDURE — 85027 COMPLETE CBC AUTOMATED: CPT

## 2021-02-09 PROCEDURE — 82570 ASSAY OF URINE CREATININE: CPT

## 2021-04-13 ENCOUNTER — TELEPHONE (OUTPATIENT)
Dept: ENDOCRINOLOGY | Facility: CLINIC | Age: 61
End: 2021-04-13

## 2021-04-13 NOTE — TELEPHONE ENCOUNTER
[employer] ([phone]) left voicemail asking us to clarify on his directions what the MN stands for.

## 2021-04-14 ENCOUNTER — LAB (OUTPATIENT)
Dept: LAB | Facility: HOSPITAL | Age: 61
End: 2021-04-14

## 2021-04-14 DIAGNOSIS — E10.49 TYPE 1 DIABETES MELLITUS WITH OTHER DIABETIC NEUROLOGICAL COMPLICATION (HCC): ICD-10-CM

## 2021-04-14 DIAGNOSIS — N18.30 STAGE 3 CHRONIC KIDNEY DISEASE, UNSPECIFIED WHETHER STAGE 3A OR 3B CKD (HCC): Primary | ICD-10-CM

## 2021-04-14 LAB
ALBUMIN SERPL-MCNC: 3.6 G/DL (ref 3.5–5.2)
ALBUMIN/GLOB SERPL: 1.2 G/DL
ALP SERPL-CCNC: 81 U/L (ref 39–117)
ALT SERPL W P-5'-P-CCNC: 25 U/L (ref 1–41)
ANION GAP SERPL CALCULATED.3IONS-SCNC: 11.8 MMOL/L (ref 5–15)
AST SERPL-CCNC: 24 U/L (ref 1–40)
BACTERIA UR QL AUTO: NORMAL /HPF
BASOPHILS # BLD AUTO: 0.05 10*3/MM3 (ref 0–0.2)
BASOPHILS NFR BLD AUTO: 0.8 % (ref 0–1.5)
BILIRUB SERPL-MCNC: 0.2 MG/DL (ref 0–1.2)
BILIRUB UR QL STRIP: NEGATIVE
BUN SERPL-MCNC: 70 MG/DL (ref 6–20)
BUN/CREAT SERPL: 24.7 (ref 7–25)
CALCIUM SPEC-SCNC: 9 MG/DL (ref 8.6–10.5)
CHLORIDE SERPL-SCNC: 99 MMOL/L (ref 98–107)
CLARITY UR: CLEAR
CO2 SERPL-SCNC: 24.2 MMOL/L (ref 22–29)
COLOR UR: YELLOW
CREAT SERPL-MCNC: 2.83 MG/DL (ref 0.76–1.27)
CREAT UR-MCNC: 45.4 MG/DL
DEPRECATED RDW RBC AUTO: 42.2 FL (ref 37–54)
EOSINOPHIL # BLD AUTO: 0.33 10*3/MM3 (ref 0–0.4)
EOSINOPHIL NFR BLD AUTO: 5.3 % (ref 0.3–6.2)
ERYTHROCYTE [DISTWIDTH] IN BLOOD BY AUTOMATED COUNT: 12.3 % (ref 12.3–15.4)
GFR SERPL CREATININE-BSD FRML MDRD: 23 ML/MIN/1.73
GLOBULIN UR ELPH-MCNC: 3 GM/DL
GLUCOSE SERPL-MCNC: 267 MG/DL (ref 65–99)
GLUCOSE UR STRIP-MCNC: ABNORMAL MG/DL
HBA1C MFR BLD: 8.4 % (ref 3.5–5.6)
HCT VFR BLD AUTO: 36.4 % (ref 37.5–51)
HGB BLD-MCNC: 12.3 G/DL (ref 13–17.7)
HGB UR QL STRIP.AUTO: NEGATIVE
HYALINE CASTS UR QL AUTO: NORMAL /LPF
IMM GRANULOCYTES # BLD AUTO: 0.02 10*3/MM3 (ref 0–0.05)
IMM GRANULOCYTES NFR BLD AUTO: 0.3 % (ref 0–0.5)
KETONES UR QL STRIP: NEGATIVE
LEUKOCYTE ESTERASE UR QL STRIP.AUTO: NEGATIVE
LYMPHOCYTES # BLD AUTO: 1.02 10*3/MM3 (ref 0.7–3.1)
LYMPHOCYTES NFR BLD AUTO: 16.5 % (ref 19.6–45.3)
MAGNESIUM SERPL-MCNC: 2 MG/DL (ref 1.6–2.6)
MCH RBC QN AUTO: 31.6 PG (ref 26.6–33)
MCHC RBC AUTO-ENTMCNC: 33.8 G/DL (ref 31.5–35.7)
MCV RBC AUTO: 93.6 FL (ref 79–97)
MONOCYTES # BLD AUTO: 0.71 10*3/MM3 (ref 0.1–0.9)
MONOCYTES NFR BLD AUTO: 11.5 % (ref 5–12)
NEUTROPHILS NFR BLD AUTO: 4.04 10*3/MM3 (ref 1.7–7)
NEUTROPHILS NFR BLD AUTO: 65.6 % (ref 42.7–76)
NITRITE UR QL STRIP: NEGATIVE
NRBC BLD AUTO-RTO: 0 /100 WBC (ref 0–0.2)
PH UR STRIP.AUTO: 6 [PH] (ref 5–8)
PHOSPHATE SERPL-MCNC: 3.4 MG/DL (ref 2.5–4.5)
PLATELET # BLD AUTO: 194 10*3/MM3 (ref 140–450)
PMV BLD AUTO: 10.8 FL (ref 6–12)
POTASSIUM SERPL-SCNC: 4.8 MMOL/L (ref 3.5–5.2)
PROT SERPL-MCNC: 6.6 G/DL (ref 6–8.5)
PROT UR QL STRIP: ABNORMAL
PROT UR-MCNC: 37 MG/DL
PROT/CREAT UR: 815 MG/G CREA (ref 0–200)
PTH-INTACT SERPL-MCNC: 82 PG/ML (ref 15–65)
RBC # BLD AUTO: 3.89 10*6/MM3 (ref 4.14–5.8)
RBC # UR: NORMAL /HPF
REF LAB TEST METHOD: NORMAL
SODIUM SERPL-SCNC: 135 MMOL/L (ref 136–145)
SP GR UR STRIP: 1.01 (ref 1–1.03)
SQUAMOUS #/AREA URNS HPF: NORMAL /HPF
UROBILINOGEN UR QL STRIP: ABNORMAL
WBC # BLD AUTO: 6.17 10*3/MM3 (ref 3.4–10.8)
WBC UR QL AUTO: NORMAL /HPF

## 2021-04-14 PROCEDURE — 83970 ASSAY OF PARATHORMONE: CPT

## 2021-04-14 PROCEDURE — 83735 ASSAY OF MAGNESIUM: CPT

## 2021-04-14 PROCEDURE — 84156 ASSAY OF PROTEIN URINE: CPT

## 2021-04-14 PROCEDURE — 36415 COLL VENOUS BLD VENIPUNCTURE: CPT

## 2021-04-14 PROCEDURE — 82570 ASSAY OF URINE CREATININE: CPT

## 2021-04-14 PROCEDURE — 81001 URINALYSIS AUTO W/SCOPE: CPT

## 2021-04-14 PROCEDURE — 80053 COMPREHEN METABOLIC PANEL: CPT

## 2021-04-14 PROCEDURE — 83036 HEMOGLOBIN GLYCOSYLATED A1C: CPT

## 2021-04-14 PROCEDURE — 84100 ASSAY OF PHOSPHORUS: CPT

## 2021-04-14 PROCEDURE — 85025 COMPLETE CBC W/AUTO DIFF WBC: CPT

## 2021-04-19 PROCEDURE — 93295 DEV INTERROG REMOTE 1/2/MLT: CPT | Performed by: NURSE PRACTITIONER

## 2021-04-19 PROCEDURE — 93296 REM INTERROG EVL PM/IDS: CPT | Performed by: NURSE PRACTITIONER

## 2021-04-19 NOTE — TELEPHONE ENCOUNTER
I found out MN stands for Midnight. [facility] given this information.

## 2021-04-21 ENCOUNTER — OFFICE VISIT (OUTPATIENT)
Dept: ENDOCRINOLOGY | Facility: CLINIC | Age: 61
End: 2021-04-21

## 2021-04-21 VITALS
BODY MASS INDEX: 27.25 KG/M2 | DIASTOLIC BLOOD PRESSURE: 64 MMHG | OXYGEN SATURATION: 99 % | TEMPERATURE: 96.6 F | HEART RATE: 68 BPM | SYSTOLIC BLOOD PRESSURE: 104 MMHG | HEIGHT: 69 IN | WEIGHT: 184 LBS

## 2021-04-21 DIAGNOSIS — E55.9 VITAMIN D DEFICIENCY: ICD-10-CM

## 2021-04-21 DIAGNOSIS — Z46.81 INSULIN PUMP TITRATION: ICD-10-CM

## 2021-04-21 DIAGNOSIS — E78.2 MIXED HYPERLIPIDEMIA: ICD-10-CM

## 2021-04-21 DIAGNOSIS — E10.49 TYPE 1 DIABETES MELLITUS WITH OTHER DIABETIC NEUROLOGICAL COMPLICATION (HCC): Primary | ICD-10-CM

## 2021-04-21 PROCEDURE — 99214 OFFICE O/P EST MOD 30 MIN: CPT | Performed by: INTERNAL MEDICINE

## 2021-04-21 NOTE — PATIENT INSTRUCTIONS
See eye doctor.  Continue exercise.  Increase basal rate to 0.9 units/h from 12a-2p.  Continue atorvastatin & vit D.  Call if blood sugars are running under 100 or over 200.  F/u in 6 months, with fasting labs prior.

## 2021-04-28 DIAGNOSIS — E10.65 UNCONTROLLED TYPE 1 DIABETES MELLITUS WITH HYPERGLYCEMIA (HCC): Primary | ICD-10-CM

## 2021-04-29 NOTE — PROGRESS NOTES
[facility]        Patient Care Team:  Erika Hernandez MD as PCP - General  Erika Hernandez MD as PCP - Family Medicine    Chief Complaint:    Chief Complaint   Patient presents with   • Diabetes     Type 1,  4hr PP, Basal 12am .85, 2pm 1.0, 5pm .70         Subjective   Here for diabetes f/u  Blood sugars: higher around noon  Insulin delivery per pump: Basal 45% / bolus 55%  Exercise program: walking  Due for eye exam  Taking vit D    Interval History:     Patient Complaints: none  Patient Denies:  hypoglycemia  History taken from: patient    Review of Systems:   Review of Systems   Eyes: Negative for blurred vision.   Gastrointestinal: Negative for nausea.   Endocrine: Negative for polyuria.   Neurological: Negative for headache.     Lost  2 lb since last visit    Objective     Vital Signs      Vitals:    04/21/21 1305   BP: 104/64   Pulse: 68   Temp: 96.6 °F (35.9 °C)   SpO2: 99%         Physical Exam:     General Appearance:    Alert, cooperative, in no acute distress   Head:    Normocephalic, without obvious abnormality, atraumatic   Eyes:            Lids and lashes normal, conjunctivae and sclerae normal, no   icterus, no pallor, corneas clear, PERRLA   Throat:   No oral lesions,  oral mucosa moist. Edentulous   Neck:   No adenopathy, supple,  no thyromegaly, no   carotid bruit   Lungs:     Clear    Heart:    Regular rhythm and normal rate   Chest Wall:    No abnormalities observed   Abdomen:     Normal bowel sounds, soft                 Extremities:   Toe deformities, ecchymosis L great toe               Pulses:   Pulses palpable and equal bilaterally   Skin:   Pump site clean   Neurologic:  DTR absent in ankles, absent vibratory sense in toes, unable to feel the 10g monofilament ( same as 1y ago )            Results Review:    I have reviewed the patient's new clinical results, labs & imaging.    Medication Review:   Prior to Admission medications    Medication Sig Start Date End  "Date Taking? Authorizing Provider   acetone, urine, test (KETOSTIX) strip Take As Directed. 1/16/18  Yes Giacomo Cheema MD   aspirin (ADULT ASPIRIN EC LOW STRENGTH) 81 MG EC tablet 1 tablet Daily. 6/29/16  Yes Giacomo Cheema MD   B Complex Vitamins (VITAMIN B COMPLEX PO) 1 tablet Daily. 11/15/17  Yes Giacomo Cheema MD   BRILINTA 90 MG tablet tablet TAKE 1 TABLET TWICE A DAY 7/13/20  Yes Rachid Sheriff MD   bumetanide (BUMEX) 2 MG tablet 1 tablet 2 (Two) Times a Day. 7/9/19  Yes Giacomo Cheema MD   calcitriol (ROCALTROL) 0.25 MCG capsule 1 capsule. Take one every other day 4/17/19  Yes Giacomo Cheema MD   Cholecalciferol 1000 units capsule 1 capsule Daily. 5000units daily 12/7/16  Yes Giacomo Cheema MD   diphenhydrAMINE (BENADRYL) 50 MG capsule Take 1 capsule by mouth Every 6 (Six) Hours As Needed for Itching. 12/25/20  Yes Philip Mobley, DO   glucose blood test strip 1 each by Other route As Needed (livongo test strips.. use to check blood ugar four times daily). Use as instructed   Yes Giacomo Cheema MD   hydrALAZINE (APRESOLINE) 25 MG tablet Take 1 tablet by mouth 2 (Two) Times a Day. 11/11/20  Yes Rachid Sheriff MD   Insulin Syringe 31G X 5/16\" 0.3 ML misc For us with insulin when pump not working properly 1/10/20  Yes Shirley Guido MD   isosorbide mononitrate (IMDUR) 30 MG 24 hr tablet 1 tablet Daily. 6/26/19  Yes Giacomo Cheema MD   metoprolol succinate XL (TOPROL-XL) 25 MG 24 hr tablet TAKE 1 TABLET DAILY 11/11/20  Yes Rachid Sheriff MD   Multiple Vitamin (DAILY-VITAMIN) tablet 1 tablet Daily. 10/10/17  Yes Giacomo Cheema MD   Omega-3 Fatty Acids (FISH OIL) 1000 MG capsule capsule 1 capsule Daily. 10/10/17  Yes Giacomo Cheema MD   spironolactone (ALDACTONE) 25 MG tablet Take 25 mg by mouth Daily.   Yes Giacomo Cheema MD   insulin lispro (HumaLOG) 100 UNIT/ML injection USE IN INSULIN PUMP. MAXIMUM DOSE 50 UNITS DAILY. MN 0.8, " 8 A.M. 0.65, 2 P.M. 0.9, 5 P.M. 0.5, 9 P.M. 0.5 3/2/21 4/28/21 Yes Shirley Guido MD   insulin lispro (HumaLOG) 100 UNIT/ML injection USE IN INSULIN PUMP. MAXIMUM DOSE 50 UNITS DAILY. MN 0.8, 8 A.M. 0.65, 2 P.M. 0.9, 5 P.M. 0.5, 9 P.M. 0.5 4/28/21   Shirley Guido MD       Lab Results (most recent)     None        Lab Results   Component Value Date    HGBA1C 8.4 (H) 04/14/2021    HGBA1C 7.2 (H) 10/07/2020    HGBA1C 7.5 (H) 04/20/2020      Lab Results   Component Value Date    GLUCOSE 267 (H) 04/14/2021    BUN 70 (H) 04/14/2021    CREATININE 2.83 (H) 04/14/2021    EGFRIFNONA 23 (L) 04/14/2021    EGFRIFAFRI 35 (L) 07/16/2016    BCR 24.7 04/14/2021    K 4.8 04/14/2021    CO2 24.2 04/14/2021    CALCIUM 9.0 04/14/2021    ALBUMIN 3.60 04/14/2021    LABIL2 0.9 (L) 04/10/2019    AST 24 04/14/2021    ALT 25 04/14/2021    CHOL 120 10/07/2020    LDL 67 10/07/2020    HDL 38 (L) 10/07/2020    TRIG 77 10/07/2020     Lab Results   Component Value Date    TSH 2.670 10/07/2020       Assessment/Plan     Diagnoses and all orders for this visit:    1. Type 1 diabetes mellitus with other diabetic neurological complication (CMS/[facility]) (Primary) - worse  -     Hemoglobin A1c; Future  -     Microalbumin / Creatinine Urine Ratio - Urine, Clean Catch; Future    2. Mixed hyperlipidemia - will check status next visit  -     Comprehensive Metabolic Panel; Future  -     Lipid Panel; Future  -     TSH; Future    3. Vitamin D deficiency - will check status next visit  -     Vitamin D 25 Hydroxy; Future    4. Insulin pump titration    Wearing an OmniPod insulin pump since 6/2017. Also using DexCom G6    See eye doctor.  Continue exercise.  Increase basal rate to 0.9 units/h from 12a-2p.  Continue atorvastatin & vit D.  Call if blood sugars are running under 100 or over 200.        Shirley Guido MD  04/28/21  22:01 EDT

## 2021-04-30 RX ORDER — METOPROLOL SUCCINATE 25 MG/1
25 TABLET, EXTENDED RELEASE ORAL DAILY
Qty: 90 TABLET | Refills: 0 | Status: SHIPPED | OUTPATIENT
Start: 2021-04-30 | End: 2021-06-25 | Stop reason: SDUPTHER

## 2021-04-30 RX ORDER — HYDRALAZINE HYDROCHLORIDE 25 MG/1
25 TABLET, FILM COATED ORAL 2 TIMES DAILY
Qty: 180 TABLET | Refills: 0 | Status: SHIPPED | OUTPATIENT
Start: 2021-04-30 | End: 2021-06-25 | Stop reason: SDUPTHER

## 2021-05-20 ENCOUNTER — CLINICAL SUPPORT NO REQUIREMENTS (OUTPATIENT)
Dept: CARDIOLOGY | Facility: CLINIC | Age: 61
End: 2021-05-20

## 2021-05-20 ENCOUNTER — OFFICE VISIT (OUTPATIENT)
Dept: CARDIOLOGY | Facility: CLINIC | Age: 61
End: 2021-05-20

## 2021-05-20 VITALS
WEIGHT: 185 LBS | HEART RATE: 76 BPM | DIASTOLIC BLOOD PRESSURE: 76 MMHG | BODY MASS INDEX: 27.4 KG/M2 | SYSTOLIC BLOOD PRESSURE: 128 MMHG | HEIGHT: 69 IN

## 2021-05-20 DIAGNOSIS — I50.9 CONGESTIVE HEART FAILURE, UNSPECIFIED HF CHRONICITY, UNSPECIFIED HEART FAILURE TYPE (HCC): ICD-10-CM

## 2021-05-20 DIAGNOSIS — E10.49 TYPE 1 DIABETES MELLITUS WITH OTHER DIABETIC NEUROLOGICAL COMPLICATION (HCC): ICD-10-CM

## 2021-05-20 DIAGNOSIS — Z95.810 ICD (IMPLANTABLE CARDIOVERTER-DEFIBRILLATOR), DUAL, IN SITU: ICD-10-CM

## 2021-05-20 DIAGNOSIS — E78.2 MIXED HYPERLIPIDEMIA: ICD-10-CM

## 2021-05-20 DIAGNOSIS — I42.0 CARDIOMYOPATHY, DILATED (HCC): Primary | ICD-10-CM

## 2021-05-20 DIAGNOSIS — I25.10 CORONARY ARTERY DISEASE INVOLVING NATIVE CORONARY ARTERY OF NATIVE HEART WITHOUT ANGINA PECTORIS: ICD-10-CM

## 2021-05-20 PROCEDURE — 99214 OFFICE O/P EST MOD 30 MIN: CPT | Performed by: INTERNAL MEDICINE
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
## 2021-05-26 ENCOUNTER — APPOINTMENT (OUTPATIENT)
Dept: CARDIOLOGY | Facility: HOSPITAL | Age: 61
End: 2021-05-26

## 2021-06-02 ENCOUNTER — APPOINTMENT (OUTPATIENT)
Dept: VASCULAR SURGERY | Facility: HOSPITAL | Age: 61
End: 2021-06-02

## 2021-06-03 ENCOUNTER — APPOINTMENT (OUTPATIENT)
Dept: VASCULAR SURGERY | Facility: HOSPITAL | Age: 61
End: 2021-06-03

## 2021-06-09 ENCOUNTER — HOSPITAL ENCOUNTER (OUTPATIENT)
Dept: CARDIOLOGY | Facility: HOSPITAL | Age: 61
Discharge: HOME | End: 2021-06-09
Admitting: PHYSICIAN ASSISTANT

## 2021-06-09 DIAGNOSIS — I73.9 PERIPHERAL VASCULAR DISEASE, UNSPECIFIED (HCC): ICD-10-CM

## 2021-06-09 LAB
BH CV LOWER ARTERIAL LEFT ABI RATIO: 0.72
BH CV LOWER ARTERIAL LEFT CALF RATIO: 0.77
BH CV LOWER ARTERIAL LEFT DORSALIS PEDIS SYS MAX: 93 MMHG
BH CV LOWER ARTERIAL LEFT GREAT TOE SYS MAX: 72 MMHG
BH CV LOWER ARTERIAL LEFT LOW THIGH SYS MAX: 149 MMHG
BH CV LOWER ARTERIAL LEFT POPLITEAL SYS MAX: 100 MMHG
BH CV LOWER ARTERIAL LEFT POST TIBIAL SYS MAX: 92 MMHG
BH CV LOWER ARTERIAL LEFT TBI RATIO: 0.55
BH CV LOWER ARTERIAL RIGHT ABI RATIO: 1.08
BH CV LOWER ARTERIAL RIGHT CALF RATIO: 1.08
BH CV LOWER ARTERIAL RIGHT DORSALIS PEDIS SYS MAX: 139 MMHG
BH CV LOWER ARTERIAL RIGHT GREAT TOE SYS MAX: 118 MMHG
BH CV LOWER ARTERIAL RIGHT LOW THIGH SYS MAX: 170 MMHG
BH CV LOWER ARTERIAL RIGHT POPLITEAL SYS MAX: 140 MMHG
BH CV LOWER ARTERIAL RIGHT POST TIBIAL SYS MAX: 141 MMHG
BH CV LOWER ARTERIAL RIGHT TBI RATIO: 0.91
MAXIMAL PREDICTED HEART RATE: 162 BPM
STRESS TARGET HR: 138 BPM
UPPER ARTERIAL LEFT ARM BRACHIAL SYS MAX: 124 MMHG
UPPER ARTERIAL RIGHT ARM BRACHIAL SYS MAX: 130 MMHG

## 2021-06-09 PROCEDURE — 93923 UPR/LXTR ART STDY 3+ LVLS: CPT

## 2021-06-16 ENCOUNTER — APPOINTMENT (OUTPATIENT)
Dept: VASCULAR SURGERY | Facility: HOSPITAL | Age: 61
End: 2021-06-16

## 2021-06-16 PROCEDURE — G0463 HOSPITAL OUTPT CLINIC VISIT: HCPCS

## 2021-06-23 ENCOUNTER — TRANSCRIBE ORDERS (OUTPATIENT)
Dept: ADMINISTRATIVE | Facility: HOSPITAL | Age: 61
End: 2021-06-23

## 2021-06-23 ENCOUNTER — LAB (OUTPATIENT)
Dept: LAB | Facility: HOSPITAL | Age: 61
End: 2021-06-23

## 2021-06-23 DIAGNOSIS — N18.4 CHRONIC KIDNEY DISEASE, STAGE IV (SEVERE) (HCC): Primary | ICD-10-CM

## 2021-06-23 DIAGNOSIS — N18.4 CHRONIC KIDNEY DISEASE, STAGE IV (SEVERE) (HCC): ICD-10-CM

## 2021-06-23 LAB
ALBUMIN SERPL-MCNC: 3.7 G/DL (ref 3.5–5.2)
ALBUMIN/GLOB SERPL: 1.1 G/DL
ALP SERPL-CCNC: 81 U/L (ref 39–117)
ALT SERPL W P-5'-P-CCNC: 21 U/L (ref 1–41)
ANION GAP SERPL CALCULATED.3IONS-SCNC: 10.3 MMOL/L (ref 5–15)
AST SERPL-CCNC: 23 U/L (ref 1–40)
BACTERIA UR QL AUTO: ABNORMAL /HPF
BILIRUB SERPL-MCNC: 0.3 MG/DL (ref 0–1.2)
BILIRUB UR QL STRIP: NEGATIVE
BUN SERPL-MCNC: 57 MG/DL (ref 6–20)
BUN/CREAT SERPL: 16.1 (ref 7–25)
CALCIUM SPEC-SCNC: 8.6 MG/DL (ref 8.6–10.5)
CHLORIDE SERPL-SCNC: 102 MMOL/L (ref 98–107)
CLARITY UR: ABNORMAL
CO2 SERPL-SCNC: 22.7 MMOL/L (ref 22–29)
COLOR UR: YELLOW
CREAT SERPL-MCNC: 3.55 MG/DL (ref 0.76–1.27)
CREAT UR-MCNC: 47.1 MG/DL
DEPRECATED RDW RBC AUTO: 45.6 FL (ref 37–54)
ERYTHROCYTE [DISTWIDTH] IN BLOOD BY AUTOMATED COUNT: 12.8 % (ref 12.3–15.4)
GFR SERPL CREATININE-BSD FRML MDRD: 18 ML/MIN/1.73
GLOBULIN UR ELPH-MCNC: 3.4 GM/DL
GLUCOSE SERPL-MCNC: 151 MG/DL (ref 65–99)
GLUCOSE UR STRIP-MCNC: NEGATIVE MG/DL
HCT VFR BLD AUTO: 38.7 % (ref 37.5–51)
HGB BLD-MCNC: 13 G/DL (ref 13–17.7)
HGB UR QL STRIP.AUTO: ABNORMAL
HYALINE CASTS UR QL AUTO: ABNORMAL /LPF
KETONES UR QL STRIP: NEGATIVE
LEUKOCYTE ESTERASE UR QL STRIP.AUTO: ABNORMAL
MAGNESIUM SERPL-MCNC: 2.2 MG/DL (ref 1.6–2.6)
MCH RBC QN AUTO: 32.3 PG (ref 26.6–33)
MCHC RBC AUTO-ENTMCNC: 33.6 G/DL (ref 31.5–35.7)
MCV RBC AUTO: 96.3 FL (ref 79–97)
NITRITE UR QL STRIP: NEGATIVE
PH UR STRIP.AUTO: 6 [PH] (ref 5–8)
PHOSPHATE SERPL-MCNC: 3.6 MG/DL (ref 2.5–4.5)
PLATELET # BLD AUTO: 217 10*3/MM3 (ref 140–450)
PMV BLD AUTO: 10.4 FL (ref 6–12)
POTASSIUM SERPL-SCNC: 5 MMOL/L (ref 3.5–5.2)
PROT SERPL-MCNC: 7.1 G/DL (ref 6–8.5)
PROT UR QL STRIP: ABNORMAL
PROT UR-MCNC: 37 MG/DL
PROT/CREAT UR: 785.6 MG/G CREA (ref 0–200)
PTH-INTACT SERPL-MCNC: 138 PG/ML (ref 15–65)
RBC # BLD AUTO: 4.02 10*6/MM3 (ref 4.14–5.8)
RBC # UR: ABNORMAL /HPF
REF LAB TEST METHOD: ABNORMAL
SODIUM SERPL-SCNC: 135 MMOL/L (ref 136–145)
SP GR UR STRIP: 1.01 (ref 1–1.03)
SQUAMOUS #/AREA URNS HPF: ABNORMAL /HPF
UROBILINOGEN UR QL STRIP: ABNORMAL
WBC # BLD AUTO: 9.79 10*3/MM3 (ref 3.4–10.8)
WBC UR QL AUTO: ABNORMAL /HPF

## 2021-06-23 PROCEDURE — 80053 COMPREHEN METABOLIC PANEL: CPT

## 2021-06-23 PROCEDURE — 85027 COMPLETE CBC AUTOMATED: CPT

## 2021-06-23 PROCEDURE — 87086 URINE CULTURE/COLONY COUNT: CPT

## 2021-06-23 PROCEDURE — 84156 ASSAY OF PROTEIN URINE: CPT

## 2021-06-23 PROCEDURE — 82570 ASSAY OF URINE CREATININE: CPT

## 2021-06-23 PROCEDURE — 81001 URINALYSIS AUTO W/SCOPE: CPT

## 2021-06-23 PROCEDURE — 36415 COLL VENOUS BLD VENIPUNCTURE: CPT

## 2021-06-23 PROCEDURE — 83970 ASSAY OF PARATHORMONE: CPT

## 2021-06-23 PROCEDURE — 83735 ASSAY OF MAGNESIUM: CPT

## 2021-06-23 PROCEDURE — 84100 ASSAY OF PHOSPHORUS: CPT

## 2021-06-23 PROCEDURE — 87147 CULTURE TYPE IMMUNOLOGIC: CPT

## 2021-06-25 LAB — BACTERIA SPEC AEROBE CULT: ABNORMAL

## 2021-06-28 RX ORDER — HYDRALAZINE HYDROCHLORIDE 25 MG/1
25 TABLET, FILM COATED ORAL 2 TIMES DAILY
Qty: 180 TABLET | Refills: 2 | Status: SHIPPED | OUTPATIENT
Start: 2021-06-28 | End: 2022-02-15

## 2021-06-28 RX ORDER — METOPROLOL SUCCINATE 25 MG/1
25 TABLET, EXTENDED RELEASE ORAL DAILY
Qty: 90 TABLET | Refills: 2 | Status: SHIPPED | OUTPATIENT
Start: 2021-06-28 | End: 2022-02-15

## 2021-07-06 ENCOUNTER — LAB (OUTPATIENT)
Dept: LAB | Facility: HOSPITAL | Age: 61
End: 2021-07-06

## 2021-07-06 ENCOUNTER — TRANSCRIBE ORDERS (OUTPATIENT)
Dept: ADMINISTRATIVE | Facility: HOSPITAL | Age: 61
End: 2021-07-06

## 2021-07-06 DIAGNOSIS — N18.4 STAGE 4 CHRONIC KIDNEY DISEASE (HCC): ICD-10-CM

## 2021-07-06 DIAGNOSIS — N18.4 STAGE 4 CHRONIC KIDNEY DISEASE (HCC): Primary | ICD-10-CM

## 2021-07-06 LAB
ALBUMIN SERPL-MCNC: 3.6 G/DL (ref 3.5–5.2)
ALBUMIN/GLOB SERPL: 1.1 G/DL
ALP SERPL-CCNC: 81 U/L (ref 39–117)
ALT SERPL W P-5'-P-CCNC: 15 U/L (ref 1–41)
ANION GAP SERPL CALCULATED.3IONS-SCNC: 8.3 MMOL/L (ref 5–15)
AST SERPL-CCNC: 17 U/L (ref 1–40)
BACTERIA UR QL AUTO: ABNORMAL /HPF
BILIRUB SERPL-MCNC: 0.2 MG/DL (ref 0–1.2)
BILIRUB UR QL STRIP: NEGATIVE
BUN SERPL-MCNC: 49 MG/DL (ref 6–20)
BUN/CREAT SERPL: 19.1 (ref 7–25)
CALCIUM SPEC-SCNC: 9 MG/DL (ref 8.6–10.5)
CHLORIDE SERPL-SCNC: 103 MMOL/L (ref 98–107)
CLARITY UR: ABNORMAL
CO2 SERPL-SCNC: 21.7 MMOL/L (ref 22–29)
COLOR UR: YELLOW
CREAT SERPL-MCNC: 2.56 MG/DL (ref 0.76–1.27)
CREAT UR-MCNC: 57.5 MG/DL
DEPRECATED RDW RBC AUTO: 44.1 FL (ref 37–54)
ERYTHROCYTE [DISTWIDTH] IN BLOOD BY AUTOMATED COUNT: 12.7 % (ref 12.3–15.4)
GFR SERPL CREATININE-BSD FRML MDRD: 26 ML/MIN/1.73
GLOBULIN UR ELPH-MCNC: 3.2 GM/DL
GLUCOSE SERPL-MCNC: 228 MG/DL (ref 65–99)
GLUCOSE UR STRIP-MCNC: ABNORMAL MG/DL
HCT VFR BLD AUTO: 33.8 % (ref 37.5–51)
HGB BLD-MCNC: 11.4 G/DL (ref 13–17.7)
HGB UR QL STRIP.AUTO: ABNORMAL
HYALINE CASTS UR QL AUTO: ABNORMAL /LPF
KETONES UR QL STRIP: NEGATIVE
LEUKOCYTE ESTERASE UR QL STRIP.AUTO: ABNORMAL
MCH RBC QN AUTO: 32.2 PG (ref 26.6–33)
MCHC RBC AUTO-ENTMCNC: 33.7 G/DL (ref 31.5–35.7)
MCV RBC AUTO: 95.5 FL (ref 79–97)
NITRITE UR QL STRIP: NEGATIVE
PH UR STRIP.AUTO: 6 [PH] (ref 5–8)
PLATELET # BLD AUTO: 233 10*3/MM3 (ref 140–450)
PMV BLD AUTO: 10.5 FL (ref 6–12)
POTASSIUM SERPL-SCNC: 4.6 MMOL/L (ref 3.5–5.2)
PROT SERPL-MCNC: 6.8 G/DL (ref 6–8.5)
PROT UR QL STRIP: ABNORMAL
PROT UR-MCNC: 61 MG/DL
PROT/CREAT UR: 1060.9 MG/G CREA (ref 0–200)
RBC # BLD AUTO: 3.54 10*6/MM3 (ref 4.14–5.8)
RBC # UR: ABNORMAL /HPF
REF LAB TEST METHOD: ABNORMAL
SODIUM SERPL-SCNC: 133 MMOL/L (ref 136–145)
SP GR UR STRIP: 1.01 (ref 1–1.03)
SQUAMOUS #/AREA URNS HPF: ABNORMAL /HPF
UROBILINOGEN UR QL STRIP: ABNORMAL
WBC # BLD AUTO: 8.11 10*3/MM3 (ref 3.4–10.8)
WBC UR QL AUTO: ABNORMAL /HPF

## 2021-07-06 PROCEDURE — 82570 ASSAY OF URINE CREATININE: CPT

## 2021-07-06 PROCEDURE — 85027 COMPLETE CBC AUTOMATED: CPT

## 2021-07-06 PROCEDURE — 87086 URINE CULTURE/COLONY COUNT: CPT

## 2021-07-06 PROCEDURE — 80053 COMPREHEN METABOLIC PANEL: CPT

## 2021-07-06 PROCEDURE — 87147 CULTURE TYPE IMMUNOLOGIC: CPT

## 2021-07-06 PROCEDURE — 36415 COLL VENOUS BLD VENIPUNCTURE: CPT

## 2021-07-06 PROCEDURE — 81001 URINALYSIS AUTO W/SCOPE: CPT

## 2021-07-06 PROCEDURE — 84156 ASSAY OF PROTEIN URINE: CPT

## 2021-07-08 LAB — BACTERIA SPEC AEROBE CULT: ABNORMAL

## 2021-08-23 ENCOUNTER — TRANSCRIBE ORDERS (OUTPATIENT)
Dept: ADMINISTRATIVE | Facility: HOSPITAL | Age: 61
End: 2021-08-23

## 2021-08-23 DIAGNOSIS — I73.9 PERIPHERAL VASCULAR DISEASE, UNSPECIFIED (HCC): Primary | ICD-10-CM

## 2021-09-03 PROCEDURE — 93296 REM INTERROG EVL PM/IDS: CPT | Performed by: NURSE PRACTITIONER

## 2021-09-03 PROCEDURE — 93295 DEV INTERROG REMOTE 1/2/MLT: CPT | Performed by: NURSE PRACTITIONER

## 2021-09-22 ENCOUNTER — LAB (OUTPATIENT)
Dept: LAB | Facility: HOSPITAL | Age: 61
End: 2021-09-22

## 2021-09-22 ENCOUNTER — TELEPHONE (OUTPATIENT)
Dept: ENDOCRINOLOGY | Facility: CLINIC | Age: 61
End: 2021-09-22

## 2021-09-22 ENCOUNTER — TRANSCRIBE ORDERS (OUTPATIENT)
Dept: ADMINISTRATIVE | Facility: HOSPITAL | Age: 61
End: 2021-09-22

## 2021-09-22 DIAGNOSIS — N18.30 STAGE 3 CHRONIC KIDNEY DISEASE, UNSPECIFIED WHETHER STAGE 3A OR 3B CKD (HCC): Primary | ICD-10-CM

## 2021-09-22 DIAGNOSIS — N18.30 STAGE 3 CHRONIC KIDNEY DISEASE, UNSPECIFIED WHETHER STAGE 3A OR 3B CKD (HCC): ICD-10-CM

## 2021-09-22 LAB
ALBUMIN SERPL-MCNC: 3.7 G/DL (ref 3.5–5.2)
ALBUMIN/GLOB SERPL: 1.2 G/DL
ALP SERPL-CCNC: 80 U/L (ref 39–117)
ALT SERPL W P-5'-P-CCNC: 16 U/L (ref 1–41)
ANION GAP SERPL CALCULATED.3IONS-SCNC: 9 MMOL/L (ref 5–15)
AST SERPL-CCNC: 16 U/L (ref 1–40)
BACTERIA UR QL AUTO: NORMAL /HPF
BILIRUB SERPL-MCNC: 0.4 MG/DL (ref 0–1.2)
BILIRUB UR QL STRIP: NEGATIVE
BUN SERPL-MCNC: 50 MG/DL (ref 6–20)
BUN/CREAT SERPL: 19.5 (ref 7–25)
CALCIUM SPEC-SCNC: 9.4 MG/DL (ref 8.6–10.5)
CHLORIDE SERPL-SCNC: 106 MMOL/L (ref 98–107)
CLARITY UR: CLEAR
CO2 SERPL-SCNC: 24 MMOL/L (ref 22–29)
COLOR UR: YELLOW
CREAT SERPL-MCNC: 2.57 MG/DL (ref 0.76–1.27)
CREAT UR-MCNC: 42.2 MG/DL
DEPRECATED RDW RBC AUTO: 44 FL (ref 37–54)
ERYTHROCYTE [DISTWIDTH] IN BLOOD BY AUTOMATED COUNT: 12.6 % (ref 12.3–15.4)
GFR SERPL CREATININE-BSD FRML MDRD: 26 ML/MIN/1.73
GLOBULIN UR ELPH-MCNC: 3 GM/DL
GLUCOSE SERPL-MCNC: 122 MG/DL (ref 65–99)
GLUCOSE UR STRIP-MCNC: NEGATIVE MG/DL
HCT VFR BLD AUTO: 31.8 % (ref 37.5–51)
HGB BLD-MCNC: 10.6 G/DL (ref 13–17.7)
HGB UR QL STRIP.AUTO: NEGATIVE
HYALINE CASTS UR QL AUTO: NORMAL /LPF
KETONES UR QL STRIP: NEGATIVE
LEUKOCYTE ESTERASE UR QL STRIP.AUTO: NEGATIVE
MAGNESIUM SERPL-MCNC: 2.2 MG/DL (ref 1.6–2.6)
MCH RBC QN AUTO: 31.6 PG (ref 26.6–33)
MCHC RBC AUTO-ENTMCNC: 33.3 G/DL (ref 31.5–35.7)
MCV RBC AUTO: 94.9 FL (ref 79–97)
NITRITE UR QL STRIP: NEGATIVE
PH UR STRIP.AUTO: 6 [PH] (ref 5–8)
PHOSPHATE SERPL-MCNC: 3.3 MG/DL (ref 2.5–4.5)
PLATELET # BLD AUTO: 226 10*3/MM3 (ref 140–450)
PMV BLD AUTO: 10.3 FL (ref 6–12)
POTASSIUM SERPL-SCNC: 5.1 MMOL/L (ref 3.5–5.2)
PROT SERPL-MCNC: 6.7 G/DL (ref 6–8.5)
PROT UR QL STRIP: ABNORMAL
PROT UR-MCNC: 76 MG/DL
PROT/CREAT UR: 1800.9 MG/G CREA (ref 0–200)
PTH-INTACT SERPL-MCNC: 38.4 PG/ML (ref 15–65)
RBC # BLD AUTO: 3.35 10*6/MM3 (ref 4.14–5.8)
RBC # UR: NORMAL /HPF
REF LAB TEST METHOD: NORMAL
SODIUM SERPL-SCNC: 139 MMOL/L (ref 136–145)
SP GR UR STRIP: 1.01 (ref 1–1.03)
SQUAMOUS #/AREA URNS HPF: NORMAL /HPF
UROBILINOGEN UR QL STRIP: ABNORMAL
WBC # BLD AUTO: 7.83 10*3/MM3 (ref 3.4–10.8)
WBC UR QL AUTO: NORMAL /HPF

## 2021-09-22 PROCEDURE — 83970 ASSAY OF PARATHORMONE: CPT

## 2021-09-22 PROCEDURE — 85027 COMPLETE CBC AUTOMATED: CPT

## 2021-09-22 PROCEDURE — 84100 ASSAY OF PHOSPHORUS: CPT

## 2021-09-22 PROCEDURE — 84156 ASSAY OF PROTEIN URINE: CPT

## 2021-09-22 PROCEDURE — 83735 ASSAY OF MAGNESIUM: CPT

## 2021-09-22 PROCEDURE — 36415 COLL VENOUS BLD VENIPUNCTURE: CPT

## 2021-09-22 PROCEDURE — 81001 URINALYSIS AUTO W/SCOPE: CPT

## 2021-09-22 PROCEDURE — 80053 COMPREHEN METABOLIC PANEL: CPT

## 2021-09-22 PROCEDURE — 82570 ASSAY OF URINE CREATININE: CPT

## 2021-09-23 ENCOUNTER — TELEPHONE (OUTPATIENT)
Dept: ENDOCRINOLOGY | Facility: CLINIC | Age: 61
End: 2021-09-23

## 2021-09-23 DIAGNOSIS — E10.49 TYPE 1 DIABETES MELLITUS WITH OTHER DIABETIC NEUROLOGICAL COMPLICATION (HCC): Primary | ICD-10-CM

## 2021-09-23 RX ORDER — INSULIN PUMP CONTROLLER
EACH MISCELLANEOUS
COMMUNITY
End: 2021-09-23 | Stop reason: SDUPTHER

## 2021-09-23 RX ORDER — INSULIN PUMP CONTROLLER
1 EACH MISCELLANEOUS CONTINUOUS
Qty: 45 EACH | Refills: 3 | Status: SHIPPED | OUTPATIENT
Start: 2021-09-23 | End: 2022-09-13

## 2021-09-25 PROCEDURE — U0004 COV-19 TEST NON-CDC HGH THRU: HCPCS | Performed by: PHYSICIAN ASSISTANT

## 2021-10-05 ENCOUNTER — TELEPHONE (OUTPATIENT)
Dept: ENDOCRINOLOGY | Facility: CLINIC | Age: 61
End: 2021-10-05

## 2021-10-05 NOTE — TELEPHONE ENCOUNTER
Pt called because he hasn't heard anything about his new pods. In 9/23 phone note, rx was sent to [facility]. Provided pt with phone number to pharmacy and he is going to contact them to see if they have the rx. He will contact us again if needed.

## 2021-10-13 ENCOUNTER — LAB (OUTPATIENT)
Dept: LAB | Facility: HOSPITAL | Age: 61
End: 2021-10-13
Attending: INTERNAL MEDICINE

## 2021-10-13 DIAGNOSIS — E55.9 VITAMIN D DEFICIENCY: ICD-10-CM

## 2021-10-13 DIAGNOSIS — E78.2 MIXED HYPERLIPIDEMIA: ICD-10-CM

## 2021-10-13 DIAGNOSIS — E10.49 TYPE 1 DIABETES MELLITUS WITH OTHER DIABETIC NEUROLOGICAL COMPLICATION (HCC): ICD-10-CM

## 2021-10-13 LAB
25(OH)D3 SERPL-MCNC: 91.4 NG/ML
ALBUMIN SERPL-MCNC: 3.7 G/DL (ref 3.5–5.2)
ALBUMIN UR-MCNC: 60.2 MG/DL
ALBUMIN/GLOB SERPL: 1.1 G/DL
ALP SERPL-CCNC: 105 U/L (ref 39–117)
ALT SERPL W P-5'-P-CCNC: 33 U/L (ref 1–41)
ANION GAP SERPL CALCULATED.3IONS-SCNC: 9.6 MMOL/L (ref 5–15)
AST SERPL-CCNC: 24 U/L (ref 1–40)
BILIRUB SERPL-MCNC: 0.3 MG/DL (ref 0–1.2)
BUN SERPL-MCNC: 55 MG/DL (ref 6–20)
BUN/CREAT SERPL: 21 (ref 7–25)
CALCIUM SPEC-SCNC: 9.1 MG/DL (ref 8.6–10.5)
CHLORIDE SERPL-SCNC: 104 MMOL/L (ref 98–107)
CHOLEST SERPL-MCNC: 117 MG/DL (ref 0–200)
CO2 SERPL-SCNC: 24.4 MMOL/L (ref 22–29)
CREAT SERPL-MCNC: 2.62 MG/DL (ref 0.76–1.27)
CREAT UR-MCNC: 49 MG/DL
GFR SERPL CREATININE-BSD FRML MDRD: 25 ML/MIN/1.73
GLOBULIN UR ELPH-MCNC: 3.4 GM/DL
GLUCOSE SERPL-MCNC: 123 MG/DL (ref 65–99)
HBA1C MFR BLD: 6.7 % (ref 3.5–5.6)
HDLC SERPL-MCNC: 29 MG/DL (ref 40–60)
LDLC SERPL CALC-MCNC: 67 MG/DL (ref 0–100)
LDLC/HDLC SERPL: 2.25 {RATIO}
MICROALBUMIN/CREAT UR: 1228.6 MG/G
POTASSIUM SERPL-SCNC: 4.4 MMOL/L (ref 3.5–5.2)
PROT SERPL-MCNC: 7.1 G/DL (ref 6–8.5)
SODIUM SERPL-SCNC: 138 MMOL/L (ref 136–145)
TRIGL SERPL-MCNC: 114 MG/DL (ref 0–150)
TSH SERPL DL<=0.05 MIU/L-ACNC: 3.06 UIU/ML (ref 0.27–4.2)
VLDLC SERPL-MCNC: 21 MG/DL (ref 5–40)

## 2021-10-13 PROCEDURE — 36415 COLL VENOUS BLD VENIPUNCTURE: CPT

## 2021-10-13 PROCEDURE — 83036 HEMOGLOBIN GLYCOSYLATED A1C: CPT

## 2021-10-13 PROCEDURE — 80061 LIPID PANEL: CPT

## 2021-10-13 PROCEDURE — 80053 COMPREHEN METABOLIC PANEL: CPT

## 2021-10-13 PROCEDURE — 82570 ASSAY OF URINE CREATININE: CPT

## 2021-10-13 PROCEDURE — 82306 VITAMIN D 25 HYDROXY: CPT

## 2021-10-13 PROCEDURE — 82043 UR ALBUMIN QUANTITATIVE: CPT

## 2021-10-13 PROCEDURE — 84443 ASSAY THYROID STIM HORMONE: CPT

## 2021-10-15 RX ORDER — GABAPENTIN 300 MG/1
CAPSULE ORAL
COMMUNITY
Start: 2021-09-27 | End: 2022-12-27

## 2021-10-20 ENCOUNTER — OFFICE VISIT (OUTPATIENT)
Dept: ENDOCRINOLOGY | Facility: CLINIC | Age: 61
End: 2021-10-20

## 2021-10-20 VITALS
DIASTOLIC BLOOD PRESSURE: 80 MMHG | HEIGHT: 69 IN | HEART RATE: 81 BPM | BODY MASS INDEX: 27.64 KG/M2 | WEIGHT: 186.6 LBS | SYSTOLIC BLOOD PRESSURE: 114 MMHG | TEMPERATURE: 97.4 F

## 2021-10-20 DIAGNOSIS — Z46.81 INSULIN PUMP TITRATION: ICD-10-CM

## 2021-10-20 DIAGNOSIS — E10.49 TYPE 1 DIABETES MELLITUS WITH OTHER DIABETIC NEUROLOGICAL COMPLICATION (HCC): Primary | ICD-10-CM

## 2021-10-20 DIAGNOSIS — E55.9 VITAMIN D DEFICIENCY: ICD-10-CM

## 2021-10-20 DIAGNOSIS — E78.2 MIXED HYPERLIPIDEMIA: ICD-10-CM

## 2021-10-20 PROCEDURE — 99214 OFFICE O/P EST MOD 30 MIN: CPT | Performed by: INTERNAL MEDICINE

## 2021-10-20 NOTE — PATIENT INSTRUCTIONS
Keep up the good work!  See eye doctor.  Continue same pump settings, chol meds & vit D supplements.  Call if blood sugars are running under 100 or over 200.  F/u in 6 months, with labs prior.

## 2021-10-21 ENCOUNTER — TELEPHONE (OUTPATIENT)
Dept: ENDOCRINOLOGY | Facility: CLINIC | Age: 61
End: 2021-10-21

## 2021-10-21 NOTE — PROGRESS NOTES
[facility]        Patient Care Team:  Erika Hernandez MD as PCP - General  Erika Hernandez MD as PCP - Family Medicine    Chief Complaint:    Chief Complaint   Patient presents with   • Diabetes     follow up, Type 1,  1.5hr PP, Basal 12am 0.90, 2pm 1.0, 5pm 0.70         Subjective   Here for diabetes f/u  Blood sugars: in the mid 100's  Exercise program: walking  Taking vit D    Interval History:     Patient Complaints: got his new PDM. Not sure what need to be done  Patient Denies:  hypoglycemia  History taken from: patient    Review of Systems:   Review of Systems   HENT: Negative for trouble swallowing.    Eyes: Negative for blurred vision.   Gastrointestinal: Negative for nausea.   Endocrine: Negative for polyuria.   Neurological: Negative for headache.     Gained 2 lb since last visit    Objective     Vital Signs      Vitals:    10/20/21 1338   BP: 114/80   Pulse: 81   Temp: 97.4 °F (36.3 °C)         Physical Exam:     General Appearance:    Alert, cooperative, in no acute distress   Head:    Normocephalic, without obvious abnormality, atraumatic   Eyes:            Lids and lashes normal, conjunctivae and sclerae normal, no   icterus, no pallor, corneas clear, PERRLA   Throat:   No oral lesions,  oral mucosa moist. Edentulous   Neck:   No adenopathy, supple,  no thyromegaly, no   carotid bruit   Lungs:     Clear     Heart:    Regular rhythm and normal rate   Chest Wall:    No abnormalities observed   Abdomen:     Normal bowel sounds, soft                 Extremities:   Toe deformities, ecchymosis L great toe, no edema               Pulses:   Pulses palpable and equal bilaterally   Skin:   Pump site clean   Neurologic:  DTR absent, unable to feel the 10g monofilament          Results Review:    I have reviewed the patient's new clinical results, labs & imaging.    Medication Review:   Prior to Admission medications    Medication Sig Start Date End Date Taking? Authorizing Provider  "  acetone, urine, test (KETOSTIX) strip Take As Directed. 1/16/18  Yes Giacomo Cheema MD   aspirin (ADULT ASPIRIN EC LOW STRENGTH) 81 MG EC tablet 1 tablet Daily. 6/29/16  Yes Giacomo Cheema MD   B Complex Vitamins (VITAMIN B COMPLEX PO) 1 tablet Daily. 11/15/17  Yes Giacomo Cheema MD   bumetanide (BUMEX) 2 MG tablet 1 tablet 2 (Two) Times a Day. 7/9/19  Yes Giacomo Cheema MD   calcitriol (ROCALTROL) 0.25 MCG capsule 1 capsule. Take one every other day 4/17/19  Yes Giacomo Cheema MD   Cholecalciferol 1000 units capsule 1 capsule Daily. 5000units daily 12/7/16  Yes Giacomo Cheema MD   gabapentin (NEURONTIN) 300 MG capsule  9/27/21  Yes Giacomo Cheema MD   glucose blood test strip 1 each by Other route As Needed (livongo test strips.. use to check blood ugar four times daily). Use as instructed   Yes Giacomo Cheema MD   hydrALAZINE (APRESOLINE) 25 MG tablet Take 1 tablet by mouth 2 (Two) Times a Day. 6/28/21  Yes Rachid Sheriff MD   Insulin Disposable Pump (OmniPod Dash 5 Pack Pods) misc 1 each Continuous. Pt to change pod every 48 hours 9/23/21  Yes Shirley Guido MD   insulin lispro (HumaLOG) 100 UNIT/ML injection USE IN INSULIN PUMP. MAXIMUM DOSE 50 UNITS DAILY. MN 0.8, 8 A.M. 0.65, 2 P.M. 0.9, 5 P.M. 0.5, 9 P.M. 0.5 4/28/21  Yes Shirley Guido MD   Insulin Syringe 31G X 5/16\" 0.3 ML misc For us with insulin when pump not working properly 1/10/20  Yes Shirley Guido MD   isosorbide mononitrate (IMDUR) 30 MG 24 hr tablet 1 tablet Daily. 6/26/19  Yes Giacomo Cheema MD   metoprolol succinate XL (TOPROL-XL) 25 MG 24 hr tablet Take 1 tablet by mouth Daily. 6/28/21  Yes Rachid Sheriff MD   Multiple Vitamin (DAILY-VITAMIN) tablet 1 tablet Daily. 10/10/17  Yes Provider, Historical, MD   Omega-3 Fatty Acids (FISH OIL) 1000 MG capsule capsule 1 capsule Daily. 10/10/17  Yes Provider, Historical, MD   simvastatin (ZOCOR) 40 MG tablet  7/20/21  Yes " Emergency, Nurse Keri, RN   spironolactone (ALDACTONE) 25 MG tablet Take 25 mg by mouth Daily.   Yes Provider, MD Giacomo   ticagrelor (Brilinta) 90 MG tablet tablet Take 1 tablet by mouth 2 (Two) Times a Day. 6/28/21  Yes Rachid Sheriff MD       Lab Results (most recent)     None        Lab Results   Component Value Date    HGBA1C 6.7 (H) 10/13/2021    HGBA1C 8.4 (H) 04/14/2021    HGBA1C 7.2 (H) 10/07/2020      Lab Results   Component Value Date    GLUCOSE 123 (H) 10/13/2021    BUN 55 (H) 10/13/2021    CREATININE 2.62 (H) 10/13/2021    EGFRIFNONA 25 (L) 10/13/2021    EGFRIFAFRI 35 (L) 07/16/2016    BCR 21.0 10/13/2021    K 4.4 10/13/2021    CO2 24.4 10/13/2021    CALCIUM 9.1 10/13/2021    ALBUMIN 3.70 10/13/2021    LABIL2 0.9 (L) 04/10/2019    AST 24 10/13/2021    ALT 33 10/13/2021    CHOL 117 10/13/2021    LDL 67 10/13/2021    HDL 29 (L) 10/13/2021    TRIG 114 10/13/2021     Lab Results   Component Value Date    TSH 3.060 10/13/2021    BCAT57DJ 91.4 10/13/2021     Microalb/cr ratio 1,228.6    Assessment/Plan     Diagnoses and all orders for this visit:    1. 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 Mixed hyperlipidemia - stable  -     Comprehensive Metabolic Panel; Future    3. Vitamin D deficiency - stable    4. Insulin pump titration    Wearing an OmniPod insulin pump since 6/2017. Also using DexCom G6.    See eye doctor.  Continue same pump settings, chol meds & vit D supplements.  Call if blood sugars are running under 100 or over 200.  Met with the DM educator. Pump settings transferred to new PDM.  Also went over new features.      Shirley Guido MD  10/21/21  16:44 EDT

## 2021-10-21 NOTE — TELEPHONE ENCOUNTER
In clinic visit scanned into phone note. Pt requested settings be transferred over to new Omnipod DASH PDM. Advised pt that he could come in for education appt to learn more about the advanced features on the DASH, and he declined at this time.

## 2021-11-17 ENCOUNTER — TRANSCRIBE ORDERS (OUTPATIENT)
Dept: ADMINISTRATIVE | Facility: HOSPITAL | Age: 61
End: 2021-11-17

## 2021-11-17 ENCOUNTER — LAB (OUTPATIENT)
Dept: LAB | Facility: HOSPITAL | Age: 61
End: 2021-11-17

## 2021-11-17 DIAGNOSIS — N18.4 CHRONIC RENAL DISEASE, STAGE IV (HCC): Primary | ICD-10-CM

## 2021-11-17 DIAGNOSIS — N18.4 CHRONIC RENAL DISEASE, STAGE IV (HCC): ICD-10-CM

## 2021-11-17 LAB
ALBUMIN SERPL-MCNC: 3.5 G/DL (ref 3.5–5.2)
ALBUMIN/GLOB SERPL: 1 G/DL
ALP SERPL-CCNC: 83 U/L (ref 39–117)
ALT SERPL W P-5'-P-CCNC: 18 U/L (ref 1–41)
ANION GAP SERPL CALCULATED.3IONS-SCNC: 9.7 MMOL/L (ref 5–15)
AST SERPL-CCNC: 19 U/L (ref 1–40)
BACTERIA UR QL AUTO: NORMAL /HPF
BILIRUB SERPL-MCNC: 0.2 MG/DL (ref 0–1.2)
BILIRUB UR QL STRIP: NEGATIVE
BUN SERPL-MCNC: 35 MG/DL (ref 6–20)
BUN/CREAT SERPL: 13.3 (ref 7–25)
CALCIUM SPEC-SCNC: 9.2 MG/DL (ref 8.6–10.5)
CHLORIDE SERPL-SCNC: 104 MMOL/L (ref 98–107)
CLARITY UR: CLEAR
CO2 SERPL-SCNC: 24.3 MMOL/L (ref 22–29)
COLOR UR: YELLOW
CREAT SERPL-MCNC: 2.64 MG/DL (ref 0.76–1.27)
CREAT UR-MCNC: 56.8 MG/DL
DEPRECATED RDW RBC AUTO: 47.4 FL (ref 37–54)
ERYTHROCYTE [DISTWIDTH] IN BLOOD BY AUTOMATED COUNT: 14.1 % (ref 12.3–15.4)
GFR SERPL CREATININE-BSD FRML MDRD: 25 ML/MIN/1.73
GLOBULIN UR ELPH-MCNC: 3.4 GM/DL
GLUCOSE SERPL-MCNC: 144 MG/DL (ref 65–99)
GLUCOSE UR STRIP-MCNC: ABNORMAL MG/DL
HCT VFR BLD AUTO: 36.6 % (ref 37.5–51)
HGB BLD-MCNC: 12.2 G/DL (ref 13–17.7)
HGB UR QL STRIP.AUTO: NEGATIVE
HYALINE CASTS UR QL AUTO: NORMAL /LPF
KETONES UR QL STRIP: NEGATIVE
LEUKOCYTE ESTERASE UR QL STRIP.AUTO: NEGATIVE
MAGNESIUM SERPL-MCNC: 2.2 MG/DL (ref 1.6–2.6)
MCH RBC QN AUTO: 31.2 PG (ref 26.6–33)
MCHC RBC AUTO-ENTMCNC: 33.3 G/DL (ref 31.5–35.7)
MCV RBC AUTO: 93.6 FL (ref 79–97)
NITRITE UR QL STRIP: NEGATIVE
PH UR STRIP.AUTO: 6.5 [PH] (ref 5–8)
PHOSPHATE SERPL-MCNC: 3.8 MG/DL (ref 2.5–4.5)
PLATELET # BLD AUTO: 213 10*3/MM3 (ref 140–450)
PMV BLD AUTO: 11 FL (ref 6–12)
POTASSIUM SERPL-SCNC: 3.9 MMOL/L (ref 3.5–5.2)
PROT ?TM UR-MCNC: 169 MG/DL
PROT SERPL-MCNC: 6.9 G/DL (ref 6–8.5)
PROT UR QL STRIP: ABNORMAL
PROT/CREAT UR: 2975.4 MG/G CREA (ref 0–200)
PTH-INTACT SERPL-MCNC: 63.8 PG/ML (ref 15–65)
RBC # BLD AUTO: 3.91 10*6/MM3 (ref 4.14–5.8)
RBC # UR STRIP: NORMAL /HPF
REF LAB TEST METHOD: NORMAL
SODIUM SERPL-SCNC: 138 MMOL/L (ref 136–145)
SP GR UR STRIP: 1.01 (ref 1–1.03)
SQUAMOUS #/AREA URNS HPF: NORMAL /HPF
UROBILINOGEN UR QL STRIP: ABNORMAL
WBC # UR STRIP: NORMAL /HPF
WBC NRBC COR # BLD: 6.4 10*3/MM3 (ref 3.4–10.8)

## 2021-11-17 PROCEDURE — 84100 ASSAY OF PHOSPHORUS: CPT

## 2021-11-17 PROCEDURE — 83735 ASSAY OF MAGNESIUM: CPT

## 2021-11-17 PROCEDURE — 85027 COMPLETE CBC AUTOMATED: CPT

## 2021-11-17 PROCEDURE — 84156 ASSAY OF PROTEIN URINE: CPT

## 2021-11-17 PROCEDURE — 82570 ASSAY OF URINE CREATININE: CPT

## 2021-11-17 PROCEDURE — 83970 ASSAY OF PARATHORMONE: CPT

## 2021-11-17 PROCEDURE — 36415 COLL VENOUS BLD VENIPUNCTURE: CPT

## 2021-11-17 PROCEDURE — 80053 COMPREHEN METABOLIC PANEL: CPT

## 2021-11-17 PROCEDURE — 81001 URINALYSIS AUTO W/SCOPE: CPT

## 2021-11-18 ENCOUNTER — TELEPHONE (OUTPATIENT)
Dept: ENDOCRINOLOGY | Facility: CLINIC | Age: 61
End: 2021-11-18

## 2021-11-19 ENCOUNTER — LAB (OUTPATIENT)
Dept: LAB | Facility: HOSPITAL | Age: 61
End: 2021-11-19

## 2021-11-19 DIAGNOSIS — N18.4 CHRONIC RENAL DISEASE, STAGE IV (HCC): ICD-10-CM

## 2021-11-19 LAB
COLLECT DURATION TIME UR: 24 HRS
CREAT CL 24H UR+SERPL-VRATE: 14.3 L/24 HR (ref 139.7–197.3)
CREAT CL 24H UR+SERPL-VRATE: 9.9 ML/MIN (ref 97–137)
CREAT SERPL-MCNC: 2.69 MG/DL (ref 0.76–1.27)
CREAT UR-MCNC: 49.1 MG/DL
CREATINE 24H UR-MRATE: 0.86 G/24 HR (ref 1–2.4)
GFR SERPL CREATININE-BSD FRML MDRD: 24 ML/MIN/1.73
PROT 24H UR-MRATE: 2415 MG/24HOURS (ref 0–150)
SPECIMEN VOL 24H UR: 1750 ML

## 2021-11-19 PROCEDURE — 82565 ASSAY OF CREATININE: CPT

## 2021-11-19 PROCEDURE — 84156 ASSAY OF PROTEIN URINE: CPT

## 2021-11-19 PROCEDURE — 82575 CREATININE CLEARANCE TEST: CPT

## 2021-12-03 PROCEDURE — 93296 REM INTERROG EVL PM/IDS: CPT | Performed by: NURSE PRACTITIONER

## 2021-12-03 PROCEDURE — 93295 DEV INTERROG REMOTE 1/2/MLT: CPT | Performed by: NURSE PRACTITIONER

## 2021-12-16 ENCOUNTER — APPOINTMENT (OUTPATIENT)
Dept: CARDIOLOGY | Facility: HOSPITAL | Age: 61
End: 2021-12-16

## 2021-12-23 ENCOUNTER — TELEPHONE (OUTPATIENT)
Dept: ENDOCRINOLOGY | Facility: CLINIC | Age: 61
End: 2021-12-23

## 2022-01-16 DIAGNOSIS — E10.65 UNCONTROLLED TYPE 1 DIABETES MELLITUS WITH HYPERGLYCEMIA: ICD-10-CM

## 2022-02-14 ENCOUNTER — TRANSCRIBE ORDERS (OUTPATIENT)
Dept: ADMINISTRATIVE | Facility: HOSPITAL | Age: 62
End: 2022-02-14

## 2022-02-14 ENCOUNTER — LAB (OUTPATIENT)
Dept: LAB | Facility: HOSPITAL | Age: 62
End: 2022-02-14

## 2022-02-14 DIAGNOSIS — N18.4 CHRONIC KIDNEY DISEASE, STAGE IV (SEVERE): Primary | ICD-10-CM

## 2022-02-14 DIAGNOSIS — N18.4 CHRONIC KIDNEY DISEASE, STAGE IV (SEVERE): ICD-10-CM

## 2022-02-14 LAB
ALBUMIN SERPL-MCNC: 3.3 G/DL (ref 3.5–5.2)
ALBUMIN/GLOB SERPL: 1 G/DL
ALP SERPL-CCNC: 98 U/L (ref 39–117)
ALT SERPL W P-5'-P-CCNC: 24 U/L (ref 1–41)
ANION GAP SERPL CALCULATED.3IONS-SCNC: 9 MMOL/L (ref 5–15)
AST SERPL-CCNC: 22 U/L (ref 1–40)
BACTERIA UR QL AUTO: NORMAL /HPF
BILIRUB SERPL-MCNC: 0.4 MG/DL (ref 0–1.2)
BILIRUB UR QL STRIP: NEGATIVE
BUN SERPL-MCNC: 41 MG/DL (ref 6–20)
BUN/CREAT SERPL: 15.1 (ref 7–25)
CALCIUM SPEC-SCNC: 8.5 MG/DL (ref 8.6–10.5)
CHLORIDE SERPL-SCNC: 103 MMOL/L (ref 98–107)
CLARITY UR: CLEAR
CO2 SERPL-SCNC: 23 MMOL/L (ref 22–29)
COLOR UR: YELLOW
CREAT SERPL-MCNC: 2.71 MG/DL (ref 0.76–1.27)
CREAT UR-MCNC: 62 MG/DL
DEPRECATED RDW RBC AUTO: 50.1 FL (ref 37–54)
ERYTHROCYTE [DISTWIDTH] IN BLOOD BY AUTOMATED COUNT: 14.6 % (ref 12.3–15.4)
GFR SERPL CREATININE-BSD FRML MDRD: 24 ML/MIN/1.73
GLOBULIN UR ELPH-MCNC: 3.4 GM/DL
GLUCOSE SERPL-MCNC: 298 MG/DL (ref 65–99)
GLUCOSE UR STRIP-MCNC: ABNORMAL MG/DL
HCT VFR BLD AUTO: 37.3 % (ref 37.5–51)
HGB BLD-MCNC: 12.3 G/DL (ref 13–17.7)
HGB UR QL STRIP.AUTO: NEGATIVE
HYALINE CASTS UR QL AUTO: NORMAL /LPF
KETONES UR QL STRIP: NEGATIVE
LEUKOCYTE ESTERASE UR QL STRIP.AUTO: NEGATIVE
MAGNESIUM SERPL-MCNC: 2.1 MG/DL (ref 1.6–2.6)
MCH RBC QN AUTO: 31.2 PG (ref 26.6–33)
MCHC RBC AUTO-ENTMCNC: 33 G/DL (ref 31.5–35.7)
MCV RBC AUTO: 94.7 FL (ref 79–97)
NITRITE UR QL STRIP: NEGATIVE
PH UR STRIP.AUTO: 6 [PH] (ref 5–8)
PHOSPHATE SERPL-MCNC: 3.9 MG/DL (ref 2.5–4.5)
PLATELET # BLD AUTO: 247 10*3/MM3 (ref 140–450)
PMV BLD AUTO: 10.5 FL (ref 6–12)
POTASSIUM SERPL-SCNC: 4.7 MMOL/L (ref 3.5–5.2)
PROT ?TM UR-MCNC: 249.6 MG/DL
PROT SERPL-MCNC: 6.7 G/DL (ref 6–8.5)
PROT UR QL STRIP: ABNORMAL
PTH-INTACT SERPL-MCNC: 133 PG/ML (ref 15–65)
RBC # BLD AUTO: 3.94 10*6/MM3 (ref 4.14–5.8)
RBC # UR STRIP: NORMAL /HPF
REF LAB TEST METHOD: NORMAL
SODIUM SERPL-SCNC: 135 MMOL/L (ref 136–145)
SP GR UR STRIP: 1.01 (ref 1–1.03)
SQUAMOUS #/AREA URNS HPF: NORMAL /HPF
UROBILINOGEN UR QL STRIP: ABNORMAL
WBC # UR STRIP: NORMAL /HPF
WBC NRBC COR # BLD: 7.67 10*3/MM3 (ref 3.4–10.8)

## 2022-02-14 PROCEDURE — 83970 ASSAY OF PARATHORMONE: CPT

## 2022-02-14 PROCEDURE — 36415 COLL VENOUS BLD VENIPUNCTURE: CPT

## 2022-02-14 PROCEDURE — 84156 ASSAY OF PROTEIN URINE: CPT

## 2022-02-14 PROCEDURE — 83735 ASSAY OF MAGNESIUM: CPT

## 2022-02-14 PROCEDURE — 81001 URINALYSIS AUTO W/SCOPE: CPT

## 2022-02-14 PROCEDURE — 82570 ASSAY OF URINE CREATININE: CPT

## 2022-02-14 PROCEDURE — 80053 COMPREHEN METABOLIC PANEL: CPT

## 2022-02-14 PROCEDURE — 85027 COMPLETE CBC AUTOMATED: CPT

## 2022-02-14 PROCEDURE — 84100 ASSAY OF PHOSPHORUS: CPT

## 2022-02-15 RX ORDER — METOPROLOL SUCCINATE 25 MG/1
25 TABLET, EXTENDED RELEASE ORAL DAILY
Qty: 90 TABLET | Refills: 1 | Status: SHIPPED | OUTPATIENT
Start: 2022-02-15 | End: 2022-02-24 | Stop reason: SDUPTHER

## 2022-02-15 RX ORDER — HYDRALAZINE HYDROCHLORIDE 25 MG/1
TABLET, FILM COATED ORAL
Qty: 180 TABLET | Refills: 1 | Status: SHIPPED | OUTPATIENT
Start: 2022-02-15 | End: 2022-08-15

## 2022-02-15 RX ORDER — TICAGRELOR 90 MG/1
TABLET ORAL
Qty: 180 TABLET | Refills: 1 | Status: SHIPPED | OUTPATIENT
Start: 2022-02-15 | End: 2022-08-15
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
## 2022-02-24 ENCOUNTER — OFFICE VISIT (OUTPATIENT)
Dept: CARDIOLOGY | Facility: CLINIC | Age: 62
End: 2022-02-24

## 2022-02-24 VITALS
DIASTOLIC BLOOD PRESSURE: 96 MMHG | SYSTOLIC BLOOD PRESSURE: 152 MMHG | HEIGHT: 69 IN | HEART RATE: 96 BPM | WEIGHT: 194 LBS | BODY MASS INDEX: 28.73 KG/M2

## 2022-02-24 DIAGNOSIS — Z95.810 ICD (IMPLANTABLE CARDIOVERTER-DEFIBRILLATOR), DUAL, IN SITU: ICD-10-CM

## 2022-02-24 DIAGNOSIS — E10.49 TYPE 1 DIABETES MELLITUS WITH OTHER DIABETIC NEUROLOGICAL COMPLICATION: ICD-10-CM

## 2022-02-24 DIAGNOSIS — I25.10 CORONARY ARTERY DISEASE INVOLVING NATIVE CORONARY ARTERY OF NATIVE HEART WITHOUT ANGINA PECTORIS: ICD-10-CM

## 2022-02-24 DIAGNOSIS — I50.9 CONGESTIVE HEART FAILURE, UNSPECIFIED HF CHRONICITY, UNSPECIFIED HEART FAILURE TYPE: ICD-10-CM

## 2022-02-24 DIAGNOSIS — E78.2 MIXED HYPERLIPIDEMIA: ICD-10-CM

## 2022-02-24 DIAGNOSIS — I42.0 CARDIOMYOPATHY, DILATED: Primary | ICD-10-CM

## 2022-02-24 PROCEDURE — 99214 OFFICE O/P EST MOD 30 MIN: CPT | Performed by: INTERNAL MEDICINE

## 2022-02-24 PROCEDURE — 93000 ELECTROCARDIOGRAM COMPLETE: CPT | Performed by: INTERNAL MEDICINE

## 2022-02-24 RX ORDER — METOPROLOL SUCCINATE 50 MG/1
50 TABLET, EXTENDED RELEASE ORAL DAILY
Qty: 90 TABLET | Refills: 1 | Status: SHIPPED | OUTPATIENT
Start: 2022-02-24 | End: 2022-03-03 | Stop reason: SDUPTHER

## 2022-03-03 ENCOUNTER — TELEPHONE (OUTPATIENT)
Dept: CARDIOLOGY | Facility: CLINIC | Age: 62
End: 2022-03-03

## 2022-03-03 DIAGNOSIS — I25.10 CORONARY ARTERY DISEASE INVOLVING NATIVE CORONARY ARTERY OF NATIVE HEART WITHOUT ANGINA PECTORIS: ICD-10-CM

## 2022-03-03 DIAGNOSIS — Z95.810 ICD (IMPLANTABLE CARDIOVERTER-DEFIBRILLATOR), DUAL, IN SITU: ICD-10-CM

## 2022-03-03 DIAGNOSIS — E10.49 TYPE 1 DIABETES MELLITUS WITH OTHER DIABETIC NEUROLOGICAL COMPLICATION: ICD-10-CM

## 2022-03-03 DIAGNOSIS — I50.9 CONGESTIVE HEART FAILURE, UNSPECIFIED HF CHRONICITY, UNSPECIFIED HEART FAILURE TYPE: ICD-10-CM

## 2022-03-03 DIAGNOSIS — E78.2 MIXED HYPERLIPIDEMIA: ICD-10-CM

## 2022-03-03 DIAGNOSIS — I42.0 CARDIOMYOPATHY, DILATED: ICD-10-CM

## 2022-03-03 RX ORDER — METOPROLOL SUCCINATE 50 MG/1
50 TABLET, EXTENDED RELEASE ORAL DAILY
Qty: 90 TABLET | Refills: 1 | Status: SHIPPED | OUTPATIENT
Start: 2022-03-03 | End: 2022-08-15

## 2022-03-04 PROCEDURE — 93295 DEV INTERROG REMOTE 1/2/MLT: CPT | Performed by: NURSE PRACTITIONER

## 2022-03-04 PROCEDURE — 93296 REM INTERROG EVL PM/IDS: CPT | Performed by: NURSE PRACTITIONER

## 2022-03-09 ENCOUNTER — HOSPITAL ENCOUNTER (OUTPATIENT)
Dept: CARDIOLOGY | Facility: HOSPITAL | Age: 62
Discharge: HOME | End: 2022-03-09
Admitting: INTERNAL MEDICINE

## 2022-03-09 VITALS
BODY MASS INDEX: 28.73 KG/M2 | DIASTOLIC BLOOD PRESSURE: 82 MMHG | HEIGHT: 69 IN | SYSTOLIC BLOOD PRESSURE: 130 MMHG | WEIGHT: 194 LBS

## 2022-03-09 DIAGNOSIS — I25.10 CORONARY ARTERY DISEASE INVOLVING NATIVE CORONARY ARTERY OF NATIVE HEART WITHOUT ANGINA PECTORIS: ICD-10-CM

## 2022-03-09 DIAGNOSIS — I50.9 CONGESTIVE HEART FAILURE, UNSPECIFIED HF CHRONICITY, UNSPECIFIED HEART FAILURE TYPE: ICD-10-CM

## 2022-03-09 DIAGNOSIS — Z95.810 ICD (IMPLANTABLE CARDIOVERTER-DEFIBRILLATOR), DUAL, IN SITU: ICD-10-CM

## 2022-03-09 DIAGNOSIS — I42.0 CARDIOMYOPATHY, DILATED: ICD-10-CM

## 2022-03-09 DIAGNOSIS — E78.2 MIXED HYPERLIPIDEMIA: ICD-10-CM

## 2022-03-09 DIAGNOSIS — E10.49 TYPE 1 DIABETES MELLITUS WITH OTHER DIABETIC NEUROLOGICAL COMPLICATION: ICD-10-CM

## 2022-03-09 PROCEDURE — 93306 TTE W/DOPPLER COMPLETE: CPT | Performed by: INTERNAL MEDICINE

## 2022-03-09 PROCEDURE — 93306 TTE W/DOPPLER COMPLETE: CPT

## 2022-03-10 ENCOUNTER — TELEPHONE (OUTPATIENT)
Dept: CARDIOLOGY | Facility: CLINIC | Age: 62
End: 2022-03-10

## 2022-03-10 LAB
BH CV ECHO MEAS - ACS: 1.68 CM
BH CV ECHO MEAS - AO MAX PG: 2.5 MMHG
BH CV ECHO MEAS - AO MEAN PG: 1.83 MMHG
BH CV ECHO MEAS - AO ROOT DIAM: 3.2 CM
BH CV ECHO MEAS - AO V2 MAX: 79.7 CM/SEC
BH CV ECHO MEAS - AO V2 VTI: 14 CM
BH CV ECHO MEAS - AVA(I,D): 1.91 CM2
BH CV ECHO MEAS - EDV(CUBED): 235.4 ML
BH CV ECHO MEAS - EDV(MOD-SP4): 183.3 ML
BH CV ECHO MEAS - EF(MOD-BP): 15 %
BH CV ECHO MEAS - EF(MOD-SP4): 15.3 %
BH CV ECHO MEAS - ESV(CUBED): 166.2 ML
BH CV ECHO MEAS - ESV(MOD-SP4): 155.4 ML
BH CV ECHO MEAS - FS: 11 %
BH CV ECHO MEAS - IVS/LVPW: 0.99 CM
BH CV ECHO MEAS - IVSD: 0.97 CM
BH CV ECHO MEAS - LA DIMENSION(2D): 4.3 CM
BH CV ECHO MEAS - LV DIASTOLIC VOL/BSA (35-75): 89.9 CM2
BH CV ECHO MEAS - LV MASS(C)D: 249.9 GRAMS
BH CV ECHO MEAS - LV MAX PG: 1.07 MMHG
BH CV ECHO MEAS - LV MEAN PG: 0.57 MMHG
BH CV ECHO MEAS - LV SYSTOLIC VOL/BSA (12-30): 76.2 CM2
BH CV ECHO MEAS - LV V1 MAX: 51.7 CM/SEC
BH CV ECHO MEAS - LV V1 VTI: 8.4 CM
BH CV ECHO MEAS - LVIDD: 6.2 CM
BH CV ECHO MEAS - LVIDS: 5.5 CM
BH CV ECHO MEAS - LVOT AREA: 3.2 CM2
BH CV ECHO MEAS - LVOT DIAM: 2.01 CM
BH CV ECHO MEAS - LVPWD: 0.98 CM
BH CV ECHO MEAS - MV MAX PG: 4.2 MMHG
BH CV ECHO MEAS - MV MEAN PG: 1.1 MMHG
BH CV ECHO MEAS - MV V2 VTI: 15.7 CM
BH CV ECHO MEAS - MVA(VTI): 1.7 CM2
BH CV ECHO MEAS - PA ACC TIME: 0.05 SEC
BH CV ECHO MEAS - PA PR(ACCEL): 58.2 MMHG
BH CV ECHO MEAS - PA V2 MAX: 82.3 CM/SEC
BH CV ECHO MEAS - PI END-D VEL: 172.2 CM/SEC
BH CV ECHO MEAS - RAP SYSTOLE: 8 MMHG
BH CV ECHO MEAS - RV MAX PG: 1.18 MMHG
BH CV ECHO MEAS - RV V1 MAX: 54.3 CM/SEC
BH CV ECHO MEAS - RV V1 VTI: 8.9 CM
BH CV ECHO MEAS - RVDD: 3.6 CM
BH CV ECHO MEAS - RVSP: 26.1 MMHG
BH CV ECHO MEAS - SI(MOD-SP4): 13.7 ML/M2
BH CV ECHO MEAS - SV(LVOT): 26.7 ML
BH CV ECHO MEAS - SV(MOD-SP4): 28 ML
BH CV ECHO MEAS - TR MAX PG: 18.1 MMHG
BH CV ECHO MEAS - TR MAX VEL: 206.6 CM/SEC
MAXIMAL PREDICTED HEART RATE: 161 BPM
STRESS TARGET HR: 137 BPM

## 2022-03-10 NOTE — TELEPHONE ENCOUNTER
Patient says his kidney doctor has changed the frequency of calcitriol 0.25mcg from every other day to every day. He is wanting to get this updated in his chart.

## 2022-03-10 NOTE — TELEPHONE ENCOUNTER
Patient needs a follow up appt to discuss his echo in the next couple of weeks l/m for patient to call back

## 2022-04-12 ENCOUNTER — TELEPHONE (OUTPATIENT)
Dept: ENDOCRINOLOGY | Facility: CLINIC | Age: 62
End: 2022-04-12

## 2022-04-12 NOTE — TELEPHONE ENCOUNTER
Tried to call patient regarding lab closure. No answer left vm asking to have labs drawn at [facility] Delivered lab or to contact our office and let know where to send orders.   Lab is now cx'ed.

## 2022-04-13 ENCOUNTER — LAB (OUTPATIENT)
Dept: LAB | Facility: HOSPITAL | Age: 62
End: 2022-04-13

## 2022-04-13 DIAGNOSIS — E78.2 MIXED HYPERLIPIDEMIA: ICD-10-CM

## 2022-04-13 DIAGNOSIS — E10.49 TYPE 1 DIABETES MELLITUS WITH OTHER DIABETIC NEUROLOGICAL COMPLICATION: ICD-10-CM

## 2022-04-13 LAB
ALBUMIN SERPL-MCNC: 3.8 G/DL (ref 3.5–5.2)
ALBUMIN/GLOB SERPL: 1.4 G/DL
ALP SERPL-CCNC: 86 U/L (ref 39–117)
ALT SERPL W P-5'-P-CCNC: 23 U/L (ref 1–41)
ANION GAP SERPL CALCULATED.3IONS-SCNC: 10.2 MMOL/L (ref 5–15)
AST SERPL-CCNC: 26 U/L (ref 1–40)
BILIRUB SERPL-MCNC: 0.3 MG/DL (ref 0–1.2)
BUN SERPL-MCNC: 43 MG/DL (ref 6–20)
BUN/CREAT SERPL: 15.2 (ref 7–25)
CALCIUM SPEC-SCNC: 9.3 MG/DL (ref 8.6–10.5)
CHLORIDE SERPL-SCNC: 103 MMOL/L (ref 98–107)
CO2 SERPL-SCNC: 21.8 MMOL/L (ref 22–29)
CREAT SERPL-MCNC: 2.82 MG/DL (ref 0.76–1.27)
EGFRCR SERPLBLD CKD-EPI 2021: 25 ML/MIN/1.73
GLOBULIN UR ELPH-MCNC: 2.8 GM/DL
GLUCOSE SERPL-MCNC: 221 MG/DL (ref 65–99)
HBA1C MFR BLD: 8.6 % (ref 3.5–5.6)
POTASSIUM SERPL-SCNC: 4.3 MMOL/L (ref 3.5–5.2)
PROT SERPL-MCNC: 6.6 G/DL (ref 6–8.5)
SODIUM SERPL-SCNC: 135 MMOL/L (ref 136–145)

## 2022-04-13 PROCEDURE — 80053 COMPREHEN METABOLIC PANEL: CPT

## 2022-04-13 PROCEDURE — 83036 HEMOGLOBIN GLYCOSYLATED A1C: CPT

## 2022-04-13 PROCEDURE — 36415 COLL VENOUS BLD VENIPUNCTURE: CPT

## 2022-04-20 ENCOUNTER — OFFICE VISIT (OUTPATIENT)
Dept: ENDOCRINOLOGY | Facility: CLINIC | Age: 62
End: 2022-04-20

## 2022-04-20 VITALS
WEIGHT: 189 LBS | DIASTOLIC BLOOD PRESSURE: 66 MMHG | SYSTOLIC BLOOD PRESSURE: 110 MMHG | TEMPERATURE: 96.8 F | HEIGHT: 69 IN | HEART RATE: 73 BPM | BODY MASS INDEX: 27.99 KG/M2

## 2022-04-20 DIAGNOSIS — E55.9 VITAMIN D DEFICIENCY: ICD-10-CM

## 2022-04-20 DIAGNOSIS — E10.42 TYPE 1 DIABETES MELLITUS WITH DIABETIC POLYNEUROPATHY: Primary | ICD-10-CM

## 2022-04-20 DIAGNOSIS — E78.2 MIXED HYPERLIPIDEMIA: ICD-10-CM

## 2022-04-20 DIAGNOSIS — Z46.81 INSULIN PUMP TITRATION: ICD-10-CM

## 2022-04-20 LAB — GLUCOSE BLDC GLUCOMTR-MCNC: 232 MG/DL (ref 70–105)

## 2022-04-20 PROCEDURE — 82962 GLUCOSE BLOOD TEST: CPT | Performed by: INTERNAL MEDICINE

## 2022-04-20 PROCEDURE — 99214 OFFICE O/P EST MOD 30 MIN: CPT | Performed by: INTERNAL MEDICINE

## 2022-04-20 NOTE — PATIENT INSTRUCTIONS
See eye doctor.  Continue exercise as able.  Increase basal rate to 1.0 from 12a- 2p.  Continue lipid meds & vit D supplements.  Call if blood sugars are running under 100 or over 200.  F/u in 6 months, with fasting labs prior.

## 2022-04-29 NOTE — PROGRESS NOTES
[facility]        Patient Care Team:  Erika Hernandez MD as PCP - General  Erika Hernandez MD as PCP - Family Medicine    Chief Complaint:    Chief Complaint   Patient presents with   • Diabetes     Type 1, ,  3hr PP, Basal 12am 0.9, 2pm 1, 5pm 0.7         Subjective   Here for diabetes f/u  Blood sugars: higher in am  Insulin delivery per pump: Basal 44% / bolus 56%  Exercise program: walking, working part time  Taking vit D    Interval History:     Patient Complaints: had teeth pulled in December  Patient Denies: severe hypoglycemia  History taken from: patient    Review of Systems:   Review of Systems   Eyes: Negative for blurred vision.   Gastrointestinal: Negative for nausea.   Endocrine: Negative for polyuria.   Neurological: Negative for headache.     Gained 3 lb since last visit    Objective     Vital Signs      Vitals:    04/20/22 1237   BP: 110/66   Pulse: 73   Temp: 96.8 °F (36 °C)         Physical Exam:     General Appearance:    Alert, cooperative, in no acute distress   Head:    Normocephalic, without obvious abnormality, atraumatic   Eyes:            Lids and lashes normal, conjunctivae and sclerae normal, no   icterus, no pallor, corneas clear, PERRLA   Throat:   No oral lesions,  oral mucosa moist. Edentulous   Neck:   No adenopathy, supple,  no thyromegaly, no   carotid bruit   Lungs:     Clear     Heart:    Regular rhythm and normal rate   Chest Wall:    No abnormalities observed   Abdomen:     Normal bowel sounds, soft                 Extremities:   Toe deformities, no edema               Pulses:   Pulses palpable and equal bilaterally   Skin:   Pump site clean   Neurologic:  DTR absent in ankles, absent vibratory sense in toes, unable to feel the 10g monofilament ( same as 1y ago )            Results Review:    I have reviewed the patient's new clinical results, labs & imaging.    Medication Review:   Prior to Admission medications    Medication Sig Start Date End  "Date Taking? Authorizing Provider   acetone, urine, test strip Take As Directed. 1/16/18  Yes Giacomo Cheema MD   aspirin (aspirin) 81 MG EC tablet 1 tablet Daily. 6/29/16  Yes Giacomo Cheema MD   B Complex Vitamins (VITAMIN B COMPLEX PO) 1 tablet Daily. 11/15/17  Yes Giacomo Cheema MD   Brilinta 90 MG tablet tablet Take 1 tablet by mouth twice daily. 2/15/22  Yes Rachid Sheriff MD   bumetanide (BUMEX) 2 MG tablet 1 tablet 2 (Two) Times a Day. 7/9/19  Yes Giacomo Cheema MD   calcitriol (ROCALTROL) 0.25 MCG capsule 1 capsule Daily. 4/17/19  Yes Giacomo Cheema MD   Cholecalciferol 1000 units capsule 1 capsule Daily. 5000units daily 12/7/16  Yes Giacomo Cheema MD   gabapentin (NEURONTIN) 300 MG capsule  9/27/21  Yes Giacomo Cheema MD   glucose blood test strip 1 each by Other route As Needed (livongo test strips.. use to check blood ugar four times daily). Use as instructed   Yes Giacomo Cheema MD   hydrALAZINE (APRESOLINE) 25 MG tablet Take 1 tablet by mouth twice daily. 2/15/22  Yes Rachid Sheriff MD   Insulin Disposable Pump (OmniPod Dash 5 Pack Pods) misc 1 each Continuous. Pt to change pod every 48 hours 9/23/21  Yes Shirley Guido MD   insulin lispro (humaLOG) 100 UNIT/ML injection Inject via insulin pump. Maximum dose 50 units daily. MN 0.8, 8 A.M. 0.65, 2 P.M. 0.9, 5 P.M. 0.5, 9 P.M. 0.5. 1/17/22  Yes Shirley Guido MD   Insulin Syringe 31G X 5/16\" 0.3 ML misc For us with insulin when pump not working properly 1/10/20  Yes Shirley Guido MD   isosorbide mononitrate (IMDUR) 30 MG 24 hr tablet 1 tablet Daily. 6/26/19  Yes Giacomo Cheema MD   metoprolol succinate XL (TOPROL-XL) 50 MG 24 hr tablet Take 1 tablet by mouth Daily. 3/3/22  Yes Rachid Sheriff MD   Multiple Vitamin (DAILY-VITAMIN) tablet 1 tablet Daily. 10/10/17  Yes Provider, MD Giacomo   Omega-3 Fatty Acids (FISH OIL) 1000 MG capsule capsule 1 capsule Daily. 10/10/17  Yes " Provider, MD Giacomo   simvastatin (ZOCOR) 40 MG tablet  7/20/21  Yes Emergency, Nurse Keri, RN   spironolactone (ALDACTONE) 25 MG tablet Take 25 mg by mouth Daily.   Yes ProviderGiacomo MD       Lab Results (most recent)     Procedure Component Value Units Date/Time    POC Glucose [[id number]]  (Abnormal) Collected: 04/20/22 1241    Specimen: Blood Updated: 04/20/22 1242     Glucose 232 mg/dL      Comment: Serial Number: [id number]  [id number]           Lab Results   Component Value Date    HGBA1C 8.6 (H) 04/13/2022    HGBA1C 6.7 (H) 10/13/2021    HGBA1C 8.4 (H) 04/14/2021      Lab Results   Component Value Date    GLUCOSE 221 (H) 04/13/2022    BUN 43 (H) 04/13/2022    CREATININE 2.82 (H) 04/13/2022    EGFRIFNONA 24 (L) 02/14/2022    EGFRIFAFRI 35 (L) 07/16/2016    BCR 15.2 04/13/2022    K 4.3 04/13/2022    CO2 21.8 (L) 04/13/2022    CALCIUM 9.3 04/13/2022    ALBUMIN 3.80 04/13/2022    LABIL2 0.9 (L) 04/10/2019    AST 26 04/13/2022    ALT 23 04/13/2022    CHOL 117 10/13/2021    LDL 67 10/13/2021    HDL 29 (L) 10/13/2021    TRIG 114 10/13/2021     Lab Results   Component Value Date    TSH 3.060 10/13/2021    ZNQX91SK 91.4 10/13/2021       Assessment/Plan     Diagnoses and all orders for this visit:    1. Type 1 diabetes mellitus with diabetic polyneuropathy (HCC) (Primary) - worse  -     Hemoglobin A1c; Future  -     Microalbumin / Creatinine Urine Ratio - Urine, Clean Catch; Future    2. Mixed hyperlipidemia - will check status next visit  -     Comprehensive Metabolic Panel; Future  -     Lipid Panel; Future  -     TSH; Future    3. Vitamin D deficiency - will check status next visit  -     Vitamin D 25 Hydroxy; Future    4. Insulin pump titration    Other orders  -     POC Glucose    Wearing an OmniPod insulin pump since 6/2017. Also using DexCom G6.    See eye doctor.  Continue exercise as able.  Increase basal rate to 1.0 from 12a- 2p.  Continue lipid meds & vit D supplements.  Call if blood  sugars are running under 100 or over 200.        Shirley Guido MD  04/29/22  19:36 EDT

## 2022-06-01 ENCOUNTER — TRANSCRIBE ORDERS (OUTPATIENT)
Dept: ADMINISTRATIVE | Facility: HOSPITAL | Age: 62
End: 2022-06-01

## 2022-06-01 ENCOUNTER — LAB (OUTPATIENT)
Dept: LAB | Facility: HOSPITAL | Age: 62
End: 2022-06-01

## 2022-06-01 DIAGNOSIS — N18.4 CHRONIC KIDNEY DISEASE, STAGE IV (SEVERE): Primary | ICD-10-CM

## 2022-06-01 DIAGNOSIS — N18.4 CHRONIC KIDNEY DISEASE, STAGE IV (SEVERE): ICD-10-CM

## 2022-06-01 LAB
ALBUMIN SERPL-MCNC: 3.7 G/DL (ref 3.5–5.2)
ALBUMIN/GLOB SERPL: 1.1 G/DL
ALP SERPL-CCNC: 108 U/L (ref 39–117)
ALT SERPL W P-5'-P-CCNC: 24 U/L (ref 1–41)
ANION GAP SERPL CALCULATED.3IONS-SCNC: 10.8 MMOL/L (ref 5–15)
AST SERPL-CCNC: 26 U/L (ref 1–40)
BACTERIA UR QL AUTO: NORMAL /HPF
BILIRUB SERPL-MCNC: 0.5 MG/DL (ref 0–1.2)
BILIRUB UR QL STRIP: NEGATIVE
BUN SERPL-MCNC: 43 MG/DL (ref 6–20)
BUN/CREAT SERPL: 15.8 (ref 7–25)
CALCIUM SPEC-SCNC: 9 MG/DL (ref 8.6–10.5)
CHLORIDE SERPL-SCNC: 104 MMOL/L (ref 98–107)
CLARITY UR: CLEAR
CO2 SERPL-SCNC: 21.2 MMOL/L (ref 22–29)
COLOR UR: YELLOW
CREAT SERPL-MCNC: 2.73 MG/DL (ref 0.76–1.27)
CREAT UR-MCNC: 50.5 MG/DL
DEPRECATED RDW RBC AUTO: 54.5 FL (ref 37–54)
EGFRCR SERPLBLD CKD-EPI 2021: 26 ML/MIN/1.73
ERYTHROCYTE [DISTWIDTH] IN BLOOD BY AUTOMATED COUNT: 16.1 % (ref 12.3–15.4)
GLOBULIN UR ELPH-MCNC: 3.4 GM/DL
GLUCOSE SERPL-MCNC: 168 MG/DL (ref 65–99)
GLUCOSE UR STRIP-MCNC: ABNORMAL MG/DL
HCT VFR BLD AUTO: 40.9 % (ref 37.5–51)
HGB BLD-MCNC: 13.6 G/DL (ref 13–17.7)
HGB UR QL STRIP.AUTO: NEGATIVE
HYALINE CASTS UR QL AUTO: NORMAL /LPF
KETONES UR QL STRIP: NEGATIVE
LEUKOCYTE ESTERASE UR QL STRIP.AUTO: NEGATIVE
MAGNESIUM SERPL-MCNC: 2.1 MG/DL (ref 1.6–2.6)
MCH RBC QN AUTO: 30.8 PG (ref 26.6–33)
MCHC RBC AUTO-ENTMCNC: 33.3 G/DL (ref 31.5–35.7)
MCV RBC AUTO: 92.7 FL (ref 79–97)
NITRITE UR QL STRIP: NEGATIVE
PH UR STRIP.AUTO: 6 [PH] (ref 5–8)
PHOSPHATE SERPL-MCNC: 3.1 MG/DL (ref 2.5–4.5)
PLATELET # BLD AUTO: 224 10*3/MM3 (ref 140–450)
PMV BLD AUTO: 10.3 FL (ref 6–12)
POTASSIUM SERPL-SCNC: 4.7 MMOL/L (ref 3.5–5.2)
PROT ?TM UR-MCNC: 201.4 MG/DL
PROT SERPL-MCNC: 7.1 G/DL (ref 6–8.5)
PROT UR QL STRIP: ABNORMAL
PROT/CREAT UR: 3988.1 MG/G CREA (ref 0–200)
PTH-INTACT SERPL-MCNC: 111 PG/ML (ref 15–65)
RBC # BLD AUTO: 4.41 10*6/MM3 (ref 4.14–5.8)
RBC # UR STRIP: NORMAL /HPF
REF LAB TEST METHOD: NORMAL
SODIUM SERPL-SCNC: 136 MMOL/L (ref 136–145)
SP GR UR STRIP: 1.01 (ref 1–1.03)
SQUAMOUS #/AREA URNS HPF: NORMAL /HPF
UROBILINOGEN UR QL STRIP: ABNORMAL
WBC # UR STRIP: NORMAL /HPF
WBC NRBC COR # BLD: 7.67 10*3/MM3 (ref 3.4–10.8)

## 2022-06-01 PROCEDURE — 83735 ASSAY OF MAGNESIUM: CPT

## 2022-06-01 PROCEDURE — 85027 COMPLETE CBC AUTOMATED: CPT

## 2022-06-01 PROCEDURE — 83970 ASSAY OF PARATHORMONE: CPT

## 2022-06-01 PROCEDURE — 80053 COMPREHEN METABOLIC PANEL: CPT

## 2022-06-01 PROCEDURE — 84156 ASSAY OF PROTEIN URINE: CPT

## 2022-06-01 PROCEDURE — 84100 ASSAY OF PHOSPHORUS: CPT

## 2022-06-01 PROCEDURE — 36415 COLL VENOUS BLD VENIPUNCTURE: CPT

## 2022-06-01 PROCEDURE — 82570 ASSAY OF URINE CREATININE: CPT

## 2022-06-01 PROCEDURE — 81001 URINALYSIS AUTO W/SCOPE: CPT

## 2022-06-03 PROCEDURE — 93295 DEV INTERROG REMOTE 1/2/MLT: CPT | Performed by: NURSE PRACTITIONER

## 2022-06-03 PROCEDURE — 93296 REM INTERROG EVL PM/IDS: CPT | Performed by: NURSE PRACTITIONER
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
## 2022-08-14 DIAGNOSIS — I25.10 CORONARY ARTERY DISEASE INVOLVING NATIVE CORONARY ARTERY OF NATIVE HEART WITHOUT ANGINA PECTORIS: ICD-10-CM

## 2022-08-14 DIAGNOSIS — I42.0 CARDIOMYOPATHY, DILATED: ICD-10-CM

## 2022-08-14 DIAGNOSIS — E78.2 MIXED HYPERLIPIDEMIA: ICD-10-CM

## 2022-08-14 DIAGNOSIS — Z95.810 ICD (IMPLANTABLE CARDIOVERTER-DEFIBRILLATOR), DUAL, IN SITU: ICD-10-CM

## 2022-08-14 DIAGNOSIS — E10.49 TYPE 1 DIABETES MELLITUS WITH OTHER DIABETIC NEUROLOGICAL COMPLICATION: ICD-10-CM

## 2022-08-14 DIAGNOSIS — I50.9 CONGESTIVE HEART FAILURE, UNSPECIFIED HF CHRONICITY, UNSPECIFIED HEART FAILURE TYPE: ICD-10-CM

## 2022-08-15 RX ORDER — HYDRALAZINE HYDROCHLORIDE 25 MG/1
TABLET, FILM COATED ORAL
Qty: 180 TABLET | Refills: 0 | Status: SHIPPED | OUTPATIENT
Start: 2022-08-15 | End: 2022-11-14

## 2022-08-15 RX ORDER — TICAGRELOR 90 MG/1
TABLET ORAL
Qty: 180 TABLET | Refills: 0 | Status: SHIPPED | OUTPATIENT
Start: 2022-08-15 | End: 2022-11-14

## 2022-08-15 RX ORDER — METOPROLOL SUCCINATE 50 MG/1
50 TABLET, EXTENDED RELEASE ORAL DAILY
Qty: 90 TABLET | Refills: 0 | Status: SHIPPED | OUTPATIENT
Start: 2022-08-15 | End: 2022-11-14

## 2022-08-24 ENCOUNTER — LAB (OUTPATIENT)
Dept: LAB | Facility: HOSPITAL | Age: 62
End: 2022-08-24

## 2022-08-24 ENCOUNTER — TRANSCRIBE ORDERS (OUTPATIENT)
Dept: ADMINISTRATIVE | Facility: HOSPITAL | Age: 62
End: 2022-08-24

## 2022-08-24 DIAGNOSIS — E78.2 MIXED HYPERLIPIDEMIA: ICD-10-CM

## 2022-08-24 DIAGNOSIS — E55.9 VITAMIN D DEFICIENCY: ICD-10-CM

## 2022-08-24 DIAGNOSIS — N18.4 CHRONIC KIDNEY DISEASE, STAGE IV (SEVERE): ICD-10-CM

## 2022-08-24 DIAGNOSIS — E10.42 TYPE 1 DIABETES MELLITUS WITH DIABETIC POLYNEUROPATHY: ICD-10-CM

## 2022-08-24 DIAGNOSIS — N18.30 STAGE 3 CHRONIC KIDNEY DISEASE, UNSPECIFIED WHETHER STAGE 3A OR 3B CKD: ICD-10-CM

## 2022-08-24 DIAGNOSIS — N18.30 STAGE 3 CHRONIC KIDNEY DISEASE, UNSPECIFIED WHETHER STAGE 3A OR 3B CKD: Primary | ICD-10-CM

## 2022-08-24 LAB
25(OH)D3 SERPL-MCNC: 71.2 NG/ML (ref 30–100)
ALBUMIN SERPL-MCNC: 3.7 G/DL (ref 3.5–5.2)
ALBUMIN UR-MCNC: 99.9 MG/DL
ALBUMIN/GLOB SERPL: 1.2 G/DL
ALP SERPL-CCNC: 83 U/L (ref 39–117)
ALT SERPL W P-5'-P-CCNC: 22 U/L (ref 1–41)
ANION GAP SERPL CALCULATED.3IONS-SCNC: 11 MMOL/L (ref 5–15)
AST SERPL-CCNC: 24 U/L (ref 1–40)
BASOPHILS # BLD AUTO: 0.05 10*3/MM3 (ref 0–0.2)
BASOPHILS NFR BLD AUTO: 0.7 % (ref 0–1.5)
BILIRUB SERPL-MCNC: 0.4 MG/DL (ref 0–1.2)
BUN SERPL-MCNC: 40 MG/DL (ref 6–20)
BUN/CREAT SERPL: 13.9 (ref 7–25)
CALCIUM SPEC-SCNC: 9.4 MG/DL (ref 8.6–10.5)
CHLORIDE SERPL-SCNC: 102 MMOL/L (ref 98–107)
CHOLEST SERPL-MCNC: 154 MG/DL (ref 0–200)
CO2 SERPL-SCNC: 24 MMOL/L (ref 22–29)
CREAT SERPL-MCNC: 2.88 MG/DL (ref 0.76–1.27)
CREAT UR-MCNC: 71.3 MG/DL
CREAT UR-MCNC: 71.3 MG/DL
DEPRECATED RDW RBC AUTO: 48.9 FL (ref 37–54)
EGFRCR SERPLBLD CKD-EPI 2021: 24.4 ML/MIN/1.73
EOSINOPHIL # BLD AUTO: 0.35 10*3/MM3 (ref 0–0.4)
EOSINOPHIL NFR BLD AUTO: 4.8 % (ref 0.3–6.2)
ERYTHROCYTE [DISTWIDTH] IN BLOOD BY AUTOMATED COUNT: 13.7 % (ref 12.3–15.4)
GLOBULIN UR ELPH-MCNC: 3.2 GM/DL
GLUCOSE SERPL-MCNC: 181 MG/DL (ref 65–99)
HBA1C MFR BLD: 8.1 % (ref 3.5–5.6)
HCT VFR BLD AUTO: 39.9 % (ref 37.5–51)
HDLC SERPL-MCNC: 38 MG/DL (ref 40–60)
HGB BLD-MCNC: 13.2 G/DL (ref 13–17.7)
IMM GRANULOCYTES # BLD AUTO: 0.02 10*3/MM3 (ref 0–0.05)
IMM GRANULOCYTES NFR BLD AUTO: 0.3 % (ref 0–0.5)
LDLC SERPL CALC-MCNC: 92 MG/DL (ref 0–100)
LDLC/HDLC SERPL: 2.36 {RATIO}
LYMPHOCYTES # BLD AUTO: 1.48 10*3/MM3 (ref 0.7–3.1)
LYMPHOCYTES NFR BLD AUTO: 20.2 % (ref 19.6–45.3)
MAGNESIUM SERPL-MCNC: 2.3 MG/DL (ref 1.6–2.6)
MCH RBC QN AUTO: 32.3 PG (ref 26.6–33)
MCHC RBC AUTO-ENTMCNC: 33.1 G/DL (ref 31.5–35.7)
MCV RBC AUTO: 97.6 FL (ref 79–97)
MICROALBUMIN/CREAT UR: 1401.1 MG/G
MONOCYTES # BLD AUTO: 0.9 10*3/MM3 (ref 0.1–0.9)
MONOCYTES NFR BLD AUTO: 12.3 % (ref 5–12)
NEUTROPHILS NFR BLD AUTO: 4.52 10*3/MM3 (ref 1.7–7)
NEUTROPHILS NFR BLD AUTO: 61.7 % (ref 42.7–76)
NRBC BLD AUTO-RTO: 0 /100 WBC (ref 0–0.2)
PHOSPHATE SERPL-MCNC: 4.7 MG/DL (ref 2.5–4.5)
PLATELET # BLD AUTO: 233 10*3/MM3 (ref 140–450)
PMV BLD AUTO: 10.3 FL (ref 6–12)
POTASSIUM SERPL-SCNC: 5.3 MMOL/L (ref 3.5–5.2)
PROT ?TM UR-MCNC: 181.6 MG/DL
PROT SERPL-MCNC: 6.9 G/DL (ref 6–8.5)
PROT/CREAT UR: 2547 MG/G CREA (ref 0–200)
PTH-INTACT SERPL-MCNC: 120 PG/ML (ref 15–65)
RBC # BLD AUTO: 4.09 10*6/MM3 (ref 4.14–5.8)
SODIUM SERPL-SCNC: 137 MMOL/L (ref 136–145)
TRIGL SERPL-MCNC: 132 MG/DL (ref 0–150)
TSH SERPL DL<=0.05 MIU/L-ACNC: 3.89 UIU/ML (ref 0.27–4.2)
VLDLC SERPL-MCNC: 24 MG/DL (ref 5–40)
WBC NRBC COR # BLD: 7.32 10*3/MM3 (ref 3.4–10.8)

## 2022-08-24 PROCEDURE — 83735 ASSAY OF MAGNESIUM: CPT

## 2022-08-24 PROCEDURE — 82306 VITAMIN D 25 HYDROXY: CPT

## 2022-08-24 PROCEDURE — 82043 UR ALBUMIN QUANTITATIVE: CPT

## 2022-08-24 PROCEDURE — 80050 GENERAL HEALTH PANEL: CPT

## 2022-08-24 PROCEDURE — 80061 LIPID PANEL: CPT

## 2022-08-24 PROCEDURE — 82570 ASSAY OF URINE CREATININE: CPT

## 2022-08-24 PROCEDURE — 36415 COLL VENOUS BLD VENIPUNCTURE: CPT

## 2022-08-24 PROCEDURE — 84156 ASSAY OF PROTEIN URINE: CPT

## 2022-08-24 PROCEDURE — 83970 ASSAY OF PARATHORMONE: CPT

## 2022-08-24 PROCEDURE — 84100 ASSAY OF PHOSPHORUS: CPT

## 2022-08-24 PROCEDURE — 83036 HEMOGLOBIN GLYCOSYLATED A1C: CPT

## 2022-08-25 ENCOUNTER — OFFICE VISIT (OUTPATIENT)
Dept: CARDIOLOGY | Facility: CLINIC | Age: 62
End: 2022-08-25

## 2022-08-25 VITALS
BODY MASS INDEX: 27.99 KG/M2 | HEART RATE: 71 BPM | DIASTOLIC BLOOD PRESSURE: 82 MMHG | HEIGHT: 69 IN | SYSTOLIC BLOOD PRESSURE: 134 MMHG | OXYGEN SATURATION: 99 % | WEIGHT: 189 LBS

## 2022-08-25 DIAGNOSIS — I25.10 CORONARY ARTERY DISEASE INVOLVING NATIVE CORONARY ARTERY OF NATIVE HEART WITHOUT ANGINA PECTORIS: Primary | ICD-10-CM

## 2022-08-25 DIAGNOSIS — Z95.810 ICD (IMPLANTABLE CARDIOVERTER-DEFIBRILLATOR), DUAL, IN SITU: ICD-10-CM

## 2022-08-25 DIAGNOSIS — I50.9 CONGESTIVE HEART FAILURE, UNSPECIFIED HF CHRONICITY, UNSPECIFIED HEART FAILURE TYPE: ICD-10-CM

## 2022-08-25 DIAGNOSIS — E78.2 MIXED HYPERLIPIDEMIA: ICD-10-CM

## 2022-08-25 DIAGNOSIS — E10.42 TYPE 1 DIABETES MELLITUS WITH DIABETIC POLYNEUROPATHY: ICD-10-CM

## 2022-08-25 DIAGNOSIS — I42.0 CARDIOMYOPATHY, DILATED: ICD-10-CM

## 2022-08-25 PROCEDURE — 99214 OFFICE O/P EST MOD 30 MIN: CPT | Performed by: INTERNAL MEDICINE

## 2022-08-25 PROCEDURE — 93282 PRGRMG EVAL IMPLANTABLE DFB: CPT | Performed by: INTERNAL MEDICINE
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
## 2022-09-02 PROCEDURE — 93296 REM INTERROG EVL PM/IDS: CPT | Performed by: NURSE PRACTITIONER

## 2022-09-02 PROCEDURE — 93295 DEV INTERROG REMOTE 1/2/MLT: CPT | Performed by: NURSE PRACTITIONER

## 2022-09-13 DIAGNOSIS — E10.49 TYPE 1 DIABETES MELLITUS WITH OTHER DIABETIC NEUROLOGICAL COMPLICATION: ICD-10-CM

## 2022-09-13 RX ORDER — INSULIN PUMP CONTROLLER
EACH MISCELLANEOUS
Qty: 45 EACH | Refills: 3 | Status: SHIPPED | OUTPATIENT
Start: 2022-09-13 | End: 2023-01-12 | Stop reason: SDUPTHER

## 2022-09-26 ENCOUNTER — TELEPHONE (OUTPATIENT)
Dept: ENDOCRINOLOGY | Facility: CLINIC | Age: 62
End: 2022-09-26

## 2022-09-28 ENCOUNTER — TRANSCRIBE ORDERS (OUTPATIENT)
Dept: ADMINISTRATIVE | Facility: HOSPITAL | Age: 62
End: 2022-09-28

## 2022-09-28 ENCOUNTER — LAB (OUTPATIENT)
Dept: LAB | Facility: HOSPITAL | Age: 62
End: 2022-09-28

## 2022-09-28 DIAGNOSIS — N18.4 CHRONIC KIDNEY DISEASE, STAGE IV (SEVERE): ICD-10-CM

## 2022-09-28 DIAGNOSIS — N18.4 CHRONIC KIDNEY DISEASE, STAGE IV (SEVERE): Primary | ICD-10-CM

## 2022-09-28 LAB
ANION GAP SERPL CALCULATED.3IONS-SCNC: 6.9 MMOL/L (ref 5–15)
BACTERIA UR QL AUTO: NORMAL /HPF
BASOPHILS # BLD AUTO: 0.06 10*3/MM3 (ref 0–0.2)
BASOPHILS NFR BLD AUTO: 0.8 % (ref 0–1.5)
BILIRUB UR QL STRIP: NEGATIVE
BUN SERPL-MCNC: 39 MG/DL (ref 6–20)
BUN/CREAT SERPL: 12.4 (ref 7–25)
CALCIUM SPEC-SCNC: 9.2 MG/DL (ref 8.6–10.5)
CHLORIDE SERPL-SCNC: 105 MMOL/L (ref 98–107)
CLARITY UR: CLEAR
CO2 SERPL-SCNC: 27.1 MMOL/L (ref 22–29)
COLOR UR: YELLOW
CREAT SERPL-MCNC: 3.14 MG/DL (ref 0.76–1.27)
CREAT UR-MCNC: 34.1 MG/DL
DEPRECATED RDW RBC AUTO: 47.6 FL (ref 37–54)
EGFRCR SERPLBLD CKD-EPI 2021: 22 ML/MIN/1.73
EOSINOPHIL # BLD AUTO: 0.35 10*3/MM3 (ref 0–0.4)
EOSINOPHIL NFR BLD AUTO: 4.8 % (ref 0.3–6.2)
ERYTHROCYTE [DISTWIDTH] IN BLOOD BY AUTOMATED COUNT: 13.3 % (ref 12.3–15.4)
GLUCOSE SERPL-MCNC: 157 MG/DL (ref 65–99)
GLUCOSE UR STRIP-MCNC: ABNORMAL MG/DL
HCT VFR BLD AUTO: 36.9 % (ref 37.5–51)
HGB BLD-MCNC: 12.6 G/DL (ref 13–17.7)
HGB UR QL STRIP.AUTO: NEGATIVE
HYALINE CASTS UR QL AUTO: NORMAL /LPF
IMM GRANULOCYTES # BLD AUTO: 0.03 10*3/MM3 (ref 0–0.05)
IMM GRANULOCYTES NFR BLD AUTO: 0.4 % (ref 0–0.5)
KETONES UR QL STRIP: NEGATIVE
LEUKOCYTE ESTERASE UR QL STRIP.AUTO: NEGATIVE
LYMPHOCYTES # BLD AUTO: 1 10*3/MM3 (ref 0.7–3.1)
LYMPHOCYTES NFR BLD AUTO: 13.9 % (ref 19.6–45.3)
MAGNESIUM SERPL-MCNC: 2.3 MG/DL (ref 1.6–2.6)
MCH RBC QN AUTO: 32.6 PG (ref 26.6–33)
MCHC RBC AUTO-ENTMCNC: 34.1 G/DL (ref 31.5–35.7)
MCV RBC AUTO: 95.3 FL (ref 79–97)
MONOCYTES # BLD AUTO: 0.93 10*3/MM3 (ref 0.1–0.9)
MONOCYTES NFR BLD AUTO: 12.9 % (ref 5–12)
NEUTROPHILS NFR BLD AUTO: 4.85 10*3/MM3 (ref 1.7–7)
NEUTROPHILS NFR BLD AUTO: 67.2 % (ref 42.7–76)
NITRITE UR QL STRIP: NEGATIVE
NRBC BLD AUTO-RTO: 0 /100 WBC (ref 0–0.2)
PH UR STRIP.AUTO: 6.5 [PH] (ref 5–8)
PHOSPHATE SERPL-MCNC: 4.1 MG/DL (ref 2.5–4.5)
PLATELET # BLD AUTO: 191 10*3/MM3 (ref 140–450)
PMV BLD AUTO: 10.7 FL (ref 6–12)
POTASSIUM SERPL-SCNC: 5 MMOL/L (ref 3.5–5.2)
PROT ?TM UR-MCNC: 85.1 MG/DL
PROT UR QL STRIP: ABNORMAL
PROT/CREAT UR: 2495.6 MG/G CREA (ref 0–200)
PTH-INTACT SERPL-MCNC: 133 PG/ML (ref 15–65)
RBC # BLD AUTO: 3.87 10*6/MM3 (ref 4.14–5.8)
RBC # UR STRIP: NORMAL /HPF
REF LAB TEST METHOD: NORMAL
SODIUM SERPL-SCNC: 139 MMOL/L (ref 136–145)
SP GR UR STRIP: 1.01 (ref 1–1.03)
SQUAMOUS #/AREA URNS HPF: NORMAL /HPF
UROBILINOGEN UR QL STRIP: ABNORMAL
WBC # UR STRIP: NORMAL /HPF
WBC NRBC COR # BLD: 7.22 10*3/MM3 (ref 3.4–10.8)

## 2022-09-28 PROCEDURE — 36415 COLL VENOUS BLD VENIPUNCTURE: CPT

## 2022-09-28 PROCEDURE — 82570 ASSAY OF URINE CREATININE: CPT

## 2022-09-28 PROCEDURE — 84100 ASSAY OF PHOSPHORUS: CPT

## 2022-09-28 PROCEDURE — 84156 ASSAY OF PROTEIN URINE: CPT

## 2022-09-28 PROCEDURE — 83735 ASSAY OF MAGNESIUM: CPT

## 2022-09-28 PROCEDURE — 80048 BASIC METABOLIC PNL TOTAL CA: CPT

## 2022-09-28 PROCEDURE — 81001 URINALYSIS AUTO W/SCOPE: CPT

## 2022-09-28 PROCEDURE — 85025 COMPLETE CBC W/AUTO DIFF WBC: CPT

## 2022-09-28 PROCEDURE — 83970 ASSAY OF PARATHORMONE: CPT

## 2022-09-30 DIAGNOSIS — E10.65 UNCONTROLLED TYPE 1 DIABETES MELLITUS WITH HYPERGLYCEMIA: ICD-10-CM

## 2022-09-30 DIAGNOSIS — E10.49 TYPE 1 DIABETES MELLITUS WITH OTHER DIABETIC NEUROLOGICAL COMPLICATION: Primary | ICD-10-CM

## 2022-09-30 DIAGNOSIS — E78.2 MIXED HYPERLIPIDEMIA: ICD-10-CM

## 2022-09-30 DIAGNOSIS — E55.9 VITAMIN D DEFICIENCY: ICD-10-CM

## 2022-10-03 ENCOUNTER — LAB (OUTPATIENT)
Dept: LAB | Facility: HOSPITAL | Age: 62
End: 2022-10-03

## 2022-10-03 DIAGNOSIS — E55.9 VITAMIN D DEFICIENCY: ICD-10-CM

## 2022-10-03 DIAGNOSIS — E10.49 TYPE 1 DIABETES MELLITUS WITH OTHER DIABETIC NEUROLOGICAL COMPLICATION: ICD-10-CM

## 2022-10-03 DIAGNOSIS — E10.65 UNCONTROLLED TYPE 1 DIABETES MELLITUS WITH HYPERGLYCEMIA: ICD-10-CM

## 2022-10-03 DIAGNOSIS — E78.2 MIXED HYPERLIPIDEMIA: ICD-10-CM

## 2022-10-03 LAB
25(OH)D3 SERPL-MCNC: 77 NG/ML (ref 30–100)
ALBUMIN SERPL-MCNC: 3.8 G/DL (ref 3.5–5.2)
ALBUMIN UR-MCNC: 101.3 MG/DL
ALBUMIN/GLOB SERPL: 1.1 G/DL
ALP SERPL-CCNC: 89 U/L (ref 39–117)
ALT SERPL W P-5'-P-CCNC: 18 U/L (ref 1–41)
ANION GAP SERPL CALCULATED.3IONS-SCNC: 14 MMOL/L (ref 5–15)
AST SERPL-CCNC: 19 U/L (ref 1–40)
BILIRUB SERPL-MCNC: 0.5 MG/DL (ref 0–1.2)
BUN SERPL-MCNC: 47 MG/DL (ref 6–20)
BUN/CREAT SERPL: 15.2 (ref 7–25)
CALCIUM SPEC-SCNC: 9.4 MG/DL (ref 8.6–10.5)
CHLORIDE SERPL-SCNC: 101 MMOL/L (ref 98–107)
CHOLEST SERPL-MCNC: 157 MG/DL (ref 0–200)
CO2 SERPL-SCNC: 23 MMOL/L (ref 22–29)
CREAT SERPL-MCNC: 3.1 MG/DL (ref 0.76–1.27)
CREAT UR-MCNC: 49 MG/DL
EGFRCR SERPLBLD CKD-EPI 2021: 22.3 ML/MIN/1.73
GLOBULIN UR ELPH-MCNC: 3.5 GM/DL
GLUCOSE SERPL-MCNC: 156 MG/DL (ref 65–99)
HBA1C MFR BLD: 7.5 % (ref 3.5–5.6)
HDLC SERPL-MCNC: 40 MG/DL (ref 40–60)
LDLC SERPL CALC-MCNC: 94 MG/DL (ref 0–100)
LDLC/HDLC SERPL: 2.29 {RATIO}
MICROALBUMIN/CREAT UR: 2067.3 MG/G
POTASSIUM SERPL-SCNC: 4.6 MMOL/L (ref 3.5–5.2)
PROT SERPL-MCNC: 7.3 G/DL (ref 6–8.5)
SODIUM SERPL-SCNC: 138 MMOL/L (ref 136–145)
TRIGL SERPL-MCNC: 128 MG/DL (ref 0–150)
TSH SERPL DL<=0.05 MIU/L-ACNC: 3.41 UIU/ML (ref 0.27–4.2)
VLDLC SERPL-MCNC: 23 MG/DL (ref 5–40)

## 2022-10-03 PROCEDURE — 80053 COMPREHEN METABOLIC PANEL: CPT

## 2022-10-03 PROCEDURE — 36415 COLL VENOUS BLD VENIPUNCTURE: CPT

## 2022-10-03 PROCEDURE — 82306 VITAMIN D 25 HYDROXY: CPT

## 2022-10-03 PROCEDURE — 82043 UR ALBUMIN QUANTITATIVE: CPT

## 2022-10-03 PROCEDURE — 80061 LIPID PANEL: CPT

## 2022-10-03 PROCEDURE — 82570 ASSAY OF URINE CREATININE: CPT

## 2022-10-03 PROCEDURE — 84443 ASSAY THYROID STIM HORMONE: CPT

## 2022-10-03 PROCEDURE — 83036 HEMOGLOBIN GLYCOSYLATED A1C: CPT

## 2022-10-11 NOTE — PROGRESS NOTES
I reviewed labs & will discuss at upcoming appt on 10/10/2022.  He rescheduled for 4/17/2022.    A1C, vit D, kidney test better. Chol stable.

## 2022-11-12 DIAGNOSIS — I25.10 CORONARY ARTERY DISEASE INVOLVING NATIVE CORONARY ARTERY OF NATIVE HEART WITHOUT ANGINA PECTORIS: ICD-10-CM

## 2022-11-12 DIAGNOSIS — E78.2 MIXED HYPERLIPIDEMIA: ICD-10-CM

## 2022-11-12 DIAGNOSIS — I50.9 CONGESTIVE HEART FAILURE, UNSPECIFIED HF CHRONICITY, UNSPECIFIED HEART FAILURE TYPE: ICD-10-CM

## 2022-11-12 DIAGNOSIS — E10.49 TYPE 1 DIABETES MELLITUS WITH OTHER DIABETIC NEUROLOGICAL COMPLICATION: ICD-10-CM

## 2022-11-12 DIAGNOSIS — Z95.810 ICD (IMPLANTABLE CARDIOVERTER-DEFIBRILLATOR), DUAL, IN SITU: ICD-10-CM

## 2022-11-12 DIAGNOSIS — I42.0 CARDIOMYOPATHY, DILATED: ICD-10-CM

## 2022-11-14 RX ORDER — METOPROLOL SUCCINATE 50 MG/1
50 TABLET, EXTENDED RELEASE ORAL DAILY
Qty: 90 TABLET | Refills: 0 | Status: SHIPPED | OUTPATIENT
Start: 2022-11-14 | End: 2023-02-06

## 2022-11-14 RX ORDER — TICAGRELOR 90 MG/1
TABLET ORAL
Qty: 180 TABLET | Refills: 0 | Status: ON HOLD | OUTPATIENT
Start: 2022-11-14 | End: 2022-12-28

## 2022-11-14 RX ORDER — HYDRALAZINE HYDROCHLORIDE 25 MG/1
TABLET, FILM COATED ORAL
Qty: 180 TABLET | Refills: 0 | Status: ON HOLD | OUTPATIENT
Start: 2022-11-14 | End: 2022-12-28

## 2022-11-16 ENCOUNTER — TELEPHONE (OUTPATIENT)
Dept: CARDIOLOGY | Facility: CLINIC | Age: 62
End: 2022-11-16

## 2022-11-16 NOTE — TELEPHONE ENCOUNTER
Remote device interrogation received and reviewed    Patient has an S ICD implanted in 2016 by Dr. Blanco    G transmission this morning indicated a right alert for possible device malfunction with a faulty code of BD mentioned    I contacted [employer] technical support for [employer] who analyzed the transmission and read reports that they are recommending device replacement within the next 90 days.  If he receives VT therapy they report that it would need to be replaced if he is had 6 shocks    Will contact the patient to discuss and set up S-ICD replacement.    I contacted Dr. Blanco and discussed the case with him who is in agreement to this plan

## 2022-11-22 ENCOUNTER — TELEPHONE (OUTPATIENT)
Dept: CARDIOLOGY | Facility: CLINIC | Age: 62
End: 2022-11-22

## 2022-11-22 NOTE — TELEPHONE ENCOUNTER
Patient wants to talk to you about getting his pm changes out please call him he has some questions

## 2022-11-23 DIAGNOSIS — Z45.02 ELECTIVE REPLACEMENT INDICATED FOR IMPLANTABLE CARDIOVERTER-DEFIBRILLATOR (ICD): Primary | ICD-10-CM

## 2022-11-30 PROBLEM — Z45.02 ELECTIVE REPLACEMENT INDICATED FOR IMPLANTABLE CARDIOVERTER-DEFIBRILLATOR (ICD): Status: ACTIVE | Noted: 2022-11-30

## 2022-12-02 PROCEDURE — 93296 REM INTERROG EVL PM/IDS: CPT | Performed by: NURSE PRACTITIONER

## 2022-12-02 PROCEDURE — 93295 DEV INTERROG REMOTE 1/2/MLT: CPT | Performed by: NURSE PRACTITIONER

## 2022-12-12 DIAGNOSIS — E10.65 UNCONTROLLED TYPE 1 DIABETES MELLITUS WITH HYPERGLYCEMIA: ICD-10-CM

## 2022-12-13 RX ORDER — INSULIN LISPRO 100 [IU]/ML
INJECTION, SOLUTION INTRAVENOUS; SUBCUTANEOUS
Qty: 50 ML | Refills: 4 | Status: SHIPPED | OUTPATIENT
Start: 2022-12-13

## 2022-12-26 ENCOUNTER — LAB (OUTPATIENT)
Dept: LAB | Facility: HOSPITAL | Age: 62
End: 2022-12-26

## 2022-12-26 DIAGNOSIS — Z45.02 ELECTIVE REPLACEMENT INDICATED FOR IMPLANTABLE CARDIOVERTER-DEFIBRILLATOR (ICD): ICD-10-CM

## 2022-12-26 LAB — SARS-COV-2 ORF1AB RESP QL NAA+PROBE: NOT DETECTED

## 2022-12-26 PROCEDURE — U0004 COV-19 TEST NON-CDC HGH THRU: HCPCS

## 2022-12-26 PROCEDURE — C9803 HOPD COVID-19 SPEC COLLECT: HCPCS

## 2022-12-28 ENCOUNTER — ANESTHESIA EVENT (OUTPATIENT)
Dept: CARDIOLOGY | Facility: HOSPITAL | Age: 62
End: 2022-12-28

## 2022-12-28 ENCOUNTER — HOSPITAL ENCOUNTER (OUTPATIENT)
Facility: HOSPITAL | Age: 62
Setting detail: HOSPITAL OUTPATIENT SURGERY
Discharge: HOME | End: 2022-12-28
Attending: INTERNAL MEDICINE | Admitting: INTERNAL MEDICINE

## 2022-12-28 ENCOUNTER — ANESTHESIA (OUTPATIENT)
Dept: CARDIOLOGY | Facility: HOSPITAL | Age: 62
End: 2022-12-28

## 2022-12-28 VITALS
HEART RATE: 69 BPM | BODY MASS INDEX: 29.13 KG/M2 | RESPIRATION RATE: 16 BRPM | WEIGHT: 196.65 LBS | TEMPERATURE: 98.2 F | SYSTOLIC BLOOD PRESSURE: 142 MMHG | OXYGEN SATURATION: 97 % | DIASTOLIC BLOOD PRESSURE: 82 MMHG | HEIGHT: 69 IN

## 2022-12-28 VITALS — OXYGEN SATURATION: 97 % | DIASTOLIC BLOOD PRESSURE: 70 MMHG | HEART RATE: 78 BPM | SYSTOLIC BLOOD PRESSURE: 113 MMHG

## 2022-12-28 DIAGNOSIS — Z45.02 ELECTIVE REPLACEMENT INDICATED FOR IMPLANTABLE CARDIOVERTER-DEFIBRILLATOR (ICD): ICD-10-CM

## 2022-12-28 DIAGNOSIS — I50.22 CHRONIC HFREF (HEART FAILURE WITH REDUCED EJECTION FRACTION): Primary | ICD-10-CM

## 2022-12-28 DIAGNOSIS — Z95.810 ICD (IMPLANTABLE CARDIOVERTER-DEFIBRILLATOR), DUAL, IN SITU: ICD-10-CM

## 2022-12-28 LAB
ANION GAP SERPL CALCULATED.3IONS-SCNC: 10 MMOL/L (ref 5–15)
BUN SERPL-MCNC: 50 MG/DL (ref 8–23)
BUN/CREAT SERPL: 14 (ref 7–25)
CALCIUM SPEC-SCNC: 9 MG/DL (ref 8.6–10.5)
CHLORIDE SERPL-SCNC: 102 MMOL/L (ref 98–107)
CO2 SERPL-SCNC: 27 MMOL/L (ref 22–29)
CREAT SERPL-MCNC: 3.56 MG/DL (ref 0.76–1.27)
DEPRECATED RDW RBC AUTO: 50.8 FL (ref 37–54)
EGFRCR SERPLBLD CKD-EPI 2021: 18.8 ML/MIN/1.73
ERYTHROCYTE [DISTWIDTH] IN BLOOD BY AUTOMATED COUNT: 15 % (ref 12.3–15.4)
GLUCOSE BLDC GLUCOMTR-MCNC: 102 MG/DL (ref 70–105)
GLUCOSE SERPL-MCNC: 134 MG/DL (ref 65–99)
HCT VFR BLD AUTO: 42.4 % (ref 37.5–51)
HGB BLD-MCNC: 13.9 G/DL (ref 13–17.7)
MCH RBC QN AUTO: 30.3 PG (ref 26.6–33)
MCHC RBC AUTO-ENTMCNC: 32.7 G/DL (ref 31.5–35.7)
MCV RBC AUTO: 92.9 FL (ref 79–97)
MRSA DNA SPEC QL NAA+PROBE: NORMAL
PLATELET # BLD AUTO: 202 10*3/MM3 (ref 140–450)
PMV BLD AUTO: 8.9 FL (ref 6–12)
POTASSIUM SERPL-SCNC: 4.8 MMOL/L (ref 3.5–5.2)
RBC # BLD AUTO: 4.57 10*6/MM3 (ref 4.14–5.8)
SODIUM SERPL-SCNC: 139 MMOL/L (ref 136–145)
WBC NRBC COR # BLD: 6.7 10*3/MM3 (ref 3.4–10.8)

## 2022-12-28 PROCEDURE — 87641 MR-STAPH DNA AMP PROBE: CPT | Performed by: INTERNAL MEDICINE

## 2022-12-28 PROCEDURE — 82962 GLUCOSE BLOOD TEST: CPT

## 2022-12-28 PROCEDURE — 85027 COMPLETE CBC AUTOMATED: CPT | Performed by: INTERNAL MEDICINE

## 2022-12-28 PROCEDURE — 25010000002 PROPOFOL 1000 MG/100ML EMULSION: Performed by: ANESTHESIOLOGY

## 2022-12-28 PROCEDURE — 33262 RMVL& REPLC PULSE GEN 1 LEAD: CPT | Performed by: INTERNAL MEDICINE

## 2022-12-28 PROCEDURE — 25010000002 CEFAZOLIN PER 500 MG: Performed by: INTERNAL MEDICINE

## 2022-12-28 PROCEDURE — 25010000002 MIDAZOLAM PER 1 MG: Performed by: ANESTHESIOLOGY

## 2022-12-28 PROCEDURE — 80048 BASIC METABOLIC PNL TOTAL CA: CPT | Performed by: INTERNAL MEDICINE

## 2022-12-28 PROCEDURE — 25010000002 FENTANYL CITRATE (PF) 100 MCG/2ML SOLUTION: Performed by: ANESTHESIOLOGY

## 2022-12-28 PROCEDURE — C1722 AICD, SINGLE CHAMBER: HCPCS | Performed by: INTERNAL MEDICINE

## 2022-12-28 DEVICE — SUBCUTANEOUS IMPLANTABLE CARDIOVERTER DEFIBRILLATOR
Type: IMPLANTABLE DEVICE | Status: FUNCTIONAL
Brand: EMBLEM™ MRI S-ICD

## 2022-12-28 RX ORDER — KETAMINE HCL IN NACL, ISO-OSM 100MG/10ML
SYRINGE (ML) INJECTION AS NEEDED
Status: DISCONTINUED | OUTPATIENT
Start: 2022-12-28 | End: 2022-12-28 | Stop reason: SURG

## 2022-12-28 RX ORDER — LIDOCAINE HYDROCHLORIDE AND EPINEPHRINE BITARTRATE 20; .01 MG/ML; MG/ML
INJECTION, SOLUTION SUBCUTANEOUS
Status: DISCONTINUED | OUTPATIENT
Start: 2022-12-28 | End: 2022-12-28 | Stop reason: HOSPADM

## 2022-12-28 RX ORDER — TRAMADOL HYDROCHLORIDE 50 MG/1
50 TABLET ORAL EVERY 12 HOURS PRN
Qty: 10 TABLET | Refills: 0 | Status: SHIPPED | OUTPATIENT
Start: 2022-12-28

## 2022-12-28 RX ORDER — EPHEDRINE SULFATE 5 MG/ML
INJECTION INTRAVENOUS AS NEEDED
Status: DISCONTINUED | OUTPATIENT
Start: 2022-12-28 | End: 2022-12-28 | Stop reason: SURG

## 2022-12-28 RX ORDER — SODIUM CHLORIDE 9 MG/ML
INJECTION, SOLUTION INTRAVENOUS CONTINUOUS PRN
Status: DISCONTINUED | OUTPATIENT
Start: 2022-12-28 | End: 2022-12-28 | Stop reason: SURG

## 2022-12-28 RX ORDER — LIDOCAINE HYDROCHLORIDE 10 MG/ML
INJECTION, SOLUTION EPIDURAL; INFILTRATION; INTRACAUDAL; PERINEURAL AS NEEDED
Status: DISCONTINUED | OUTPATIENT
Start: 2022-12-28 | End: 2022-12-28 | Stop reason: SURG

## 2022-12-28 RX ORDER — PROPOFOL 10 MG/ML
INJECTION, EMULSION INTRAVENOUS CONTINUOUS PRN
Status: DISCONTINUED | OUTPATIENT
Start: 2022-12-28 | End: 2022-12-28 | Stop reason: SURG

## 2022-12-28 RX ORDER — ACETAMINOPHEN 325 MG/1
650 TABLET ORAL EVERY 4 HOURS PRN
Status: DISCONTINUED | OUTPATIENT
Start: 2022-12-28 | End: 2022-12-28 | Stop reason: HOSPADM

## 2022-12-28 RX ORDER — CEPHALEXIN 500 MG/1
500 CAPSULE ORAL 2 TIMES DAILY
Qty: 10 CAPSULE | Refills: 0 | Status: SHIPPED | OUTPATIENT
Start: 2022-12-28 | End: 2023-01-02

## 2022-12-28 RX ORDER — ONDANSETRON 2 MG/ML
4 INJECTION INTRAMUSCULAR; INTRAVENOUS EVERY 6 HOURS PRN
Status: DISCONTINUED | OUTPATIENT
Start: 2022-12-28 | End: 2022-12-28 | Stop reason: HOSPADM

## 2022-12-28 RX ORDER — FENTANYL CITRATE 50 UG/ML
INJECTION, SOLUTION INTRAMUSCULAR; INTRAVENOUS AS NEEDED
Status: DISCONTINUED | OUTPATIENT
Start: 2022-12-28 | End: 2022-12-28 | Stop reason: SURG

## 2022-12-28 RX ORDER — NALOXONE HCL 0.4 MG/ML
0.4 VIAL (ML) INJECTION
Status: DISCONTINUED | OUTPATIENT
Start: 2022-12-28 | End: 2022-12-28 | Stop reason: HOSPADM

## 2022-12-28 RX ORDER — TRAMADOL HYDROCHLORIDE 50 MG/1
50 TABLET ORAL EVERY 12 HOURS PRN
Status: DISCONTINUED | OUTPATIENT
Start: 2022-12-28 | End: 2022-12-28 | Stop reason: HOSPADM

## 2022-12-28 RX ORDER — MIDAZOLAM HYDROCHLORIDE 1 MG/ML
INJECTION INTRAMUSCULAR; INTRAVENOUS AS NEEDED
Status: DISCONTINUED | OUTPATIENT
Start: 2022-12-28 | End: 2022-12-28 | Stop reason: SURG

## 2022-12-28 RX ORDER — ACETAMINOPHEN 650 MG/1
650 SUPPOSITORY RECTAL EVERY 4 HOURS PRN
Status: DISCONTINUED | OUTPATIENT
Start: 2022-12-28 | End: 2022-12-28 | Stop reason: HOSPADM

## 2022-12-28 RX ORDER — HYDRALAZINE HYDROCHLORIDE 25 MG/1
25 TABLET, FILM COATED ORAL 2 TIMES DAILY
COMMUNITY
End: 2023-02-06

## 2022-12-28 RX ADMIN — CEFAZOLIN 2 G: 2 INJECTION, POWDER, FOR SOLUTION INTRAMUSCULAR; INTRAVENOUS at 16:15

## 2022-12-28 RX ADMIN — FENTANYL CITRATE 25 MCG: 50 INJECTION, SOLUTION INTRAMUSCULAR; INTRAVENOUS at 16:55

## 2022-12-28 RX ADMIN — PROPOFOL 100 MCG/KG/MIN: 10 INJECTION, EMULSION INTRAVENOUS at 16:32

## 2022-12-28 RX ADMIN — EPHEDRINE SULFATE 10 MG: 5 INJECTION INTRAVENOUS at 16:52

## 2022-12-28 RX ADMIN — FENTANYL CITRATE 25 MCG: 50 INJECTION, SOLUTION INTRAMUSCULAR; INTRAVENOUS at 17:00

## 2022-12-28 RX ADMIN — LIDOCAINE HYDROCHLORIDE 50 MG: 10 INJECTION, SOLUTION EPIDURAL; INFILTRATION; INTRACAUDAL; PERINEURAL at 16:32

## 2022-12-28 RX ADMIN — Medication 50 MG: at 16:32

## 2022-12-28 RX ADMIN — EPHEDRINE SULFATE 10 MG: 5 INJECTION INTRAVENOUS at 16:42

## 2022-12-28 RX ADMIN — MIDAZOLAM 2 MG: 1 INJECTION INTRAMUSCULAR; INTRAVENOUS at 16:27

## 2022-12-28 RX ADMIN — FENTANYL CITRATE 50 MCG: 50 INJECTION, SOLUTION INTRAMUSCULAR; INTRAVENOUS at 16:32

## 2022-12-28 RX ADMIN — SODIUM CHLORIDE: 0.9 INJECTION, SOLUTION INTRAVENOUS at 16:23

## 2022-12-28 NOTE — ANESTHESIA PREPROCEDURE EVALUATION
Anesthesia Evaluation     Patient summary reviewed and Nursing notes reviewed   NPO Solid Status: > 8 hours  NPO Liquid Status: > 8 hours           Airway   Mallampati: II  TM distance: >3 FB  Neck ROM: full  No difficulty expected  Dental - normal exam     Pulmonary    (+) COPD,   Cardiovascular     (+) hypertension, CAD, CHF , hyperlipidemia,       Neuro/Psych  (+) CVA, numbness,    GI/Hepatic/Renal/Endo    (+)  GI bleeding , renal disease, diabetes mellitus,     Musculoskeletal     Abdominal    Substance History      OB/GYN          Other        ROS/Med Hx Other: ? Left ventricular systolic function is severely decreased.  ? Left ventricular ejection fraction is 10 to 15%  ? Akinetic septum, distal anterior wall, apex and distal inferior wall.  ? Left ventricular diastolic function was normal.  ? Moderate mitral valve regurgitation is present.                    Anesthesia Plan    ASA 4     MAC     intravenous induction     Anesthetic plan, risks, benefits, and alternatives have been provided, discussed and informed consent has been obtained with: patient.    Plan discussed with CRNA and CAA.        CODE STATUS:
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
## 2022-12-29 NOTE — ANESTHESIA POSTPROCEDURE EVALUATION
Patient: Esdras Peralta    Procedure Summary     Date: 12/28/22 Room / Location: [place] CATH LAB 3 /  [facility]    Anesthesia Start: 1624 Anesthesia Stop: 1723    Procedure: ICD battery change [place] aware Diagnosis:       Elective replacement indicated for implantable cardioverter-defibrillator (ICD)      (NICM)    Providers: Roman Blanco MD Provider: Mik Chang MD    Anesthesia Type: MAC ASA Status: 4          Anesthesia Type: MAC    Vitals  Vitals Value Taken Time   /82 12/28/22 1920   Temp     Pulse 69 12/28/22 1921   Resp 16 12/28/22 1800   SpO2 99 % 12/28/22 1843   Vitals shown include unvalidated device data.        Post Anesthesia Care and Evaluation    Patient location during evaluation: PACU  Patient participation: complete - patient participated  Level of consciousness: awake  Pain scale: See nurse's notes for pain score.  Pain management: adequate    Airway patency: patent  Anesthetic complications: No anesthetic complications  PONV Status: none  Cardiovascular status: acceptable  Respiratory status: acceptable  Hydration status: acceptable    Comments: Patient seen and examined postoperatively; vital signs stable; SpO2 greater than or equal to 90%; cardiopulmonary status stable; nausea/vomiting adequately controlled; pain adequately controlled; no apparent anesthesia complications; patient discharged from anesthesia care when discharge criteria were met

## 2023-01-03 ENCOUNTER — TELEPHONE (OUTPATIENT)
Dept: CARDIOLOGY | Facility: CLINIC | Age: 63
End: 2023-01-03
Payer: COMMERCIAL

## 2023-01-05 ENCOUNTER — OFFICE VISIT (OUTPATIENT)
Dept: CARDIOLOGY | Facility: CLINIC | Age: 63
End: 2023-01-05
Payer: COMMERCIAL

## 2023-01-05 ENCOUNTER — CLINICAL SUPPORT NO REQUIREMENTS (OUTPATIENT)
Dept: CARDIOLOGY | Facility: CLINIC | Age: 63
End: 2023-01-05
Payer: COMMERCIAL

## 2023-01-05 VITALS
WEIGHT: 195 LBS | HEIGHT: 69 IN | BODY MASS INDEX: 28.88 KG/M2 | RESPIRATION RATE: 18 BRPM | HEART RATE: 88 BPM | DIASTOLIC BLOOD PRESSURE: 99 MMHG | SYSTOLIC BLOOD PRESSURE: 147 MMHG

## 2023-01-05 DIAGNOSIS — I42.0 CARDIOMYOPATHY, DILATED: ICD-10-CM

## 2023-01-05 DIAGNOSIS — Z95.810 ICD (IMPLANTABLE CARDIOVERTER-DEFIBRILLATOR), DUAL, IN SITU: Primary | ICD-10-CM

## 2023-01-05 PROCEDURE — 93260 PRGRMG DEV EVAL IMPLTBL SYS: CPT | Performed by: NURSE PRACTITIONER

## 2023-01-05 PROCEDURE — 99024 POSTOP FOLLOW-UP VISIT: CPT | Performed by: NURSE PRACTITIONER

## 2023-01-12 DIAGNOSIS — E10.49 TYPE 1 DIABETES MELLITUS WITH OTHER DIABETIC NEUROLOGICAL COMPLICATION: ICD-10-CM

## 2023-01-12 RX ORDER — INSULIN PUMP CONTROLLER
1 EACH MISCELLANEOUS
Qty: 45 EACH | Refills: 3 | Status: SHIPPED | OUTPATIENT
Start: 2023-01-12

## 2023-01-13 NOTE — PROGRESS NOTES
Patient had recent generator replacement of his S ICD.    This was performed by Dr. Blanco.    Incision in the left lateral chest wall has staples intact.  Staples were removed without difficulty.  Wound edges are well approximated and there is no drainage.  There is some mild hematoma present.    Interrogation of his S ICD shows stable device function.  There is been no VT or VF episodes.    Patient will keep routine scheduled follow-up with Dr. Sheriff.    Will continue to monitor his ICD via [employer] if he has any problems with the wound of advised him to contact our office

## 2023-01-18 ENCOUNTER — LAB (OUTPATIENT)
Dept: LAB | Facility: HOSPITAL | Age: 63
End: 2023-01-18
Payer: COMMERCIAL

## 2023-01-18 ENCOUNTER — TRANSCRIBE ORDERS (OUTPATIENT)
Dept: LAB | Facility: HOSPITAL | Age: 63
End: 2023-01-18
Payer: COMMERCIAL

## 2023-01-18 DIAGNOSIS — N18.4 CHRONIC KIDNEY DISEASE, STAGE IV (SEVERE): Primary | ICD-10-CM

## 2023-01-18 DIAGNOSIS — N18.4 CHRONIC KIDNEY DISEASE, STAGE IV (SEVERE): ICD-10-CM

## 2023-01-18 LAB
ALBUMIN SERPL-MCNC: 3.3 G/DL (ref 3.5–5.2)
ALBUMIN/GLOB SERPL: 1.1 G/DL
ALP SERPL-CCNC: 98 U/L (ref 39–117)
ALT SERPL W P-5'-P-CCNC: 28 U/L (ref 1–41)
ANION GAP SERPL CALCULATED.3IONS-SCNC: 10.2 MMOL/L (ref 5–15)
AST SERPL-CCNC: 27 U/L (ref 1–40)
BACTERIA UR QL AUTO: NORMAL /HPF
BASOPHILS # BLD AUTO: 0.06 10*3/MM3 (ref 0–0.2)
BASOPHILS NFR BLD AUTO: 0.8 % (ref 0–1.5)
BILIRUB SERPL-MCNC: 0.3 MG/DL (ref 0–1.2)
BILIRUB UR QL STRIP: NEGATIVE
BUN SERPL-MCNC: 47 MG/DL (ref 8–23)
BUN/CREAT SERPL: 14.6 (ref 7–25)
CALCIUM SPEC-SCNC: 8.8 MG/DL (ref 8.6–10.5)
CHLORIDE SERPL-SCNC: 99 MMOL/L (ref 98–107)
CLARITY UR: CLEAR
CO2 SERPL-SCNC: 27.8 MMOL/L (ref 22–29)
COLOR UR: YELLOW
CREAT SERPL-MCNC: 3.22 MG/DL (ref 0.76–1.27)
CREAT UR-MCNC: 41.4 MG/DL
DEPRECATED RDW RBC AUTO: 49.3 FL (ref 37–54)
EGFRCR SERPLBLD CKD-EPI 2021: 21.2 ML/MIN/1.73
EOSINOPHIL # BLD AUTO: 0.25 10*3/MM3 (ref 0–0.4)
EOSINOPHIL NFR BLD AUTO: 3.4 % (ref 0.3–6.2)
ERYTHROCYTE [DISTWIDTH] IN BLOOD BY AUTOMATED COUNT: 14.5 % (ref 12.3–15.4)
GLOBULIN UR ELPH-MCNC: 3 GM/DL
GLUCOSE SERPL-MCNC: 201 MG/DL (ref 65–99)
GLUCOSE UR STRIP-MCNC: ABNORMAL MG/DL
HCT VFR BLD AUTO: 38.4 % (ref 37.5–51)
HGB BLD-MCNC: 12.5 G/DL (ref 13–17.7)
HGB UR QL STRIP.AUTO: NEGATIVE
HYALINE CASTS UR QL AUTO: NORMAL /LPF
IMM GRANULOCYTES # BLD AUTO: 0.02 10*3/MM3 (ref 0–0.05)
IMM GRANULOCYTES NFR BLD AUTO: 0.3 % (ref 0–0.5)
KETONES UR QL STRIP: NEGATIVE
LEUKOCYTE ESTERASE UR QL STRIP.AUTO: NEGATIVE
LYMPHOCYTES # BLD AUTO: 0.97 10*3/MM3 (ref 0.7–3.1)
LYMPHOCYTES NFR BLD AUTO: 13.4 % (ref 19.6–45.3)
MAGNESIUM SERPL-MCNC: 2.3 MG/DL (ref 1.6–2.4)
MCH RBC QN AUTO: 30.6 PG (ref 26.6–33)
MCHC RBC AUTO-ENTMCNC: 32.6 G/DL (ref 31.5–35.7)
MCV RBC AUTO: 93.9 FL (ref 79–97)
MONOCYTES # BLD AUTO: 0.71 10*3/MM3 (ref 0.1–0.9)
MONOCYTES NFR BLD AUTO: 9.8 % (ref 5–12)
NEUTROPHILS NFR BLD AUTO: 5.24 10*3/MM3 (ref 1.7–7)
NEUTROPHILS NFR BLD AUTO: 72.3 % (ref 42.7–76)
NITRITE UR QL STRIP: NEGATIVE
NRBC BLD AUTO-RTO: 0 /100 WBC (ref 0–0.2)
PH UR STRIP.AUTO: 7 [PH] (ref 5–8)
PHOSPHATE SERPL-MCNC: 4.1 MG/DL (ref 2.5–4.5)
PLATELET # BLD AUTO: 206 10*3/MM3 (ref 140–450)
PMV BLD AUTO: 11.1 FL (ref 6–12)
POTASSIUM SERPL-SCNC: 3.7 MMOL/L (ref 3.5–5.2)
PROT ?TM UR-MCNC: 338.3 MG/DL
PROT SERPL-MCNC: 6.3 G/DL (ref 6–8.5)
PROT UR QL STRIP: ABNORMAL
PROT/CREAT UR: 8171.5 MG/G CREA (ref 0–200)
PTH-INTACT SERPL-MCNC: 145 PG/ML (ref 15–65)
RBC # BLD AUTO: 4.09 10*6/MM3 (ref 4.14–5.8)
RBC # UR STRIP: NORMAL /HPF
REF LAB TEST METHOD: NORMAL
SODIUM SERPL-SCNC: 137 MMOL/L (ref 136–145)
SP GR UR STRIP: 1.01 (ref 1–1.03)
SQUAMOUS #/AREA URNS HPF: NORMAL /HPF
UROBILINOGEN UR QL STRIP: ABNORMAL
WBC # UR STRIP: NORMAL /HPF
WBC NRBC COR # BLD: 7.25 10*3/MM3 (ref 3.4–10.8)

## 2023-01-18 PROCEDURE — 80053 COMPREHEN METABOLIC PANEL: CPT

## 2023-01-18 PROCEDURE — 83970 ASSAY OF PARATHORMONE: CPT

## 2023-01-18 PROCEDURE — 81001 URINALYSIS AUTO W/SCOPE: CPT

## 2023-01-18 PROCEDURE — 84100 ASSAY OF PHOSPHORUS: CPT

## 2023-01-18 PROCEDURE — 84156 ASSAY OF PROTEIN URINE: CPT

## 2023-01-18 PROCEDURE — 82570 ASSAY OF URINE CREATININE: CPT

## 2023-01-18 PROCEDURE — 36415 COLL VENOUS BLD VENIPUNCTURE: CPT

## 2023-01-18 PROCEDURE — 83735 ASSAY OF MAGNESIUM: CPT

## 2023-01-18 PROCEDURE — 85025 COMPLETE CBC W/AUTO DIFF WBC: CPT

## 2023-02-05 DIAGNOSIS — I42.0 CARDIOMYOPATHY, DILATED: ICD-10-CM

## 2023-02-05 DIAGNOSIS — Z95.810 ICD (IMPLANTABLE CARDIOVERTER-DEFIBRILLATOR), DUAL, IN SITU: ICD-10-CM

## 2023-02-05 DIAGNOSIS — E78.2 MIXED HYPERLIPIDEMIA: ICD-10-CM

## 2023-02-05 DIAGNOSIS — E10.49 TYPE 1 DIABETES MELLITUS WITH OTHER DIABETIC NEUROLOGICAL COMPLICATION: ICD-10-CM

## 2023-02-05 DIAGNOSIS — I25.10 CORONARY ARTERY DISEASE INVOLVING NATIVE CORONARY ARTERY OF NATIVE HEART WITHOUT ANGINA PECTORIS: ICD-10-CM

## 2023-02-05 DIAGNOSIS — I50.9 CONGESTIVE HEART FAILURE, UNSPECIFIED HF CHRONICITY, UNSPECIFIED HEART FAILURE TYPE: ICD-10-CM

## 2023-02-06 RX ORDER — METOPROLOL SUCCINATE 50 MG/1
50 TABLET, EXTENDED RELEASE ORAL DAILY
Qty: 90 TABLET | Refills: 0 | Status: SHIPPED | OUTPATIENT
Start: 2023-02-06

## 2023-02-06 RX ORDER — TICAGRELOR 90 MG/1
TABLET ORAL
Qty: 180 TABLET | Refills: 1 | Status: SHIPPED | OUTPATIENT
Start: 2023-02-06

## 2023-02-06 RX ORDER — HYDRALAZINE HYDROCHLORIDE 25 MG/1
TABLET, FILM COATED ORAL
Qty: 180 TABLET | Refills: 1 | Status: SHIPPED | OUTPATIENT
Start: 2023-02-06

## 2023-02-10 ENCOUNTER — TELEPHONE (OUTPATIENT)
Dept: ENDOCRINOLOGY | Facility: CLINIC | Age: 63
End: 2023-02-10
Payer: COMMERCIAL

## 2023-03-02 ENCOUNTER — TELEPHONE (OUTPATIENT)
Dept: CARDIOLOGY | Facility: CLINIC | Age: 63
End: 2023-03-02

## 2023-03-02 NOTE — TELEPHONE ENCOUNTER
Caller: Esdras Peralta    Relationship: Self    Best call back number: [phone]    What is the best time to reach you: ANYTIME    Who are you requesting to speak with (clinical staff, provider,  specific staff member): CLINICAL    What was the call regarding: PT CALLING TO [facility] APPT - PT REPORTS IT WAS GOING TO BE A TELEHEALTH BUT THEN WAS UNABLE TO CONFIRM, PT REPORTS HE FORGOT TO [facility] AND NEXT AVAIL IS IN MAY - PT REPORTS LETHARGY AND TROUBLE SLEEPING - PT ALSO REPORTS THAT HE SPOKE WITH DR LEDESMA ABOUT A PRESCRIPTION FOR A WHEELCHAIR - PT WOULD LIKE TO BE SEEN     Do you require a callback: YES PLEASE

## 2023-03-03 PROCEDURE — 93295 DEV INTERROG REMOTE 1/2/MLT: CPT | Performed by: NURSE PRACTITIONER

## 2023-03-03 PROCEDURE — 93296 REM INTERROG EVL PM/IDS: CPT | Performed by: NURSE PRACTITIONER

## 2023-03-06 ENCOUNTER — LAB (OUTPATIENT)
Dept: LAB | Facility: HOSPITAL | Age: 63
End: 2023-03-06
Payer: COMMERCIAL

## 2023-03-06 ENCOUNTER — TRANSCRIBE ORDERS (OUTPATIENT)
Dept: ADMINISTRATIVE | Facility: HOSPITAL | Age: 63
End: 2023-03-06
Payer: COMMERCIAL

## 2023-03-06 DIAGNOSIS — N18.4 CHRONIC KIDNEY DISEASE, STAGE IV (SEVERE): Primary | ICD-10-CM

## 2023-03-06 DIAGNOSIS — N18.4 CHRONIC KIDNEY DISEASE, STAGE IV (SEVERE): ICD-10-CM

## 2023-03-06 LAB
ANION GAP SERPL CALCULATED.3IONS-SCNC: 10.4 MMOL/L (ref 5–15)
BACTERIA UR QL AUTO: NORMAL /HPF
BASOPHILS # BLD AUTO: 0.06 10*3/MM3 (ref 0–0.2)
BASOPHILS NFR BLD AUTO: 0.9 % (ref 0–1.5)
BILIRUB UR QL STRIP: NEGATIVE
BUN SERPL-MCNC: 61 MG/DL (ref 8–23)
BUN/CREAT SERPL: 16.8 (ref 7–25)
CALCIUM SPEC-SCNC: 9.2 MG/DL (ref 8.6–10.5)
CHLORIDE SERPL-SCNC: 99 MMOL/L (ref 98–107)
CLARITY UR: CLEAR
CO2 SERPL-SCNC: 24.6 MMOL/L (ref 22–29)
COLOR UR: YELLOW
CREAT SERPL-MCNC: 3.63 MG/DL (ref 0.76–1.27)
CREAT UR-MCNC: 55.9 MG/DL
DEPRECATED RDW RBC AUTO: 54.4 FL (ref 37–54)
EGFRCR SERPLBLD CKD-EPI 2021: 18.3 ML/MIN/1.73
EOSINOPHIL # BLD AUTO: 0.37 10*3/MM3 (ref 0–0.4)
EOSINOPHIL NFR BLD AUTO: 5.3 % (ref 0.3–6.2)
ERYTHROCYTE [DISTWIDTH] IN BLOOD BY AUTOMATED COUNT: 16.2 % (ref 12.3–15.4)
GLUCOSE SERPL-MCNC: 179 MG/DL (ref 65–99)
GLUCOSE UR STRIP-MCNC: ABNORMAL MG/DL
HCT VFR BLD AUTO: 37.1 % (ref 37.5–51)
HGB BLD-MCNC: 11.6 G/DL (ref 13–17.7)
HGB UR QL STRIP.AUTO: NEGATIVE
HYALINE CASTS UR QL AUTO: NORMAL /LPF
IMM GRANULOCYTES # BLD AUTO: 0.03 10*3/MM3 (ref 0–0.05)
IMM GRANULOCYTES NFR BLD AUTO: 0.4 % (ref 0–0.5)
KETONES UR QL STRIP: NEGATIVE
LEUKOCYTE ESTERASE UR QL STRIP.AUTO: NEGATIVE
LYMPHOCYTES # BLD AUTO: 0.79 10*3/MM3 (ref 0.7–3.1)
LYMPHOCYTES NFR BLD AUTO: 11.4 % (ref 19.6–45.3)
MAGNESIUM SERPL-MCNC: 2.5 MG/DL (ref 1.6–2.4)
MCH RBC QN AUTO: 29 PG (ref 26.6–33)
MCHC RBC AUTO-ENTMCNC: 31.3 G/DL (ref 31.5–35.7)
MCV RBC AUTO: 92.8 FL (ref 79–97)
MONOCYTES # BLD AUTO: 0.77 10*3/MM3 (ref 0.1–0.9)
MONOCYTES NFR BLD AUTO: 11.1 % (ref 5–12)
NEUTROPHILS NFR BLD AUTO: 4.91 10*3/MM3 (ref 1.7–7)
NEUTROPHILS NFR BLD AUTO: 70.9 % (ref 42.7–76)
NITRITE UR QL STRIP: NEGATIVE
NRBC BLD AUTO-RTO: 0 /100 WBC (ref 0–0.2)
PH UR STRIP.AUTO: 6.5 [PH] (ref 5–8)
PHOSPHATE SERPL-MCNC: 5.6 MG/DL (ref 2.5–4.5)
PLATELET # BLD AUTO: 221 10*3/MM3 (ref 140–450)
PMV BLD AUTO: 10.7 FL (ref 6–12)
POTASSIUM SERPL-SCNC: 4.1 MMOL/L (ref 3.5–5.2)
PROT ?TM UR-MCNC: 352.4 MG/DL
PROT UR QL STRIP: ABNORMAL
PROT/CREAT UR: 6304.1 MG/G CREA (ref 0–200)
PTH-INTACT SERPL-MCNC: 96.9 PG/ML (ref 15–65)
RBC # BLD AUTO: 4 10*6/MM3 (ref 4.14–5.8)
RBC # UR STRIP: NORMAL /HPF
REF LAB TEST METHOD: NORMAL
SODIUM SERPL-SCNC: 134 MMOL/L (ref 136–145)
SP GR UR STRIP: 1.02 (ref 1–1.03)
SQUAMOUS #/AREA URNS HPF: NORMAL /HPF
UROBILINOGEN UR QL STRIP: ABNORMAL
WBC # UR STRIP: NORMAL /HPF
WBC NRBC COR # BLD: 6.93 10*3/MM3 (ref 3.4–10.8)

## 2023-03-06 PROCEDURE — 83735 ASSAY OF MAGNESIUM: CPT

## 2023-03-06 PROCEDURE — 36415 COLL VENOUS BLD VENIPUNCTURE: CPT

## 2023-03-06 PROCEDURE — 81001 URINALYSIS AUTO W/SCOPE: CPT

## 2023-03-06 PROCEDURE — 85025 COMPLETE CBC W/AUTO DIFF WBC: CPT

## 2023-03-06 PROCEDURE — 84156 ASSAY OF PROTEIN URINE: CPT

## 2023-03-06 PROCEDURE — 82570 ASSAY OF URINE CREATININE: CPT

## 2023-03-06 PROCEDURE — 84100 ASSAY OF PHOSPHORUS: CPT

## 2023-03-06 PROCEDURE — 80048 BASIC METABOLIC PNL TOTAL CA: CPT

## 2023-03-06 PROCEDURE — 83970 ASSAY OF PARATHORMONE: CPT

## 2023-03-13 ENCOUNTER — CLINICAL SUPPORT NO REQUIREMENTS (OUTPATIENT)
Dept: CARDIOLOGY | Facility: CLINIC | Age: 63
End: 2023-03-13
Payer: COMMERCIAL

## 2023-03-13 ENCOUNTER — OFFICE VISIT (OUTPATIENT)
Dept: CARDIOLOGY | Facility: CLINIC | Age: 63
End: 2023-03-13
Payer: COMMERCIAL

## 2023-03-13 VITALS
HEART RATE: 88 BPM | SYSTOLIC BLOOD PRESSURE: 144 MMHG | WEIGHT: 195 LBS | DIASTOLIC BLOOD PRESSURE: 93 MMHG | OXYGEN SATURATION: 97 % | HEIGHT: 69 IN | BODY MASS INDEX: 28.88 KG/M2

## 2023-03-13 DIAGNOSIS — I42.0 CARDIOMYOPATHY, DILATED: Primary | ICD-10-CM

## 2023-03-13 DIAGNOSIS — N18.30 STAGE 3 CHRONIC KIDNEY DISEASE, UNSPECIFIED WHETHER STAGE 3A OR 3B CKD: ICD-10-CM

## 2023-03-13 DIAGNOSIS — Z95.810 ICD (IMPLANTABLE CARDIOVERTER-DEFIBRILLATOR), DUAL, IN SITU: ICD-10-CM

## 2023-03-13 DIAGNOSIS — I50.22 CHRONIC HFREF (HEART FAILURE WITH REDUCED EJECTION FRACTION): ICD-10-CM

## 2023-03-13 DIAGNOSIS — I25.10 CORONARY ARTERY DISEASE INVOLVING NATIVE CORONARY ARTERY OF NATIVE HEART WITHOUT ANGINA PECTORIS: ICD-10-CM

## 2023-03-13 PROCEDURE — 93260 PRGRMG DEV EVAL IMPLTBL SYS: CPT | Performed by: NURSE PRACTITIONER

## 2023-03-13 PROCEDURE — 93000 ELECTROCARDIOGRAM COMPLETE: CPT | Performed by: NURSE PRACTITIONER

## 2023-03-13 PROCEDURE — 99213 OFFICE O/P EST LOW 20 MIN: CPT | Performed by: NURSE PRACTITIONER
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
## 2023-03-23 ENCOUNTER — TRANSCRIBE ORDERS (OUTPATIENT)
Dept: ADMINISTRATIVE | Facility: HOSPITAL | Age: 63
End: 2023-03-23
Payer: COMMERCIAL

## 2023-03-23 DIAGNOSIS — R06.02 SHORTNESS OF BREATH: Primary | ICD-10-CM

## 2023-03-23 DIAGNOSIS — R18.8 PANCREATIC ASCITES: ICD-10-CM

## 2023-03-24 ENCOUNTER — OFFICE VISIT (OUTPATIENT)
Dept: CARDIOLOGY | Facility: CLINIC | Age: 63
End: 2023-03-24
Payer: COMMERCIAL

## 2023-03-24 VITALS
HEIGHT: 69 IN | BODY MASS INDEX: 30.21 KG/M2 | OXYGEN SATURATION: 98 % | HEART RATE: 77 BPM | SYSTOLIC BLOOD PRESSURE: 120 MMHG | DIASTOLIC BLOOD PRESSURE: 90 MMHG | WEIGHT: 204 LBS

## 2023-03-24 DIAGNOSIS — N18.30 STAGE 3 CHRONIC KIDNEY DISEASE, UNSPECIFIED WHETHER STAGE 3A OR 3B CKD: ICD-10-CM

## 2023-03-24 DIAGNOSIS — I13.0 CARDIORENAL SYNDROME WITH RENAL FAILURE, STAGE 1-4 OR UNSPECIFIED CHRONIC KIDNEY DISEASE, WITH HEART FAILURE: ICD-10-CM

## 2023-03-24 DIAGNOSIS — I50.9 CONGESTIVE HEART FAILURE, UNSPECIFIED HF CHRONICITY, UNSPECIFIED HEART FAILURE TYPE: ICD-10-CM

## 2023-03-24 DIAGNOSIS — I50.22 CHRONIC HFREF (HEART FAILURE WITH REDUCED EJECTION FRACTION): Primary | ICD-10-CM

## 2023-03-24 DIAGNOSIS — I25.10 CORONARY ARTERY DISEASE INVOLVING NATIVE CORONARY ARTERY OF NATIVE HEART WITHOUT ANGINA PECTORIS: ICD-10-CM

## 2023-03-24 DIAGNOSIS — Z95.810 ICD (IMPLANTABLE CARDIOVERTER-DEFIBRILLATOR), DUAL, IN SITU: ICD-10-CM

## 2023-03-24 PROCEDURE — 99214 OFFICE O/P EST MOD 30 MIN: CPT | Performed by: INTERNAL MEDICINE

## 2023-03-24 RX ORDER — METOLAZONE 2.5 MG/1
2.5 TABLET ORAL DAILY PRN
Qty: 30 TABLET | Refills: 11 | Status: SHIPPED | OUTPATIENT
Start: 2023-03-24
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
## 2023-03-28 ENCOUNTER — TELEPHONE (OUTPATIENT)
Dept: ENDOCRINOLOGY | Facility: CLINIC | Age: 63
End: 2023-03-28
Payer: COMMERCIAL

## 2023-03-28 DIAGNOSIS — E10.42 TYPE 1 DIABETES MELLITUS WITH DIABETIC POLYNEUROPATHY: Primary | ICD-10-CM

## 2023-03-28 NOTE — TELEPHONE ENCOUNTER
Patient left message he is going to [facility] next week to get labs done. He had fasting labs done back in September for an anticipated October appt but then he rescheduled for April 2023. What labs do you want him to have done for this upcoming appt?

## 2023-04-05 ENCOUNTER — LAB (OUTPATIENT)
Dept: LAB | Facility: HOSPITAL | Age: 63
End: 2023-04-05
Payer: COMMERCIAL

## 2023-04-05 ENCOUNTER — TRANSCRIBE ORDERS (OUTPATIENT)
Dept: ADMINISTRATIVE | Facility: HOSPITAL | Age: 63
End: 2023-04-05
Payer: COMMERCIAL

## 2023-04-05 DIAGNOSIS — E10.42 TYPE 1 DIABETES MELLITUS WITH DIABETIC POLYNEUROPATHY: ICD-10-CM

## 2023-04-05 DIAGNOSIS — N18.4 CHRONIC KIDNEY DISEASE, STAGE IV (SEVERE): Primary | ICD-10-CM

## 2023-04-05 DIAGNOSIS — N18.4 CHRONIC KIDNEY DISEASE, STAGE IV (SEVERE): ICD-10-CM

## 2023-04-05 LAB
ALBUMIN SERPL-MCNC: 3.2 G/DL (ref 3.5–5.2)
ALBUMIN/GLOB SERPL: 0.8 G/DL
ALP SERPL-CCNC: 108 U/L (ref 39–117)
ALT SERPL W P-5'-P-CCNC: 26 U/L (ref 1–41)
ANION GAP SERPL CALCULATED.3IONS-SCNC: 13 MMOL/L (ref 5–15)
AST SERPL-CCNC: 31 U/L (ref 1–40)
BACTERIA UR QL AUTO: NORMAL /HPF
BASOPHILS # BLD AUTO: 0.05 10*3/MM3 (ref 0–0.2)
BASOPHILS NFR BLD AUTO: 0.6 % (ref 0–1.5)
BILIRUB SERPL-MCNC: 0.4 MG/DL (ref 0–1.2)
BILIRUB UR QL STRIP: NEGATIVE
BUN SERPL-MCNC: 69 MG/DL (ref 8–23)
BUN/CREAT SERPL: 18.4 (ref 7–25)
CALCIUM SPEC-SCNC: 9.5 MG/DL (ref 8.6–10.5)
CHLORIDE SERPL-SCNC: 79 MMOL/L (ref 98–107)
CLARITY UR: CLEAR
CO2 SERPL-SCNC: 36 MMOL/L (ref 22–29)
COLOR UR: YELLOW
CREAT SERPL-MCNC: 3.75 MG/DL (ref 0.76–1.27)
CREAT UR-MCNC: 25.6 MG/DL
DEPRECATED RDW RBC AUTO: 56.1 FL (ref 37–54)
EGFRCR SERPLBLD CKD-EPI 2021: 17.6 ML/MIN/1.73
EOSINOPHIL # BLD AUTO: 0.31 10*3/MM3 (ref 0–0.4)
EOSINOPHIL NFR BLD AUTO: 3.5 % (ref 0.3–6.2)
ERYTHROCYTE [DISTWIDTH] IN BLOOD BY AUTOMATED COUNT: 16.7 % (ref 12.3–15.4)
GLOBULIN UR ELPH-MCNC: 4.1 GM/DL
GLUCOSE SERPL-MCNC: 207 MG/DL (ref 65–99)
GLUCOSE UR STRIP-MCNC: ABNORMAL MG/DL
HBA1C MFR BLD: 8.9 % (ref 4.8–5.6)
HCT VFR BLD AUTO: 38.9 % (ref 37.5–51)
HGB BLD-MCNC: 12.8 G/DL (ref 13–17.7)
HGB UR QL STRIP.AUTO: NEGATIVE
HYALINE CASTS UR QL AUTO: NORMAL /LPF
IMM GRANULOCYTES # BLD AUTO: 0.03 10*3/MM3 (ref 0–0.05)
IMM GRANULOCYTES NFR BLD AUTO: 0.3 % (ref 0–0.5)
KETONES UR QL STRIP: NEGATIVE
LEUKOCYTE ESTERASE UR QL STRIP.AUTO: NEGATIVE
LYMPHOCYTES # BLD AUTO: 1.09 10*3/MM3 (ref 0.7–3.1)
LYMPHOCYTES NFR BLD AUTO: 12.4 % (ref 19.6–45.3)
MAGNESIUM SERPL-MCNC: 2.3 MG/DL (ref 1.6–2.4)
MCH RBC QN AUTO: 30.1 PG (ref 26.6–33)
MCHC RBC AUTO-ENTMCNC: 32.9 G/DL (ref 31.5–35.7)
MCV RBC AUTO: 91.5 FL (ref 79–97)
MONOCYTES # BLD AUTO: 1.13 10*3/MM3 (ref 0.1–0.9)
MONOCYTES NFR BLD AUTO: 12.9 % (ref 5–12)
NEUTROPHILS NFR BLD AUTO: 6.17 10*3/MM3 (ref 1.7–7)
NEUTROPHILS NFR BLD AUTO: 70.3 % (ref 42.7–76)
NITRITE UR QL STRIP: NEGATIVE
NRBC BLD AUTO-RTO: 0 /100 WBC (ref 0–0.2)
PH UR STRIP.AUTO: 7.5 [PH] (ref 5–8)
PHOSPHATE SERPL-MCNC: 5.1 MG/DL (ref 2.5–4.5)
PLATELET # BLD AUTO: 237 10*3/MM3 (ref 140–450)
PMV BLD AUTO: 10 FL (ref 6–12)
POTASSIUM SERPL-SCNC: 3.2 MMOL/L (ref 3.5–5.2)
PROT ?TM UR-MCNC: 165.1 MG/DL
PROT SERPL-MCNC: 7.3 G/DL (ref 6–8.5)
PROT UR QL STRIP: ABNORMAL
PROT/CREAT UR: 6449.2 MG/G CREA (ref 0–200)
PTH-INTACT SERPL-MCNC: 87.2 PG/ML (ref 15–65)
RBC # BLD AUTO: 4.25 10*6/MM3 (ref 4.14–5.8)
RBC # UR STRIP: NORMAL /HPF
REF LAB TEST METHOD: NORMAL
SODIUM SERPL-SCNC: 128 MMOL/L (ref 136–145)
SP GR UR STRIP: 1.01 (ref 1–1.03)
SQUAMOUS #/AREA URNS HPF: NORMAL /HPF
UROBILINOGEN UR QL STRIP: ABNORMAL
WBC # UR STRIP: NORMAL /HPF
WBC NRBC COR # BLD: 8.78 10*3/MM3 (ref 3.4–10.8)

## 2023-04-05 PROCEDURE — 36415 COLL VENOUS BLD VENIPUNCTURE: CPT

## 2023-04-05 PROCEDURE — 83970 ASSAY OF PARATHORMONE: CPT

## 2023-04-05 PROCEDURE — 80053 COMPREHEN METABOLIC PANEL: CPT

## 2023-04-05 PROCEDURE — 84100 ASSAY OF PHOSPHORUS: CPT

## 2023-04-05 PROCEDURE — 82570 ASSAY OF URINE CREATININE: CPT

## 2023-04-05 PROCEDURE — 81001 URINALYSIS AUTO W/SCOPE: CPT

## 2023-04-05 PROCEDURE — 83036 HEMOGLOBIN GLYCOSYLATED A1C: CPT

## 2023-04-05 PROCEDURE — 83735 ASSAY OF MAGNESIUM: CPT

## 2023-04-05 PROCEDURE — 85025 COMPLETE CBC W/AUTO DIFF WBC: CPT

## 2023-04-05 PROCEDURE — 84156 ASSAY OF PROTEIN URINE: CPT

## 2023-04-13 RX ORDER — SEVELAMER CARBONATE 800 MG/1
TABLET, FILM COATED ORAL
COMMUNITY
Start: 2023-03-16 | End: 2023-04-16

## 2023-04-16 ENCOUNTER — APPOINTMENT (OUTPATIENT)
Dept: GENERAL RADIOLOGY | Facility: HOSPITAL | Age: 63
End: 2023-04-16
Payer: COMMERCIAL

## 2023-04-16 ENCOUNTER — APPOINTMENT (OUTPATIENT)
Dept: CT IMAGING | Facility: HOSPITAL | Age: 63
End: 2023-04-16
Payer: COMMERCIAL

## 2023-04-16 ENCOUNTER — HOSPITAL ENCOUNTER (INPATIENT)
Facility: HOSPITAL | Age: 63
LOS: 1 days | Discharge: HOME | End: 2023-04-18
Attending: EMERGENCY MEDICINE | Admitting: INTERNAL MEDICINE
Payer: COMMERCIAL

## 2023-04-16 DIAGNOSIS — J98.11 MILD BIBASILAR ATELECTASIS: ICD-10-CM

## 2023-04-16 DIAGNOSIS — I51.7 CARDIOMEGALY: ICD-10-CM

## 2023-04-16 DIAGNOSIS — N18.4 STAGE 4 CHRONIC KIDNEY DISEASE: Primary | ICD-10-CM

## 2023-04-16 DIAGNOSIS — K42.9 PERIUMBILICAL HERNIA: ICD-10-CM

## 2023-04-16 DIAGNOSIS — E86.0 DEHYDRATION: ICD-10-CM

## 2023-04-16 DIAGNOSIS — N18.32 STAGE 3B CHRONIC KIDNEY DISEASE: ICD-10-CM

## 2023-04-16 DIAGNOSIS — R53.1 WEAKNESS: ICD-10-CM

## 2023-04-16 LAB
ALBUMIN SERPL-MCNC: 3.4 G/DL (ref 3.5–5.2)
ALBUMIN/GLOB SERPL: 0.9 G/DL
ALP SERPL-CCNC: 93 U/L (ref 39–117)
ALT SERPL W P-5'-P-CCNC: 25 U/L (ref 1–41)
AMMONIA BLD-SCNC: 19 UMOL/L (ref 16–60)
ANION GAP SERPL CALCULATED.3IONS-SCNC: 14 MMOL/L (ref 5–15)
AST SERPL-CCNC: 33 U/L (ref 1–40)
B PARAPERT DNA SPEC QL NAA+PROBE: NOT DETECTED
B PERT DNA SPEC QL NAA+PROBE: NOT DETECTED
BACTERIA UR QL AUTO: ABNORMAL /HPF
BASOPHILS # BLD AUTO: 0 10*3/MM3 (ref 0–0.2)
BASOPHILS NFR BLD AUTO: 0.5 % (ref 0–1.5)
BILIRUB SERPL-MCNC: 0.8 MG/DL (ref 0–1.2)
BILIRUB UR QL STRIP: NEGATIVE
BUN SERPL-MCNC: 103 MG/DL (ref 8–23)
BUN/CREAT SERPL: 27.6 (ref 7–25)
C PNEUM DNA NPH QL NAA+NON-PROBE: NOT DETECTED
CALCIUM SPEC-SCNC: 9.8 MG/DL (ref 8.6–10.5)
CHLORIDE SERPL-SCNC: 85 MMOL/L (ref 98–107)
CLARITY UR: CLEAR
CO2 SERPL-SCNC: 32 MMOL/L (ref 22–29)
COLOR UR: YELLOW
CREAT SERPL-MCNC: 3.73 MG/DL (ref 0.76–1.27)
D-LACTATE SERPL-SCNC: 1.1 MMOL/L (ref 0.3–2)
DEPRECATED RDW RBC AUTO: 62.1 FL (ref 37–54)
EGFRCR SERPLBLD CKD-EPI 2021: 17.8 ML/MIN/1.73
EOSINOPHIL # BLD AUTO: 0.1 10*3/MM3 (ref 0–0.4)
EOSINOPHIL NFR BLD AUTO: 0.7 % (ref 0.3–6.2)
ERYTHROCYTE [DISTWIDTH] IN BLOOD BY AUTOMATED COUNT: 19.6 % (ref 12.3–15.4)
FLUAV SUBTYP SPEC NAA+PROBE: NOT DETECTED
FLUBV RNA ISLT QL NAA+PROBE: NOT DETECTED
GLOBULIN UR ELPH-MCNC: 3.8 GM/DL
GLUCOSE BLDC GLUCOMTR-MCNC: 190 MG/DL (ref 70–105)
GLUCOSE SERPL-MCNC: 188 MG/DL (ref 65–99)
GLUCOSE UR STRIP-MCNC: ABNORMAL MG/DL
HADV DNA SPEC NAA+PROBE: NOT DETECTED
HCOV 229E RNA SPEC QL NAA+PROBE: NOT DETECTED
HCOV HKU1 RNA SPEC QL NAA+PROBE: NOT DETECTED
HCOV NL63 RNA SPEC QL NAA+PROBE: NOT DETECTED
HCOV OC43 RNA SPEC QL NAA+PROBE: NOT DETECTED
HCT VFR BLD AUTO: 35.2 % (ref 37.5–51)
HGB BLD-MCNC: 11.3 G/DL (ref 13–17.7)
HGB UR QL STRIP.AUTO: NEGATIVE
HMPV RNA NPH QL NAA+NON-PROBE: NOT DETECTED
HPIV1 RNA ISLT QL NAA+PROBE: NOT DETECTED
HPIV2 RNA SPEC QL NAA+PROBE: NOT DETECTED
HPIV3 RNA NPH QL NAA+PROBE: NOT DETECTED
HPIV4 P GENE NPH QL NAA+PROBE: NOT DETECTED
HYALINE CASTS UR QL AUTO: ABNORMAL /LPF
KETONES UR QL STRIP: NEGATIVE
LEUKOCYTE ESTERASE UR QL STRIP.AUTO: ABNORMAL
LYMPHOCYTES # BLD AUTO: 0.7 10*3/MM3 (ref 0.7–3.1)
LYMPHOCYTES NFR BLD AUTO: 9 % (ref 19.6–45.3)
M PNEUMO IGG SER IA-ACNC: NOT DETECTED
MAGNESIUM SERPL-MCNC: 2.1 MG/DL (ref 1.6–2.4)
MCH RBC QN AUTO: 30.2 PG (ref 26.6–33)
MCHC RBC AUTO-ENTMCNC: 32.2 G/DL (ref 31.5–35.7)
MCV RBC AUTO: 93.7 FL (ref 79–97)
MONOCYTES # BLD AUTO: 0.6 10*3/MM3 (ref 0.1–0.9)
MONOCYTES NFR BLD AUTO: 7.5 % (ref 5–12)
NEUTROPHILS NFR BLD AUTO: 6.5 10*3/MM3 (ref 1.7–7)
NEUTROPHILS NFR BLD AUTO: 82.3 % (ref 42.7–76)
NITRITE UR QL STRIP: NEGATIVE
NRBC BLD AUTO-RTO: 0.1 /100 WBC (ref 0–0.2)
NT-PROBNP SERPL-MCNC: ABNORMAL PG/ML (ref 0–900)
PH UR STRIP.AUTO: 6.5 [PH] (ref 5–8)
PHOSPHATE SERPL-MCNC: 4 MG/DL (ref 2.5–4.5)
PLATELET # BLD AUTO: 181 10*3/MM3 (ref 140–450)
PMV BLD AUTO: 9.1 FL (ref 6–12)
POTASSIUM SERPL-SCNC: 3.2 MMOL/L (ref 3.5–5.2)
PROT SERPL-MCNC: 7.2 G/DL (ref 6–8.5)
PROT UR QL STRIP: ABNORMAL
RBC # BLD AUTO: 3.75 10*6/MM3 (ref 4.14–5.8)
RBC # UR STRIP: ABNORMAL /HPF
REF LAB TEST METHOD: ABNORMAL
RHINOVIRUS RNA SPEC NAA+PROBE: NOT DETECTED
RSV RNA NPH QL NAA+NON-PROBE: NOT DETECTED
SARS-COV-2 RNA NPH QL NAA+NON-PROBE: NOT DETECTED
SODIUM SERPL-SCNC: 131 MMOL/L (ref 136–145)
SP GR UR STRIP: 1.01 (ref 1–1.03)
SQUAMOUS #/AREA URNS HPF: ABNORMAL /HPF
TROPONIN T SERPL HS-MCNC: 62 NG/L
TSH SERPL DL<=0.05 MIU/L-ACNC: 3.27 UIU/ML (ref 0.27–4.2)
UROBILINOGEN UR QL STRIP: ABNORMAL
WBC # UR STRIP: ABNORMAL /HPF
WBC NRBC COR # BLD: 8 10*3/MM3 (ref 3.4–10.8)

## 2023-04-16 PROCEDURE — 25010000002 ONDANSETRON PER 1 MG: Performed by: EMERGENCY MEDICINE

## 2023-04-16 PROCEDURE — 82140 ASSAY OF AMMONIA: CPT | Performed by: EMERGENCY MEDICINE

## 2023-04-16 PROCEDURE — G0378 HOSPITAL OBSERVATION PER HR: HCPCS

## 2023-04-16 PROCEDURE — 84100 ASSAY OF PHOSPHORUS: CPT | Performed by: EMERGENCY MEDICINE

## 2023-04-16 PROCEDURE — 99285 EMERGENCY DEPT VISIT HI MDM: CPT

## 2023-04-16 PROCEDURE — 81001 URINALYSIS AUTO W/SCOPE: CPT | Performed by: EMERGENCY MEDICINE

## 2023-04-16 PROCEDURE — 84484 ASSAY OF TROPONIN QUANT: CPT | Performed by: EMERGENCY MEDICINE

## 2023-04-16 PROCEDURE — 71045 X-RAY EXAM CHEST 1 VIEW: CPT

## 2023-04-16 PROCEDURE — 74176 CT ABD & PELVIS W/O CONTRAST: CPT

## 2023-04-16 PROCEDURE — 83880 ASSAY OF NATRIURETIC PEPTIDE: CPT | Performed by: INTERNAL MEDICINE

## 2023-04-16 PROCEDURE — 80050 GENERAL HEALTH PANEL: CPT | Performed by: EMERGENCY MEDICINE

## 2023-04-16 PROCEDURE — 82962 GLUCOSE BLOOD TEST: CPT

## 2023-04-16 PROCEDURE — 83735 ASSAY OF MAGNESIUM: CPT | Performed by: EMERGENCY MEDICINE

## 2023-04-16 PROCEDURE — 36415 COLL VENOUS BLD VENIPUNCTURE: CPT

## 2023-04-16 PROCEDURE — 83605 ASSAY OF LACTIC ACID: CPT

## 2023-04-16 PROCEDURE — 0202U NFCT DS 22 TRGT SARS-COV-2: CPT | Performed by: EMERGENCY MEDICINE

## 2023-04-16 RX ORDER — HYDROCODONE BITARTRATE AND ACETAMINOPHEN 5; 325 MG/1; MG/1
1 TABLET ORAL EVERY 4 HOURS PRN
Status: DISCONTINUED | OUTPATIENT
Start: 2023-04-16 | End: 2023-04-18 | Stop reason: HOSPADM

## 2023-04-16 RX ORDER — SEVELAMER CARBONATE 800 MG/1
800 TABLET, FILM COATED ORAL
Status: DISCONTINUED | OUTPATIENT
Start: 2023-04-17 | End: 2023-04-17

## 2023-04-16 RX ORDER — POTASSIUM CHLORIDE 20 MEQ/1
20 TABLET, EXTENDED RELEASE ORAL ONCE
Status: COMPLETED | OUTPATIENT
Start: 2023-04-16 | End: 2023-04-16

## 2023-04-16 RX ORDER — BUMETANIDE 1 MG/1
1 TABLET ORAL 2 TIMES DAILY
Status: DISCONTINUED | OUTPATIENT
Start: 2023-04-16 | End: 2023-04-17

## 2023-04-16 RX ORDER — IBUPROFEN 600 MG/1
1 TABLET ORAL
Status: DISCONTINUED | OUTPATIENT
Start: 2023-04-16 | End: 2023-04-17

## 2023-04-16 RX ORDER — ONDANSETRON 2 MG/ML
4 INJECTION INTRAMUSCULAR; INTRAVENOUS EVERY 6 HOURS PRN
Status: DISCONTINUED | OUTPATIENT
Start: 2023-04-16 | End: 2023-04-18 | Stop reason: HOSPADM

## 2023-04-16 RX ORDER — DEXTROSE MONOHYDRATE 25 G/50ML
25 INJECTION, SOLUTION INTRAVENOUS
Status: DISCONTINUED | OUTPATIENT
Start: 2023-04-16 | End: 2023-04-17

## 2023-04-16 RX ORDER — ISOSORBIDE MONONITRATE 30 MG/1
30 TABLET, EXTENDED RELEASE ORAL DAILY
Status: DISCONTINUED | OUTPATIENT
Start: 2023-04-17 | End: 2023-04-18 | Stop reason: HOSPADM

## 2023-04-16 RX ORDER — METOPROLOL SUCCINATE 50 MG/1
50 TABLET, EXTENDED RELEASE ORAL DAILY
Status: DISCONTINUED | OUTPATIENT
Start: 2023-04-17 | End: 2023-04-18 | Stop reason: HOSPADM

## 2023-04-16 RX ORDER — ONDANSETRON 2 MG/ML
4 INJECTION INTRAMUSCULAR; INTRAVENOUS ONCE
Status: COMPLETED | OUTPATIENT
Start: 2023-04-16 | End: 2023-04-16

## 2023-04-16 RX ORDER — ASPIRIN 81 MG/1
81 TABLET ORAL DAILY
Status: DISCONTINUED | OUTPATIENT
Start: 2023-04-17 | End: 2023-04-18 | Stop reason: HOSPADM

## 2023-04-16 RX ORDER — BUMETANIDE 1 MG/1
1 TABLET ORAL ONCE
Status: COMPLETED | OUTPATIENT
Start: 2023-04-16 | End: 2023-04-16

## 2023-04-16 RX ORDER — SODIUM CHLORIDE 0.9 % (FLUSH) 0.9 %
10 SYRINGE (ML) INJECTION AS NEEDED
Status: DISCONTINUED | OUTPATIENT
Start: 2023-04-16 | End: 2023-04-18 | Stop reason: HOSPADM

## 2023-04-16 RX ORDER — CHOLECALCIFEROL (VITAMIN D3) 125 MCG
5 CAPSULE ORAL NIGHTLY PRN
Status: DISCONTINUED | OUTPATIENT
Start: 2023-04-16 | End: 2023-04-18 | Stop reason: HOSPADM

## 2023-04-16 RX ORDER — INSULIN LISPRO 100 [IU]/ML
3-14 INJECTION, SOLUTION INTRAVENOUS; SUBCUTANEOUS
Status: DISCONTINUED | OUTPATIENT
Start: 2023-04-17 | End: 2023-04-17

## 2023-04-16 RX ORDER — BISACODYL 10 MG
10 SUPPOSITORY, RECTAL RECTAL DAILY PRN
Status: DISCONTINUED | OUTPATIENT
Start: 2023-04-16 | End: 2023-04-18 | Stop reason: HOSPADM

## 2023-04-16 RX ORDER — ECHINACEA PURPUREA EXTRACT 125 MG
1 TABLET ORAL 2 TIMES DAILY
Status: DISCONTINUED | OUTPATIENT
Start: 2023-04-16 | End: 2023-04-18 | Stop reason: HOSPADM

## 2023-04-16 RX ORDER — TRAMADOL HYDROCHLORIDE 50 MG/1
50 TABLET ORAL EVERY 12 HOURS PRN
Status: DISCONTINUED | OUTPATIENT
Start: 2023-04-16 | End: 2023-04-18 | Stop reason: HOSPADM

## 2023-04-16 RX ORDER — SPIRONOLACTONE 25 MG/1
25 TABLET ORAL DAILY
Status: ON HOLD | COMMUNITY
Start: 2023-04-11

## 2023-04-16 RX ORDER — NICOTINE POLACRILEX 4 MG
15 LOZENGE BUCCAL
Status: DISCONTINUED | OUTPATIENT
Start: 2023-04-16 | End: 2023-04-17

## 2023-04-16 RX ORDER — DIPHENOXYLATE HYDROCHLORIDE AND ATROPINE SULFATE 2.5; .025 MG/1; MG/1
1 TABLET ORAL EVERY 24 HOURS
Status: DISCONTINUED | OUTPATIENT
Start: 2023-04-16 | End: 2023-04-18 | Stop reason: HOSPADM

## 2023-04-16 RX ORDER — POLYETHYLENE GLYCOL 3350 17 G/17G
17 POWDER, FOR SOLUTION ORAL DAILY PRN
Status: DISCONTINUED | OUTPATIENT
Start: 2023-04-16 | End: 2023-04-18 | Stop reason: HOSPADM

## 2023-04-16 RX ORDER — SODIUM CHLORIDE 9 MG/ML
40 INJECTION, SOLUTION INTRAVENOUS AS NEEDED
Status: DISCONTINUED | OUTPATIENT
Start: 2023-04-16 | End: 2023-04-18 | Stop reason: HOSPADM

## 2023-04-16 RX ORDER — CALCITRIOL 0.25 UG/1
0.25 CAPSULE, LIQUID FILLED ORAL DAILY
Status: DISCONTINUED | OUTPATIENT
Start: 2023-04-17 | End: 2023-04-18 | Stop reason: HOSPADM

## 2023-04-16 RX ORDER — ONDANSETRON 4 MG/1
4 TABLET, FILM COATED ORAL EVERY 6 HOURS PRN
Status: DISCONTINUED | OUTPATIENT
Start: 2023-04-16 | End: 2023-04-18 | Stop reason: HOSPADM

## 2023-04-16 RX ORDER — BISACODYL 5 MG/1
5 TABLET, DELAYED RELEASE ORAL DAILY PRN
Status: DISCONTINUED | OUTPATIENT
Start: 2023-04-16 | End: 2023-04-18 | Stop reason: HOSPADM

## 2023-04-16 RX ORDER — SPIRONOLACTONE 25 MG/1
25 TABLET ORAL DAILY
Status: DISCONTINUED | OUTPATIENT
Start: 2023-04-16 | End: 2023-04-18 | Stop reason: HOSPADM

## 2023-04-16 RX ORDER — METOLAZONE 2.5 MG/1
2.5 TABLET ORAL DAILY PRN
Status: DISCONTINUED | OUTPATIENT
Start: 2023-04-16 | End: 2023-04-17

## 2023-04-16 RX ORDER — TRAZODONE HYDROCHLORIDE 50 MG/1
50 TABLET ORAL NIGHTLY
Status: DISCONTINUED | OUTPATIENT
Start: 2023-04-16 | End: 2023-04-18 | Stop reason: HOSPADM

## 2023-04-16 RX ORDER — SODIUM CHLORIDE 0.9 % (FLUSH) 0.9 %
10 SYRINGE (ML) INJECTION EVERY 12 HOURS SCHEDULED
Status: DISCONTINUED | OUTPATIENT
Start: 2023-04-16 | End: 2023-04-18 | Stop reason: HOSPADM

## 2023-04-16 RX ORDER — AMOXICILLIN 250 MG
2 CAPSULE ORAL 2 TIMES DAILY
Status: DISCONTINUED | OUTPATIENT
Start: 2023-04-16 | End: 2023-04-18 | Stop reason: HOSPADM

## 2023-04-16 RX ADMIN — Medication 10 ML: at 21:17

## 2023-04-16 RX ADMIN — TICAGRELOR 90 MG: 90 TABLET ORAL at 21:18

## 2023-04-16 RX ADMIN — SPIRONOLACTONE 25 MG: 25 TABLET ORAL at 21:18

## 2023-04-16 RX ADMIN — POTASSIUM CHLORIDE 20 MEQ: 1500 TABLET, EXTENDED RELEASE ORAL at 15:34

## 2023-04-16 RX ADMIN — ONDANSETRON 4 MG: 2 INJECTION INTRAMUSCULAR; INTRAVENOUS at 13:52

## 2023-04-16 RX ADMIN — SODIUM CHLORIDE 500 ML: 9 INJECTION, SOLUTION INTRAVENOUS at 15:01

## 2023-04-16 RX ADMIN — TRAZODONE HYDROCHLORIDE 50 MG: 50 TABLET ORAL at 21:18

## 2023-04-16 RX ADMIN — BUMETANIDE 1 MG: 1 TABLET ORAL at 21:18

## 2023-04-16 RX ADMIN — THERA TABS 1 TABLET: TAB at 21:18
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
## 2023-04-17 LAB
ALBUMIN SERPL-MCNC: 3.1 G/DL (ref 3.5–5.2)
ALBUMIN/GLOB SERPL: 0.9 G/DL
ALP SERPL-CCNC: 100 U/L (ref 39–117)
ALT SERPL W P-5'-P-CCNC: 28 U/L (ref 1–41)
ANION GAP SERPL CALCULATED.3IONS-SCNC: 10 MMOL/L (ref 5–15)
ANION GAP SERPL CALCULATED.3IONS-SCNC: 12 MMOL/L (ref 5–15)
AST SERPL-CCNC: 32 U/L (ref 1–40)
BILIRUB SERPL-MCNC: 0.8 MG/DL (ref 0–1.2)
BUN SERPL-MCNC: 101 MG/DL (ref 8–23)
BUN SERPL-MCNC: 102 MG/DL (ref 8–23)
BUN/CREAT SERPL: 27.7 (ref 7–25)
BUN/CREAT SERPL: 28.3 (ref 7–25)
CA-I SERPL ISE-MCNC: 1.17 MMOL/L (ref 1.2–1.3)
CALCIUM SPEC-SCNC: 9.3 MG/DL (ref 8.6–10.5)
CALCIUM SPEC-SCNC: 9.3 MG/DL (ref 8.6–10.5)
CHLORIDE SERPL-SCNC: 83 MMOL/L (ref 98–107)
CHLORIDE SERPL-SCNC: 84 MMOL/L (ref 98–107)
CHOLEST SERPL-MCNC: 165 MG/DL (ref 0–200)
CK SERPL-CCNC: 127 U/L (ref 20–200)
CO2 SERPL-SCNC: 33 MMOL/L (ref 22–29)
CO2 SERPL-SCNC: 35 MMOL/L (ref 22–29)
CREAT SERPL-MCNC: 3.6 MG/DL (ref 0.76–1.27)
CREAT SERPL-MCNC: 3.64 MG/DL (ref 0.76–1.27)
D-LACTATE SERPL-SCNC: 1.5 MMOL/L (ref 0.5–2)
D-LACTATE SERPL-SCNC: 2.1 MMOL/L (ref 0.5–2)
EGFRCR SERPLBLD CKD-EPI 2021: 18.3 ML/MIN/1.73
EGFRCR SERPLBLD CKD-EPI 2021: 18.5 ML/MIN/1.73
FERRITIN SERPL-MCNC: 143.2 NG/ML (ref 30–400)
GLOBULIN UR ELPH-MCNC: 3.6 GM/DL
GLUCOSE BLDC GLUCOMTR-MCNC: 185 MG/DL (ref 70–105)
GLUCOSE BLDC GLUCOMTR-MCNC: 193 MG/DL (ref 70–105)
GLUCOSE BLDC GLUCOMTR-MCNC: 220 MG/DL (ref 70–105)
GLUCOSE BLDC GLUCOMTR-MCNC: 247 MG/DL (ref 70–105)
GLUCOSE BLDC GLUCOMTR-MCNC: 271 MG/DL (ref 70–105)
GLUCOSE SERPL-MCNC: 257 MG/DL (ref 65–99)
GLUCOSE SERPL-MCNC: 282 MG/DL (ref 65–99)
HBA1C MFR BLD: 8.9 % (ref 4.8–5.6)
HDLC SERPL-MCNC: 38 MG/DL (ref 40–60)
INR PPP: 1.21 (ref 0.93–1.1)
IRON 24H UR-MRATE: 69 MCG/DL (ref 59–158)
IRON SATN MFR SERPL: 15 % (ref 20–50)
LDLC SERPL CALC-MCNC: 111 MG/DL (ref 0–100)
LDLC/HDLC SERPL: 2.91 {RATIO}
MAGNESIUM SERPL-MCNC: 2.2 MG/DL (ref 1.6–2.4)
PHOSPHATE SERPL-MCNC: 4.1 MG/DL (ref 2.5–4.5)
POTASSIUM SERPL-SCNC: 3.8 MMOL/L (ref 3.5–5.2)
POTASSIUM SERPL-SCNC: 4.4 MMOL/L (ref 3.5–5.2)
PROCALCITONIN SERPL-MCNC: 0.55 NG/ML (ref 0–0.25)
PROT SERPL-MCNC: 6.7 G/DL (ref 6–8.5)
PROTHROMBIN TIME: 12.3 SECONDS (ref 9.6–11.7)
SODIUM SERPL-SCNC: 128 MMOL/L (ref 136–145)
SODIUM SERPL-SCNC: 129 MMOL/L (ref 136–145)
TIBC SERPL-MCNC: 460 MCG/DL (ref 298–536)
TRANSFERRIN SERPL-MCNC: 309 MG/DL (ref 200–360)
TRIGL SERPL-MCNC: 82 MG/DL (ref 0–150)
VLDLC SERPL-MCNC: 16 MG/DL (ref 5–40)

## 2023-04-17 PROCEDURE — 83036 HEMOGLOBIN GLYCOSYLATED A1C: CPT | Performed by: INTERNAL MEDICINE

## 2023-04-17 PROCEDURE — 82728 ASSAY OF FERRITIN: CPT | Performed by: INTERNAL MEDICINE

## 2023-04-17 PROCEDURE — G0108 DIAB MANAGE TRN  PER INDIV: HCPCS

## 2023-04-17 PROCEDURE — 84145 PROCALCITONIN (PCT): CPT | Performed by: INTERNAL MEDICINE

## 2023-04-17 PROCEDURE — 83605 ASSAY OF LACTIC ACID: CPT | Performed by: INTERNAL MEDICINE

## 2023-04-17 PROCEDURE — 99222 1ST HOSP IP/OBS MODERATE 55: CPT | Performed by: INTERNAL MEDICINE

## 2023-04-17 PROCEDURE — 97161 PT EVAL LOW COMPLEX 20 MIN: CPT

## 2023-04-17 PROCEDURE — 83735 ASSAY OF MAGNESIUM: CPT | Performed by: INTERNAL MEDICINE

## 2023-04-17 PROCEDURE — 84466 ASSAY OF TRANSFERRIN: CPT | Performed by: INTERNAL MEDICINE

## 2023-04-17 PROCEDURE — 80053 COMPREHEN METABOLIC PANEL: CPT | Performed by: INTERNAL MEDICINE

## 2023-04-17 PROCEDURE — 80061 LIPID PANEL: CPT | Performed by: INTERNAL MEDICINE

## 2023-04-17 PROCEDURE — 84100 ASSAY OF PHOSPHORUS: CPT | Performed by: INTERNAL MEDICINE

## 2023-04-17 PROCEDURE — 63710000001 INSULIN LISPRO (HUMAN) PER 5 UNITS: Performed by: NURSE PRACTITIONER

## 2023-04-17 PROCEDURE — 82550 ASSAY OF CK (CPK): CPT | Performed by: INTERNAL MEDICINE

## 2023-04-17 PROCEDURE — 97165 OT EVAL LOW COMPLEX 30 MIN: CPT

## 2023-04-17 PROCEDURE — 83540 ASSAY OF IRON: CPT | Performed by: INTERNAL MEDICINE

## 2023-04-17 PROCEDURE — 82330 ASSAY OF CALCIUM: CPT | Performed by: INTERNAL MEDICINE

## 2023-04-17 PROCEDURE — 85610 PROTHROMBIN TIME: CPT | Performed by: INTERNAL MEDICINE

## 2023-04-17 PROCEDURE — 99222 1ST HOSP IP/OBS MODERATE 55: CPT | Performed by: SURGERY

## 2023-04-17 PROCEDURE — 82962 GLUCOSE BLOOD TEST: CPT

## 2023-04-17 RX ORDER — DEXTROSE MONOHYDRATE 25 G/50ML
25 INJECTION, SOLUTION INTRAVENOUS
Status: DISCONTINUED | OUTPATIENT
Start: 2023-04-17 | End: 2023-04-18 | Stop reason: HOSPADM

## 2023-04-17 RX ORDER — NICOTINE POLACRILEX 4 MG
15 LOZENGE BUCCAL
Status: DISCONTINUED | OUTPATIENT
Start: 2023-04-17 | End: 2023-04-18 | Stop reason: HOSPADM

## 2023-04-17 RX ORDER — IBUPROFEN 600 MG/1
1 TABLET ORAL
Status: DISCONTINUED | OUTPATIENT
Start: 2023-04-17 | End: 2023-04-18 | Stop reason: HOSPADM

## 2023-04-17 RX ORDER — BUMETANIDE 1 MG/1
1 TABLET ORAL 3 TIMES DAILY
Status: DISCONTINUED | OUTPATIENT
Start: 2023-04-17 | End: 2023-04-18 | Stop reason: HOSPADM

## 2023-04-17 RX ADMIN — BUMETANIDE 1 MG: 1 TABLET ORAL at 20:24

## 2023-04-17 RX ADMIN — Medication 10 ML: at 08:47

## 2023-04-17 RX ADMIN — SPIRONOLACTONE 25 MG: 25 TABLET ORAL at 07:23

## 2023-04-17 RX ADMIN — BUMETANIDE 1 MG: 1 TABLET ORAL at 08:47

## 2023-04-17 RX ADMIN — INSULIN LISPRO 3 UNITS: 100 INJECTION, SOLUTION INTRAVENOUS; SUBCUTANEOUS at 07:51

## 2023-04-17 RX ADMIN — TICAGRELOR 90 MG: 90 TABLET ORAL at 07:23

## 2023-04-17 RX ADMIN — Medication 5000 UNITS: at 07:23

## 2023-04-17 RX ADMIN — BUMETANIDE 1 MG: 1 TABLET ORAL at 16:40

## 2023-04-17 RX ADMIN — TRAZODONE HYDROCHLORIDE 50 MG: 50 TABLET ORAL at 20:24

## 2023-04-17 RX ADMIN — Medication 10 ML: at 20:24

## 2023-04-17 RX ADMIN — SALINE NASAL SPRAY 1 SPRAY: 1.5 SOLUTION NASAL at 07:22

## 2023-04-17 RX ADMIN — INSULIN LISPRO 8 UNITS: 100 INJECTION, SOLUTION INTRAVENOUS; SUBCUTANEOUS at 12:13

## 2023-04-17 RX ADMIN — ISOSORBIDE MONONITRATE 30 MG: 30 TABLET, EXTENDED RELEASE ORAL at 07:24

## 2023-04-17 RX ADMIN — HYDROCODONE BITARTRATE AND ACETAMINOPHEN 1 TABLET: 5; 325 TABLET ORAL at 21:36

## 2023-04-17 RX ADMIN — ASPIRIN 81 MG: 81 TABLET, COATED ORAL at 07:23

## 2023-04-17 RX ADMIN — CALCITRIOL CAPSULES 0.25 MCG 0.25 MCG: 0.25 CAPSULE ORAL at 07:22

## 2023-04-17 RX ADMIN — METOPROLOL SUCCINATE 50 MG: 50 TABLET, EXTENDED RELEASE ORAL at 07:24

## 2023-04-17 RX ADMIN — THERA TABS 1 TABLET: TAB at 20:24

## 2023-04-17 RX ADMIN — DOCUSATE SODIUM AND SENNOSIDES 2 TABLET: 8.6; 5 TABLET, FILM COATED ORAL at 07:23

## 2023-04-17 NOTE — CONSULTS
Diabetes Education    Patient Name:  Esdras Peralta  YOB: 1960  MRN: [id number]  Admit Date:  2023      Insulin Pump Contract completed and in chart: Yes  Insulin Pump Log at bedside:  Yes  LDA started: Yes  Patient Supplied Insulin Pump (orders initiated): Yes  Educator Consult entered: Yes    Pump Brand/Model: Omnipod Dash  CGMS Brand/Model: Dexcom G6    Type of Insulin Used: Lipro    Basal Rate: (units/hour)  12 am 1.0  2 pm 1.0  5 pm 0.7     Bolus Rate: (insulin:carbohydrate ratio)   12 am 1:15    Last Bolus Given: 2023 1522 3.0 units                                                                  Insulin Sensitivity Factor/Correction Dose:  12 am 1:50     Blood Glucose Target: 100-150    Active Insulin: 4 hours    Date of Last Site Change: 2023    Location of Pod: Left upper, outer arm    Site Assessment: clean, dry, intact    Educator assessed:       pump supplies at bedside: no       Insulin Expiration Date: unable to assess (not at bedside)    Comments: Saw pt earlier today. He states insulin pod  last night at 11 pm and staff told him they would be giving SQ insulin injections and he would not be using insulin pump. Sliding scale insulin ordered for pt. Pt with type 1 diabetes and follows at the [facility]. Had appt with Dr. Shirley Guido today and this was cancelled due to admission.     Pt received sliding scale 3 units today at 0751 and 8 units at 1213 today. He does not have long-acting insulin on board. Educator contacted hospitalist to see if insulin pump could be ordered for inpatient use. Hospitalist did order to restart pump. Pt does not have home insulin at bedside. Educator talked with pharmacy about getting insulin vial from pharmacy. Pharmacist requesting wife bring in home insulin.     Wife brought in insulin from home and pt restarted insulin pump today at 1520. Educator was not in room when pt restarted pump. Wife has taken insulin back home.  Discussed importance of keeping extra pods and insulin at bedside until discharge. Pt states wife can bring back in tomorrow. Pt with no questions for educator at this time. Educator did review A1c result of 8.9% and gave A1c info sheet.        Electronically signed by:  Pauly Cuba RN  04/17/23 15:32 EDT
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
## 2023-04-17 NOTE — PLAN OF CARE
Goal Outcome Evaluation:  Plan of Care Reviewed With: patient, spouse        Progress: no change  Outcome Evaluation: Pt is a 61 y/o M admitted to [facility] 4/16/23 with c/o increased weakness x3 months. PMHx significant for ICD in place, CAD, DMI, CKDIV, and hx CVA (8-9 years ago). At baseline pt lives with spouse in [place] [facility] with spouse with 1 MARGE. Pt currently works x3 days a week and drives. Pt has 0 falls within last 6 mos. This date, pt with wife present at bedside. Pt standing in room upon arrival. Pt demos independence with functional mobility, bed mobility and LB dressing. Pt reports decreased sensation in L hand in 2,3rd digit from previous CVA. Pt reports decrease in activity tolerance frequently requiring seated rest break at work or at home. Pt likely safe to d/c home with assist and HHOT to return to PLOF to max potential. OT to follow.

## 2023-04-17 NOTE — PLAN OF CARE
Problem: Adult Inpatient Plan of Care  Goal: Plan of Care Review  Outcome: Ongoing, Progressing   Goal Outcome Evaluation:               Pt admitted via ER for CHF, CKD, wkns. Consults placed for cardio, nephro & surgery. VSS. Spouse remains at bedside. Pt educated on current plan of care, verbalized understanding.    MI/Diabetes

## 2023-04-17 NOTE — PLAN OF CARE
Goal Outcome Evaluation:  Plan of Care Reviewed With: patient        Progress: improving     Pt is alert and oriented. Started on insulin pump today

## 2023-04-17 NOTE — PLAN OF CARE
Goal Outcome Evaluation:  Plan of Care Reviewed With: patient, significant other           Outcome Evaluation: 63 y/o male admitted o 4/16 due to report of weakness x 3 months, lack of sleep. PMH includes pacemaker, CKD,CHF, DM. Patient with bilateral LE edema which has worsened since not using compression. Patient had trial of Unna boot in past with good success in decreasing edema , trialed compression socks but did not give adequate compression to feet and caused inc swelling to upper thigh. Patient normally works as a , resides with spouse who also works. At time of eval, patient is independent with all bed mobility, transfers and ambulation in room. Patient may benefit from OP PT follow up for edema management and fitting for appropriate compression garment.
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
## 2023-04-17 NOTE — PROGRESS NOTES
[facility] Daily Progress Note    Patient Name: Esdras Peralta  : 1960  MRN: [id number]  Primary Care Physician:  Erika Hernandez MD  Date of admission: 2023      Subjective      Chief Complaint: weakness     History of Present Illness: Esdras Peralta is a 62 y.o. white male with multimedical problems, a past history significant for CAD, CHF, COPD, diabetes, hyperlipidemia, hypertension.  Who presented to [facility] on 2023 complaining of weakness x 3 month.   says he was sent in for admission by Dr. Sandoval he reports a 3-month history of increasing weakness.  He states he has had difficulty sleeping and feels that if he goes to sleep he might never wake up.  He states that he has had episodes where he is asleep and wakes up feeling like he is smothering.  He states over the last 2 weeks he has had increasing weakness without focal etiology or Jacksonian progression.  He denies fever or chills.  He states that he has dyspnea on exertion and occasionally reports orthopnea.  He states he thinks he has gained 30 pounds in water fluid and has had problems with leg swelling.  He reports that his chest occasionally feels tight but he denies anginal type chest discomfort.  The patient reports no focal neurologic weakness or lateralizing neurologic signs     CT abd- No acute abdominal or pelvic abnormality.     23 patient seen and examined in bed no acute distress, vital signs stable, discussed with RN.  Holding anticoagulation, for possible procedure on Friday.    ROS Review of Systems ROS   constitutional: Positive for fatigue and unexpected weight change. Negative for chills and fever.   HENT: Negative for trouble swallowing.    Eyes: Negative for discharge and visual disturbance.   Respiratory: Positive for chest tightness and shortness of breath. Negative for cough, choking, wheezing and stridor.    Cardiovascular: Positive for leg  swelling. Negative for palpitations.   Gastrointestinal: Positive for abdominal pain and nausea. Negative for diarrhea.   Genitourinary: Negative for flank pain.   Musculoskeletal: Positive for back pain.   Skin: Negative for pallor and rash.   Allergic/Immunologic: Negative for food allergies.   Neurological: Positive for weakness, light-headedness and headaches. Negative for facial asymmetry.   Hematological: Does not bruise/bleed easily.   All other systems reviewed and are negative.      Objective      Vitals:   Temp:  [97.2 °F (36.2 °C)-98.4 °F (36.9 °C)] 97.7 °F (36.5 °C)  Heart Rate:  [80-99] 80  Resp:  [15-20] 19  BP: (119-148)/(83-96) 119/83  Flow (L/min):  [2] 2    Physical Exam Constitutional:       General: He is not in acute distress.     Appearance: He is well-developed. He is not ill-appearing, toxic-appearing or diaphoretic.   HENT:      Head: Normocephalic and atraumatic.      Nose: Nose normal. No congestion or rhinorrhea.      Mouth/Throat:      Mouth: Mucous membranes are moist.      Pharynx: No oropharyngeal exudate.   Eyes:      General: No scleral icterus.        Right eye: No discharge.         Left eye: No discharge.      Extraocular Movements: Extraocular movements intact.      Pupils: Pupils are equal, round, and reactive to light.   Neck:      Thyroid: No thyromegaly.      Vascular: No carotid bruit or JVD.      Trachea: No tracheal deviation.   Cardiovascular:      Rate and Rhythm: Normal rate and regular rhythm.      Pulses: Normal pulses.      Heart sounds: Normal heart sounds. No murmur heard.    No friction rub. No gallop.   Pulmonary:      Effort: Pulmonary effort is normal. No respiratory distress.      Breath sounds: No stridor. Rhonchi present. No wheezing or rales.   Chest:      Chest wall: No tenderness.   Abdominal:      General: Bowel sounds are normal. There is no distension.      Palpations: Abdomen is soft.      Tenderness: There is no abdominal tenderness. There is no  guarding.   Musculoskeletal:         General: Swelling present. No tenderness, deformity or signs of injury. Normal range of motion.      Cervical back: Normal range of motion and neck supple. No rigidity. No muscular tenderness.      Right lower leg: Edema present.      Left lower leg: Edema present.   Lymphadenopathy:      Cervical: No cervical adenopathy.   Skin:     General: Skin is warm and dry.      Coloration: Skin is not jaundiced or pale.      Findings: No bruising or erythema.   Neurological:      General: No focal deficit present.      Mental Status: He is alert and oriented to person, place, and time. Mental status is at baseline.      Cranial Nerves: No cranial nerve deficit.      Sensory: No sensory deficit.      Motor: Weakness present. No abnormal muscle tone.      Coordination: Coordination normal.   Psychiatric:         Mood and Affect: Mood normal.         Behavior: Behavior normal.         Thought Content: Thought content normal.         Judgment: Judgment normal.              Result Review    Result Review:  I have personally reviewed the results from the time of this admission to 4/17/2023 18:42 EDT and agree with these findings:  []  Laboratory  []  Microbiology  []  Radiology  []  EKG/Telemetry   []  Cardiology/Vascular   []  Pathology  []  Old records  []  Other:  Most notable findings include: *        CBC:      Lab 04/16/23  1352   WBC 8.00   HEMOGLOBIN 11.3*   HEMATOCRIT 35.2*   PLATELETS 181   NEUTROS ABS 6.50   LYMPHS ABS 0.70   MONOS ABS 0.60   EOS ABS 0.10   MCV 93.7     CMP:      Lab 04/17/23  1617 04/17/23  1028 04/16/23  1352   SODIUM 129* 128* 131*   POTASSIUM 4.4 3.8 3.2*   CHLORIDE 84* 83* 85*   CO2 33.0* 35.0* 32.0*   ANION GAP 12.0 10.0 14.0   * 102* 103*   CREATININE 3.64* 3.60* 3.73*   EGFR 18.3* 18.5* 17.8*   GLUCOSE 257* 282* 188*   CALCIUM 9.3 9.3 9.8   IONIZED CALCIUM  --  1.17*  --    MAGNESIUM  --  2.2 2.1   PHOSPHORUS  --  4.1 4.0   TOTAL PROTEIN 6.7  --  7.2    ALBUMIN 3.1*  --  3.4*   GLOBULIN 3.6  --  3.8   ALT (SGPT) 28  --  25   AST (SGOT) 32  --  33   BILIRUBIN 0.8  --  0.8   ALK PHOS 100  --  93     No results found for: ACANTHNAEG, AFBCX, BPERTUSSISCX, BLOODCX  No results found for: BCIDPCR, CXREFLEX, CSFCX, CULTURETIS  No results found for: CULTURES, HSVCX, URCX  No results found for: EYECULTURE, GCCX, HSVCULTURE, LABHSV  No results found for: LEGIONELLA, MRSACX, MUMPSCX, MYCOPLASCX  No results found for: NOCARDIACX, STOOLCX  No results found for: THROATCX, UNSTIMCULT, URINECX, CULTURE, VZVCULTUR  No results found for: VIRALCULTU, WOUNDCX      Assessment & Plan      Brief Patient Summary:  Esdras Peralta is a 62 y.o. male who       aspirin, 81 mg, Oral, Daily  bumetanide, 1 mg, Oral, TID  calcitriol, 0.25 mcg, Oral, Daily  isosorbide mononitrate, 30 mg, Oral, Daily  metoprolol succinate XL, 50 mg, Oral, Daily  multivitamin, 1 tablet, Oral, Q24H  senna-docusate sodium, 2 tablet, Oral, BID  sodium chloride, 10 mL, Intravenous, Q12H  sodium chloride, 1 spray, Nasal, BID  spironolactone, 25 mg, Oral, Daily  traZODone, 50 mg, Oral, Nightly  vitamin D3, 5,000 Units, Oral, Daily       insulin,          Active Hospital Problems:  Active Hospital Problems    Diagnosis    • **Weakness    • Stage 4 chronic kidney disease    • Stage 3b chronic kidney disease      Chronic kidney disease SAURABH, nephrology consult monitor renal function, avoid hypotension avoid nephrotoxin.  Per surgery-Patient was last given Brilinta on 4/17/2023 and per cardiology recommendations will hold at least 5 days preoperatively  - At this time we will tentatively plan for PD catheter placement on Monday, 4/24/2023 as long as patient is stable for surgery     Generalized weakness, PT OT,  consult     Chronic essential hypertension, monitor blood pressure closely, continue metoprolol spironolactone.     Hyperlipidemia, check lipid panel.  CPK.  Not on statins at home.     CAD, continue  aspirin and Brilinta.  Imdur metoprolol     COPD, DuoNeb every 4.  Oxygen titration as needed.     Type 1 diabetes, diabetic diet, regular insulin sliding scale, glucometers ACH S.     Vitamin D deficiency-check vitamin D continue vitamin D supplement     CHF, cardiomyopathy, continue diuretics Bumex     B12 deficiency, check B12, continue B12 replacement.     anemia check iron profile.  B12 folic acid.  Monitor H&H.     Insomnia-trazodone, psych consult.      DVT prophylaxis:  Mechanical DVT prophylaxis orders are present.    CODE STATUS:    Level Of Support Discussed With: Patient  Code Status (Patient has no pulse and is not breathing): CPR (Attempt to Resuscitate)  Medical Interventions (Patient has pulse or is breathing): Full Support  Release to patient: Routine Release    Disposition:  I expect patient to be discharged *home    I have utilized all available, immediate resources to obtain, update, or review the patient's current medications including all prescriptions, over-the-counter products, herbals, cannabis/cannabidiol products, and vitamin.mineral/dietary (nutritional) supplements.    Level Of Support Discussed With: Patient  Code Status (Patient has no pulse and is not breathing): CPR (Attempt to Resuscitate)  Medical Interventions (Patient has pulse or is breathing): Full Support  Release to patient: Routine Release    Next of kin of Power of :     Admission Status:  I believe this patient meets admit status.    This patient has been examined wearing appropriate Personal Protective Equipment and discussed with rn. 04/17/23    Electronically signed by Winston Caldera MD, 04/17/23, 18:42 EDT.  [facility] Hospitalist Team
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
## 2023-04-17 NOTE — CASE MANAGEMENT/SOCIAL WORK
Continued Stay Note  JAVED Machado     Patient Name: Esdras Peralta  MRN: [id number]  Today's Date: 4/17/2023    Admit Date: 4/16/2023    Plan: DC Plan: Home with spouse.  Watch for peritoneal dialysis needs. Plan PD catheter placement on Monday, 4/24/2023, after holding brilinta.   Discharge Plan     Row Name 04/17/23 1804       Plan    Plan DC Plan: Home with spouse.  Watch for peritoneal dialysis needs. Plan PD catheter placement on Monday, 4/24/2023, after holding brilinta.    Plan Comments Need CM assesment.  Barriers to discharge:   Echo today.  2L O2.  Plan PD catheter placement on Monday, 4/24/2023, after holding Brilinta              Expected Discharge Date and Time     Expected Discharge Date Expected Discharge Time    Apr 25, 2023           Phone communication or documentation only - no physical contact with patient or family.  Alyx Haddad RN     Office Phone [phone]  Office Cell [phone]

## 2023-04-17 NOTE — CONSULTS
Diabetes Education    Patient Name:  Esdras ePralta  YOB: 1960  MRN: [id number]  Admit Date:  4/16/2023    Follow up note: Educator documented insulin pod placement was left arm. Pod placement is Right upper outer arm.      Electronically signed by:  Pauly Cuba RN  04/17/23 15:52 EDT

## 2023-04-18 ENCOUNTER — READMISSION MANAGEMENT (OUTPATIENT)
Dept: CALL CENTER | Facility: HOSPITAL | Age: 63
End: 2023-04-18
Payer: COMMERCIAL

## 2023-04-18 ENCOUNTER — APPOINTMENT (OUTPATIENT)
Dept: INTERVENTIONAL RADIOLOGY/VASCULAR | Facility: HOSPITAL | Age: 63
End: 2023-04-18
Payer: COMMERCIAL

## 2023-04-18 ENCOUNTER — PREP FOR SURGERY (OUTPATIENT)
Dept: OTHER | Facility: HOSPITAL | Age: 63
End: 2023-04-18
Payer: COMMERCIAL

## 2023-04-18 ENCOUNTER — APPOINTMENT (OUTPATIENT)
Dept: CARDIOLOGY | Facility: HOSPITAL | Age: 63
End: 2023-04-18
Payer: COMMERCIAL

## 2023-04-18 VITALS
HEIGHT: 69 IN | SYSTOLIC BLOOD PRESSURE: 140 MMHG | DIASTOLIC BLOOD PRESSURE: 89 MMHG | RESPIRATION RATE: 16 BRPM | OXYGEN SATURATION: 94 % | BODY MASS INDEX: 31.4 KG/M2 | HEART RATE: 87 BPM | WEIGHT: 212 LBS | TEMPERATURE: 97.5 F

## 2023-04-18 DIAGNOSIS — N18.4 STAGE 4 CHRONIC KIDNEY DISEASE: Primary | ICD-10-CM

## 2023-04-18 PROBLEM — I50.22 CHRONIC SYSTOLIC HEART FAILURE: Status: ACTIVE | Noted: 2023-04-18

## 2023-04-18 LAB
BH CV ECHO MEAS - ACS: 1.86 CM
BH CV ECHO MEAS - AO MAX PG: 3 MMHG
BH CV ECHO MEAS - AO MEAN PG: 1.78 MMHG
BH CV ECHO MEAS - AO ROOT DIAM: 3.1 CM
BH CV ECHO MEAS - AO V2 MAX: 87.2 CM/SEC
BH CV ECHO MEAS - AO V2 VTI: 14 CM
BH CV ECHO MEAS - AVA(I,D): 2.21 CM2
BH CV ECHO MEAS - EDV(CUBED): 256.7 ML
BH CV ECHO MEAS - EDV(MOD-SP4): 173 ML
BH CV ECHO MEAS - EF(MOD-SP4): 21.8 %
BH CV ECHO MEAS - ESV(CUBED): 177.3 ML
BH CV ECHO MEAS - ESV(MOD-SP4): 135.3 ML
BH CV ECHO MEAS - FS: 11.6 %
BH CV ECHO MEAS - IVS/LVPW: 0.96 CM
BH CV ECHO MEAS - IVSD: 0.97 CM
BH CV ECHO MEAS - LA DIMENSION: 4 CM
BH CV ECHO MEAS - LV DIASTOLIC VOL/BSA (35-75): 81.7 CM2
BH CV ECHO MEAS - LV MASS(C)D: 269.2 GRAMS
BH CV ECHO MEAS - LV MAX PG: 1.85 MMHG
BH CV ECHO MEAS - LV MEAN PG: 0.91 MMHG
BH CV ECHO MEAS - LV SYSTOLIC VOL/BSA (12-30): 63.9 CM2
BH CV ECHO MEAS - LV V1 MAX: 68 CM/SEC
BH CV ECHO MEAS - LV V1 VTI: 9.7 CM
BH CV ECHO MEAS - LVIDD: 6.4 CM
BH CV ECHO MEAS - LVIDS: 5.6 CM
BH CV ECHO MEAS - LVOT AREA: 3.2 CM2
BH CV ECHO MEAS - LVOT DIAM: 2.02 CM
BH CV ECHO MEAS - LVPWD: 1.01 CM
BH CV ECHO MEAS - MV A MAX VEL: 34.4 CM/SEC
BH CV ECHO MEAS - MV DEC SLOPE: 834.1 CM/SEC2
BH CV ECHO MEAS - MV DEC TIME: 0.12 MSEC
BH CV ECHO MEAS - MV E MAX VEL: 101.2 CM/SEC
BH CV ECHO MEAS - MV E/A: 2.9
BH CV ECHO MEAS - MV MAX PG: 5.5 MMHG
BH CV ECHO MEAS - MV MEAN PG: 2.18 MMHG
BH CV ECHO MEAS - MV V2 VTI: 17.7 CM
BH CV ECHO MEAS - MVA(VTI): 1.75 CM2
BH CV ECHO MEAS - PA ACC TIME: 0.08 SEC
BH CV ECHO MEAS - PA PR(ACCEL): 43.2 MMHG
BH CV ECHO MEAS - PA V2 MAX: 72.5 CM/SEC
BH CV ECHO MEAS - PI END-D VEL: 168.7 CM/SEC
BH CV ECHO MEAS - RAP SYSTOLE: 15 MMHG
BH CV ECHO MEAS - RV MAX PG: 0.94 MMHG
BH CV ECHO MEAS - RV V1 MAX: 48.4 CM/SEC
BH CV ECHO MEAS - RV V1 VTI: 8.7 CM
BH CV ECHO MEAS - RVDD: 3.6 CM
BH CV ECHO MEAS - RVSP: 58.1 MMHG
BH CV ECHO MEAS - SI(MOD-SP4): 17.8 ML/M2
BH CV ECHO MEAS - SV(LVOT): 31 ML
BH CV ECHO MEAS - SV(MOD-SP4): 37.7 ML
BH CV ECHO MEAS - TR MAX PG: 43.1 MMHG
BH CV ECHO MEAS - TR MAX VEL: 323.7 CM/SEC
GLUCOSE BLDC GLUCOMTR-MCNC: 123 MG/DL (ref 70–105)
GLUCOSE BLDC GLUCOMTR-MCNC: 125 MG/DL (ref 70–105)
GLUCOSE BLDC GLUCOMTR-MCNC: 165 MG/DL (ref 70–105)
GLUCOSE BLDC GLUCOMTR-MCNC: 247 MG/DL (ref 70–105)
MAXIMAL PREDICTED HEART RATE: 160 BPM
STRESS TARGET HR: 136 BPM

## 2023-04-18 PROCEDURE — 25010000002 SULFUR HEXAFLUORIDE MICROSPH 60.7-25 MG RECONSTITUTED SUSPENSION: Performed by: INTERNAL MEDICINE

## 2023-04-18 PROCEDURE — 99232 SBSQ HOSP IP/OBS MODERATE 35: CPT | Performed by: INTERNAL MEDICINE

## 2023-04-18 PROCEDURE — 93306 TTE W/DOPPLER COMPLETE: CPT

## 2023-04-18 PROCEDURE — 82962 GLUCOSE BLOOD TEST: CPT

## 2023-04-18 PROCEDURE — 93306 TTE W/DOPPLER COMPLETE: CPT | Performed by: INTERNAL MEDICINE

## 2023-04-18 RX ORDER — SODIUM CHLORIDE 0.9 % (FLUSH) 0.9 %
3 SYRINGE (ML) INJECTION EVERY 12 HOURS SCHEDULED
Status: CANCELLED | OUTPATIENT
Start: 2023-04-18

## 2023-04-18 RX ORDER — SODIUM CHLORIDE 9 MG/ML
40 INJECTION, SOLUTION INTRAVENOUS AS NEEDED
Status: CANCELLED | OUTPATIENT
Start: 2023-04-18

## 2023-04-18 RX ORDER — SODIUM CHLORIDE 9 MG/ML
100 INJECTION, SOLUTION INTRAVENOUS CONTINUOUS
Status: CANCELLED | OUTPATIENT
Start: 2023-04-18

## 2023-04-18 RX ORDER — HYDRALAZINE HYDROCHLORIDE 10 MG/1
10 TABLET, FILM COATED ORAL EVERY 12 HOURS SCHEDULED
Status: DISCONTINUED | OUTPATIENT
Start: 2023-04-18 | End: 2023-04-18 | Stop reason: HOSPADM

## 2023-04-18 RX ORDER — SODIUM CHLORIDE 0.9 % (FLUSH) 0.9 %
3-10 SYRINGE (ML) INJECTION AS NEEDED
Status: CANCELLED | OUTPATIENT
Start: 2023-04-18

## 2023-04-18 RX ORDER — TRAZODONE HYDROCHLORIDE 50 MG/1
50 TABLET ORAL NIGHTLY
Qty: 30 TABLET | Refills: 0 | Status: ON HOLD | OUTPATIENT
Start: 2023-04-18

## 2023-04-18 RX ADMIN — METOPROLOL SUCCINATE 50 MG: 50 TABLET, EXTENDED RELEASE ORAL at 09:33

## 2023-04-18 RX ADMIN — CALCITRIOL CAPSULES 0.25 MCG 0.25 MCG: 0.25 CAPSULE ORAL at 09:32

## 2023-04-18 RX ADMIN — SULFUR HEXAFLUORIDE 3 ML: KIT at 10:36

## 2023-04-18 RX ADMIN — ISOSORBIDE MONONITRATE 30 MG: 30 TABLET, EXTENDED RELEASE ORAL at 09:32

## 2023-04-18 RX ADMIN — SPIRONOLACTONE 25 MG: 25 TABLET ORAL at 09:33

## 2023-04-18 RX ADMIN — BUMETANIDE 1 MG: 1 TABLET ORAL at 09:33

## 2023-04-18 RX ADMIN — Medication 5000 UNITS: at 09:32

## 2023-04-18 RX ADMIN — Medication 10 ML: at 09:33

## 2023-04-18 RX ADMIN — ASPIRIN 81 MG: 81 TABLET, COATED ORAL at 09:32

## 2023-04-18 NOTE — PROGRESS NOTES
"   LOS: 1 day   Admitting Physician- Winston Caldera MD    Reason For Followup:    Ischemic dilated cardiomyopathy  Coronary artery stenting  CAD  CKD  Bilateral leg edema    Subjective     Patient is sitting up in bed.  Does not seem to be in congestive heart failure.    Objective     Blood pressure is stable on the upper side of normal    Review of Systems:   Review of Systems   Constitutional: Negative for chills and fever.   HENT: Negative for ear discharge and nosebleeds.    Eyes: Negative for discharge and redness.   Cardiovascular: Positive for leg swelling. Negative for chest pain, orthopnea, palpitations, paroxysmal nocturnal dyspnea and syncope.   Respiratory: Positive for shortness of breath. Negative for cough and wheezing.    Endocrine: Negative for heat intolerance.   Skin: Negative for rash.   Musculoskeletal: Negative for arthritis and myalgias.   Gastrointestinal: Negative for abdominal pain, melena, nausea and vomiting.   Genitourinary: Negative for dysuria and hematuria.   Neurological: Negative for dizziness, light-headedness, numbness and tremors.   Psychiatric/Behavioral: Negative for depression. The patient is not nervous/anxious.          Vital Signs  Vitals:    04/17/23 1908 04/18/23 0315 04/18/23 0927 04/18/23 1313   BP: 133/93 137/93 140/89 140/89   BP Location: Left arm Left arm Left arm    Patient Position: Sitting Lying Sitting    Pulse: 85 81 87    Resp: 21 21 16    Temp: 97.5 °F (36.4 °C) 97.4 °F (36.3 °C) 97.5 °F (36.4 °C)    TempSrc: Oral Oral Oral    SpO2: 98% 94% 94%    Weight:  96.3 kg (212 lb 4.9 oz)  96.2 kg (212 lb)   Height:    175.3 cm (69\")     Wt Readings from Last 1 Encounters:   04/18/23 96.2 kg (212 lb)       Intake/Output Summary (Last 24 hours) at 4/18/2023 1421  Last data filed at 4/18/2023 0949  Gross per 24 hour   Intake 1300 ml   Output 700 ml   Net 600 ml     Physical Exam:  Constitutional:       Appearance: Well-developed.   Eyes:      General: No scleral " icterus.        Right eye: No discharge.   HENT:      Head: Normocephalic and atraumatic.   Neck:      Thyroid: No thyromegaly.      Lymphadenopathy: No cervical adenopathy.   Pulmonary:      Effort: Pulmonary effort is normal. No respiratory distress.      Breath sounds: Normal breath sounds. No wheezing. No rales.   Cardiovascular:      Normal rate. Regular rhythm.      No gallop.   Edema:     Peripheral edema absent.   Abdominal:      Tenderness: There is no abdominal tenderness.   Skin:     Findings: No erythema or rash.   Neurological:      Mental Status: Alert and oriented to person, place, and time.         Results Review:   Lab Results (last 24 hours)     Procedure Component Value Units Date/Time    POC Glucose Once [[id number]]  (Abnormal) Collected: 04/18/23 1152    Specimen: Blood Updated: 04/18/23 1153     Glucose 247 mg/dL      Comment: Serial Number: [id number]:  [id number]       POC Glucose Once [[id number]]  (Abnormal) Collected: 04/18/23 0701    Specimen: Blood Updated: 04/18/23 0702     Glucose 125 mg/dL      Comment: Serial Number: [id number]:  [id number]       POC Glucose Once [[id number]]  (Abnormal) Collected: 04/18/23 0436    Specimen: Blood Updated: 04/18/23 0437     Glucose 123 mg/dL      Comment: Serial Number: [id number]:  [id number]       POC Glucose Once [[id number]]  (Abnormal) Collected: 04/18/23 0024    Specimen: Blood Updated: 04/18/23 0025     Glucose 165 mg/dL      Comment: Serial Number: [id number]:  [id number]       POC Glucose Once [[id number]]  (Abnormal) Collected: 04/17/23 2106    Specimen: Blood Updated: 04/17/23 2107     Glucose 193 mg/dL      Comment: Serial Number: [id number]:  [id number]       Comprehensive Metabolic Panel [[id number]]  (Abnormal) Collected: 04/17/23 1617    Specimen: Blood Updated: 04/17/23 1718     Glucose 257 mg/dL       mg/dL      Creatinine 3.64 mg/dL      Sodium 129 mmol/L      Potassium 4.4 mmol/L      Chloride 84  mmol/L      CO2 33.0 mmol/L      Calcium 9.3 mg/dL      Total Protein 6.7 g/dL      Albumin 3.1 g/dL      ALT (SGPT) 28 U/L      AST (SGOT) 32 U/L      Alkaline Phosphatase 100 U/L      Total Bilirubin 0.8 mg/dL      Globulin 3.6 gm/dL      A/G Ratio 0.9 g/dL      BUN/Creatinine Ratio 27.7     Anion Gap 12.0 mmol/L      eGFR 18.3 mL/min/1.73     Narrative:      GFR Normal >60  Chronic Kidney Disease <60  Kidney Failure <15      STAT Lactic Acid, Reflex [[id number]]  (Normal) Collected: 04/17/23 1617    Specimen: Blood Updated: 04/17/23 1718     Lactate 1.5 mmol/L     POC Glucose Once [[id number]]  (Abnormal) Collected: 04/17/23 1632    Specimen: Blood Updated: 04/17/23 1634     Glucose 247 mg/dL      Comment: Serial Number: [id number]:  [id number]       POC Glucose Once [[id number]]  (Abnormal) Collected: 04/17/23 1421    Specimen: Blood Updated: 04/17/23 1423     Glucose 220 mg/dL      Comment: Serial Number: [id number]:  [id number]           Imaging Results (Last 72 Hours)     Procedure Component Value Units Date/Time    CT Abdomen Pelvis Without Contrast [[id number]] Collected: 04/16/23 1415     Updated: 04/16/23 1423    Narrative:      CT ABDOMEN PELVIS WO CONTRAST    Date of Exam: 4/16/2023 1:55 PM EDT    Indication: abd pain.    Comparison: November 28, 2018    Technique: Axial CT images were obtained of the abdomen and pelvis without the administration of contrast. Sagittal and coronal reconstructions were performed.  Automated exposure control and iterative reconstruction methods were used.     Findings:  There are small bilateral pleural effusions. There is mild bilateral basilar atelectasis.    The gallbladder, liver, bilateral adrenal glands, pancreas and spleen are normal. Chronic perinephric fat stranding is unchanged. There are no renal stones.    The abdominal pelvic portions of the GI tract are normal. The appendix is normal. There is a periumbilical hernia obtaining only fat. There are  degenerative changes of the spine.      Impression:      Impression:  No acute abdominal or pelvic abnormality.      Electronically Signed: Prudencio Saez    4/16/2023 2:21 PM EDT    Workstation ID: [id number]    XR Chest 1 View [[id number]] Collected: 04/16/23 1352     Updated: 04/16/23 1356    Narrative:      XR CHEST 1 VW    Date of Exam: 4/16/2023 1:41 PM EDT    Indication: dyspnea.    Comparison: January 13, 2018    Findings:  There is a left-sided AICD device. The heart is enlarged. There is mild pulmonary vascular congestion. There are no focal consolidations to suggest pneumonia.      Impression:      Impression:  Cardiomegaly with mild pulmonary vascular congestion.    Electronically Signed: Prudencio Saez    4/16/2023 1:53 PM EDT    Workstation ID: [id number]        ECG/EMG Results (most recent)     Procedure Component Value Units Date/Time    SCANNED - TELEMETRY   [[id number]] Resulted: 04/16/23     Updated: 04/17/23 1007    SCANNED - TELEMETRY   [[id number]] Resulted: 04/16/23     Updated: 04/17/23 1027    SCANNED - TELEMETRY   [[id number]] Resulted: 04/16/23     Updated: 04/17/23 1248    SCANNED - TELEMETRY   [[id number]] Resulted: 04/16/23     Updated: 04/17/23 2059    SCANNED - TELEMETRY   [[id number]] Resulted: 04/16/23     Updated: 04/18/23 0635    SCANNED - TELEMETRY   [[id number]] Resulted: 04/16/23     Updated: 04/18/23 1129    Adult Transthoracic Echo Complete W/ Cont if Necessary Per Protocol [[id number]] Resulted: 04/18/23 1314     Updated: 04/18/23 1316     Target HR (85%) 136 bpm      Max. Pred. HR (100%) 160 bpm      LVIDd 6.4 cm      LVIDs 5.6 cm      IVSd 0.97 cm      LVPWd 1.01 cm      FS 11.6 %      IVS/LVPW 0.96 cm      ESV(cubed) 177.3 ml      LV Sys Vol (BSA corrected) 63.9 cm2      EDV(cubed) 256.7 ml      LV [place] Vol (BSA corrected) 81.7 cm2      LVOT area 3.2 cm2      LV mass(C)d 269.2 grams      LVOT diam 2.02 cm      EDV(MOD-sp4) 173.0 ml      ESV(MOD-sp4) 135.3 ml      SV(MOD-sp4) 37.7 ml       SI(MOD-sp4) 17.8 ml/m2      EF(MOD-sp4) 21.8 %      MV E max andry 101.2 cm/sec      MV A max andry 34.4 cm/sec      MV dec time 0.12 msec      MV E/A 2.9     SV(LVOT) 31.0 ml      RVIDd 3.6 cm      LV V1 max 68.0 cm/sec      LV V1 max PG 1.85 mmHg      LV V1 mean PG 0.91 mmHg      LV V1 VTI 9.7 cm      Ao pk andry 87.2 cm/sec      Ao max PG 3.0 mmHg      Ao mean PG 1.78 mmHg      Ao V2 VTI 14.0 cm      COLTEN(I,D) 2.21 cm2      MV max PG 5.5 mmHg      MV mean PG 2.18 mmHg      MV V2 VTI 17.7 cm      MVA(VTI) 1.75 cm2      MV dec slope 834.1 cm/sec2      TR max andry 323.7 cm/sec      TR max PG 43.1 mmHg      RVSP(TR) 58.1 mmHg      RAP systole 15.0 mmHg      RV V1 max PG 0.94 mmHg      RV V1 max 48.4 cm/sec      RV V1 VTI 8.7 cm      PA V2 max 72.5 cm/sec      PA acc time 0.08 sec      PA pr(Accel) 43.2 mmHg      PI end-d andry 168.7 cm/sec      Ao root diam 3.1 cm      ACS 1.86 cm      LA dimension (2D)  4.0 cm     Narrative:      •  Estimated right ventricular systolic pressure from tricuspid   regurgitation is markedly elevated (>55 mmHg).      Impression:              CBC    Results from last 7 days   Lab Units 04/16/23  1352   WBC 10*3/mm3 8.00   HEMOGLOBIN g/dL 11.3*   PLATELETS 10*3/mm3 181     BMP   Results from last 7 days   Lab Units 04/17/23  1617 04/17/23  1028 04/16/23  1352   SODIUM mmol/L 129* 128* 131*   POTASSIUM mmol/L 4.4 3.8 3.2*   CHLORIDE mmol/L 84* 83* 85*   CO2 mmol/L 33.0* 35.0* 32.0*   BUN mg/dL 101* 102* 103*   CREATININE mg/dL 3.64* 3.60* 3.73*   GLUCOSE mg/dL 257* 282* 188*   MAGNESIUM mg/dL  --  2.2 2.1   PHOSPHORUS mg/dL  --  4.1 4.0     CMP   Results from last 7 days   Lab Units 04/17/23  1617 04/17/23  1028 04/16/23  1352   SODIUM mmol/L 129* 128* 131*   POTASSIUM mmol/L 4.4 3.8 3.2*   CHLORIDE mmol/L 84* 83* 85*   CO2 mmol/L 33.0* 35.0* 32.0*   BUN mg/dL 101* 102* 103*   CREATININE mg/dL 3.64* 3.60* 3.73*   GLUCOSE mg/dL 257* 282* 188*   ALBUMIN g/dL 3.1*  --  3.4*   BILIRUBIN mg/dL 0.8  --   0.8   ALK PHOS U/L 100  --  93   AST (SGOT) U/L 32  --  33   ALT (SGPT) U/L 28  --  25   AMMONIA umol/L  --   --  19     Cardiac Studies:  Echo- Results for orders placed during the hospital encounter of 03/09/22    Adult Transthoracic Echo Complete W/ Cont if Necessary Per Protocol    Interpretation Summary  · Left ventricular systolic function is severely decreased.  · Left ventricular ejection fraction is 10 to 15%  · Akinetic septum, distal anterior wall, apex and distal inferior wall.  · Left ventricular diastolic function was normal.  · Moderate mitral valve regurgitation is present.    Stress Myoview-  Cath-      Medication Review:   Scheduled Meds:aspirin, 81 mg, Oral, Daily  bumetanide, 1 mg, Oral, TID  calcitriol, 0.25 mcg, Oral, Daily  hydrALAZINE, 10 mg, Oral, Q12H  isosorbide mononitrate, 30 mg, Oral, Daily  metoprolol succinate XL, 50 mg, Oral, Daily  multivitamin, 1 tablet, Oral, Q24H  senna-docusate sodium, 2 tablet, Oral, BID  sodium chloride, 10 mL, Intravenous, Q12H  sodium chloride, 1 spray, Nasal, BID  spironolactone, 25 mg, Oral, Daily  traZODone, 50 mg, Oral, Nightly  vitamin D3, 5,000 Units, Oral, Daily      Continuous Infusions:insulin,       PRN Meds:.•  senna-docusate sodium **AND** polyethylene glycol **AND** bisacodyl **AND** bisacodyl  •  dextrose  •  dextrose  •  glucagon (human recombinant)  •  HYDROcodone-acetaminophen  •  melatonin  •  ondansetron **OR** ondansetron  •  sodium chloride  •  sodium chloride  •  traMADol      Assessment & Plan     MDM:    1.  Bilateral leg edema:    Patient is on Bumex 1 mg 3 times daily.  Continue that.  I believe that patient would benefit with intravenous diuretics.    2.  Ischemic dilated cardiomyopathy:    Patient is not on ACE inhibitor/ARB/NRI.  Because of his advanced CKD and high creatinine.  I would start hydralazine 10 mg twice daily.  Monitor blood pressure    3.  CAD:    Patient is not complaining any chest pain.    4.   Hypertension:    Start hydralazine.      Rachid Sheriff MD  04/18/23  14:21 EDT

## 2023-04-18 NOTE — PLAN OF CARE
Problem: Adult Inpatient Plan of Care  Goal: Plan of Care Review  Outcome: Ongoing, Progressing   Goal Outcome Evaluation:               Pt awake most of shift, VSS. Plan for echo in AM & PD catheter placement on 04/24. Pt utilizing Omnipod insulin pump per MD orders, educated by Diabetes Educator & contract completed and in chart. Pt educated on need for staff to check glucose with hospital glucometer and shown that was marked on the contract which pt signed.     Pt concerned for glucose dropping around midnight, per pt glucose was 85 but when checked with hospital glucometer, glucose was at 165. Pt educated that current orders are to only provide glucose gel when glucose is 70 or below. Pt verbalized understanding.     Pt educated on current plan of care, verbalized understanding.

## 2023-04-18 NOTE — PROGRESS NOTES
PROGRESS NOTE      Patient Name: Esdras Peralta  : 1960  MRN: [id number]  Primary Care Physician: Erika Hernandez MD  Date of admission: 2023    Patient Care Team:  Erika Hernandez MD as PCP - General  Erika Hernandez MD as PCP - Family Medicine        Subjective   Subjective:     Patient is feeling stable no new complaint appetite is good no significant shortness of breath  Review of systems:  All other review of system unremarkable      Allergies:    Allergies   Allergen Reactions   • Codeine Unknown (See Comments)       Objective   Exam:     Vital Signs  Temp:  [97.4 °F (36.3 °C)-97.8 °F (36.6 °C)] 97.5 °F (36.4 °C)  Heart Rate:  [80-88] 87  Resp:  [16-21] 16  BP: (119-140)/(83-93) 140/89  SpO2:  [94 %-98 %] 94 %  on   ;   Device (Oxygen Therapy): room air  Body mass index is 31.35 kg/m².    General: Middle-age  male in no acute distress.    Head:      Normocephalic and atraumatic.    Eyes:      PERRL/EOM intact, conjunctiva and sclera clear with out nystagmus.    Neck:      No masses, thyromegaly,  trachea central with normal respiratory effort   Lungs:    Mild rhonchi bilaterally to auscultation.    Heart:      Regular rate and rhythm, no murmur no gallop  Abd:        Soft, nontender, not distended, bowel sounds positive, no shifting dullness   Pulses:   Pulses palpable  Extr:        No cyanosis or clubbing--+2 edema.    Neuro:    No focal deficits.   alert oriented x3  Skin:       Intact without lesions or rashes.    Psych:    Alert and cooperative; normal mood and affect; .      Results Review:  I have personally reviewed most recent Data :  CBC    Results from last 7 days   Lab Units 23  1352   WBC 10*3/mm3 8.00   HEMOGLOBIN g/dL 11.3*   PLATELETS 10*3/mm3 181     CMP   Results from last 7 days   Lab Units 23  1617 23  1028 23  1352   SODIUM mmol/L 129* 128* 131*   POTASSIUM mmol/L 4.4 3.8 3.2*   CHLORIDE mmol/L 84* 83* 85*   CO2 mmol/L 33.0* 35.0* 32.0*   BUN  mg/dL 101* 102* 103*   CREATININE mg/dL 3.64* 3.60* 3.73*   GLUCOSE mg/dL 257* 282* 188*   ALBUMIN g/dL 3.1*  --  3.4*   BILIRUBIN mg/dL 0.8  --  0.8   ALK PHOS U/L 100  --  93   AST (SGOT) U/L 32  --  33   ALT (SGPT) U/L 28  --  25   AMMONIA umol/L  --   --  19     ABG      CT Abdomen Pelvis Without Contrast    Result Date: 4/16/2023  Impression: No acute abdominal or pelvic abnormality. Electronically Signed: Prudencio Se  4/16/2023 2:21 PM EDT  Workstation ID: [id number]    XR Chest 1 View    Result Date: 4/16/2023  Impression: Cardiomegaly with mild pulmonary vascular congestion. Electronically Signed: Prudencio Saez  4/16/2023 1:53 PM EDT  Workstation ID: [id number]      Results for orders placed during the hospital encounter of 03/09/22    Adult Transthoracic Echo Complete W/ Cont if Necessary Per Protocol    Interpretation Summary  · Left ventricular systolic function is severely decreased.  · Left ventricular ejection fraction is 10 to 15%  · Akinetic septum, distal anterior wall, apex and distal inferior wall.  · Left ventricular diastolic function was normal.  · Moderate mitral valve regurgitation is present.    Scheduled Meds:aspirin, 81 mg, Oral, Daily  bumetanide, 1 mg, Oral, TID  calcitriol, 0.25 mcg, Oral, Daily  isosorbide mononitrate, 30 mg, Oral, Daily  metoprolol succinate XL, 50 mg, Oral, Daily  multivitamin, 1 tablet, Oral, Q24H  senna-docusate sodium, 2 tablet, Oral, BID  sodium chloride, 10 mL, Intravenous, Q12H  sodium chloride, 1 spray, Nasal, BID  spironolactone, 25 mg, Oral, Daily  traZODone, 50 mg, Oral, Nightly  vitamin D3, 5,000 Units, Oral, Daily      Continuous Infusions:insulin,       PRN Meds:•  senna-docusate sodium **AND** polyethylene glycol **AND** bisacodyl **AND** bisacodyl  •  dextrose  •  dextrose  •  glucagon (human recombinant)  •  HYDROcodone-acetaminophen  •  melatonin  •  ondansetron **OR** ondansetron  •  sodium chloride  •  sodium chloride  •  traMADol    Assessment & Plan    Assessment and Plan:         Weakness    Stage 3b chronic kidney disease    Stage 4 chronic kidney disease    ASSESSMENT:  • Chronic kidney disease stage V  • History of hypertension  • Diabetes mellitus  • Congestive heart failure with ejection fraction of 15%  • Coronary artery disease with multiple stent placement  • History of GI bleed  • History of significant edema  • Azotemia              PLAN :      • At this time patient will get a PD catheter placed next week as patient was on blood thinner  • Okay to be discharged from readmission on Monday  • As clinically patient is feeling better and he really do not want to be initiated on hemodialysis I will get PD catheter placed and will get it flushed in 1 to 2 days and start patient on PD training sometime in 1 to 2 weeks.    • We will get repeat echocardiogram result which are still pending   • Restart Brilinta after the PD catheter placement  • Cardiology to clear patient  • Will hold Brilinta because patient may go for procedure next week   • thank you for letting me participate  • We will consider starting patient on Entresto  •           Electronically signed by Harish Lagunas MD,   [facility] kidney consultant  [phone]  4/18/2023  12:32 EDT
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
## 2023-04-18 NOTE — DISCHARGE SUMMARY
[facility] Medicine Services  DISCHARGE SUMMARY    Patient Name: Esdras Peralta  : 1960  MRN: [id number]    Date of Admission: 2023  Discharge Diagnosis:Chronic kidney disease SAURABH,  Date of Discharge:  *23  Primary Care Physician: Erika Hernandez MD      Presenting Problem:   Cardiomegaly [I51.7]  Dehydration [E86.0]  Weakness [R53.1]  Periumbilical hernia [K42.9]  Mild bibasilar atelectasis [J98.11]  Stage 4 chronic kidney disease [N18.4]    Active and Resolved Hospital Problems:  Active Hospital Problems    Diagnosis POA   • **Weakness [R53.1] Unknown   • Chronic systolic heart failure (CMS/HCC) [I50.22] Unknown   • Stage 4 chronic kidney disease [N18.4] Yes   • Stage 3b chronic kidney disease [N18.32] Unknown      Resolved Hospital Problems   No resolved problems to display.     Chronic kidney disease SAURABH, nephrology consult monitor renal function, avoid hypotension avoid nephrotoxin.  Per surgery-Patient was last given Brilinta on 2023 and per cardiology recommendations will hold at least 5 days preoperatively  - At this time we will tentatively plan for PD catheter placement on Monday, 2023 as long as patient is stable for surgery     Generalized weakness, PT OT,  consult     Chronic essential hypertension, monitor blood pressure closely, continue metoprolol spironolactone.     Hyperlipidemia, check lipid panel.  CPK.  Not on statins at home.     CAD, continue aspirin and Brilinta.  Imdur metoprolol     COPD, DuoNeb every 4.  Oxygen titration as needed.     Type 1 diabetes, diabetic diet, regular insulin sliding scale, glucometers ACH S.     Vitamin D deficiency-check vitamin D continue vitamin D supplement     CHF, cardiomyopathy, continue diuretics Bumex     B12 deficiency, check B12, continue B12 replacement.     anemia check iron profile.  B12 folic acid.  Monitor H&H.     Insomnia-trazodone, psych consult.       Hospital Course      History of Present Illness: Esdras Peralta is a 62 y.o. white male with multimedical problems, a past history significant for CAD, CHF, COPD, diabetes, hyperlipidemia, hypertension.  Who presented to [facility] on 4/16/2023 complaining of weakness x 3 month.   says he was sent in for admission by Dr. Sandoval he reports a 3-month history of increasing weakness.  He states he has had difficulty sleeping and feels that if he goes to sleep he might never wake up.  He states that he has had episodes where he is asleep and wakes up feeling like he is smothering.  He states over the last 2 weeks he has had increasing weakness without focal etiology or Jacksonian progression.  He denies fever or chills.  He states that he has dyspnea on exertion and occasionally reports orthopnea.  He states he thinks he has gained 30 pounds in water fluid and has had problems with leg swelling.  He reports that his chest occasionally feels tight but he denies anginal type chest discomfort.  The patient reports no focal neurologic weakness or lateralizing neurologic signs     CT abd- No acute abdominal or pelvic abnormality.     4/17/23 patient seen and examined in bed no acute distress, vital signs stable, discussed with RN.  Holding anticoagulation, for possible procedure on Friday.   4/18/23 patient doing better today, will dc home, condition on dc stable      DISCHARGE Follow Up Recommendations for labs and diagnostics: follow with pcp in one week  To return to hospital on Monday for patrimonial dialysis placement   Follow with nephrology in one week    Reasons For Change In Medications and Indications for New Medications:  Trazodone     Day of Discharge     Vital Signs:  Temp:  [97.4 °F (36.3 °C)-97.7 °F (36.5 °C)] 97.5 °F (36.4 °C)  Heart Rate:  [80-87] 87  Resp:  [16-21] 16  BP: (119-140)/(83-93) 140/89      Physical Exam Constitutional:       General: He is not in acute distress.     Appearance: He is well-developed.  He is not ill-appearing, toxic-appearing or diaphoretic.   HENT:      Head: Normocephalic and atraumatic.      Nose: Nose normal. No congestion or rhinorrhea.      Mouth/Throat:      Mouth: Mucous membranes are moist.      Pharynx: No oropharyngeal exudate.   Eyes:      General: No scleral icterus.        Right eye: No discharge.         Left eye: No discharge.      Extraocular Movements: Extraocular movements intact.      Pupils: Pupils are equal, round, and reactive to light.   Neck:      Thyroid: No thyromegaly.      Vascular: No carotid bruit or JVD.      Trachea: No tracheal deviation.   Cardiovascular:      Rate and Rhythm: Normal rate and regular rhythm.      Pulses: Normal pulses.      Heart sounds: Normal heart sounds. No murmur heard.    No friction rub. No gallop.   Pulmonary:      Effort: Pulmonary effort is normal. No respiratory distress.      Breath sounds: No stridor. Rhonchi present. No wheezing or rales.   Chest:      Chest wall: No tenderness.   Abdominal:      General: Bowel sounds are normal. There is no distension.      Palpations: Abdomen is soft.      Tenderness: There is no abdominal tenderness. There is no guarding.   Musculoskeletal:         General: Swelling present. No tenderness, deformity or signs of injury. Normal range of motion.      Cervical back: Normal range of motion and neck supple. No rigidity. No muscular tenderness.      Right lower leg: Edema present.      Left lower leg: Edema present.   Lymphadenopathy:      Cervical: No cervical adenopathy.   Skin:     General: Skin is warm and dry.      Coloration: Skin is not jaundiced or pale.      Findings: No bruising or erythema.   Neurological:      General: No focal deficit present.      Mental Status: He is alert and oriented to person, place, and time. Mental status is at baseline.      Cranial Nerves: No cranial nerve deficit.      Sensory: No sensory deficit.      Motor: Weakness present. No abnormal muscle tone.       Coordination: Coordination normal.   Psychiatric:         Mood and Affect: Mood normal.         Behavior: Behavior normal.         Thought Content: Thought content normal.         Judgment: Judgment normal.       Pertinent  and/or Most Recent Results     LAB RESULTS:      Lab 04/17/23  1617 04/17/23  1028 04/16/23  1506 04/16/23  1352   WBC  --   --   --  8.00   HEMOGLOBIN  --   --   --  11.3*   HEMATOCRIT  --   --   --  35.2*   PLATELETS  --   --   --  181   NEUTROS ABS  --   --   --  6.50   LYMPHS ABS  --   --   --  0.70   MONOS ABS  --   --   --  0.60   EOS ABS  --   --   --  0.10   MCV  --   --   --  93.7   PROCALCITONIN  --  0.55*  --   --    LACTATE 1.5 2.1* 1.1  --    PROTIME  --  12.3*  --   --          Lab 04/17/23 1617 04/17/23 1028 04/16/23  1352   SODIUM 129* 128* 131*   POTASSIUM 4.4 3.8 3.2*   CHLORIDE 84* 83* 85*   CO2 33.0* 35.0* 32.0*   ANION GAP 12.0 10.0 14.0   * 102* 103*   CREATININE 3.64* 3.60* 3.73*   EGFR 18.3* 18.5* 17.8*   GLUCOSE 257* 282* 188*   CALCIUM 9.3 9.3 9.8   IONIZED CALCIUM  --  1.17*  --    MAGNESIUM  --  2.2 2.1   PHOSPHORUS  --  4.1 4.0   HEMOGLOBIN A1C  --  8.90*  --    TSH  --   --  3.270         Lab 04/17/23 1617 04/16/23  1352   TOTAL PROTEIN 6.7 7.2   ALBUMIN 3.1* 3.4*   GLOBULIN 3.6 3.8   ALT (SGPT) 28 25   AST (SGOT) 32 33   BILIRUBIN 0.8 0.8   ALK PHOS 100 93         Lab 04/17/23  1028 04/16/23  1352   PROBNP  --  40,356.0*   HSTROP T  --  62*   PROTIME 12.3*  --    INR 1.21*  --          Lab 04/17/23  1028   CHOLESTEROL 165   LDL CHOL 111*   HDL CHOL 38*   TRIGLYCERIDES 82         Lab 04/17/23  1028   IRON 69   IRON SATURATION 15*   TIBC 460   TRANSFERRIN 309   FERRITIN 143.20         Brief Urine Lab Results  (Last result in the past 365 days)      Color   Clarity   Blood   Leuk Est   Nitrite   Protein   CREAT   Urine HCG        04/16/23 1451 Yellow   Clear   Negative   Trace   Negative   >=300 mg/dL (3+)               Microbiology Results (last 10  days)     Procedure Component Value - Date/Time    Respiratory Panel PCR w/COVID-19(SARS-CoV-2) [facility] In-House, NP Swab in UTM/VTM, 3-4 HR TAT - Swab, Nasopharynx [[id number]]  (Normal) Collected: 04/16/23 1352    Lab Status: Final result Specimen: Swab from Nasopharynx Updated: 04/16/23 4166     ADENOVIRUS, PCR Not Detected     Coronavirus 229E Not Detected     Coronavirus HKU1 Not Detected     Coronavirus NL63 Not Detected     Coronavirus OC43 Not Detected     COVID19 Not Detected     Human Metapneumovirus Not Detected     Human Rhinovirus/Enterovirus Not Detected     Influenza A PCR Not Detected     Influenza B PCR Not Detected     Parainfluenza Virus 1 Not Detected     Parainfluenza Virus 2 Not Detected     Parainfluenza Virus 3 Not Detected     Parainfluenza Virus 4 Not Detected     RSV, PCR Not Detected     Bordetella pertussis pcr Not Detected     Bordetella parapertussis PCR Not Detected     Chlamydophila pneumoniae PCR Not Detected     Mycoplasma pneumo by PCR Not Detected    Narrative:      In the setting of a positive respiratory panel with a viral infection PLUS a negative procalcitonin without other underlying concern for bacterial infection, consider observing off antibiotics or discontinuation of antibiotics and continue supportive care. If the respiratory panel is positive for atypical bacterial infection (Bordetella pertussis, Chlamydophila pneumoniae, or Mycoplasma pneumoniae), consider antibiotic de-escalation to target atypical bacterial infection.          CT Abdomen Pelvis Without Contrast    Result Date: 4/16/2023  Impression: Impression: No acute abdominal or pelvic abnormality. Electronically Signed: Prudencio Saez  4/16/2023 2:21 PM EDT  Workstation ID: [id number]    XR Chest 1 View    Result Date: 4/16/2023  Impression: Impression: Cardiomegaly with mild pulmonary vascular congestion. Electronically Signed: Prudencio Saez  4/16/2023 1:53 PM EDT  Workstation ID:  [id number]      Results for orders placed during the hospital encounter of 06/09/21    Doppler Arterial Multi Level Lower Extremity - Bilateral CAR    Interpretation Summary  · Right Conclusion: The right LEONCIO is normal. Normal digital pressures.  · Left Conclusion: The left LEONCIO is moderately reduced. Waveforms are consistent with femoral disease and popliteal disease. Moderate digital ischemia.      Results for orders placed during the hospital encounter of 06/09/21    Doppler Arterial Multi Level Lower Extremity - Bilateral CAR    Interpretation Summary  · Right Conclusion: The right LEONCIO is normal. Normal digital pressures.  · Left Conclusion: The left LEONCIO is moderately reduced. Waveforms are consistent with femoral disease and popliteal disease. Moderate digital ischemia.      Results for orders placed during the hospital encounter of 03/09/22    Adult Transthoracic Echo Complete W/ Cont if Necessary Per Protocol    Interpretation Summary  · Left ventricular systolic function is severely decreased.  · Left ventricular ejection fraction is 10 to 15%  · Akinetic septum, distal anterior wall, apex and distal inferior wall.  · Left ventricular diastolic function was normal.  · Moderate mitral valve regurgitation is present.      Labs Pending at Discharge:      Procedures Performed  Procedure(s):  LAPAROSCOPIC INTRA PERITONEAL CATHETER PLACEMENT         Consults:   Consults     Date and Time Order Name Status Description    4/17/2023  2:28 PM Inpatient Endocrinology Consult Completed     4/16/2023  8:09 PM Inpatient Nephrology Consult      4/16/2023  6:07 PM Inpatient Cardiology Consult Completed     4/16/2023  6:05 PM Inpatient General Surgery Consult          ASSESSMENT:  • Chronic kidney disease stage V  • History of hypertension  • Diabetes mellitus  • Congestive heart failure with ejection fraction of 15%  • Coronary artery disease with multiple stent placement  • History of GI bleed  • History of significant  edema  • Azotemia              PLAN :      • At this time patient will get a PD catheter placed next week as patient was on blood thinner  • Okay to be discharged from readmission on Monday  • As clinically patient is feeling better and he really do not want to be initiated on hemodialysis I will get PD catheter placed and will get it flushed in 1 to 2 days and start patient on PD training sometime in 1 to 2 weeks.    • We will get repeat echocardiogram result which are still pending   • Restart Brilinta after the PD catheter placement  • Cardiology to clear patient  • Will hold Brilinta because patient may go for procedure next week   • thank you for letting me participate  • We will consider starting patient on Entresto        Electronically signed by Harish Lagunas MD,   [facility] kidney consultant    ==============================================================    Assessment & Plan  1. Diabetes type 1, with hyperglycemia & nephropathy  2. Acute on chronic systolic CHF     Continue OmniPod insulin pump for hospital protocol.  Will have pump  reach out to pt to verify he is using his pump with all its benefits.  Will follow with you.  Thank you for the consult.     I discussed the patients findings and my recommendations with patient     Shirley Guido MD  =================================================        Discharge Details        Discharge Medications      New Medications      Instructions Start Date   traZODone 50 MG tablet  Commonly known as: DESYREL   50 mg, Oral, Nightly         Continue These Medications      Instructions Start Date   aspirin 81 MG EC tablet   1 tablet, Daily      bumetanide 2 MG tablet  Commonly known as: BUMEX   1 mg, Oral, 3 Times Daily      calcitriol 0.25 MCG capsule  Commonly known as: ROCALTROL   1 capsule, Oral, Daily      Daily-Vitamin tablet tablet  Generic drug: multivitamin   1 tablet, Every 24 Hours      fish oil 1000 MG capsule capsule   1 capsule, Every 24  Hours      hydrALAZINE 25 MG tablet  Commonly known as: APRESOLINE   Take 1 tablet by mouth twice daily.      Insulin Lispro 100 UNIT/ML injection  Commonly known as: humaLOG   Inject via insulin pump. Maximum dose 50 units daily.      isosorbide mononitrate 30 MG 24 hr tablet  Commonly known as: IMDUR   30 mg, Daily      metoprolol succinate XL 50 MG 24 hr tablet  Commonly known as: TOPROL-XL   50 mg, Oral, Daily      Omnipod DASH Pods (Gen 4) misc   1 each, Other, Every 3 Days      sodium chloride 0.65 % nasal spray  Commonly known as: Ocean Nasal Spray   1 spray, Nasal, 2 Times Daily      spironolactone 25 MG tablet  Commonly known as: ALDACTONE   25 mg, Oral, Daily      traMADol 50 MG tablet  Commonly known as: Ultram   50 mg, Oral, Every 12 Hours PRN      VITAMIN B COMPLEX PO   1 tablet, Oral, Daily      vitamin D3 125 MCG (5000 UT) capsule capsule   1 capsule, Oral, Daily         Stop These Medications    Brilinta 90 MG tablet tablet  Generic drug: ticagrelor            Allergies   Allergen Reactions   • Codeine Unknown (See Comments)         Discharge Disposition:   Home or Self Care    Diet:  Hospital:  Diet Order   Procedures   • Diet: Cardiac Diets; Healthy Heart (2-3 Na+); Texture: Regular Texture (IDDSI 7); Fluid Consistency: Thin (IDDSI 0)         Discharge Activity:   Activity Instructions     Gradually Increase Activity Until at Pre-Hospitalization Level              CODE STATUS:  Code Status and Medical Interventions:   Ordered at: 04/17/23 1427     Level Of Support Discussed With:    Patient     Code Status (Patient has no pulse and is not breathing):    CPR (Attempt to Resuscitate)     Medical Interventions (Patient has pulse or is breathing):    Full Support     Release to patient:    Routine Release     I have utilized all available, immediate resources to obtain, update, or review the patient's current medications including all prescriptions, over-the-counter products, herbals,  cannabis/cannabidiol products, and vitamin.mineral/dietary (nutritional) supplements.      Level Of Support Discussed With: Patient  Code Status (Patient has no pulse and is not breathing): CPR (Attempt to Resuscitate)  Medical Interventions (Patient has pulse or is breathing): Full Support  Release to patient: Routine Release    Next of kin of Power of :       Admission Status:  I believe this patient meets admit status.    Future Appointments   Date Time Provider Department Center   5/5/2023  3:30 PM [facility] 3  [facility]   9/29/2023 10:00 AM Roman Blanco MD [facility]       Additional Instructions for the Follow-ups that You Need to Schedule     Discharge Follow-up with PCP   As directed       Currently Documented PCP:    Erika Hernandez MD    PCP Phone Number:    [phone]     Follow Up Details: 1 week         Discharge Follow-up with Specified Provider: renal; 1 Week   As directed      To: renal    Follow Up: 1 Week         Discharge Follow-up with Specified Provider: surgery; 1 Week   As directed      To: surgery    Follow Up: 1 Week               Time spent on Discharge including face to face service:  34 minutes    This patient has been examined wearing appropriate Personal Protective Equipment and discussed with rn. 04/18/23      Signature: Electronically signed by Winston Caldera MD, 04/18/23, 1:26 PM EDT.

## 2023-04-18 NOTE — CASE MANAGEMENT/SOCIAL WORK
Case Management Discharge Note      Final Note: Planned readmit for PD catheter placement 4/24/23 after holding Brilinta.         Transportation Services  Private: Car    Final Discharge Disposition Code: 81 - home or self care w/planned readmission

## 2023-04-18 NOTE — PAYOR COMM NOTE
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 g  15 g Oral Q15 Min PRN Winston Caldera MD       • glucagon (GLUCAGEN) injection 1 mg  1 mg Intramuscular Q15 Min PRN Winston Caldera MD       • HYDROcodone-acetaminophen (NORCO) 5-325 MG per tablet 1 tablet  1 tablet Oral Q4H PRN Winston Caldera MD   1 tablet at 04/17/23 2136   • insulin patient supplied pump   Subcutaneous Continuous Winston Caldera MD   Self Administered Via Pump at 04/17/23 1705   • isosorbide mononitrate (IMDUR) 24 hr tablet 30 mg  30 mg Oral Daily Winston Caldera MD   30 mg at 04/18/23 0932   • melatonin tablet 5 mg  5 mg Oral Nightly PRN Winston Caldera MD       • metoprolol succinate XL (TOPROL-XL) 24 hr tablet 50 mg  50 mg Oral Daily Winston Caldera MD   50 mg at 04/18/23 0933   • multivitamin (THERAGRAN) tablet 1 tablet  1 tablet Oral Q24H Winston Caldera MD   1 tablet at 04/17/23 2024   • [COMPLETED] ondansetron (ZOFRAN) injection 4 mg  4 mg Intravenous Once Dillon Bronson MD   4 mg at 04/16/23 1352   • ondansetron (ZOFRAN) tablet 4 mg  4 mg Oral Q6H PRN Winston Caldera MD        Or   • ondansetron (ZOFRAN)  injection 4 mg  4 mg Intravenous Q6H PRN Winston Caldera MD       • [COMPLETED] potassium chloride (K-DUR,KLOR-CON) CR tablet 20 mEq  20 mEq Oral Once Dillon Bronson MD   20 mEq at 04/16/23 1534   • [COMPLETED] sodium chloride 0.9 % bolus 500 mL  500 mL Intravenous Once Dillon Bronson MD   Stopped at 04/16/23 1605   • sodium chloride 0.9 % flush 10 mL  10 mL Intravenous Q12H Winston Caldera MD   10 mL at 04/18/23 0933   • sodium chloride 0.9 % flush 10 mL  10 mL Intravenous PRN Winston Caldera MD       • sodium chloride 0.9 % infusion 40 mL  40 mL Intravenous PRN Winston Caldera MD       • sodium chloride nasal spray 1 spray  1 spray Nasal BID Winston Caldera MD   1 spray at 04/17/23 0722   • spironolactone (ALDACTONE) tablet 25 mg  25 mg Oral Daily Winston Caldera MD   25 mg at 04/18/23 0933   • [COMPLETED] Sulfur Hexafluoride Microsph (LUMASON) 60.7-25 MG IV reconstituted suspension reconstituted suspension 3 mL  3 mL Intravenous Once in imaging Winston Caldera MD   3 mL at 04/18/23 1036   • traMADol (ULTRAM) tablet 50 mg  50 mg Oral Q12H PRN Winston Caldera MD       • traZODone (DESYREL) tablet 50 mg  50 mg Oral Nightly Winston Caldera MD   50 mg at 04/17/23 2024   • vitamin D3 capsule 5,000 Units  5,000 Units Oral Daily Winston Caldera MD   5,000 Units at 04/18/23 0932     Lab Results (last 48 hours)     Procedure Component Value Units Date/Time    POC Glucose Once [[id number]]  (Abnormal) Collected: 04/18/23 0701    Specimen: Blood Updated: 04/18/23 0702     Glucose 125 mg/dL      Comment: Serial Number: [id number]:  [id number]       POC Glucose Once [[id number]]  (Abnormal) Collected: 04/18/23 0436    Specimen: Blood Updated: 04/18/23 0437     Glucose 123 mg/dL      Comment: Serial Number: [id number]:  [id number]       POC Glucose Once [[id number]]  (Abnormal) Collected: 04/18/23 0024    Specimen: Blood Updated: 04/18/23 0025     Glucose 165  mg/dL      Comment: Serial Number: [id number]:  [id number]       POC Glucose Once [[id number]]  (Abnormal) Collected: 04/17/23 2106    Specimen: Blood Updated: 04/17/23 2107     Glucose 193 mg/dL      Comment: Serial Number: [id number]:  [id number]       Comprehensive Metabolic Panel [[id number]]  (Abnormal) Collected: 04/17/23 1617    Specimen: Blood Updated: 04/17/23 1718     Glucose 257 mg/dL       mg/dL      Creatinine 3.64 mg/dL      Sodium 129 mmol/L      Potassium 4.4 mmol/L      Chloride 84 mmol/L      CO2 33.0 mmol/L      Calcium 9.3 mg/dL      Total Protein 6.7 g/dL      Albumin 3.1 g/dL      ALT (SGPT) 28 U/L      AST (SGOT) 32 U/L      Alkaline Phosphatase 100 U/L      Total Bilirubin 0.8 mg/dL      Globulin 3.6 gm/dL      A/G Ratio 0.9 g/dL      BUN/Creatinine Ratio 27.7     Anion Gap 12.0 mmol/L      eGFR 18.3 mL/min/1.73     Narrative:      GFR Normal >60  Chronic Kidney Disease <60  Kidney Failure <15      STAT Lactic Acid, Reflex [[id number]]  (Normal) Collected: 04/17/23 1617    Specimen: Blood Updated: 04/17/23 1718     Lactate 1.5 mmol/L     POC Glucose Once [[id number]]  (Abnormal) Collected: 04/17/23 1632    Specimen: Blood Updated: 04/17/23 1634     Glucose 247 mg/dL      Comment: Serial Number: [id number]:  [id number]       POC Glucose Once [[id number]]  (Abnormal) Collected: 04/17/23 1421    Specimen: Blood Updated: 04/17/23 1423     Glucose 220 mg/dL      Comment: Serial Number: [id number]:  [id number]       POC Glucose Once [[id number]]  (Abnormal) Collected: 04/17/23 1142    Specimen: Blood Updated: 04/17/23 1147     Glucose 271 mg/dL      Comment: Serial Number: [id number]:  [id number]       Procalcitonin [[id number]]  (Abnormal) Collected: 04/17/23 1028    Specimen: Blood Updated: 04/17/23 1125     Procalcitonin 0.55 ng/mL     Narrative:      As a Marker for Sepsis (Non-Neonates):    1. <0.5 ng/mL represents a low risk of severe sepsis and/or septic  "shock.  2. >2 ng/mL represents a high risk of severe sepsis and/or septic shock.    As a Marker for Lower Respiratory Tract Infections that require antibiotic therapy:    PCT on Admission    Antibiotic Therapy       6-12 Hrs later    >0.5                Strongly Recommended  >0.25 - <0.5        Recommended   0.1 - 0.25          Discouraged              Remeasure/reassess PCT  <0.1                Strongly Discouraged     Remeasure/reassess PCT    As 28 day mortality risk marker: \"Change in Procalcitonin Result\" (>80% or <=80%) if Day 0 (or Day 1) and Day 4 values are available. Refer to http://www.[email or web address] [place]pct-calculator.com    Change in PCT <=80%  A decrease of PCT levels below or equal to 80% defines a positive change in PCT test result representing a higher risk for 28-day all-cause mortality of patients diagnosed with severe sepsis for septic shock.    Change in PCT >80%  A decrease of PCT levels of more than 80% defines a negative change in PCT result representing a lower risk for 28-day all-cause mortality of patients diagnosed with severe sepsis or septic shock.       Ferritin [[id number]]  (Normal) Collected: 04/17/23 1028    Specimen: Blood Updated: 04/17/23 1124     Ferritin 143.20 ng/mL     Narrative:      Results may be falsely decreased if patient taking Biotin.      Lactic Acid, Plasma [[id number]]  (Abnormal) Collected: 04/17/23 1028    Specimen: Blood Updated: 04/17/23 1121     Lactate 2.1 mmol/L     Basic Metabolic Panel [[id number]]  (Abnormal) Collected: 04/17/23 1028    Specimen: Blood Updated: 04/17/23 1121     Glucose 282 mg/dL       mg/dL      Creatinine 3.60 mg/dL      Sodium 128 mmol/L      Potassium 3.8 mmol/L      Chloride 83 mmol/L      CO2 35.0 mmol/L      Calcium 9.3 mg/dL      BUN/Creatinine Ratio 28.3     Anion Gap 10.0 mmol/L      eGFR 18.5 mL/min/1.73     Narrative:      GFR Normal >60  Chronic Kidney Disease <60  Kidney Failure <15      Magnesium [[id number]]  (Normal) Collected: " 04/17/23 1028    Specimen: Blood Updated: 04/17/23 1121     Magnesium 2.2 mg/dL     Iron Profile [[id number]]  (Abnormal) Collected: 04/17/23 1028    Specimen: Blood Updated: 04/17/23 1121     Iron 69 mcg/dL      Iron Saturation 15 %      Transferrin 309 mg/dL      TIBC 460 mcg/dL     CK [[id number]]  (Normal) Collected: 04/17/23 1028    Specimen: Blood Updated: 04/17/23 1121     Creatine Kinase 127 U/L     Lipid Panel [[id number]]  (Abnormal) Collected: 04/17/23 1028    Specimen: Blood Updated: 04/17/23 1121     Total Cholesterol 165 mg/dL      Triglycerides 82 mg/dL      HDL Cholesterol 38 mg/dL      LDL Cholesterol  111 mg/dL      VLDL Cholesterol 16 mg/dL      LDL/HDL Ratio 2.91    Narrative:      Cholesterol Reference Ranges  (U.S. Department of Health and Human Services ATP III Classifications)    Desirable          <200 mg/dL  Borderline High    200-239 mg/dL  High Risk          >240 mg/dL      Triglyceride Reference Ranges  (U.S. Department of Health and Human Services ATP III Classifications)    Normal           <150 mg/dL  Borderline High  150-199 mg/dL  High             200-499 mg/dL  Very High        >500 mg/dL    HDL Reference Ranges  (U.S. Department of Health and Human Services ATP III Classifications)    Low     <40 mg/dl (major risk factor for CHD)  High    >60 mg/dl ('negative' risk factor for CHD)        LDL Reference Ranges  (U.S. Department of Health and Human Services ATP III Classifications)    Optimal          <100 mg/dL  Near Optimal     100-129 mg/dL  Borderline High  130-159 mg/dL  High             160-189 mg/dL  Very High        >189 mg/dL    Phosphorus [[id number]]  (Normal) Collected: 04/17/23 1028    Specimen: Blood Updated: 04/17/23 1121     Phosphorus 4.1 mg/dL     Hemoglobin A1c [[id number]]  (Abnormal) Collected: 04/17/23 1028    Specimen: Blood Updated: 04/17/23 1108     Hemoglobin A1C 8.90 %     Protime-INR [[id number]]  (Abnormal) Collected: 04/17/23 1028    Specimen: Blood Updated:  04/17/23 1107     Protime 12.3 Seconds      INR 1.21    Calcium, Ionized [[id number]]  (Abnormal) Collected: 04/17/23 1028    Specimen: Blood Updated: 04/17/23 1105     Ionized Calcium 1.17 mmol/L     POC Glucose Once [[id number]]  (Abnormal) Collected: 04/17/23 0725    Specimen: Blood Updated: 04/17/23 0726     Glucose 185 mg/dL      Comment: Serial Number: [id number]:  [id number]       BNP [[id number]]  (Abnormal) Collected: 04/16/23 1352    Specimen: Blood Updated: 04/16/23 2240     proBNP 40,356.0 pg/mL     Narrative:      Among patients with dyspnea, NT-proBNP is highly sensitive for the detection of acute congestive heart failure. In addition NT-proBNP of <300 pg/ml effectively rules out acute congestive heart failure with 99% negative predictive value.    Results may be falsely decreased if patient taking Biotin.      POC Glucose Once [[id number]]  (Abnormal) Collected: 04/16/23 2025    Specimen: Blood Updated: 04/16/23 2027     Glucose 190 mg/dL      Comment: Serial Number: [id number]:  [id number]       POC Lactate [[id number]]  (Normal) Collected: 04/16/23 1506    Specimen: Blood Updated: 04/16/23 1507     Lactate 1.1 mmol/L      Comment: Serial Number: [id number]:  [id number]       Urinalysis With Culture If Indicated - Urine, Clean Catch [[id number]]  (Abnormal) Collected: 04/16/23 1451    Specimen: Urine, Clean Catch Updated: 04/16/23 1504     Color, UA Yellow     Appearance, UA Clear     pH, UA 6.5     Specific Gravity, UA 1.015     Glucose,  mg/dL (Trace)     Ketones, UA Negative     Bilirubin, UA Negative     Blood, UA Negative     Protein, UA >=300 mg/dL (3+)     Leuk Esterase, UA Trace     Nitrite, UA Negative     Urobilinogen, UA 0.2 E.U./dL    Narrative:      In absence of clinical symptoms, the presence of pyuria, bacteria, and/or nitrites on the urinalysis result does not correlate with infection.    Urinalysis, Microscopic Only - Urine, Clean Catch [[id number]]  (Abnormal)  Collected: 04/16/23 1451    Specimen: Urine, Clean Catch Updated: 04/16/23 1504     RBC, UA 0-2 /HPF      WBC, UA 0-2 /HPF      Comment: Urine culture not indicated.        Bacteria, UA None Seen /HPF      Squamous Epithelial Cells, UA 0-2 /HPF      Hyaline Casts, UA None Seen /LPF      Methodology Automated Microscopy    Respiratory Panel PCR w/COVID-19(SARS-CoV-2) HAI/POP/CLARA/PAD/COR/MAD/ANNY In-House, NP Swab in UTM/VTM, 3-4 HR TAT - Swab, Nasopharynx [[id number]]  (Normal) Collected: 04/16/23 1352    Specimen: Swab from Nasopharynx Updated: 04/16/23 1458     ADENOVIRUS, PCR Not Detected     Coronavirus 229E Not Detected     Coronavirus HKU1 Not Detected     Coronavirus NL63 Not Detected     Coronavirus OC43 Not Detected     COVID19 Not Detected     Human Metapneumovirus Not Detected     Human Rhinovirus/Enterovirus Not Detected     Influenza A PCR Not Detected     Influenza B PCR Not Detected     Parainfluenza Virus 1 Not Detected     Parainfluenza Virus 2 Not Detected     Parainfluenza Virus 3 Not Detected     Parainfluenza Virus 4 Not Detected     RSV, PCR Not Detected     Bordetella pertussis pcr Not Detected     Bordetella parapertussis PCR Not Detected     Chlamydophila pneumoniae PCR Not Detected     Mycoplasma pneumo by PCR Not Detected    Narrative:      In the setting of a positive respiratory panel with a viral infection PLUS a negative procalcitonin without other underlying concern for bacterial infection, consider observing off antibiotics or discontinuation of antibiotics and continue supportive care. If the respiratory panel is positive for atypical bacterial infection (Bordetella pertussis, Chlamydophila pneumoniae, or Mycoplasma pneumoniae), consider antibiotic de-escalation to target atypical bacterial infection.    Single High Sensitivity Troponin T [[id number]]  (Abnormal) Collected: 04/16/23 1352    Specimen: Blood Updated: 04/16/23 1440     HS Troponin T 62 ng/L     Narrative:      High  Sensitive Troponin T Reference Range:  <10.0 ng/L- Negative Female for AMI  <15.0 ng/L- Negative Male for AMI  >=10 - Abnormal Female indicating possible myocardial injury.  >=15 - Abnormal Male indicating possible myocardial injury.   Clinicians would have to utilize clinical acumen, EKG, Troponin, and serial changes to determine if it is an Acute Myocardial Infarction or myocardial injury due to an underlying chronic condition.         TSH [[id number]]  (Normal) Collected: 04/16/23 1352    Specimen: Blood Updated: 04/16/23 1439     TSH 3.270 uIU/mL     Ammonia [[id number]]  (Normal) Collected: 04/16/23 1352    Specimen: Blood Updated: 04/16/23 1434     Ammonia 19 umol/L     Comprehensive Metabolic Panel [[id number]]  (Abnormal) Collected: 04/16/23 1352    Specimen: Blood Updated: 04/16/23 1433     Glucose 188 mg/dL       mg/dL      Creatinine 3.73 mg/dL      Sodium 131 mmol/L      Potassium 3.2 mmol/L      Chloride 85 mmol/L      CO2 32.0 mmol/L      Calcium 9.8 mg/dL      Total Protein 7.2 g/dL      Albumin 3.4 g/dL      ALT (SGPT) 25 U/L      AST (SGOT) 33 U/L      Alkaline Phosphatase 93 U/L      Total Bilirubin 0.8 mg/dL      Globulin 3.8 gm/dL      A/G Ratio 0.9 g/dL      BUN/Creatinine Ratio 27.6     Anion Gap 14.0 mmol/L      eGFR 17.8 mL/min/1.73     Narrative:      GFR Normal >60  Chronic Kidney Disease <60  Kidney Failure <15      Phosphorus [[id number]]  (Normal) Collected: 04/16/23 1352    Specimen: Blood Updated: 04/16/23 1433     Phosphorus 4.0 mg/dL     Magnesium [[id number]]  (Normal) Collected: 04/16/23 1352    Specimen: Blood Updated: 04/16/23 1433     Magnesium 2.1 mg/dL     CBC & Differential [[id number]]  (Abnormal) Collected: 04/16/23 1352    Specimen: Blood Updated: 04/16/23 1413    Narrative:      The following orders were created for panel order CBC & Differential.  Procedure                               Abnormality         Status                     ---------                                -----------         ------                     CBC Auto Differential[[id number]]        Abnormal            Final result                 Please view results for these tests on the individual orders.    CBC Auto Differential [[id number]]  (Abnormal) Collected: 04/16/23 1352    Specimen: Blood Updated: 04/16/23 1413     WBC 8.00 10*3/mm3      RBC 3.75 10*6/mm3      Hemoglobin 11.3 g/dL      Hematocrit 35.2 %      MCV 93.7 fL      MCH 30.2 pg      MCHC 32.2 g/dL      RDW 19.6 %      RDW-SD 62.1 fl      MPV 9.1 fL      Platelets 181 10*3/mm3      Neutrophil % 82.3 %      Lymphocyte % 9.0 %      Monocyte % 7.5 %      Eosinophil % 0.7 %      Basophil % 0.5 %      Neutrophils, Absolute 6.50 10*3/mm3      Lymphocytes, Absolute 0.70 10*3/mm3      Monocytes, Absolute 0.60 10*3/mm3      Eosinophils, Absolute 0.10 10*3/mm3      Basophils, Absolute 0.00 10*3/mm3      nRBC 0.1 /100 WBC           Imaging Results (Last 48 Hours)     Procedure Component Value Units Date/Time    CT Abdomen Pelvis Without Contrast [[id number]] Collected: 04/16/23 1415     Updated: 04/16/23 1423    Narrative:      CT ABDOMEN PELVIS WO CONTRAST    Date of Exam: 4/16/2023 1:55 PM EDT    Indication: abd pain.    Comparison: November 28, 2018    Technique: Axial CT images were obtained of the abdomen and pelvis without the administration of contrast. Sagittal and coronal reconstructions were performed.  Automated exposure control and iterative reconstruction methods were used.     Findings:  There are small bilateral pleural effusions. There is mild bilateral basilar atelectasis.    The gallbladder, liver, bilateral adrenal glands, pancreas and spleen are normal. Chronic perinephric fat stranding is unchanged. There are no renal stones.    The abdominal pelvic portions of the GI tract are normal. The appendix is normal. There is a periumbilical hernia obtaining only fat. There are degenerative changes of the spine.      Impression:      Impression:  No  acute abdominal or pelvic abnormality.      Electronically Signed: Prudencio Saez    2023 2:21 PM EDT    Workstation ID: [id number]    XR Chest 1 View [[id number]] Collected: 23 1352     Updated: 23 1356    Narrative:      XR CHEST 1 VW    Date of Exam: 2023 1:41 PM EDT    Indication: dyspnea.    Comparison: 2018    Findings:  There is a left-sided AICD device. The heart is enlarged. There is mild pulmonary vascular congestion. There are no focal consolidations to suggest pneumonia.      Impression:      Impression:  Cardiomegaly with mild pulmonary vascular congestion.    Electronically Signed: Prudencio Saez    2023 1:53 PM EDT    Workstation ID: [id number]           Physician Progress Notes (last 48 hours)      Winston Caldera MD at 23 1841              [facility] Medicine Services Daily Progress Note    Patient Name: Esdras Peralta  : 1960  MRN: [id number]  Primary Care Physician:  Erika Hernandez MD  Date of admission: 2023      Subjective       Chief Complaint: weakness     History of Present Illness: Esdras Peralta is a 62 y.o. white male with multimedical problems, a past history significant for CAD, CHF, COPD, diabetes, hyperlipidemia, hypertension.  Who presented to [facility] on [facility]23 complaining of weakness x 3 month.   says he was sent in for admission by Dr. Sandoval he reports a 3-month history of increasing weakness.  He states he has had difficulty sleeping and feels that if he goes to sleep he might never wake up.  He states that he has had episodes where he is asleep and wakes up feeling like he is smothering.  He states over the last 2 weeks he has had increasing weakness without focal etiology or Jacksonian progression.  He denies fever or chills.  He states that he has dyspnea on exertion and occasionally reports orthopnea.  He states he thinks he has gained 30 pounds in water fluid and has had problems  with leg swelling.  He reports that his chest occasionally feels tight but he denies anginal type chest discomfort.  The patient reports no focal neurologic weakness or lateralizing neurologic signs     CT abd- No acute abdominal or pelvic abnormality.     4/17/23 patient seen and examined in bed no acute distress, vital signs stable, discussed with RN.  Holding anticoagulation, for possible procedure on Friday.    ROS Review of Systems ROS   constitutional: Positive for fatigue and unexpected weight change. Negative for chills and fever.   HENT: Negative for trouble swallowing.    Eyes: Negative for discharge and visual disturbance.   Respiratory: Positive for chest tightness and shortness of breath. Negative for cough, choking, wheezing and stridor.    Cardiovascular: Positive for leg swelling. Negative for palpitations.   Gastrointestinal: Positive for abdominal pain and nausea. Negative for diarrhea.   Genitourinary: Negative for flank pain.   Musculoskeletal: Positive for back pain.   Skin: Negative for pallor and rash.   Allergic/Immunologic: Negative for food allergies.   Neurological: Positive for weakness, light-headedness and headaches. Negative for facial asymmetry.   Hematological: Does not bruise/bleed easily.   All other systems reviewed and are negative.      Objective       Vitals:   Temp:  [97.2 °F (36.2 °C)-98.4 °F (36.9 °C)] 97.7 °F (36.5 °C)  Heart Rate:  [80-99] 80  Resp:  [15-20] 19  BP: (119-148)/(83-96) 119/83  Flow (L/min):  [2] 2    Physical Exam Constitutional:       General: He is not in acute distress.     Appearance: He is well-developed. He is not ill-appearing, toxic-appearing or diaphoretic.   HENT:      Head: Normocephalic and atraumatic.      Nose: Nose normal. No congestion or rhinorrhea.      Mouth/Throat:      Mouth: Mucous membranes are moist.      Pharynx: No oropharyngeal exudate.   Eyes:      General: No scleral icterus.        Right eye: No discharge.         Left eye: No  discharge.      Extraocular Movements: Extraocular movements intact.      Pupils: Pupils are equal, round, and reactive to light.   Neck:      Thyroid: No thyromegaly.      Vascular: No carotid bruit or JVD.      Trachea: No tracheal deviation.   Cardiovascular:      Rate and Rhythm: Normal rate and regular rhythm.      Pulses: Normal pulses.      Heart sounds: Normal heart sounds. No murmur heard.    No friction rub. No gallop.   Pulmonary:      Effort: Pulmonary effort is normal. No respiratory distress.      Breath sounds: No stridor. Rhonchi present. No wheezing or rales.   Chest:      Chest wall: No tenderness.   Abdominal:      General: Bowel sounds are normal. There is no distension.      Palpations: Abdomen is soft.      Tenderness: There is no abdominal tenderness. There is no guarding.   Musculoskeletal:         General: Swelling present. No tenderness, deformity or signs of injury. Normal range of motion.      Cervical back: Normal range of motion and neck supple. No rigidity. No muscular tenderness.      Right lower leg: Edema present.      Left lower leg: Edema present.   Lymphadenopathy:      Cervical: No cervical adenopathy.   Skin:     General: Skin is warm and dry.      Coloration: Skin is not jaundiced or pale.      Findings: No bruising or erythema.   Neurological:      General: No focal deficit present.      Mental Status: He is alert and oriented to person, place, and time. Mental status is at baseline.      Cranial Nerves: No cranial nerve deficit.      Sensory: No sensory deficit.      Motor: Weakness present. No abnormal muscle tone.      Coordination: Coordination normal.   Psychiatric:         Mood and Affect: Mood normal.         Behavior: Behavior normal.         Thought Content: Thought content normal.         Judgment: Judgment normal.              Result Review    Result Review:  I have personally reviewed the results from the time of this admission to 4/17/2023 18:42 EDT and agree  with these findings:  []  Laboratory  []  Microbiology  []  Radiology  []  EKG/Telemetry   []  Cardiology/Vascular   []  Pathology  []  Old records  []  Other:  Most notable findings include: *        CBC:      Lab 04/16/23  1352   WBC 8.00   HEMOGLOBIN 11.3*   HEMATOCRIT 35.2*   PLATELETS 181   NEUTROS ABS 6.50   LYMPHS ABS 0.70   MONOS ABS 0.60   EOS ABS 0.10   MCV 93.7     CMP:      Lab 04/17/23  1617 04/17/23  1028 04/16/23  1352   SODIUM 129* 128* 131*   POTASSIUM 4.4 3.8 3.2*   CHLORIDE 84* 83* 85*   CO2 33.0* 35.0* 32.0*   ANION GAP 12.0 10.0 14.0   * 102* 103*   CREATININE 3.64* 3.60* 3.73*   EGFR 18.3* 18.5* 17.8*   GLUCOSE 257* 282* 188*   CALCIUM 9.3 9.3 9.8   IONIZED CALCIUM  --  1.17*  --    MAGNESIUM  --  2.2 2.1   PHOSPHORUS  --  4.1 4.0   TOTAL PROTEIN 6.7  --  7.2   ALBUMIN 3.1*  --  3.4*   GLOBULIN 3.6  --  3.8   ALT (SGPT) 28  --  25   AST (SGOT) 32  --  33   BILIRUBIN 0.8  --  0.8   ALK PHOS 100  --  93     No results found for: ACANTHNAEG, AFBCX, BPERTUSSISCX, BLOODCX  No results found for: BCIDPCR, CXREFLEX, CSFCX, CULTURETIS  No results found for: CULTURES, HSVCX, URCX  No results found for: EYECULTURE, GCCX, HSVCULTURE, LABHSV  No results found for: LEGIONELLA, MRSACX, MUMPSCX, MYCOPLASCX  No results found for: NOCARDIACX, STOOLCX  No results found for: THROATCX, UNSTIMCULT, URINECX, CULTURE, VZVCULTUR  No results found for: VIRALCULTU, WOUNDCX      Assessment & Plan      Brief Patient Summary:  Esdras Peralta is a 62 y.o. male who       aspirin, 81 mg, Oral, Daily  bumetanide, 1 mg, Oral, TID  calcitriol, 0.25 mcg, Oral, Daily  isosorbide mononitrate, 30 mg, Oral, Daily  metoprolol succinate XL, 50 mg, Oral, Daily  multivitamin,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

## 2023-04-19 NOTE — OUTREACH NOTE
Prep Survey    Flowsheet Row Responses   [facility] facility patient discharged from? [facility]   Is LACE score < 7 ? No   Eligibility Readm Mgmt   Discharge diagnosis Weakness,   PD catheter placement    Does the patient have one of the following disease processes/diagnoses(primary or secondary)? Other   Does the patient have Home health ordered? No   Is there a DME ordered? No   Prep survey completed? Yes          Lia MCCARTNEY - Registered Nurse

## 2023-04-21 ENCOUNTER — TELEPHONE (OUTPATIENT)
Dept: ENDOCRINOLOGY | Facility: CLINIC | Age: 63
End: 2023-04-21
Payer: COMMERCIAL

## 2023-04-21 RX ORDER — INSULIN PMP CART,AUT,G6/7,CNTR
EACH SUBCUTANEOUS
COMMUNITY
End: 2023-04-21 | Stop reason: SDUPTHER

## 2023-04-21 RX ORDER — INSULIN PMP CART,AUT,G6/7,CNTR
1 EACH SUBCUTANEOUS
Qty: 1 KIT | Refills: 0 | Status: SHIPPED | OUTPATIENT
Start: 2023-04-21

## 2023-04-21 RX ORDER — INSULIN PMP CART,AUT,G6/7,CNTR
1 EACH SUBCUTANEOUS
Qty: 10 EACH | Refills: 6 | Status: SHIPPED | OUTPATIENT
Start: 2023-04-21

## 2023-04-21 NOTE — PAYOR COMM NOTE
"RE: [id number]    DC NOTIFICATION/ DC DATE 23  ===============================    UTILIZATION REVIEW  THADDEUS MAYES RN   PH:   FAX: [phone]    [facility]  NPI# [id number]  TID # [id number]          Esdras Murphy \"mary anne\" (62 y.o. Male)     Date of Birth   1960    Social Security Number       Address   [address] [place] [address]    Home Phone   [phone]    MRN   [id number]       Jain   None    Marital Status                               Admission Date   23    Admission Type   Emergency    Admitting Provider   Winston Caldera MD    Attending Provider       Department, Room/Bed   [facility] 2C MEDICAL INPATIENT,        Discharge Date   2023    Discharge Disposition   Home or Self Care    Discharge Destination                               Attending Provider: (none)   Allergies: Codeine    Isolation: None   Infection: None   Code Status: Prior    Ht: 175.3 cm (69\")   Wt: 96.2 kg (212 lb)    Admission Cmt: None   Principal Problem: Weakness [R53.1]                 Active Insurance as of 2023     Primary Coverage     Payor Plan Insurance Group Employer/Plan Group    [employer] ANTH BLUE [id number] BLUE SHIELD PPO [id number]     Payor Plan Address Payor Plan Phone Number Payor Plan Fax Number Effective Dates    [address] [phone]  2019 - None Entered    [place] [address]       Subscriber Name Subscriber Birth Date Member ID       CHRISTIAN MURPHY 12/15/1967 [id number]                 Emergency Contacts      (Rel.) Home Phone Work Phone Mobile Phone    christian murphy (Spouse) [phone] -- [phone]               Discharge Summary      Winston Caldera MD at 23 1315                       [facility]  DISCHARGE SUMMARY    Patient Name: Esdras Murphy  : 1960  MRN: [id number]    Date of Admission: 2023  Discharge " Diagnosis:Chronic kidney disease SAURABH,  Date of Discharge:  *4/18/23  Primary Care Physician: Erika Hernandez MD      Presenting Problem:   Cardiomegaly [I51.7]  Dehydration [E86.0]  Weakness [R53.1]  Periumbilical hernia [K42.9]  Mild bibasilar atelectasis [J98.11]  Stage 4 chronic kidney disease [N18.4]    Active and Resolved Hospital Problems:  Active Hospital Problems    Diagnosis POA   • **Weakness [R53.1] Unknown   • Chronic systolic heart failure (CMS/HCC) [I50.22] Unknown   • Stage 4 chronic kidney disease [N18.4] Yes   • Stage 3b chronic kidney disease [N18.32] Unknown      Resolved Hospital Problems   No resolved problems to display.     Chronic kidney disease SAURABH, nephrology consult monitor renal function, avoid hypotension avoid nephrotoxin.  Per surgery-Patient was last given Brilinta on 4/17/2023 and per cardiology recommendations will hold at least 5 days preoperatively  - At this time we will tentatively plan for PD catheter placement on Monday, 4/24/2023 as long as patient is stable for surgery     Generalized weakness, PT OT,  consult     Chronic essential hypertension, monitor blood pressure closely, continue metoprolol spironolactone.     Hyperlipidemia, check lipid panel.  CPK.  Not on statins at home.     CAD, continue aspirin and Brilinta.  Imdur metoprolol     COPD, DuoNeb every 4.  Oxygen titration as needed.     Type 1 diabetes, diabetic diet, regular insulin sliding scale, glucometers ACH S.     Vitamin D deficiency-check vitamin D continue vitamin D supplement     CHF, cardiomyopathy, continue diuretics Bumex     B12 deficiency, check B12, continue B12 replacement.     anemia check iron profile.  B12 folic acid.  Monitor H&H.     Insomnia-trazodone, psych consult.       Hospital Course     History of Present Illness: Esdras Peralta is a 62 y.o. white male with multimedical problems, a past history significant for CAD, CHF, COPD, diabetes, hyperlipidemia, hypertension.  Who  presented to [facility] on 4/16/2023 complaining of weakness x 3 month.   says he was sent in for admission by Dr. Sandoval he reports a 3-month history of increasing weakness.  He states he has had difficulty sleeping and feels that if he goes to sleep he might never wake up.  He states that he has had episodes where he is asleep and wakes up feeling like he is smothering.  He states over the last 2 weeks he has had increasing weakness without focal etiology or Jacksonian progression.  He denies fever or chills.  He states that he has dyspnea on exertion and occasionally reports orthopnea.  He states he thinks he has gained 30 pounds in water fluid and has had problems with leg swelling.  He reports that his chest occasionally feels tight but he denies anginal type chest discomfort.  The patient reports no focal neurologic weakness or lateralizing neurologic signs     CT abd- No acute abdominal or pelvic abnormality.     4/17/23 patient seen and examined in bed no acute distress, vital signs stable, discussed with RN.  Holding anticoagulation, for possible procedure on Friday.   4/18/23 patient doing better today, will dc home, condition on dc stable      DISCHARGE Follow Up Recommendations for labs and diagnostics: follow with pcp in one week  To return to hospital on Monday for patrimonial dialysis placement   Follow with nephrology in one week    Reasons For Change In Medications and Indications for New Medications:  Trazodone     Day of Discharge     Vital Signs:  Temp:  [97.4 °F (36.3 °C)-97.7 °F (36.5 °C)] 97.5 °F (36.4 °C)  Heart Rate:  [80-87] 87  Resp:  [16-21] 16  BP: (119-140)/(83-93) 140/89      Physical Exam Constitutional:       General: He is not in acute distress.     Appearance: He is well-developed. He is not ill-appearing, toxic-appearing or diaphoretic.   HENT:      Head: Normocephalic and atraumatic.      Nose: Nose normal. No congestion or rhinorrhea.      Mouth/Throat:      Mouth:  Mucous membranes are moist.      Pharynx: No oropharyngeal exudate.   Eyes:      General: No scleral icterus.        Right eye: No discharge.         Left eye: No discharge.      Extraocular Movements: Extraocular movements intact.      Pupils: Pupils are equal, round, and reactive to light.   Neck:      Thyroid: No thyromegaly.      Vascular: No carotid bruit or JVD.      Trachea: No tracheal deviation.   Cardiovascular:      Rate and Rhythm: Normal rate and regular rhythm.      Pulses: Normal pulses.      Heart sounds: Normal heart sounds. No murmur heard.    No friction rub. No gallop.   Pulmonary:      Effort: Pulmonary effort is normal. No respiratory distress.      Breath sounds: No stridor. Rhonchi present. No wheezing or rales.   Chest:      Chest wall: No tenderness.   Abdominal:      General: Bowel sounds are normal. There is no distension.      Palpations: Abdomen is soft.      Tenderness: There is no abdominal tenderness. There is no guarding.   Musculoskeletal:         General: Swelling present. No tenderness, deformity or signs of injury. Normal range of motion.      Cervical back: Normal range of motion and neck supple. No rigidity. No muscular tenderness.      Right lower leg: Edema present.      Left lower leg: Edema present.   Lymphadenopathy:      Cervical: No cervical adenopathy.   Skin:     General: Skin is warm and dry.      Coloration: Skin is not jaundiced or pale.      Findings: No bruising or erythema.   Neurological:      General: No focal deficit present.      Mental Status: He is alert and oriented to person, place, and time. Mental status is at baseline.      Cranial Nerves: No cranial nerve deficit.      Sensory: No sensory deficit.      Motor: Weakness present. No abnormal muscle tone.      Coordination: Coordination normal.   Psychiatric:         Mood and Affect: Mood normal.         Behavior: Behavior normal.         Thought Content: Thought content normal.         Judgment:  Judgment normal.       Pertinent  and/or Most Recent Results     LAB RESULTS:      Lab 04/17/23  1617 04/17/23  1028 04/16/23  1506 04/16/23  1352   WBC  --   --   --  8.00   HEMOGLOBIN  --   --   --  11.3*   HEMATOCRIT  --   --   --  35.2*   PLATELETS  --   --   --  181   NEUTROS ABS  --   --   --  6.50   LYMPHS ABS  --   --   --  0.70   MONOS ABS  --   --   --  0.60   EOS ABS  --   --   --  0.10   MCV  --   --   --  93.7   PROCALCITONIN  --  0.55*  --   --    LACTATE 1.5 2.1* 1.1  --    PROTIME  --  12.3*  --   --          Lab 04/17/23  1617 04/17/23  1028 04/16/23  1352   SODIUM 129* 128* 131*   POTASSIUM 4.4 3.8 3.2*   CHLORIDE 84* 83* 85*   CO2 33.0* 35.0* 32.0*   ANION GAP 12.0 10.0 14.0   * 102* 103*   CREATININE 3.64* 3.60* 3.73*   EGFR 18.3* 18.5* 17.8*   GLUCOSE 257* 282* 188*   CALCIUM 9.3 9.3 9.8   IONIZED CALCIUM  --  1.17*  --    MAGNESIUM  --  2.2 2.1   PHOSPHORUS  --  4.1 4.0   HEMOGLOBIN A1C  --  8.90*  --    TSH  --   --  3.270         Lab 04/17/23  1617 04/16/23  1352   TOTAL PROTEIN 6.7 7.2   ALBUMIN 3.1* 3.4*   GLOBULIN 3.6 3.8   ALT (SGPT) 28 25   AST (SGOT) 32 33   BILIRUBIN 0.8 0.8   ALK PHOS 100 93         Lab 04/17/23  1028 04/16/23  1352   PROBNP  --  40,356.0*   HSTROP T  --  62*   PROTIME 12.3*  --    INR 1.21*  --          Lab 04/17/23  1028   CHOLESTEROL 165   LDL CHOL 111*   HDL CHOL 38*   TRIGLYCERIDES 82         Lab 04/17/23  1028   IRON 69   IRON SATURATION 15*   TIBC 460   TRANSFERRIN 309   FERRITIN 143.20         Brief Urine Lab Results  (Last result in the past 365 days)      Color   Clarity   Blood   Leuk Est   Nitrite   Protein   CREAT   Urine HCG        04/16/23 1451 Yellow   Clear   Negative   Trace   Negative   >=300 mg/dL (3+)               Microbiology Results (last 10 days)     Procedure Component Value - Date/Time    Respiratory Panel PCR w/COVID-19(SARS-CoV-2) HAI/POP/CLARA/PAD/COR/MAD/ANNY In-House, NP Swab in UTM/VTM, 3-4 HR TAT - Swab, Nasopharynx [[id number]]   (Normal) Collected: 04/16/23 9732    Lab Status: Final result Specimen: Swab from Nasopharynx Updated: 04/16/23 4057     ADENOVIRUS, PCR Not Detected     Coronavirus 229E Not Detected     Coronavirus HKU1 Not Detected     Coronavirus NL63 Not Detected     Coronavirus OC43 Not Detected     COVID19 Not Detected     Human Metapneumovirus Not Detected     Human Rhinovirus/Enterovirus Not Detected     Influenza A PCR Not Detected     Influenza B PCR Not Detected     Parainfluenza Virus 1 Not Detected     Parainfluenza Virus 2 Not Detected     Parainfluenza Virus 3 Not Detected     Parainfluenza Virus 4 Not Detected     RSV, PCR Not Detected     Bordetella pertussis pcr Not Detected     Bordetella parapertussis PCR Not Detected     Chlamydophila pneumoniae PCR Not Detected     Mycoplasma pneumo by PCR Not Detected    Narrative:      In the setting of a positive respiratory panel with a viral infection PLUS a negative procalcitonin without other underlying concern for bacterial infection, consider observing off antibiotics or discontinuation of antibiotics and continue supportive care. If the respiratory panel is positive for atypical bacterial infection (Bordetella pertussis, Chlamydophila pneumoniae, or Mycoplasma pneumoniae), consider antibiotic de-escalation to target atypical bacterial infection.          CT Abdomen Pelvis Without Contrast    Result Date: 4/16/2023  Impression: Impression: No acute abdominal or pelvic abnormality. Electronically Signed: Prudencio Saez  4/16/2023 2:21 PM EDT  Workstation ID: [id number]    XR Chest 1 View    Result Date: 4/16/2023  Impression: Impression: Cardiomegaly with mild pulmonary vascular congestion. Electronically Signed: Prudencio Saez  4/16/2023 1:53 PM EDT  Workstation ID: [id number]      Results for orders placed during the hospital encounter of 06/09/21    Doppler Arterial Multi Level Lower Extremity - Bilateral CAR    Interpretation Summary  · Right Conclusion: The right LEONCIO is  normal. Normal digital pressures.  · Left Conclusion: The left LEONCIO is moderately reduced. Waveforms are consistent with femoral disease and popliteal disease. Moderate digital ischemia.      Results for orders placed during the hospital encounter of 06/09/21    Doppler Arterial Multi Level Lower Extremity - Bilateral CAR    Interpretation Summary  · Right Conclusion: The right LEONCIO is normal. Normal digital pressures.  · Left Conclusion: The left LEONCIO is moderately reduced. Waveforms are consistent with femoral disease and popliteal disease. Moderate digital ischemia.      Results for orders placed during the hospital encounter of 03/09/22    Adult Transthoracic Echo Complete W/ Cont if Necessary Per Protocol    Interpretation Summary  · Left ventricular systolic function is severely decreased.  · Left ventricular ejection fraction is 10 to 15%  · Akinetic septum, distal anterior wall, apex and distal inferior wall.  · Left ventricular diastolic function was normal.  · Moderate mitral valve regurgitation is present.      Labs Pending at Discharge:      Procedures Performed  Procedure(s):  LAPAROSCOPIC INTRA PERITONEAL CATHETER PLACEMENT         Consults:   Consults     Date and Time Order Name Status Description    4/17/2023  2:28 PM Inpatient Endocrinology Consult Completed     4/16/2023  8:09 PM Inpatient Nephrology Consult      4/16/2023  6:07 PM Inpatient Cardiology Consult Completed     4/16/2023  6:05 PM Inpatient General Surgery Consult          ASSESSMENT:  • Chronic kidney disease stage V  • History of hypertension  • Diabetes mellitus  • Congestive heart failure with ejection fraction of 15%  • Coronary artery disease with multiple stent placement  • History of GI bleed  • History of significant edema  • Azotemia              PLAN :      • At this time patient will get a PD catheter placed next week as patient was on blood thinner  • Okay to be discharged from readmission on Monday  • As clinically patient  is feeling better and he really do not want to be initiated on hemodialysis I will get PD catheter placed and will get it flushed in 1 to 2 days and start patient on PD training sometime in 1 to 2 weeks.    • We will get repeat echocardiogram result which are still pending   • Restart Brilinta after the PD catheter placement  • Cardiology to clear patient  • Will hold Brilinta because patient may go for procedure next week   • thank you for letting me participate  • We will consider starting patient on Entresto        Electronically signed by Harish Lagunas MD,   [facility] kidney consultant    ==============================================================    Assessment & Plan  1. Diabetes type 1, with hyperglycemia & nephropathy  2. Acute on chronic systolic CHF     Continue OmniPod insulin pump for hospital protocol.  Will have pump  reach out to pt to verify he is using his pump with all its benefits.  Will follow with you.  Thank you for the consult.     I discussed the patients findings and my recommendations with patient     Shirley Guido MD  =================================================        Discharge Details        Discharge Medications      New Medications      Instructions Start Date   traZODone 50 MG tablet  Commonly known as: DESYREL   50 mg, Oral, Nightly         Continue These Medications      Instructions Start Date   aspirin 81 MG EC tablet   1 tablet, Daily      bumetanide 2 MG tablet  Commonly known as: BUMEX   1 mg, Oral, 3 Times Daily      calcitriol 0.25 MCG capsule  Commonly known as: ROCALTROL   1 capsule, Oral, Daily      Daily-Vitamin tablet tablet  Generic drug: multivitamin   1 tablet, Every 24 Hours      fish oil 1000 MG capsule capsule   1 capsule, Every 24 Hours      hydrALAZINE 25 MG tablet  Commonly known as: APRESOLINE   Take 1 tablet by mouth twice daily.      Insulin Lispro 100 UNIT/ML injection  Commonly known as: humaLOG   Inject via insulin pump. Maximum dose 50  units daily.      isosorbide mononitrate 30 MG 24 hr tablet  Commonly known as: IMDUR   30 mg, Daily      metoprolol succinate XL 50 MG 24 hr tablet  Commonly known as: TOPROL-XL   50 mg, Oral, Daily      Omnipod DASH Pods (Gen 4) misc   1 each, Other, Every 3 Days      sodium chloride 0.65 % nasal spray  Commonly known as: Ocean Nasal Spray   1 spray, Nasal, 2 Times Daily      spironolactone 25 MG tablet  Commonly known as: ALDACTONE   25 mg, Oral, Daily      traMADol 50 MG tablet  Commonly known as: Ultram   50 mg, Oral, Every 12 Hours PRN      VITAMIN B COMPLEX PO   1 tablet, Oral, Daily      vitamin D3 125 MCG (5000 UT) capsule capsule   1 capsule, Oral, Daily         Stop These Medications    Brilinta 90 MG tablet tablet  Generic drug: ticagrelor            Allergies   Allergen Reactions   • Codeine Unknown (See Comments)         Discharge Disposition:   Home or Self Care    Diet:  Hospital:  Diet Order   Procedures   • Diet: Cardiac Diets; Healthy Heart (2-3 Na+); Texture: Regular Texture (IDDSI 7); Fluid Consistency: Thin (IDDSI 0)         Discharge Activity:   Activity Instructions     Gradually Increase Activity Until at Pre-Hospitalization Level              CODE STATUS:  Code Status and Medical Interventions:   Ordered at: 04/17/23 9074     Level Of Support Discussed With:    Patient     Code Status (Patient has no pulse and is not breathing):    CPR (Attempt to Resuscitate)     Medical Interventions (Patient has pulse or is breathing):    Full Support     Release to patient:    Routine Release     I have utilized all available, immediate resources to obtain, update, or review the patient's current medications including all prescriptions, over-the-counter products, herbals, cannabis/cannabidiol products, and vitamin.mineral/dietary (nutritional) supplements.      Level Of Support Discussed With: Patient  Code Status (Patient has no pulse and is not breathing): CPR (Attempt to Resuscitate)  Medical  Interventions (Patient has pulse or is breathing): Full Support  Release to patient: Routine Release    Next of kin of Power of :       Admission Status:  I believe this patient meets admit status.    Future Appointments   Date Time Provider Department Center   5/5/2023  3:30 PM [facility] CT 3 BH [facility]   9/29/2023 10:00 AM Roman Blanco MD K [facility]       Additional Instructions for the Follow-ups that You Need to Schedule     Discharge Follow-up with PCP   As directed       Currently Documented PCP:    Erika Hernandez MD    PCP Phone Number:    [phone]     Follow Up Details: 1 week         Discharge Follow-up with Specified Provider: renal; 1 Week   As directed      To: renal    Follow Up: 1 Week         Discharge Follow-up with Specified Provider: surgery; 1 Week   As directed      To: surgery    Follow Up: 1 Week               Time spent on Discharge including face to face service:  34 minutes    This patient has been examined wearing appropriate Personal Protective Equipment and discussed with rn. 04/18/23      Signature: Electronically signed by Winston Caldera MD, 04/18/23, 1:26 PM EDT.      Electronically signed by Winston Caldera MD at 04/18/23 7583

## 2023-04-21 NOTE — TELEPHONE ENCOUNTER
Per Dr. Guido, patient is to try to switch to Omnipod 5. Rx's sent to pharmacy per MD s/o and patient is to call Omnipod rep Devaughn WATERMAN when he picks up supplies or if cost is too high.

## 2023-04-24 ENCOUNTER — ANESTHESIA (OUTPATIENT)
Dept: PERIOP | Facility: HOSPITAL | Age: 63
End: 2023-04-24
Payer: COMMERCIAL

## 2023-04-24 ENCOUNTER — ANESTHESIA EVENT (OUTPATIENT)
Dept: PERIOP | Facility: HOSPITAL | Age: 63
End: 2023-04-24
Payer: COMMERCIAL

## 2023-04-24 ENCOUNTER — HOSPITAL ENCOUNTER (EMERGENCY)
Facility: HOSPITAL | Age: 63
Discharge: HOME | End: 2023-04-25
Attending: EMERGENCY MEDICINE | Admitting: EMERGENCY MEDICINE
Payer: COMMERCIAL

## 2023-04-24 ENCOUNTER — HOSPITAL ENCOUNTER (OUTPATIENT)
Facility: HOSPITAL | Age: 63
Setting detail: HOSPITAL OUTPATIENT SURGERY
Discharge: HOME | End: 2023-04-24
Attending: SURGERY | Admitting: SURGERY
Payer: COMMERCIAL

## 2023-04-24 VITALS
HEART RATE: 96 BPM | OXYGEN SATURATION: 97 % | SYSTOLIC BLOOD PRESSURE: 116 MMHG | TEMPERATURE: 98 F | WEIGHT: 209.2 LBS | HEIGHT: 69 IN | DIASTOLIC BLOOD PRESSURE: 79 MMHG | RESPIRATION RATE: 16 BRPM | BODY MASS INDEX: 30.98 KG/M2

## 2023-04-24 DIAGNOSIS — N18.4 STAGE 4 CHRONIC KIDNEY DISEASE: ICD-10-CM

## 2023-04-24 DIAGNOSIS — Z48.89 ENCOUNTER FOR POSTOPERATIVE WOUND CHECK: Primary | ICD-10-CM

## 2023-04-24 LAB
GLUCOSE BLDC GLUCOMTR-MCNC: 156 MG/DL (ref 70–105)
GLUCOSE BLDC GLUCOMTR-MCNC: 160 MG/DL (ref 70–105)
GLUCOSE BLDC GLUCOMTR-MCNC: 86 MG/DL (ref 70–105)

## 2023-04-24 PROCEDURE — 25010000002 PROPOFOL 200 MG/20ML EMULSION: Performed by: NURSE ANESTHETIST, CERTIFIED REGISTERED

## 2023-04-24 PROCEDURE — C1750 CATH, HEMODIALYSIS,LONG-TERM: HCPCS | Performed by: SURGERY

## 2023-04-24 PROCEDURE — 99283 EMERGENCY DEPT VISIT LOW MDM: CPT

## 2023-04-24 PROCEDURE — 25010000002 DEXAMETHASONE PER 1 MG: Performed by: NURSE ANESTHETIST, CERTIFIED REGISTERED

## 2023-04-24 PROCEDURE — 25010000002 ONDANSETRON PER 1 MG: Performed by: NURSE ANESTHETIST, CERTIFIED REGISTERED

## 2023-04-24 PROCEDURE — 25010000002 SUGAMMADEX 200 MG/2ML SOLUTION: Performed by: NURSE ANESTHETIST, CERTIFIED REGISTERED

## 2023-04-24 PROCEDURE — 25010000002 MIDAZOLAM PER 1 MG: Performed by: NURSE ANESTHETIST, CERTIFIED REGISTERED

## 2023-04-24 PROCEDURE — 25010000002 CEFAZOLIN PER 500 MG: Performed by: SURGERY

## 2023-04-24 PROCEDURE — 82962 GLUCOSE BLOOD TEST: CPT

## 2023-04-24 DEVICE — IMPLANTABLE DEVICE: Type: IMPLANTABLE DEVICE | Site: ABDOMEN | Status: FUNCTIONAL

## 2023-04-24 RX ORDER — LIDOCAINE HYDROCHLORIDE 20 MG/ML
INJECTION, SOLUTION EPIDURAL; INFILTRATION; INTRACAUDAL; PERINEURAL AS NEEDED
Status: DISCONTINUED | OUTPATIENT
Start: 2023-04-24 | End: 2023-04-24 | Stop reason: SURG

## 2023-04-24 RX ORDER — GLYCOPYRROLATE 0.2 MG/ML
INJECTION INTRAMUSCULAR; INTRAVENOUS AS NEEDED
Status: DISCONTINUED | OUTPATIENT
Start: 2023-04-24 | End: 2023-04-24 | Stop reason: SURG

## 2023-04-24 RX ORDER — PROMETHAZINE HYDROCHLORIDE 25 MG/1
25 SUPPOSITORY RECTAL ONCE AS NEEDED
Status: DISCONTINUED | OUTPATIENT
Start: 2023-04-24 | End: 2023-04-24 | Stop reason: HOSPADM

## 2023-04-24 RX ORDER — BUPIVACAINE HYDROCHLORIDE 2.5 MG/ML
INJECTION, SOLUTION EPIDURAL; INFILTRATION; INTRACAUDAL AS NEEDED
Status: DISCONTINUED | OUTPATIENT
Start: 2023-04-24 | End: 2023-04-24 | Stop reason: HOSPADM

## 2023-04-24 RX ORDER — PROPOFOL 10 MG/ML
INJECTION, EMULSION INTRAVENOUS AS NEEDED
Status: DISCONTINUED | OUTPATIENT
Start: 2023-04-24 | End: 2023-04-24 | Stop reason: SURG

## 2023-04-24 RX ORDER — ONDANSETRON 2 MG/ML
INJECTION INTRAMUSCULAR; INTRAVENOUS AS NEEDED
Status: DISCONTINUED | OUTPATIENT
Start: 2023-04-24 | End: 2023-04-24 | Stop reason: SURG

## 2023-04-24 RX ORDER — NOREPINEPHRINE BITARTRATE 1 MG/ML
INJECTION, SOLUTION INTRAVENOUS AS NEEDED
Status: DISCONTINUED | OUTPATIENT
Start: 2023-04-24 | End: 2023-04-24

## 2023-04-24 RX ORDER — DROPERIDOL 2.5 MG/ML
0.62 INJECTION, SOLUTION INTRAMUSCULAR; INTRAVENOUS
Status: DISCONTINUED | OUTPATIENT
Start: 2023-04-24 | End: 2023-04-24 | Stop reason: HOSPADM

## 2023-04-24 RX ORDER — ETOMIDATE 2 MG/ML
INJECTION INTRAVENOUS AS NEEDED
Status: DISCONTINUED | OUTPATIENT
Start: 2023-04-24 | End: 2023-04-24 | Stop reason: SURG

## 2023-04-24 RX ORDER — DEXAMETHASONE SODIUM PHOSPHATE 4 MG/ML
INJECTION, SOLUTION INTRA-ARTICULAR; INTRALESIONAL; INTRAMUSCULAR; INTRAVENOUS; SOFT TISSUE AS NEEDED
Status: DISCONTINUED | OUTPATIENT
Start: 2023-04-24 | End: 2023-04-24 | Stop reason: SURG

## 2023-04-24 RX ORDER — FENTANYL CITRATE 50 UG/ML
50 INJECTION, SOLUTION INTRAMUSCULAR; INTRAVENOUS
Status: DISCONTINUED | OUTPATIENT
Start: 2023-04-24 | End: 2023-04-24 | Stop reason: HOSPADM

## 2023-04-24 RX ORDER — LABETALOL HYDROCHLORIDE 5 MG/ML
5 INJECTION, SOLUTION INTRAVENOUS
Status: DISCONTINUED | OUTPATIENT
Start: 2023-04-24 | End: 2023-04-24 | Stop reason: HOSPADM

## 2023-04-24 RX ORDER — IPRATROPIUM BROMIDE AND ALBUTEROL SULFATE 2.5; .5 MG/3ML; MG/3ML
3 SOLUTION RESPIRATORY (INHALATION) ONCE AS NEEDED
Status: DISCONTINUED | OUTPATIENT
Start: 2023-04-24 | End: 2023-04-24 | Stop reason: HOSPADM

## 2023-04-24 RX ORDER — HYDROCODONE BITARTRATE AND ACETAMINOPHEN 5; 325 MG/1; MG/1
1 TABLET ORAL ONCE AS NEEDED
Status: DISCONTINUED | OUTPATIENT
Start: 2023-04-24 | End: 2023-04-24 | Stop reason: HOSPADM

## 2023-04-24 RX ORDER — PROMETHAZINE HYDROCHLORIDE 25 MG/1
25 TABLET ORAL ONCE AS NEEDED
Status: DISCONTINUED | OUTPATIENT
Start: 2023-04-24 | End: 2023-04-24 | Stop reason: HOSPADM

## 2023-04-24 RX ORDER — NALOXONE HCL 0.4 MG/ML
0.2 VIAL (ML) INJECTION AS NEEDED
Status: DISCONTINUED | OUTPATIENT
Start: 2023-04-24 | End: 2023-04-24 | Stop reason: HOSPADM

## 2023-04-24 RX ORDER — ONDANSETRON 2 MG/ML
4 INJECTION INTRAMUSCULAR; INTRAVENOUS ONCE AS NEEDED
Status: DISCONTINUED | OUTPATIENT
Start: 2023-04-24 | End: 2023-04-24 | Stop reason: HOSPADM

## 2023-04-24 RX ORDER — DIPHENHYDRAMINE HYDROCHLORIDE 50 MG/ML
12.5 INJECTION INTRAMUSCULAR; INTRAVENOUS
Status: DISCONTINUED | OUTPATIENT
Start: 2023-04-24 | End: 2023-04-24 | Stop reason: HOSPADM

## 2023-04-24 RX ORDER — DEXTROSE MONOHYDRATE 25 G/50ML
50 INJECTION, SOLUTION INTRAVENOUS
Status: COMPLETED | OUTPATIENT
Start: 2023-04-24 | End: 2023-04-24

## 2023-04-24 RX ORDER — ROCURONIUM BROMIDE 10 MG/ML
INJECTION, SOLUTION INTRAVENOUS AS NEEDED
Status: DISCONTINUED | OUTPATIENT
Start: 2023-04-24 | End: 2023-04-24 | Stop reason: SURG

## 2023-04-24 RX ORDER — SODIUM CHLORIDE 0.9 % (FLUSH) 0.9 %
3 SYRINGE (ML) INJECTION EVERY 12 HOURS SCHEDULED
Status: DISCONTINUED | OUTPATIENT
Start: 2023-04-24 | End: 2023-04-24 | Stop reason: HOSPADM

## 2023-04-24 RX ORDER — MIDAZOLAM HYDROCHLORIDE 1 MG/ML
INJECTION INTRAMUSCULAR; INTRAVENOUS AS NEEDED
Status: DISCONTINUED | OUTPATIENT
Start: 2023-04-24 | End: 2023-04-24 | Stop reason: SURG

## 2023-04-24 RX ORDER — SODIUM CHLORIDE, SODIUM LACTATE, POTASSIUM CHLORIDE, AND CALCIUM CHLORIDE .6; .31; .03; .02 G/100ML; G/100ML; G/100ML; G/100ML
20 INJECTION, SOLUTION INTRAVENOUS CONTINUOUS
Status: DISCONTINUED | OUTPATIENT
Start: 2023-04-24 | End: 2023-04-24 | Stop reason: HOSPADM

## 2023-04-24 RX ORDER — KETAMINE HCL IN NACL, ISO-OSM 100MG/10ML
SYRINGE (ML) INJECTION AS NEEDED
Status: DISCONTINUED | OUTPATIENT
Start: 2023-04-24 | End: 2023-04-24 | Stop reason: SURG

## 2023-04-24 RX ORDER — HYDRALAZINE HYDROCHLORIDE 20 MG/ML
5 INJECTION INTRAMUSCULAR; INTRAVENOUS
Status: DISCONTINUED | OUTPATIENT
Start: 2023-04-24 | End: 2023-04-24 | Stop reason: HOSPADM

## 2023-04-24 RX ORDER — FLUMAZENIL 0.1 MG/ML
0.2 INJECTION INTRAVENOUS AS NEEDED
Status: DISCONTINUED | OUTPATIENT
Start: 2023-04-24 | End: 2023-04-24 | Stop reason: HOSPADM

## 2023-04-24 RX ORDER — SODIUM CHLORIDE 9 MG/ML
100 INJECTION, SOLUTION INTRAVENOUS CONTINUOUS
Status: DISCONTINUED | OUTPATIENT
Start: 2023-04-24 | End: 2023-04-24 | Stop reason: HOSPADM

## 2023-04-24 RX ORDER — SODIUM CHLORIDE 9 MG/ML
40 INJECTION, SOLUTION INTRAVENOUS AS NEEDED
Status: DISCONTINUED | OUTPATIENT
Start: 2023-04-24 | End: 2023-04-24 | Stop reason: HOSPADM

## 2023-04-24 RX ORDER — SODIUM CHLORIDE 0.9 % (FLUSH) 0.9 %
3-10 SYRINGE (ML) INJECTION AS NEEDED
Status: DISCONTINUED | OUTPATIENT
Start: 2023-04-24 | End: 2023-04-24 | Stop reason: HOSPADM

## 2023-04-24 RX ORDER — NOREPINEPHRINE BITARTRATE 0.03 MG/ML
INJECTION, SOLUTION INTRAVENOUS CONTINUOUS PRN
Status: DISCONTINUED | OUTPATIENT
Start: 2023-04-24 | End: 2023-04-24 | Stop reason: SURG

## 2023-04-24 RX ADMIN — Medication 30 MG: at 10:12

## 2023-04-24 RX ADMIN — ETOMIDATE 10 MG: 2 INJECTION INTRAVENOUS at 10:12

## 2023-04-24 RX ADMIN — MIDAZOLAM 4 MG: 1 INJECTION INTRAMUSCULAR; INTRAVENOUS at 10:12

## 2023-04-24 RX ADMIN — SODIUM CHLORIDE, POTASSIUM CHLORIDE, SODIUM LACTATE AND CALCIUM CHLORIDE 500 ML: 600; 310; 30; 20 INJECTION, SOLUTION INTRAVENOUS at 11:12

## 2023-04-24 RX ADMIN — DEXTROSE MONOHYDRATE 25 ML: 25 INJECTION, SOLUTION INTRAVENOUS at 08:33

## 2023-04-24 RX ADMIN — DEXAMETHASONE SODIUM PHOSPHATE 4 MG: 4 INJECTION, SOLUTION INTRAMUSCULAR; INTRAVENOUS at 10:16

## 2023-04-24 RX ADMIN — Medication 0.03 MCG/KG/MIN: at 10:12

## 2023-04-24 RX ADMIN — SODIUM CHLORIDE, POTASSIUM CHLORIDE, SODIUM LACTATE AND CALCIUM CHLORIDE 20 ML/HR: 600; 310; 30; 20 INJECTION, SOLUTION INTRAVENOUS at 08:33

## 2023-04-24 RX ADMIN — GLYCOPYRROLATE 0.2 MCG: 0.2 INJECTION INTRAMUSCULAR; INTRAVENOUS at 10:20

## 2023-04-24 RX ADMIN — SUGAMMADEX 200 MG: 100 INJECTION, SOLUTION INTRAVENOUS at 10:50

## 2023-04-24 RX ADMIN — CEFAZOLIN 2 G: 2 INJECTION, POWDER, FOR SOLUTION INTRAMUSCULAR; INTRAVENOUS at 10:20

## 2023-04-24 RX ADMIN — LIDOCAINE HYDROCHLORIDE 100 MG: 20 INJECTION, SOLUTION EPIDURAL; INFILTRATION; INTRACAUDAL; PERINEURAL at 10:12

## 2023-04-24 RX ADMIN — ONDANSETRON 4 MG: 2 INJECTION INTRAMUSCULAR; INTRAVENOUS at 10:15

## 2023-04-24 RX ADMIN — DEXTROSE MONOHYDRATE 25 ML: 25 INJECTION, SOLUTION INTRAVENOUS at 09:50

## 2023-04-24 RX ADMIN — ROCURONIUM BROMIDE 50 MG: 10 INJECTION, SOLUTION INTRAVENOUS at 10:12

## 2023-04-24 RX ADMIN — PROPOFOL 50 MG: 10 INJECTION, EMULSION INTRAVENOUS at 10:12

## 2023-04-24 NOTE — PRE-PROCEDURE NOTE
Patient states glucose 81 at 0950 and trending down per Omnipod reading.  Dr. Busby, anesthesiologist, at bedside, states give second half amp D50 now. Surgery nurse, Cristine, at bedside and ready to take patient to OR.

## 2023-04-24 NOTE — ANESTHESIA PREPROCEDURE EVALUATION
Anesthesia Evaluation     Patient summary reviewed and Nursing notes reviewed   no history of anesthetic complications:  NPO Solid Status: > 8 hours  NPO Liquid Status: > 8 hours           Airway   Mallampati: II  TM distance: >3 FB  Neck ROM: full  No difficulty expected  Dental    (+) edentulous    Pulmonary    (+) COPD,   Cardiovascular   Exercise tolerance: poor (<4 METS)    ECG reviewed  PT is on anticoagulation therapy    (+) hypertension, CAD, CHF , hyperlipidemia,     ROS comment: EF is <20% on most recent echo 4/18, was 10-15% in 3/2023    Neuro/Psych  (+) CVA, numbness,    GI/Hepatic/Renal/Endo    (+)  GERD, GI bleeding , renal disease ESRD, diabetes mellitus type 2,     Musculoskeletal (-) negative ROS    Abdominal    Substance History - negative use     OB/GYN          Other   blood dyscrasia anemia,     ROS/Med Hx Other: •  Left ventricular systolic function is severely decreased. Left ventricular ejection fraction appears to be less than 20%.  •  The left ventricular cavity is moderate to severely dilated.  •  The right ventricular cavity is moderately dilated.  •  Estimated right ventricular systolic pressure from tricuspid regurgitation is markedly elevated (>55 mmHg). Calculated right ventricular systolic pressure from tricuspid regurgitation is 58 mmHg.        Phys Exam Other: Significant pitting edema in b/l le                Anesthesia Plan    ASA 4     MAC     (Pt is a Uatsdin.  He refuses all blood product.  Worsening systolic HF per cardiology notes, cardiorenal syndrome, need for dialysis.  His risk for significant complications/morbidity is high,including major cardiac event.  Would be higher for intrabdominal laparoscopic surgery than for less invasive technique.  Extensive discussion had with patient and all questions answered.  )  intravenous induction     Anesthetic plan, risks, benefits, and alternatives have been provided, discussed and informed consent has been obtained  with: patient.    Plan discussed with CRNA and CAA.        CODE STATUS:

## 2023-04-24 NOTE — ANESTHESIA POSTPROCEDURE EVALUATION
Patient: Esdras Peralta    Procedure Summary     Date: 04/24/23 Room / Location: [facility] OR 08 / [facility] MAIN OR    Anesthesia Start: 1004 Anesthesia Stop: 1112    Procedure: LAPAROSCOPIC INTRA PERITONEAL CATHETER PLACEMENT Diagnosis:       Stage 3b chronic kidney disease      (Stage 3b chronic kidney disease [N18.32])    Surgeons: Jena Cabrera MD Provider: Xena Busby MD    Anesthesia Type: MAC ASA Status: 4          Anesthesia Type: MAC    Vitals  Vitals Value Taken Time   /63 04/24/23 1222   Temp 97.3 °F (36.3 °C) 04/24/23 1222   Pulse 87 04/24/23 1224   Resp 12 04/24/23 1222   SpO2 94 % 04/24/23 1224   Vitals shown include unvalidated device data.        Post Anesthesia Care and Evaluation    Patient location during evaluation: PACU  Patient participation: complete - patient participated  Level of consciousness: awake and alert  Pain management: satisfactory to patient    Airway patency: patent  Anesthetic complications: No anesthetic complications  PONV Status: none  Cardiovascular status: acceptable  Respiratory status: acceptable  Hydration status: acceptable

## 2023-04-24 NOTE — INTERVAL H&P NOTE
H&P reviewed. The patient was examined and there are no changes to the H&P.      Plan for possible admission depending on how pt does under general anesthesia.

## 2023-04-24 NOTE — DISCHARGE INSTRUCTIONS
Call the office to schedule followup appointment approx 2 wks postop  Keep dressings dry and in place until teaching performed by dialysis staff  Sponge bath only until then  No lifting >10-15 lbs x 2 wks  Over the counter stool tylenol and ibuprofen as needed for pain

## 2023-04-24 NOTE — PRE-PROCEDURE NOTE
"Patient stated upon arrival to OPS that he felt \"blood sugar was lower than normal\", states he \"usually always runs over 150.\"  Patient has Omnipod showing glucose 86 and trending down.  At 0820 patient states Omnipod showing glucose of 64.  Dr. Busby, anesthesiologist, at bedside and informed.  Half amp D50 ordered stat and given.  At 0840 patient states Omnipod showing 78.  At 0854, he states .     Patient states he is Sabianism and refuses any blood products.  Blood refusal consent obtained.  Dr. Busby at bedside having extensive discussion with patient and spouse regarding patient's desired wishes regarding blood products in an emergency.   "

## 2023-04-24 NOTE — ANESTHESIA PROCEDURE NOTES
Airway  Urgency: elective    Date/Time: 4/24/2023 10:14 AM  Airway not difficult    General Information and Staff    Patient location during procedure: OR  CRNA/CAA: Sadie Angela, CRNA    Indications and Patient Condition  Indications for airway management: airway protection    Preoxygenated: yes  Mask difficulty assessment: 2 - vent by mask + OA or adjuvant +/- NMBA    Final Airway Details  Final airway type: endotracheal airway      Successful airway: ETT  Cuffed: yes   Successful intubation technique: direct laryngoscopy  Endotracheal tube insertion site: oral  Blade: Myrtle  Blade size: 4  ETT size (mm): 7.5  Cormack-Lehane Classification: grade I - full view of glottis  Placement verified by: chest auscultation and capnometry   Cuff volume (mL): 8  Measured from: lips  ETT/EBT  to lips (cm): 21  Number of attempts at approach: 1    Additional Comments  Atraumatic placement of ETT, dentition unchanged from preop.

## 2023-04-25 ENCOUNTER — TELEPHONE (OUTPATIENT)
Dept: SURGERY | Facility: CLINIC | Age: 63
End: 2023-04-25
Payer: COMMERCIAL

## 2023-04-25 VITALS
HEIGHT: 69 IN | WEIGHT: 217.59 LBS | BODY MASS INDEX: 32.23 KG/M2 | RESPIRATION RATE: 16 BRPM | SYSTOLIC BLOOD PRESSURE: 136 MMHG | TEMPERATURE: 98.7 F | DIASTOLIC BLOOD PRESSURE: 94 MMHG | OXYGEN SATURATION: 96 % | HEART RATE: 90 BPM

## 2023-04-25 NOTE — DISCHARGE INSTRUCTIONS
Please continue postoperative instructions  Return to ED for new or worsening symptoms  Please call Dr. Cabrera office in the morning

## 2023-04-25 NOTE — OP NOTE
Operative Report    Patient Name:  Esdras Peralta  YOB: 1960    Date of Surgery:  4/24/2023     Pre-op Diagnosis:   Chronic kidney disease    Post-op Diagnosis:   Chronic kidney disease    Procedure(s):  Laparoscopic peritoneal dialysis catheter placement    Staff:  Surgeon(s):  Jena Cabrera MD    Circulator: Beatris Scanlon RN; Tico West RN  Scrub Person: Maryam Carranza RN  Assistant: Belen Miramontes  was responsible for performing the following activities: Closing, Placing Dressing and Held/Positioned Camera and their skilled assistance was necessary for the success of this case.    Anesthesia: General    Estimated Blood Loss:  Minimal    Implants:    Implant Name Type Inv. Item Serial No.  Lot No. LRB No. Used Action   CATH CURL DIEGO SWAN NK 2CUF 62.5CM LT - [id number] Implant CATH CURL DIEGO SWAN NK 2CUF 62.5CM LT  [employer] [id number] N/A 1 Implanted     Specimen:          None    Findings: No adhesions seen, placement of PD catheter within pelvis    Complications: None immediate    CLINICAL INDICATIONS: Patient is a 62 year old male with chronic kidney disease/end stage renal disease and in need of dialysis.  We discussed options for hemodialysis and peritoneal dialysis and the patient wished to proceed with peritoneal dialysis catheter placement.  The patient presents today for peritoneal dialysis placement. The surgery along with the risks, benefits, and alternatives to surgery were discussed.  Risks discussed included but were not limited to bleeding, infection, catheter malfunction requiring surgical revision.  The patient verbalized understanding and wished to proceed with surgery. Informed consent was obtained for laparoscopic possible open peritoneal dialysis catheter placement.    DESCRIPTION OF PROCEDURE: The patient was brought to the operating room and placed in the supine position with both arms out. Bilateral sequential compression stockings  placed on the lower extremities. The patient was induced and intubated by the Anesthesia service. Rylee-operative antibiotics were administered. The patient's abdomen was then prepped and draped in the usual sterile fashion. A time out was performed.    After confirming with Anesthesia that an orogastric tube was in place and to suction I made a small stab incision in the left upper quadrant at Spangler’s point. A veress needle was carefully placed and a water/saline drop test was performed indicating we were likely intra-abdominal. We began insufflation and a low opening pressure again confirmed we were likely intra-abdominal. Once the abdomen was insufflated to 12mmHg the veress needle was removed and a 5mm optical trocar was carefully placed just inferior to the veress needle.  The laparoscope was introduced and we inspected below the port to ensure there was no bleeding or injury caused by the placement of the veress needle or trocar and no injuries were noted. We then placed an 8mm robotic trocar in the right upper quadrant under direct visualization. A 5mm trocar was then placed in the left lower quadrant, tunneling towards the pelvis in the midline. The peritoneal dialysis catheter was placed into the abdomen through the 8mm trocar and pulled out through the 5mm trocar in the left lower quadrant. The catheter tubing was positioned such that both the cuffs were within the abdominal wall. There was no bleeding from the catheter site at the abdominal wall. The catheter tip was positioned low in the pelvis overlying the bowel. It flushed and aspirated easily. The catheter was then capped and clamped. At this point we slowly backed out the 5mm trocar in the left upper quadrant and it was hemostatic. It was then reintroduced and the camera was inserted through the 5mm port to allow removal of the right upper quadrant 8mm port under direct visualization and it was also hemostatic. The abdomen was desufflated and then  the 5mm trocar was again removed.  The catheter was sutured in place with 3-0 nylon suture at the level of the skin. The skin incisions were closed with 4-0 vicryl, cleaned, and dressed with sterile dressings. The catheter site was also cleaned and dressed with sterile dressings.    The patient tolerated the procedure well. He was awakened and extubated by anesthesia and then transferred to the recovery room in stable condition. At the completion of the case, all instrument, needle, and sponge counts were correct.    Jena Cabrera MD     Date: 4/25/2023  Time: 12:25 EDT    This note was created using Dragon Voice Recognition software.
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
## 2023-04-26 ENCOUNTER — HOSPITAL ENCOUNTER (INPATIENT)
Facility: HOSPITAL | Age: 63
LOS: 6 days | Discharge: HOME | End: 2023-05-02
Attending: EMERGENCY MEDICINE | Admitting: STUDENT IN AN ORGANIZED HEALTH CARE EDUCATION/TRAINING PROGRAM
Payer: COMMERCIAL

## 2023-04-26 DIAGNOSIS — E87.70 HYPERVOLEMIA, UNSPECIFIED HYPERVOLEMIA TYPE: ICD-10-CM

## 2023-04-26 DIAGNOSIS — R53.1 WEAKNESS: ICD-10-CM

## 2023-04-26 DIAGNOSIS — N18.9 CHRONIC KIDNEY DISEASE, UNSPECIFIED CKD STAGE: Primary | ICD-10-CM

## 2023-04-26 PROBLEM — N18.6 ESRD (END STAGE RENAL DISEASE): Status: ACTIVE | Noted: 2023-04-26

## 2023-04-26 LAB
ALBUMIN SERPL-MCNC: 2.9 G/DL (ref 3.5–5.2)
ALBUMIN/GLOB SERPL: 0.8 G/DL
ALP SERPL-CCNC: 107 U/L (ref 39–117)
ALT SERPL W P-5'-P-CCNC: 22 U/L (ref 1–41)
ANION GAP SERPL CALCULATED.3IONS-SCNC: 11 MMOL/L (ref 5–15)
AST SERPL-CCNC: 31 U/L (ref 1–40)
BASOPHILS # BLD AUTO: 0 10*3/MM3 (ref 0–0.2)
BASOPHILS NFR BLD AUTO: 0.4 % (ref 0–1.5)
BILIRUB SERPL-MCNC: 0.5 MG/DL (ref 0–1.2)
BUN SERPL-MCNC: 97 MG/DL (ref 8–23)
BUN/CREAT SERPL: 22.6 (ref 7–25)
CALCIUM SPEC-SCNC: 9 MG/DL (ref 8.6–10.5)
CHLORIDE SERPL-SCNC: 89 MMOL/L (ref 98–107)
CO2 SERPL-SCNC: 26 MMOL/L (ref 22–29)
CREAT SERPL-MCNC: 4.3 MG/DL (ref 0.76–1.27)
DEPRECATED RDW RBC AUTO: 68.7 FL (ref 37–54)
EGFRCR SERPLBLD CKD-EPI 2021: 15 ML/MIN/1.73
EOSINOPHIL # BLD AUTO: 0.1 10*3/MM3 (ref 0–0.4)
EOSINOPHIL NFR BLD AUTO: 1 % (ref 0.3–6.2)
ERYTHROCYTE [DISTWIDTH] IN BLOOD BY AUTOMATED COUNT: 20.3 % (ref 12.3–15.4)
GLOBULIN UR ELPH-MCNC: 3.7 GM/DL
GLUCOSE BLDC GLUCOMTR-MCNC: 183 MG/DL (ref 70–105)
GLUCOSE SERPL-MCNC: 209 MG/DL (ref 65–99)
HCT VFR BLD AUTO: 34.9 % (ref 37.5–51)
HGB BLD-MCNC: 11.3 G/DL (ref 13–17.7)
LYMPHOCYTES # BLD AUTO: 0.6 10*3/MM3 (ref 0.7–3.1)
LYMPHOCYTES NFR BLD AUTO: 6.9 % (ref 19.6–45.3)
MAGNESIUM SERPL-MCNC: 2 MG/DL (ref 1.6–2.4)
MCH RBC QN AUTO: 30 PG (ref 26.6–33)
MCHC RBC AUTO-ENTMCNC: 32.3 G/DL (ref 31.5–35.7)
MCV RBC AUTO: 93 FL (ref 79–97)
MONOCYTES # BLD AUTO: 1.1 10*3/MM3 (ref 0.1–0.9)
MONOCYTES NFR BLD AUTO: 14.1 % (ref 5–12)
NEUTROPHILS NFR BLD AUTO: 6.2 10*3/MM3 (ref 1.7–7)
NEUTROPHILS NFR BLD AUTO: 77.6 % (ref 42.7–76)
NRBC BLD AUTO-RTO: 0.1 /100 WBC (ref 0–0.2)
PLATELET # BLD AUTO: 171 10*3/MM3 (ref 140–450)
PMV BLD AUTO: 9.6 FL (ref 6–12)
POTASSIUM SERPL-SCNC: 5.2 MMOL/L (ref 3.5–5.2)
PROT SERPL-MCNC: 6.6 G/DL (ref 6–8.5)
RBC # BLD AUTO: 3.75 10*6/MM3 (ref 4.14–5.8)
SODIUM SERPL-SCNC: 126 MMOL/L (ref 136–145)
WBC NRBC COR # BLD: 8.1 10*3/MM3 (ref 3.4–10.8)

## 2023-04-26 PROCEDURE — G0378 HOSPITAL OBSERVATION PER HR: HCPCS

## 2023-04-26 PROCEDURE — 82962 GLUCOSE BLOOD TEST: CPT

## 2023-04-26 PROCEDURE — 85025 COMPLETE CBC W/AUTO DIFF WBC: CPT | Performed by: EMERGENCY MEDICINE

## 2023-04-26 PROCEDURE — 99284 EMERGENCY DEPT VISIT MOD MDM: CPT

## 2023-04-26 PROCEDURE — 25010000002 HEPARIN (PORCINE) PER 1000 UNITS: Performed by: STUDENT IN AN ORGANIZED HEALTH CARE EDUCATION/TRAINING PROGRAM

## 2023-04-26 PROCEDURE — 80053 COMPREHEN METABOLIC PANEL: CPT | Performed by: EMERGENCY MEDICINE

## 2023-04-26 PROCEDURE — 83735 ASSAY OF MAGNESIUM: CPT | Performed by: EMERGENCY MEDICINE

## 2023-04-26 RX ORDER — METOLAZONE 2.5 MG/1
2.5 TABLET ORAL DAILY
COMMUNITY

## 2023-04-26 RX ORDER — TRAZODONE HYDROCHLORIDE 50 MG/1
50 TABLET ORAL NIGHTLY
Status: DISCONTINUED | OUTPATIENT
Start: 2023-04-26 | End: 2023-05-02 | Stop reason: HOSPADM

## 2023-04-26 RX ORDER — SEVELAMER CARBONATE 800 MG/1
800 TABLET, FILM COATED ORAL
COMMUNITY

## 2023-04-26 RX ORDER — HEPARIN SODIUM 5000 [USP'U]/ML
5000 INJECTION, SOLUTION INTRAVENOUS; SUBCUTANEOUS EVERY 8 HOURS SCHEDULED
Status: DISCONTINUED | OUTPATIENT
Start: 2023-04-26 | End: 2023-05-02 | Stop reason: HOSPADM

## 2023-04-26 RX ORDER — BUMETANIDE 1 MG/1
1 TABLET ORAL 3 TIMES DAILY
Status: DISCONTINUED | OUTPATIENT
Start: 2023-04-26 | End: 2023-05-02

## 2023-04-26 RX ORDER — NICOTINE POLACRILEX 4 MG
15 LOZENGE BUCCAL
Status: DISCONTINUED | OUTPATIENT
Start: 2023-04-26 | End: 2023-05-02 | Stop reason: HOSPADM

## 2023-04-26 RX ORDER — TRAMADOL HYDROCHLORIDE 50 MG/1
50 TABLET ORAL EVERY 12 HOURS PRN
Status: DISCONTINUED | OUTPATIENT
Start: 2023-04-26 | End: 2023-05-02 | Stop reason: HOSPADM

## 2023-04-26 RX ORDER — SODIUM CHLORIDE 9 MG/ML
40 INJECTION, SOLUTION INTRAVENOUS AS NEEDED
Status: DISCONTINUED | OUTPATIENT
Start: 2023-04-26 | End: 2023-05-02 | Stop reason: HOSPADM

## 2023-04-26 RX ORDER — ONDANSETRON 2 MG/ML
4 INJECTION INTRAMUSCULAR; INTRAVENOUS EVERY 6 HOURS PRN
Status: DISCONTINUED | OUTPATIENT
Start: 2023-04-26 | End: 2023-05-02 | Stop reason: HOSPADM

## 2023-04-26 RX ORDER — SODIUM CHLORIDE 0.9 % (FLUSH) 0.9 %
10 SYRINGE (ML) INJECTION AS NEEDED
Status: DISCONTINUED | OUTPATIENT
Start: 2023-04-26 | End: 2023-05-02 | Stop reason: HOSPADM

## 2023-04-26 RX ORDER — DEXTROSE MONOHYDRATE 25 G/50ML
25 INJECTION, SOLUTION INTRAVENOUS
Status: DISCONTINUED | OUTPATIENT
Start: 2023-04-26 | End: 2023-05-02 | Stop reason: HOSPADM

## 2023-04-26 RX ORDER — SPIRONOLACTONE 25 MG/1
25 TABLET ORAL DAILY
Status: DISCONTINUED | OUTPATIENT
Start: 2023-04-27 | End: 2023-05-02 | Stop reason: HOSPADM

## 2023-04-26 RX ORDER — IPRATROPIUM BROMIDE AND ALBUTEROL SULFATE 2.5; .5 MG/3ML; MG/3ML
3 SOLUTION RESPIRATORY (INHALATION) EVERY 6 HOURS PRN
Status: DISCONTINUED | OUTPATIENT
Start: 2023-04-26 | End: 2023-05-02 | Stop reason: HOSPADM

## 2023-04-26 RX ORDER — ISOSORBIDE MONONITRATE 30 MG/1
30 TABLET, EXTENDED RELEASE ORAL
Status: DISCONTINUED | OUTPATIENT
Start: 2023-04-27 | End: 2023-05-02 | Stop reason: HOSPADM

## 2023-04-26 RX ORDER — METOPROLOL SUCCINATE 50 MG/1
50 TABLET, EXTENDED RELEASE ORAL DAILY
Status: DISCONTINUED | OUTPATIENT
Start: 2023-04-27 | End: 2023-05-02 | Stop reason: HOSPADM

## 2023-04-26 RX ORDER — ASPIRIN 81 MG/1
81 TABLET ORAL DAILY
Status: DISCONTINUED | OUTPATIENT
Start: 2023-04-27 | End: 2023-05-02 | Stop reason: HOSPADM

## 2023-04-26 RX ORDER — NITROGLYCERIN 0.4 MG/1
0.4 TABLET SUBLINGUAL
Status: DISCONTINUED | OUTPATIENT
Start: 2023-04-26 | End: 2023-05-02 | Stop reason: HOSPADM

## 2023-04-26 RX ORDER — ONDANSETRON 4 MG/1
4 TABLET, FILM COATED ORAL EVERY 6 HOURS PRN
Status: DISCONTINUED | OUTPATIENT
Start: 2023-04-26 | End: 2023-05-02 | Stop reason: HOSPADM

## 2023-04-26 RX ORDER — ECHINACEA PURPUREA EXTRACT 125 MG
1 TABLET ORAL AS NEEDED
COMMUNITY

## 2023-04-26 RX ORDER — SODIUM CHLORIDE 0.9 % (FLUSH) 0.9 %
10 SYRINGE (ML) INJECTION EVERY 12 HOURS SCHEDULED
Status: DISCONTINUED | OUTPATIENT
Start: 2023-04-26 | End: 2023-05-02 | Stop reason: HOSPADM

## 2023-04-26 RX ORDER — SIMVASTATIN 40 MG
40 TABLET ORAL NIGHTLY
COMMUNITY

## 2023-04-26 RX ORDER — IBUPROFEN 600 MG/1
1 TABLET ORAL
Status: DISCONTINUED | OUTPATIENT
Start: 2023-04-26 | End: 2023-05-02 | Stop reason: HOSPADM

## 2023-04-26 RX ADMIN — Medication 10 ML: at 22:04

## 2023-04-26 RX ADMIN — TRAZODONE HYDROCHLORIDE 50 MG: 50 TABLET ORAL at 22:04

## 2023-04-26 RX ADMIN — HEPARIN SODIUM 5000 UNITS: 5000 INJECTION INTRAVENOUS; SUBCUTANEOUS at 22:04

## 2023-04-26 RX ADMIN — BUMETANIDE 1 MG: 1 TABLET ORAL at 22:04

## 2023-04-26 RX ADMIN — TICAGRELOR 90 MG: 90 TABLET ORAL at 22:04
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
## 2023-04-26 NOTE — Clinical Note
Level of Care: Telemetry [5]   Admitting Physician: CHRISTINA ESCOBAR [[id number]]   Attending Physician: CHRISTINA ESCOBAR [[id number]]
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
## 2023-04-27 ENCOUNTER — READMISSION MANAGEMENT (OUTPATIENT)
Dept: CALL CENTER | Facility: HOSPITAL | Age: 63
End: 2023-04-27
Payer: COMMERCIAL

## 2023-04-27 LAB
ANION GAP SERPL CALCULATED.3IONS-SCNC: 13 MMOL/L (ref 5–15)
BUN SERPL-MCNC: 105 MG/DL (ref 8–23)
BUN/CREAT SERPL: 25.8 (ref 7–25)
CALCIUM SPEC-SCNC: 9.1 MG/DL (ref 8.6–10.5)
CHLORIDE SERPL-SCNC: 90 MMOL/L (ref 98–107)
CO2 SERPL-SCNC: 25 MMOL/L (ref 22–29)
CREAT SERPL-MCNC: 4.07 MG/DL (ref 0.76–1.27)
DEPRECATED RDW RBC AUTO: 62.6 FL (ref 37–54)
EGFRCR SERPLBLD CKD-EPI 2021: 16 ML/MIN/1.73
ERYTHROCYTE [DISTWIDTH] IN BLOOD BY AUTOMATED COUNT: 19.5 % (ref 12.3–15.4)
GLUCOSE BLDC GLUCOMTR-MCNC: 103 MG/DL (ref 70–105)
GLUCOSE BLDC GLUCOMTR-MCNC: 111 MG/DL (ref 70–105)
GLUCOSE BLDC GLUCOMTR-MCNC: 177 MG/DL (ref 70–105)
GLUCOSE BLDC GLUCOMTR-MCNC: 75 MG/DL (ref 70–105)
GLUCOSE SERPL-MCNC: 209 MG/DL (ref 65–99)
HCT VFR BLD AUTO: 34.1 % (ref 37.5–51)
HGB BLD-MCNC: 10.9 G/DL (ref 13–17.7)
MCH RBC QN AUTO: 30.1 PG (ref 26.6–33)
MCHC RBC AUTO-ENTMCNC: 32 G/DL (ref 31.5–35.7)
MCV RBC AUTO: 93.8 FL (ref 79–97)
PLATELET # BLD AUTO: 173 10*3/MM3 (ref 140–450)
PMV BLD AUTO: 10.2 FL (ref 6–12)
POTASSIUM SERPL-SCNC: 4.6 MMOL/L (ref 3.5–5.2)
RBC # BLD AUTO: 3.63 10*6/MM3 (ref 4.14–5.8)
SODIUM SERPL-SCNC: 128 MMOL/L (ref 136–145)
WBC NRBC COR # BLD: 6.6 10*3/MM3 (ref 3.4–10.8)

## 2023-04-27 PROCEDURE — 97161 PT EVAL LOW COMPLEX 20 MIN: CPT

## 2023-04-27 PROCEDURE — 80048 BASIC METABOLIC PNL TOTAL CA: CPT | Performed by: STUDENT IN AN ORGANIZED HEALTH CARE EDUCATION/TRAINING PROGRAM

## 2023-04-27 PROCEDURE — 97166 OT EVAL MOD COMPLEX 45 MIN: CPT | Performed by: OCCUPATIONAL THERAPIST

## 2023-04-27 PROCEDURE — 82962 GLUCOSE BLOOD TEST: CPT

## 2023-04-27 PROCEDURE — 85027 COMPLETE CBC AUTOMATED: CPT | Performed by: STUDENT IN AN ORGANIZED HEALTH CARE EDUCATION/TRAINING PROGRAM

## 2023-04-27 PROCEDURE — 36415 COLL VENOUS BLD VENIPUNCTURE: CPT | Performed by: STUDENT IN AN ORGANIZED HEALTH CARE EDUCATION/TRAINING PROGRAM

## 2023-04-27 PROCEDURE — G0378 HOSPITAL OBSERVATION PER HR: HCPCS

## 2023-04-27 PROCEDURE — 82948 REAGENT STRIP/BLOOD GLUCOSE: CPT

## 2023-04-27 PROCEDURE — 25010000002 HEPARIN (PORCINE) PER 1000 UNITS: Performed by: STUDENT IN AN ORGANIZED HEALTH CARE EDUCATION/TRAINING PROGRAM

## 2023-04-27 RX ORDER — DEXTROSE MONOHYDRATE, SODIUM CHLORIDE, SODIUM LACTATE, CALCIUM CHLORIDE, MAGNESIUM CHLORIDE 2.5; 538; 448; 18.4; 5.08 G/100ML; MG/100ML; MG/100ML; MG/100ML; MG/100ML
1000 SOLUTION INTRAPERITONEAL AS NEEDED
Status: DISCONTINUED | OUTPATIENT
Start: 2023-04-27 | End: 2023-04-28

## 2023-04-27 RX ADMIN — TICAGRELOR 90 MG: 90 TABLET ORAL at 08:54

## 2023-04-27 RX ADMIN — TRAZODONE HYDROCHLORIDE 50 MG: 50 TABLET ORAL at 21:36

## 2023-04-27 RX ADMIN — Medication 10 ML: at 08:54

## 2023-04-27 RX ADMIN — ISOSORBIDE MONONITRATE 30 MG: 30 TABLET, EXTENDED RELEASE ORAL at 08:54

## 2023-04-27 RX ADMIN — SPIRONOLACTONE 25 MG: 25 TABLET ORAL at 08:54

## 2023-04-27 RX ADMIN — METOPROLOL SUCCINATE 50 MG: 50 TABLET, EXTENDED RELEASE ORAL at 08:54

## 2023-04-27 RX ADMIN — BUMETANIDE 1 MG: 1 TABLET ORAL at 21:36

## 2023-04-27 RX ADMIN — ASPIRIN 81 MG: 81 TABLET, COATED ORAL at 08:54

## 2023-04-27 RX ADMIN — BUMETANIDE 1 MG: 1 TABLET ORAL at 08:54

## 2023-04-27 RX ADMIN — Medication 10 ML: at 21:36

## 2023-04-27 RX ADMIN — BUMETANIDE 1 MG: 1 TABLET ORAL at 17:23

## 2023-04-27 RX ADMIN — TRAMADOL HYDROCHLORIDE 50 MG: 50 TABLET, COATED ORAL at 23:03

## 2023-04-27 RX ADMIN — TICAGRELOR 90 MG: 90 TABLET ORAL at 21:36

## 2023-04-27 NOTE — PLAN OF CARE
Goal Outcome Evaluation:  Plan of Care Reviewed With: patient           Outcome Evaluation: pt has had no complaints today. pt been in bed resting this afternnon. pt to start peritoneal dialysis this evening. will continue to monitor

## 2023-04-27 NOTE — CASE MANAGEMENT/SOCIAL WORK
"Discharge Planning Assessment   [facility]     Patient Name: Esdras Peralta  MRN: [id number]  Today's Date: 4/26/2023    Admit Date: 4/26/2023    Plan: Home with family.Anticipate new peritoneal dialysis   Discharge Needs Assessment     Row Name 04/26/23 2007       Living Environment    People in Home spouse    Name(s) of People in Home Wife Mi    Current Living Arrangements home    Primary Care Provided by self    Provides Primary Care For no one    Family Caregiver if Needed spouse    Family Caregiver Names Mi    Quality of Family Relationships supportive    Able to Return to Prior Arrangements yes       Resource/Environmental Concerns    Resource/Environmental Concerns none    Transportation Concerns other (see comments)  Spouse states she doesn't drive in [place],and that is where he is supposed to go next week for PD education.She states another family member is planning on taking him       Transition Planning    Patient/Family Anticipates Transition to home with family    Patient/Family Anticipated Services at Transition outpatient care    Transportation Anticipated family or friend will provide       Discharge Needs Assessment    Equipment Currently Used at Home other (see comments)  Continuous glucose monitor; insulin pump    Concerns to be Addressed discharge planning    Anticipated Changes Related to Illness inability to care for someone else    Equipment Needed After Discharge none    Outpatient/Agency/Support Group Needs outpatient peritoneal dialysis               Discharge Plan     Row Name 04/26/23 2010       Plan    Plan Home with family.Anticipate new peritoneal dialysis    Patient/Family in Agreement with Plan yes    Plan Comments Pt and spouse report patient is IADLs, though has become \"very weak\". sent secure chat to Dr. Toussaint requesting that order for PT eval be placed. Pt confirms pcp and pharmacy.Denies difficulty obtaining medications.Pt usually drives, but reports he has " recently become too weak to do so.Wife reports she drives,but doesn't drive in [place].She said patient is supposed to go to [facility] for PD training.She states a family member is supposed to provide transportation.She reports she or another family member will  patient at dc. Pt currently denies dc needs, though anticipating beginning peritoneal dialysis              Continued Care and Services - Admitted Since 4/26/2023    Coordination has not been started for this encounter.          Demographic Summary     Row Name 04/26/23 2006       General Information    Admission Type observation    Arrived From home    Referral Source admission list    Reason for Consult discharge planning    Preferred Language English       Contact Information    Permission Granted to Share Info With ;family/designee               Functional Status     Row Name 04/26/23 2006       Functional Status    Usual Activity Tolerance good    Current Activity Tolerance fair       Functional Status, IADL    Medications independent    Meal Preparation assistive person    Housekeeping assistive person    Laundry assistive person    Shopping assistive person              Mandy Unger RN, [facility]  Office: [phone]  Fax: [phone]  [email or web address]      I met with patient in room wearing PPE: mask and goggles.     Maintained distance greater than six feet and spent </=15 minutes in the room      Mandy Unger RN

## 2023-04-27 NOTE — PLAN OF CARE
Goal Outcome Evaluation:  Plan of Care Reviewed With: patient, spouse           Outcome Evaluation: Pt is a 63 y/o M admitted to [facility] 4/26/23 with worsening BLE edema. He has ESRD & was just fitted with a peritoneal dialysis port last week & is supposed to begin training & start peritoneal dialysis soon. Pt was referred to hospital by his kidney doctor due to inc. edema & need to start dialysis sooner. PMHx significant for ICD in place, CAD, DMI, CKDIV, and hx CVA (8-9 years ago) with residual L hand sensation deficits. At baseline pt lives with spouse in [facility] with spouse with 1 MARGE. Pt currently works x3 days a week and drives. Upon eval, pt A&O X4. He is completing ADL transfers ind and amb to the bathroom ind. for toileting needs. He is able to perform all self care ADLs ind., but he requires a litte extra time & has inc. difficulty with LB ADLs due to BLE edema. He has good UB strength & balance. He seems to be functioning near his baseline & anticipate his difficulties will improve once his dialysis begins & his LE edema decreases. No significant functional deficits noted at this time; therefore, OT will sign off.

## 2023-04-27 NOTE — PLAN OF CARE
Goal Outcome Evaluation:  Plan of Care Reviewed With: patient, spouse           Outcome Evaluation: 61 y/o male came to hospital on 4/26 due to worsening BLE edema. Patient with known ESRD and suppose to start peritoneal dialysis but needing education/training.PMH Includes COPD, htn,pacemaker. Patient resides with spouse and normally independent; able to drive normally until recently when he had increasing weakness. At time of eval, patient remains modified independent with bed mobility, transfers and gait. Gait is slow but steady, no ad used. Patient limited mainly but LE edema but is at his baseline for mobility. No further skilled Physical therapy needs indicated.
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
## 2023-04-27 NOTE — OUTREACH NOTE
Medical Week 2 Survey    Flowsheet Row Responses   [facility] facility patient discharged from? [facility]   Does the patient have one of the following disease processes/diagnoses(primary or secondary)? Other   Week 2 attempt successful? No   Unsuccessful attempts Attempt 1   Revoke Readmitted          Helga RAIN - Registered Nurse

## 2023-04-27 NOTE — CONSULTS
Diabetes Education  Assessment/Teaching    Patient Name:  Esdras Perlata  YOB: 1960  MRN: [id number]  Admit Date:  4/26/2023          Other Comments:  Insulin Pump Contract completed and in chart: Done  Insulin Pump Log at bedside:  Done  LDA started:Done  Patient Supplied Insulin Pump (orders initiated):Done  Educator Consult entered:Done    Pump Brand/Model:Omnipod  CGMS Brand/Model:Dexcom    Type of Insulin Used: Lispro    Basal Rate: (units/hour) 5691-7093 1 unit  1104-4000 1 unit  2129-2981 .7 units     Bolus Rate: (insulin:carbohydrate ratio) 1:15    Last Bolus Given:4/27/23 0941                                                                  Insulin Sensitivity Factor/Correction Dose: 50     Blood Glucose Target:150      Date of Last Site Change: 4/26/23    Location of Catheter: left arm     Site Assessment:clean dry intact    Educator assessed:       pump supplies at bedside: Yes     Patient is also on the Dexcom which is located on the right abdomen.  Clean, dry intact.  Patient states he has no questions at this time.                 Electronically signed by:  Misti Lane RN  04/27/23 13:33 EDT
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
## 2023-04-27 NOTE — CASE MANAGEMENT/SOCIAL WORK
Continued Stay Note  JAVED Machado     Patient Name: Esdras Peralta  MRN: [id number]  Today's Date: 4/27/2023    Admit Date: 4/26/2023    Plan: Routine home with spouse. New PD.   Discharge Plan       Row Name 04/27/23 1455       Plan    Plan Routine home with spouse. New PD.    Patient/Family in Agreement with Plan yes    Plan Comments Nephrology following, initiating new peritoneal dialysis.             Megan Naegele, RN     Office Phone: [phone]  Office Cell: [phone]

## 2023-04-27 NOTE — PROGRESS NOTES
[facility] Medicine Services   Daily Progress Note    Patient Name: Esdras Peralta  : 1960  MRN: [id number]  Primary Care Physician:  Erika Hernandez MD  Date of admission: 2023  Date and Time of Service: 23  at 12:39 EDT       Subjective      Chief Complaint: gen weakness, malaise    Patient Reports continuied fatigue and inability to carry out ADLs. Scheduled to start PD, but not feeling good to do that at home yet. Awaiting Nephro orders.     Review of Systems   Constitutional: Positive for malaise/fatigue. Negative for chills, fever, weight gain and weight loss.   HENT: Negative for hearing loss, nosebleeds and sore throat.    Eyes: Negative for blurred vision, pain and redness.   Cardiovascular: Negative for chest pain, leg swelling, palpitations and syncope.   Respiratory: Negative for cough, shortness of breath and sputum production.    Endocrine: Negative for polyuria.   Skin: Negative for color change, rash and suspicious lesions.   Musculoskeletal: Positive for muscle weakness. Negative for back pain and joint pain.   Gastrointestinal: Negative for abdominal pain, anorexia, diarrhea, dysphagia, heartburn, melena, nausea and vomiting.   Genitourinary: Negative for dysuria, frequency and urgency.        Decreased urine output   Neurological: Negative for dizziness, numbness and weakness.   Psychiatric/Behavioral: Negative for memory loss. The patient does not have insomnia and is not nervous/anxious.           Objective      Vitals:   Temp:  [97 °F (36.1 °C)-97.8 °F (36.6 °C)] 97.7 °F (36.5 °C)  Heart Rate:  [74-85] 75  Resp:  [15-19] 15  BP: (108-146)/(67-93) 108/68    Physical Exam  Vitals and nursing note reviewed.   Constitutional:       General: He is not in acute distress.     Appearance: Normal appearance. He is obese. He is ill-appearing.   HENT:      Head: Normocephalic and atraumatic.      Mouth/Throat:      Mouth: Mucous membranes are moist.      Pharynx:  Oropharynx is clear.   Eyes:      Extraocular Movements: Extraocular movements intact.      Conjunctiva/sclera: Conjunctivae normal.      Pupils: Pupils are equal, round, and reactive to light.   Cardiovascular:      Rate and Rhythm: Normal rate and regular rhythm.      Pulses: Normal pulses.      Heart sounds: Normal heart sounds.   Pulmonary:      Effort: Pulmonary effort is normal. No respiratory distress.      Breath sounds: Normal breath sounds. No wheezing.   Abdominal:      General: Abdomen is flat. Bowel sounds are normal. There is no distension.      Palpations: Abdomen is soft.      Tenderness: There is no abdominal tenderness. There is no guarding.   Musculoskeletal:         General: No swelling, tenderness or signs of injury.      Cervical back: Normal range of motion and neck supple.   Skin:     General: Skin is warm and dry.      Capillary Refill: Capillary refill takes less than 2 seconds.      Comments: Bronzed Skin of ESRD   Neurological:      General: No focal deficit present.      Mental Status: He is alert and oriented to person, place, and time. Mental status is at baseline.      Motor: Weakness present.      Gait: Gait normal.      Comments: Strength 4-/5 B/L UE & LE     Psychiatric:         Mood and Affect: Mood normal.         Behavior: Behavior normal.         Judgment: Judgment normal.             Result Review    Result Review:  I have personally reviewed the results from the time of this admission to 4/27/2023 12:39 EDT and agree with these findings:  [x]  Laboratory  [x]  Microbiology  [x]  Radiology  []  EKG/Telemetry   []  Cardiology/Vascular   []  Pathology  []  Old records  []  Other:  Most notable findings include:         Assessment & Plan      Brief Patient Summary:  Esdras Peralta is a 62 y.o. male who came in from home 2/2 inability to carry out ADLs. Will start PD here and have PT/OT eval his functional status.       aspirin, 81 mg, Oral, Daily  bumetanide, 1 mg, Oral,  TID  heparin (porcine), 5,000 Units, Subcutaneous, Q8H  isosorbide mononitrate, 30 mg, Oral, Q24H  metoprolol succinate XL, 50 mg, Oral, Daily  sodium chloride, 10 mL, Intravenous, Q12H  spironolactone, 25 mg, Oral, Daily  ticagrelor, 90 mg, Oral, BID  traZODone, 50 mg, Oral, Nightly       insulin,          Active Hospital Problems:  Active Hospital Problems    Diagnosis    • **ESRD (end stage renal disease)      Plan:   #ESRD  #Uremia    - Cr 4.3 on admission has been worsneing    - BUN 97    - Port placed for PD    - Patient with edema and uremic symptoms, sent to [facility] by nephrology so they can start PD vs HD    - nephrology consulted, Patient of Dr Lagunas     - Zofran PRN    - sodium 126 and Cl 89, suspect hypervolemic, manage with diuresis and fluid removal by dialysis    -PT / OT eval     #HFrEF  #Dilated Cardiomyopathy    - echo on 4/18/23 shows EF 20% with mod-severe dilated left ventricle     - s/p ICD    - Dialysis for fluid management    - aspirin daily    - Brilinta 90mg PO BID    - Will continue home Bumex as making urine    - continue home Aldactone    - resume home Toprol    - resume home imdur    - resume home trazodone and Ultram     #COPD    - chronic    - stable on room air    - Duoneb PRN     #DM1    - continue insulin pump     #Anemia    - hgb 11.3 on admission, base near 12.8    - check iron and ferritin    - suspect anemia of chronic renal disease    - no overt bleeding, follow labs     #HTN    - hold home hydralazine, may resume depending on BP     #HLD    -reconcile home meds    DVT prophylaxis:  Medical DVT prophylaxis orders are present.    CODE STATUS:    Code Status (Patient has no pulse and is not breathing): CPR (Attempt to Resuscitate)  Medical Interventions (Patient has pulse or is breathing): Full Support      Disposition:  I expect patient to be discharged home in 2-3 days.    This patient has been examined wearing appropriate Personal Protective Equipment and discussed with Nurse.  04/27/23      Electronically signed by Preethi Zaman DO, 04/27/23, 12:39 EDT.  [facility] Hospitalist Team
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
## 2023-04-28 ENCOUNTER — APPOINTMENT (OUTPATIENT)
Dept: INTERVENTIONAL RADIOLOGY/VASCULAR | Facility: HOSPITAL | Age: 63
End: 2023-04-28
Payer: COMMERCIAL

## 2023-04-28 ENCOUNTER — APPOINTMENT (OUTPATIENT)
Dept: CT IMAGING | Facility: HOSPITAL | Age: 63
End: 2023-04-28
Payer: COMMERCIAL

## 2023-04-28 ENCOUNTER — APPOINTMENT (OUTPATIENT)
Dept: GENERAL RADIOLOGY | Facility: HOSPITAL | Age: 63
End: 2023-04-28
Payer: COMMERCIAL

## 2023-04-28 PROBLEM — N18.9 CHRONIC KIDNEY DISEASE, UNSPECIFIED CKD STAGE: Status: ACTIVE | Noted: 2023-04-28

## 2023-04-28 LAB
ANION GAP SERPL CALCULATED.3IONS-SCNC: 11 MMOL/L (ref 5–15)
APTT PPP: 28.5 SECONDS (ref 24–31)
BUN SERPL-MCNC: 96 MG/DL (ref 8–23)
BUN/CREAT SERPL: 23.6 (ref 7–25)
CALCIUM SPEC-SCNC: 8.7 MG/DL (ref 8.6–10.5)
CHLORIDE SERPL-SCNC: 90 MMOL/L (ref 98–107)
CO2 SERPL-SCNC: 26 MMOL/L (ref 22–29)
CREAT SERPL-MCNC: 4.06 MG/DL (ref 0.76–1.27)
DEPRECATED RDW RBC AUTO: 65.2 FL (ref 37–54)
EGFRCR SERPLBLD CKD-EPI 2021: 16 ML/MIN/1.73
ERYTHROCYTE [DISTWIDTH] IN BLOOD BY AUTOMATED COUNT: 19.7 % (ref 12.3–15.4)
GLUCOSE BLDC GLUCOMTR-MCNC: 118 MG/DL (ref 70–105)
GLUCOSE BLDC GLUCOMTR-MCNC: 135 MG/DL (ref 70–105)
GLUCOSE BLDC GLUCOMTR-MCNC: 155 MG/DL (ref 70–105)
GLUCOSE BLDC GLUCOMTR-MCNC: 79 MG/DL (ref 70–105)
GLUCOSE SERPL-MCNC: 211 MG/DL (ref 65–99)
HBV CORE IGM SERPL QL IA: NORMAL
HBV SURFACE AB SER RIA-ACNC: NORMAL
HBV SURFACE AG SERPL QL IA: NORMAL
HCT VFR BLD AUTO: 32.6 % (ref 37.5–51)
HGB BLD-MCNC: 10.9 G/DL (ref 13–17.7)
HOLD SPECIMEN: NORMAL
INR PPP: 1.17 (ref 0.93–1.1)
MCH RBC QN AUTO: 31.1 PG (ref 26.6–33)
MCHC RBC AUTO-ENTMCNC: 33.6 G/DL (ref 31.5–35.7)
MCV RBC AUTO: 92.7 FL (ref 79–97)
PLATELET # BLD AUTO: 165 10*3/MM3 (ref 140–450)
PMV BLD AUTO: 9.8 FL (ref 6–12)
POTASSIUM SERPL-SCNC: 4.6 MMOL/L (ref 3.5–5.2)
PROTHROMBIN TIME: 12.4 SECONDS (ref 9.6–11.7)
RBC # BLD AUTO: 3.52 10*6/MM3 (ref 4.14–5.8)
SODIUM SERPL-SCNC: 127 MMOL/L (ref 136–145)
WBC NRBC COR # BLD: 6.1 10*3/MM3 (ref 3.4–10.8)

## 2023-04-28 PROCEDURE — 25010000002 FENTANYL CITRATE (PF) 50 MCG/ML SOLUTION: Performed by: RADIOLOGY

## 2023-04-28 PROCEDURE — 02HV33Z INSERTION OF INFUSION DEVICE INTO SUPERIOR VENA CAVA, PERCUTANEOUS APPROACH: ICD-10-PCS | Performed by: RADIOLOGY

## 2023-04-28 PROCEDURE — 85027 COMPLETE CBC AUTOMATED: CPT | Performed by: STUDENT IN AN ORGANIZED HEALTH CARE EDUCATION/TRAINING PROGRAM

## 2023-04-28 PROCEDURE — 25010000002 HEPARIN (PORCINE) PER 1000 UNITS: Performed by: RADIOLOGY

## 2023-04-28 PROCEDURE — 82948 REAGENT STRIP/BLOOD GLUCOSE: CPT

## 2023-04-28 PROCEDURE — 74018 RADEX ABDOMEN 1 VIEW: CPT

## 2023-04-28 PROCEDURE — 77001 FLUOROGUIDE FOR VEIN DEVICE: CPT

## 2023-04-28 PROCEDURE — 0 LIDOCAINE 1 % SOLUTION: Performed by: RADIOLOGY

## 2023-04-28 PROCEDURE — 76937 US GUIDE VASCULAR ACCESS: CPT

## 2023-04-28 PROCEDURE — 0JH63XZ INSERTION OF TUNNELED VASCULAR ACCESS DEVICE INTO CHEST SUBCUTANEOUS TISSUE AND FASCIA, PERCUTANEOUS APPROACH: ICD-10-PCS | Performed by: RADIOLOGY

## 2023-04-28 PROCEDURE — 80048 BASIC METABOLIC PNL TOTAL CA: CPT | Performed by: STUDENT IN AN ORGANIZED HEALTH CARE EDUCATION/TRAINING PROGRAM

## 2023-04-28 PROCEDURE — C1750 CATH, HEMODIALYSIS,LONG-TERM: HCPCS

## 2023-04-28 PROCEDURE — 36415 COLL VENOUS BLD VENIPUNCTURE: CPT | Performed by: STUDENT IN AN ORGANIZED HEALTH CARE EDUCATION/TRAINING PROGRAM

## 2023-04-28 PROCEDURE — 36558 INSERT TUNNELED CV CATH: CPT

## 2023-04-28 PROCEDURE — 87340 HEPATITIS B SURFACE AG IA: CPT | Performed by: INTERNAL MEDICINE

## 2023-04-28 PROCEDURE — C1894 INTRO/SHEATH, NON-LASER: HCPCS

## 2023-04-28 PROCEDURE — 85610 PROTHROMBIN TIME: CPT | Performed by: INTERNAL MEDICINE

## 2023-04-28 PROCEDURE — B5181ZA FLUOROSCOPY OF SUPERIOR VENA CAVA USING LOW OSMOLAR CONTRAST, GUIDANCE: ICD-10-PCS | Performed by: RADIOLOGY

## 2023-04-28 PROCEDURE — 86705 HEP B CORE ANTIBODY IGM: CPT | Performed by: INTERNAL MEDICINE

## 2023-04-28 PROCEDURE — 74176 CT ABD & PELVIS W/O CONTRAST: CPT

## 2023-04-28 PROCEDURE — 85730 THROMBOPLASTIN TIME PARTIAL: CPT | Performed by: INTERNAL MEDICINE

## 2023-04-28 PROCEDURE — 25010000002 ONDANSETRON PER 1 MG: Performed by: RADIOLOGY

## 2023-04-28 PROCEDURE — 86706 HEP B SURFACE ANTIBODY: CPT | Performed by: INTERNAL MEDICINE

## 2023-04-28 PROCEDURE — 25010000002 CEFAZOLIN PER 500 MG: Performed by: RADIOLOGY

## 2023-04-28 RX ORDER — LIDOCAINE HYDROCHLORIDE 10 MG/ML
INJECTION, SOLUTION INFILTRATION; PERINEURAL AS NEEDED
Status: COMPLETED | OUTPATIENT
Start: 2023-04-28 | End: 2023-04-28

## 2023-04-28 RX ORDER — HYDRALAZINE HYDROCHLORIDE 25 MG/1
25 TABLET, FILM COATED ORAL 2 TIMES DAILY
Status: DISCONTINUED | OUTPATIENT
Start: 2023-04-28 | End: 2023-05-02 | Stop reason: HOSPADM

## 2023-04-28 RX ORDER — LIDOCAINE HYDROCHLORIDE AND EPINEPHRINE 10; 10 MG/ML; UG/ML
INJECTION, SOLUTION INFILTRATION; PERINEURAL AS NEEDED
Status: COMPLETED | OUTPATIENT
Start: 2023-04-28 | End: 2023-04-28

## 2023-04-28 RX ORDER — FENTANYL CITRATE 50 UG/ML
INJECTION, SOLUTION INTRAMUSCULAR; INTRAVENOUS AS NEEDED
Status: COMPLETED | OUTPATIENT
Start: 2023-04-28 | End: 2023-04-28

## 2023-04-28 RX ORDER — ATORVASTATIN CALCIUM 20 MG/1
20 TABLET, FILM COATED ORAL NIGHTLY
Status: DISCONTINUED | OUTPATIENT
Start: 2023-04-28 | End: 2023-05-02 | Stop reason: HOSPADM

## 2023-04-28 RX ORDER — CALCITRIOL 0.25 UG/1
0.25 CAPSULE, LIQUID FILLED ORAL DAILY
Status: DISCONTINUED | OUTPATIENT
Start: 2023-04-28 | End: 2023-05-02 | Stop reason: HOSPADM

## 2023-04-28 RX ORDER — LACTULOSE 10 G/15ML
30 SOLUTION ORAL ONCE
Status: COMPLETED | OUTPATIENT
Start: 2023-04-28 | End: 2023-04-28

## 2023-04-28 RX ORDER — HEPARIN SODIUM 1000 [USP'U]/ML
INJECTION, SOLUTION INTRAVENOUS; SUBCUTANEOUS AS NEEDED
Status: COMPLETED | OUTPATIENT
Start: 2023-04-28 | End: 2023-04-28

## 2023-04-28 RX ORDER — ONDANSETRON 2 MG/ML
INJECTION INTRAMUSCULAR; INTRAVENOUS AS NEEDED
Status: COMPLETED | OUTPATIENT
Start: 2023-04-28 | End: 2023-04-28

## 2023-04-28 RX ORDER — BUTALBITAL, ACETAMINOPHEN AND CAFFEINE 50; 325; 40 MG/1; MG/1; MG/1
1 TABLET ORAL EVERY 4 HOURS PRN
Status: DISCONTINUED | OUTPATIENT
Start: 2023-04-28 | End: 2023-05-02 | Stop reason: HOSPADM

## 2023-04-28 RX ADMIN — CALCITRIOL CAPSULES 0.25 MCG 0.25 MCG: 0.25 CAPSULE ORAL at 17:41

## 2023-04-28 RX ADMIN — SPIRONOLACTONE 25 MG: 25 TABLET ORAL at 09:01

## 2023-04-28 RX ADMIN — BUMETANIDE 1 MG: 1 TABLET ORAL at 09:01

## 2023-04-28 RX ADMIN — BUTALBITAL, ACETAMINOPHEN, AND CAFFEINE 1 TABLET: 50; 325; 40 TABLET ORAL at 23:46

## 2023-04-28 RX ADMIN — TRAMADOL HYDROCHLORIDE 50 MG: 50 TABLET, COATED ORAL at 19:09

## 2023-04-28 RX ADMIN — METOPROLOL SUCCINATE 50 MG: 50 TABLET, EXTENDED RELEASE ORAL at 09:01

## 2023-04-28 RX ADMIN — TICAGRELOR 90 MG: 90 TABLET ORAL at 21:06

## 2023-04-28 RX ADMIN — BUMETANIDE 1 MG: 1 TABLET ORAL at 21:05

## 2023-04-28 RX ADMIN — CEFAZOLIN 2 G: 1 INJECTION, POWDER, FOR SOLUTION INTRAMUSCULAR; INTRAVENOUS at 12:15

## 2023-04-28 RX ADMIN — ATORVASTATIN CALCIUM 20 MG: 20 TABLET, FILM COATED ORAL at 21:06

## 2023-04-28 RX ADMIN — Medication 10 ML: at 21:06

## 2023-04-28 RX ADMIN — FENTANYL CITRATE 100 MCG: 50 INJECTION, SOLUTION INTRAMUSCULAR; INTRAVENOUS at 12:14

## 2023-04-28 RX ADMIN — BUMETANIDE 1 MG: 1 TABLET ORAL at 15:11

## 2023-04-28 RX ADMIN — Medication 10 ML: at 09:04

## 2023-04-28 RX ADMIN — TRAZODONE HYDROCHLORIDE 50 MG: 50 TABLET ORAL at 21:05

## 2023-04-28 RX ADMIN — HEPARIN SODIUM 4100 UNITS: 1000 INJECTION, SOLUTION INTRAVENOUS; SUBCUTANEOUS at 12:48

## 2023-04-28 RX ADMIN — LACTULOSE 30 G: 20 SOLUTION ORAL at 09:01

## 2023-04-28 RX ADMIN — ONDANSETRON 4 MG: 2 INJECTION INTRAMUSCULAR; INTRAVENOUS at 12:14

## 2023-04-28 RX ADMIN — TICAGRELOR 90 MG: 90 TABLET ORAL at 09:01

## 2023-04-28 RX ADMIN — LIDOCAINE HYDROCHLORIDE AND EPINEPHRINE 5 ML: 10; 10 INJECTION, SOLUTION INFILTRATION; PERINEURAL at 12:27

## 2023-04-28 RX ADMIN — ISOSORBIDE MONONITRATE 30 MG: 30 TABLET, EXTENDED RELEASE ORAL at 09:01

## 2023-04-28 RX ADMIN — ASPIRIN 81 MG: 81 TABLET, COATED ORAL at 09:01

## 2023-04-28 RX ADMIN — HYDRALAZINE HYDROCHLORIDE 25 MG: 25 TABLET, FILM COATED ORAL at 21:06

## 2023-04-28 RX ADMIN — LIDOCAINE HYDROCHLORIDE 5 ML: 10 INJECTION, SOLUTION INFILTRATION; PERINEURAL at 12:24

## 2023-04-28 NOTE — PROGRESS NOTES
[facility] Medicine Services   Daily Progress Note    Patient Name: Esdras Peralta  : 1960  MRN: [id number]  Primary Care Physician:  Erika Hernandez MD  Date of admission: 2023  Date and Time of Service: 23  at 17:11 EDT       Subjective      Chief Complaint: gen weakness, malaise    Patient S&E.  No new symptoms since arrival.  Agreeable for HD initiation as PD port is nonfunctional    Review of Systems   Constitutional: Positive for malaise/fatigue. Negative for chills, fever, weight gain and weight loss.   HENT: Negative for hearing loss, nosebleeds and sore throat.    Eyes: Negative for blurred vision, pain and redness.   Cardiovascular: Negative for chest pain, leg swelling, palpitations and syncope.   Respiratory: Negative for cough, shortness of breath and sputum production.    Endocrine: Negative for polyuria.   Skin: Negative for color change, rash and suspicious lesions.   Musculoskeletal: Positive for muscle weakness. Negative for back pain and joint pain.   Gastrointestinal: Negative for abdominal pain, anorexia, diarrhea, dysphagia, heartburn, melena, nausea and vomiting.   Genitourinary: Negative for dysuria, frequency and urgency.        Decreased urine output   Neurological: Negative for dizziness, numbness and weakness.   Psychiatric/Behavioral: Negative for memory loss. The patient does not have insomnia and is not nervous/anxious.           Objective      Vitals:   Temp:  [97 °F (36.1 °C)-98 °F (36.7 °C)] 97.3 °F (36.3 °C)  Heart Rate:  [74-86] 82  Resp:  [12-18] 16  BP: (127-150)/() 138/87    Physical Exam  Vitals and nursing note reviewed.   Constitutional:       General: He is not in acute distress.     Appearance: Normal appearance. He is obese. He is ill-appearing.   HENT:      Head: Normocephalic and atraumatic.      Mouth/Throat:      Mouth: Mucous membranes are moist.      Pharynx: Oropharynx is clear.   Eyes:      Extraocular Movements:  Extraocular movements intact.      Conjunctiva/sclera: Conjunctivae normal.      Pupils: Pupils are equal, round, and reactive to light.   Cardiovascular:      Rate and Rhythm: Normal rate and regular rhythm.      Pulses: Normal pulses.      Heart sounds: Normal heart sounds.   Pulmonary:      Effort: Pulmonary effort is normal. No respiratory distress.      Breath sounds: Normal breath sounds. No wheezing.   Abdominal:      General: Abdomen is flat. Bowel sounds are normal. There is no distension.      Palpations: Abdomen is soft.      Tenderness: There is no abdominal tenderness. There is no guarding.   Musculoskeletal:         General: No swelling, tenderness or signs of injury.      Cervical back: Normal range of motion and neck supple.   Skin:     General: Skin is warm and dry.      Capillary Refill: Capillary refill takes less than 2 seconds.      Comments: Bronzed Skin of ESRD   Neurological:      General: No focal deficit present.      Mental Status: He is alert and oriented to person, place, and time. Mental status is at baseline.      Motor: Weakness present.      Gait: Gait normal.      Comments: Strength 4-/5 B/L UE & LE     Psychiatric:         Mood and Affect: Mood normal.         Behavior: Behavior normal.         Judgment: Judgment normal.             Result Review    Result Review:  I have personally reviewed the results from the time of this admission to 4/28/2023 17:11 EDT and agree with these findings:  [x]  Laboratory  [x]  Microbiology  [x]  Radiology  []  EKG/Telemetry   []  Cardiology/Vascular   []  Pathology  []  Old records  []  Other:  Most notable findings include:         Assessment & Plan      Brief Patient Summary:  Esdras Peralta is a 62 y.o. male who came in from home 2/2 inability to carry out ADLs. Will start PD here and have PT/OT eval his functional status.       aspirin, 81 mg, Oral, Daily  atorvastatin, 20 mg, Oral, Nightly  bumetanide, 1 mg, Oral, TID  calcitriol, 0.25  mcg, Oral, Daily  heparin (porcine), 5,000 Units, Subcutaneous, Q8H  hydrALAZINE, 25 mg, Oral, BID  isosorbide mononitrate, 30 mg, Oral, Q24H  metoprolol succinate XL, 50 mg, Oral, Daily  sodium chloride, 10 mL, Intravenous, Q12H  spironolactone, 25 mg, Oral, Daily  ticagrelor, 90 mg, Oral, BID  traZODone, 50 mg, Oral, Nightly       insulin,          Active Hospital Problems:  Active Hospital Problems    Diagnosis    • **ESRD (end stage renal disease)    • Chronic kidney disease, unspecified CKD stage      Plan:   #ESRD  #Uremia    - Cr 4.3 on admission has been worsneing    - BUN 97    - Port placed for PD    - Patient with edema and uremic symptoms, sent to [facility] by nephrology,  HD catheter to be placed today    - nephrology consulted, Patient of Dr Lagunas     - Zofran PRN    - sodium 126 and Cl 89, suspect hypervolemic, manage with diuresis and fluid removal by dialysis    -PT / OT eval     #HFrEF  #Dilated Cardiomyopathy    - echo on 4/18/23 shows EF 20% with mod-severe dilated left ventricle     - s/p ICD    - Dialysis for fluid management    - aspirin daily    - Brilinta 90mg PO BID    - Will continue home Bumex as making urine    - continue home Aldactone    - resume home Toprol    - resume home imdur    - resume home trazodone and Ultram     #COPD    - chronic    - stable on room air    - Duoneb PRN     #DM1    - continue insulin pump     #Anemia    - hgb 11.3 on admission, base near 12.8    - check iron and ferritin    - suspect anemia of chronic renal disease    - no overt bleeding, follow labs     #HTN    - hold home hydralazine, may resume depending on BP     #HLD    -reconcile home meds    DVT prophylaxis:  Medical DVT prophylaxis orders are present.    CODE STATUS:    Code Status (Patient has no pulse and is not breathing): CPR (Attempt to Resuscitate)  Medical Interventions (Patient has pulse or is breathing): Full Support      Disposition:  I expect patient to be discharged home in 2-3 days.    This  patient has been examined wearing appropriate Personal Protective Equipment and discussed with Nurse. 04/28/23      Electronically signed by Preethi Zaman DO, 04/28/23, 17:11 EDT.  [facility] Hospitalist Team

## 2023-04-28 NOTE — NURSING NOTE
Unable to perform 1200 assessment. Patient off unit for Shiley placement. PD unsuccessful at this time. Per MD will be switching to HD. Patient aware and agreeable.

## 2023-04-28 NOTE — PLAN OF CARE
Problem: Adult Inpatient Plan of Care  Goal: Plan of Care Review  Outcome: Ongoing, Not Progressing  Flowsheets (Taken 4/28/2023 1534)  Progress: declining  Plan of Care Reviewed With:   patient   spouse  Goal: Patient-Specific Goal (Individualized)  Outcome: Ongoing, Not Progressing  Goal: Absence of Hospital-Acquired Illness or Injury  Outcome: Ongoing, Not Progressing  Intervention: Identify and Manage Fall Risk  Recent Flowsheet Documentation  Taken 4/28/2023 1410 by Sapna Webb LPN  Safety Promotion/Fall Prevention:   assistive device/personal items within reach   clutter free environment maintained   nonskid shoes/slippers when out of bed   room organization consistent   safety round/check completed  Taken 4/28/2023 1024 by Sapna Webb LPN  Safety Promotion/Fall Prevention:   assistive device/personal items within reach   clutter free environment maintained   nonskid shoes/slippers when out of bed   room organization consistent   safety round/check completed  Taken 4/28/2023 0806 by Sapna Webb LPN  Safety Promotion/Fall Prevention:   assistive device/personal items within reach   clutter free environment maintained   nonskid shoes/slippers when out of bed   room organization consistent   safety round/check completed  Intervention: Prevent Skin Injury  Recent Flowsheet Documentation  Taken 4/28/2023 0806 by Sapna Webb LPN  Body Position:   supine   position changed independently  Intervention: Prevent and Manage VTE (Venous Thromboembolism) Risk  Recent Flowsheet Documentation  Taken 4/28/2023 0806 by Sapna Webb LPN  Activity Management: up ad jesus  VTE Prevention/Management: sequential compression devices off  Range of Motion: active ROM (range of motion) encouraged  Intervention: Prevent Infection  Recent Flowsheet Documentation  Taken 4/28/2023 1410 by Sapna Webb LPN  Infection Prevention:   environmental surveillance performed   hand hygiene promoted   single patient room  provided   visitors restricted/screened  Taken 4/28/2023 1024 by Sapna Webb LPN  Infection Prevention:   environmental surveillance performed   hand hygiene promoted   single patient room provided  Taken 4/28/2023 0806 by Sapna Webb LPN  Infection Prevention:   single patient room provided   environmental surveillance performed   hand hygiene promoted  Goal: Optimal Comfort and Wellbeing  Outcome: Ongoing, Not Progressing  Intervention: Provide Person-Centered Care  Recent Flowsheet Documentation  Taken 4/28/2023 0806 by Sapna Webb LPN  Trust Relationship/Rapport:   care explained   choices provided   questions answered  Goal: Readiness for Transition of Care  Outcome: Ongoing, Not Progressing     Problem: Diabetes Comorbidity  Goal: Blood Glucose Level Within Targeted Range  Outcome: Ongoing, Not Progressing     Problem: Adjustment to Illness (Chronic Kidney Disease)  Goal: Optimal Coping with Chronic Illness  Outcome: Ongoing, Not Progressing  Intervention: Support Psychosocial Response  Recent Flowsheet Documentation  Taken 4/28/2023 0806 by Sapna Webb LPN  Supportive Measures: active listening utilized  Family/Support System Care:   support provided   self-care encouraged     Problem: Electrolyte Imbalance (Chronic Kidney Disease)  Goal: Electrolyte Balance  Outcome: Ongoing, Not Progressing     Problem: Fluid Volume Excess (Chronic Kidney Disease)  Goal: Fluid Balance  Outcome: Ongoing, Not Progressing     Problem: Functional Decline (Chronic Kidney Disease)  Goal: Optimal Functional Ability  Outcome: Ongoing, Not Progressing  Intervention: Optimize Functional Ability  Recent Flowsheet Documentation  Taken 4/28/2023 0806 by Sapna Webb LPN  Activity Management: up ad jesus  Self-Care Promotion: independence encouraged     Problem: Hematologic Alteration (Chronic Kidney Disease)  Goal: Absence of Anemia Signs and Symptoms  Outcome: Ongoing, Not Progressing  Intervention: Manage Signs of  Anemia and Bleeding  Recent Flowsheet Documentation  Taken 4/28/2023 0806 by Sapna Webb LPN  Environmental Support: calm environment promoted     Problem: Oral Intake Inadequate (Chronic Kidney Disease)  Goal: Optimal Oral Intake  Outcome: Ongoing, Not Progressing     Problem: Pain (Chronic Kidney Disease)  Goal: Acceptable Pain Control  Outcome: Ongoing, Not Progressing     Problem: Renal Function Impairment (Chronic Kidney Disease)  Goal: Minimize Renal Failure Effects  Outcome: Ongoing, Not Progressing  Intervention: Monitor and Support Renal Function  Recent Flowsheet Documentation  Taken 4/28/2023 1410 by Sapna Webb LPN  Medication Review/Management:   medications reviewed   high-risk medications identified  Taken 4/28/2023 1024 by Sapna Webb LPN  Medication Review/Management:   medications reviewed   high-risk medications identified  Taken 4/28/2023 0806 by Sapna Webb LPN  Medication Review/Management:   medications reviewed   high-risk medications identified   Goal Outcome Evaluation:  Plan of Care Reviewed With: patient, spouse        Progress: declining   Alert and oriented x 4. Able to verbalize needs and wants. Takes medication whole and tolerates well. Continent of bowel and bladder. Independent for transfer/ambulation. Does not require O2 therapy at this time. Spouse at bedside. Shiley placed by IR and patient tolerating well. Hemodialysis scheduled for today. CT of abd/pelvis completed d/t swelling to testicles and penis. No c/o pain noted. Occasional discomfort/SOB noted. Currently in bed, awake. Call bell in reach. Continues to be followed by nephrology. Discharge plan: home with spouse.
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
## 2023-04-28 NOTE — PROGRESS NOTES
PROGRESS NOTE      Patient Name: Esdras Peralta  : 1960  MRN: [id number]  Primary Care Physician: Erika Hernandez MD  Date of admission: 2023    Patient Care Team:  Erika Hernandez MD as PCP - General  Erika Hernandez MD as PCP - Family Medicine        Subjective   Subjective:     No new active complaint, Patient on PD, tolerating well  Review of systems:    Constitutional:  Weakness, malaise/Fatigue  HEENT:           No headache, otalgia, itchy eyes, nasal discharge or sore throat.  Cardiac:           No chest pain, dyspnea, orthopnea or PND.  Chest:              No cough, phlegm or wheezing.  Abdomen:        No abdominal pain, nausea or vomiting.  Neuro:             No focal weakness, abnormal movements orseizure like activity.  :                  No hematuria, no pyuria, no dysuria, no flank pain.  ROS was otherwise negative except as mentioned in the [place].    Allergies:    Allergies   Allergen Reactions   • Codeine Unknown (See Comments)       Objective   Exam:     Vital Signs  Temp:  [97 °F (36.1 °C)-98 °F (36.7 °C)] 97.3 °F (36.3 °C)  Heart Rate:  [69-86] 83  Resp:  [15-18] 18  BP: (108-137)/(68-94) 137/94  SpO2:  [97 %-100 %] 99 %  on   ;   Device (Oxygen Therapy): room air  Body mass index is 32.16 kg/m².    General:    male in no acute distress.    Head:      Normocephalic and atraumatic.    Eyes:      PERRL/EOM intact, conjunctiva and sclera clear with out nystagmus.    Neck:      No masses, thyromegaly,  trachea central with normal respiratory effort   Lungs:    Clear bilaterally to auscultation.    Heart:      Regular rate and rhythm, no murmur no gallop  Abd:        PD catheter with PD in progress  Pulses:   Pulses palpable  Extr:        No cyanosis or clubbing-+ edema.    Neuro:    No focal deficits.   alert oriented x3  Skin:       Intact without lesions or rashes.    Psych:    Alert and cooperative; normal mood and affect; .      Results Review:  I have personally reviewed  most recent Data :  CBC    Results from last 7 days   Lab Units 04/28/23  0203 04/27/23  0616 04/26/23  1805   WBC 10*3/mm3 6.10 6.60 8.10   HEMOGLOBIN g/dL 10.9* 10.9* 11.3*   PLATELETS 10*3/mm3 165 173 171     CMP   Results from last 7 days   Lab Units 04/28/23  0203 04/27/23  0616 04/26/23  1805   SODIUM mmol/L 127* 128* 126*   POTASSIUM mmol/L 4.6 4.6 5.2   CHLORIDE mmol/L 90* 90* 89*   CO2 mmol/L 26.0 25.0 26.0   BUN mg/dL 96* 105* 97*   CREATININE mg/dL 4.06* 4.07* 4.30*   GLUCOSE mg/dL 211* 209* 209*   ALBUMIN g/dL  --   --  2.9*   BILIRUBIN mg/dL  --   --  0.5   ALK PHOS U/L  --   --  107   AST (SGOT) U/L  --   --  31   ALT (SGPT) U/L  --   --  22     ABG      XR Abdomen KUB    Result Date: 4/28/2023  Impression: Distal peritoneal dialysis catheter is looped in the right lower quadrant. Nonobstructive bowel gas pattern. Electronically Signed: Nona Walls  4/28/2023 8:37 AM EDT  Workstation ID: [id number]      Results for orders placed during the hospital encounter of 04/16/23    Adult Transthoracic Echo Complete W/ Cont if Necessary Per Protocol    Interpretation Summary  •  Left ventricular systolic function is severely decreased. Left ventricular ejection fraction appears to be less than 20%.  •  The left ventricular cavity is moderate to severely dilated.  •  The right ventricular cavity is moderately dilated.  •  Estimated right ventricular systolic pressure from tricuspid regurgitation is markedly elevated (>55 mmHg). Calculated right ventricular systolic pressure from tricuspid regurgitation is 58 mmHg.    Scheduled Meds:aspirin, 81 mg, Oral, Daily  bumetanide, 1 mg, Oral, TID  heparin (porcine), 5,000 Units, Subcutaneous, Q8H  isosorbide mononitrate, 30 mg, Oral, Q24H  metoprolol succinate XL, 50 mg, Oral, Daily  sodium chloride, 10 mL, Intravenous, Q12H  spironolactone, 25 mg, Oral, Daily  ticagrelor, 90 mg, Oral, BID  traZODone, 50 mg, Oral, Nightly      Continuous Infusions:insulin,       PRN  Meds:•  Delflex-LC/2.5% Dextrose  •  dextrose  •  dextrose  •  glucagon (human recombinant)  •  ipratropium-albuterol  •  nitroglycerin  •  ondansetron **OR** ondansetron  •  [COMPLETED] Insert Peripheral IV **AND** sodium chloride  •  sodium chloride  •  sodium chloride  •  traMADol    Assessment & Plan   Assessment and Plan:         ESRD (end stage renal disease)    ASSESSMENT:  • Chronic Kidney disease/ESRD needing initiation of dialysis, patient has mature PD catheter/ port  • Hyponatremia  • Hypervolemia/Volume overload, patient with gross B?L lower extremity edema  • Dilated Cardiomyopathy  • CAD on Brilanta  • H/o Heart dfailure  • H/o COPD  • H/o Diabetes Mellitus  • Anemia of chronic disease  • Artherosclorotic heart disease  • Secondary hyperparathyroidism of Renal disease  • Vitamin D deficiency  • S/p AICD  • H/o Hyperlipidemia on statins     EF 20% with mod-severe dilated left ventricle, echo on 4/18/2023. [place] decreased left ventricle systolic function Rt ventricle moderately dilated, left atrial dilation. RVSP >55       PLAN :      • Chronic Kidney disease/ESRD needing initiation of dialysis, patient has mature PD catheter/ port  • Peritoneal Dialysis initiated on 4/27, using Dianeal solution, but didn't get effluent back, kub unremarkable.  • Will check ct scan for hernia.  • Switch to hd x 3 days, low flow with low dfr and uf 1-2 ltrs.explained to the pt who is in agreement.  • tdc requested by ir.if not possible shiley catheter,and then switch to tdc next week.  • Continue Bumex,  Spironolactone, and Brilanta /aspirin  • BP aacptable  • Hemoglobin acceptable  • Hyponatremia, sodium 127, watch closely, potassium stable  • Daily labs, BMp, Mag, Phosphorus  • Daily weight  • Intake output record  • Avoid nephrotoxic agen  • We will continue follow up        Electronically signed by Jaziel Reese MD,   [facility] kidney consultant  [phone]  4/28/2023  10:14 EDT

## 2023-04-28 NOTE — CASE MANAGEMENT/SOCIAL WORK
Continued Stay Note  [facility]     Patient Name: Esdras Peralta  MRN: [id number]  Today's Date: 4/28/2023    Admit Date: 4/26/2023       Discharge Plan     Row Name 04/28/23 1347       Plan    Plan Comments CM submitted HD referral through [employer] portal. Hep B antibody & Hep B antigen tests pending, will need to be uploaded to portal.              Phone communication or documentation only - no physical contact with patient or family.    Tangela Desouza RN, BSN  [address] [address]  Phone: [phone]  Fax: [phone]

## 2023-04-28 NOTE — DISCHARGE PLACEMENT REQUEST
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 Admission medications    Medication Sig Start Date End Date Taking? Authorizing Provider   aspirin (aspirin) 81 MG EC tablet Take 1 tablet by mouth Daily. 6/29/16  Yes Giacomo Cheema MD   B Complex Vitamins (VITAMIN B COMPLEX PO) Take 1 tablet by mouth Daily. 11/15/17  Yes Giacomo Cheema MD   bumetanide (BUMEX) 2 MG tablet Take 1 mg by mouth 3 (Three) Times a Day. 7/9/19  Yes Giacomo Cheema MD   calcitriol (ROCALTROL) 0.25 MCG capsule Take 1 capsule by mouth Daily. 4/17/19  Yes Giacomo Cheema MD   hydrALAZINE (APRESOLINE) 25 MG tablet Take 1 tablet by mouth twice daily. 2/6/23  Yes Rachid Sheriff MD   insulin patient supplied pump Inject  under the skin into the appropriate area as directed Continuous. Per pt. Pump is currently on and running  Omnipod   Humalog   Max daily: 50u   Yes Giacomo Cheema MD   isosorbide mononitrate (IMDUR) 30 MG 24 hr tablet Take 1 tablet by mouth Daily. 6/26/19  Yes Giacomo Cheema MD   metOLazone (ZAROXOLYN) 2.5 MG tablet Take 1 tablet by mouth Daily.   Yes Giacomo Cheema MD   metoprolol succinate XL (TOPROL-XL) 50 MG 24 hr tablet Take 1  tablet by mouth daily. 2/6/23  Yes Rachid Sheriff MD   Multiple Vitamin (DAILY-VITAMIN) tablet Take 1 tablet by mouth Daily. 10/10/17  Yes Giacomo Cheema MD   Omega-3 Fatty Acids (FISH OIL) 1000 MG capsule capsule Take 1 capsule by mouth Daily. 10/10/17  Yes Giacomo Cheema MD   sevelamer (RENVELA) 800 MG tablet Take 1 tablet by mouth 3 (Three) Times a Day With Meals.   Yes Giacomo Cheema MD   simvastatin (ZOCOR) 40 MG tablet Take 1 tablet by mouth Every Night.   Yes Giacomo Cheema MD   sodium chloride 0.65 % nasal spray 1 spray into the nostril(s) as directed by provider As Needed for Congestion.   Yes Giacomo Cheema MD   spironolactone (ALDACTONE) 25 MG tablet Take 1 tablet by mouth Daily. 4/11/23  Yes Giacomo Cheema MD   traZODone (DESYREL) 50 MG tablet Take 1 tablet by mouth Every Night. 4/18/23  Yes Winston Caldera MD   vitamin D3 125 MCG (5000 UT) capsule capsule Take 1 capsule by mouth Every Other Day. 12/7/16  Yes Giacomo Cheema MD   ticagrelor (BRILINTA) 90 MG tablet tablet Take 1 tablet by mouth 2 (Two) Times a Day.    Giacomo Cheema MD     Scheduled Meds:aspirin, 81 mg, Oral, Daily  bumetanide, 1 mg, Oral, TID  heparin (porcine), 5,000 Units, Subcutaneous, Q8H  isosorbide mononitrate, 30 mg, Oral, Q24H  metoprolol succinate XL, 50 mg, Oral, Daily  sodium chloride, 10 mL, Intravenous, Q12H  spironolactone, 25 mg, Oral, Daily  ticagrelor, 90 mg, Oral, BID  traZODone, 50 mg, Oral, Nightly      Continuous Infusions:insulin,       PRN Meds:•  dextrose  •  dextrose  •  glucagon (human recombinant)  •  ipratropium-albuterol  •  nitroglycerin  •  ondansetron **OR** ondansetron  •  [COMPLETED] Insert Peripheral IV **AND** sodium chloride  •  sodium chloride  •  sodium chloride  •  traMADol  Allergies:    Allergies   Allergen Reactions   • Codeine Unknown (See Comments)       Objective   Exam:     Vital Signs  Temp:  [97 °F (36.1 °C)-97.8 °F (36.6 °C)] 97.7  °F (36.5 °C)  Heart Rate:  [74-85] 75  Resp:  [15-19] 15  BP: (108-146)/(67-93) 108/68  SpO2:  [92 %-99 %] 97 %  on   ;   Device (Oxygen Therapy): room air  Body mass index is 30.72 kg/m².  EXAM  General:    male in no acute distress.    Head:      Normocephalic and atraumatic.    Eyes:      PERRL/EOM intact, conjunctivae and sclerae clear without nystagmus.    Neck:      No masses, thyromegaly,  trachea central   Lungs:    Clear bilaterally to auscultation.    Heart:      Regular rate and rhythm, no murmur no gallop  Abd:        Soft, nontender, not distended, bowel sounds positive, no shifting dullness.  Msk:        No deformity or scoliosis noted of thoracic or lumbar spine. Muscle weakness   Pulses:   Pulses normal in all 4 extremities.    Extremities:        No cyanosis or clubbing ++ edema.    Neuro:    No focal deficits.   alert oriented x3  Skin:       Intact without lesions or rashes.    Psych:    Alert and cooperative; normal mood and affect; normal attention span       Results Review:  I have personally reviewed most recent Data :  BMP @LABRCNT(creatinine:10)  CBC    Results from last 7 days   Lab Units 04/27/23  0616 04/26/23  1805   WBC 10*3/mm3 6.60 8.10   HEMOGLOBIN g/dL 10.9* 11.3*   PLATELETS 10*3/mm3 173 171     CMP   Results from last 7 days   Lab Units 04/27/23  0616 04/26/23  1805   SODIUM mmol/L 128* 126*   POTASSIUM mmol/L 4.6 5.2   CHLORIDE mmol/L 90* 89*   CO2 mmol/L 25.0 26.0   BUN mg/dL 105* 97*   CREATININE mg/dL 4.07* 4.30*   GLUCOSE mg/dL 209* 209*   ALBUMIN g/dL  --  2.9*   BILIRUBIN mg/dL  --  0.5   ALK PHOS U/L  --  107   AST (SGOT) U/L  --  31   ALT (SGPT) U/L  --  22     ABG      No radiology results for the last 7 days    Results for orders placed during the hospital encounter of 04/16/23    Adult Transthoracic Echo Complete W/ Cont if Necessary Per Protocol    Interpretation Summary  •  Left ventricular systolic function is severely decreased. Left ventricular ejection fraction  "appears to be less than 20%.  •  The left ventricular cavity is moderate to severely dilated.  •  The right ventricular cavity is moderately dilated.  •  Estimated right ventricular systolic pressure from tricuspid regurgitation is markedly elevated (>55 mmHg). Calculated right ventricular systolic pressure from tricuspid regurgitation is 58 mmHg.        Assessment & Plan   Assessment and Plan:         ESRD (end stage renal disease)    ASSESSMENT:  • Chronic Kidney disease/ESRD needing initiation of dialysis, patient has mature PD catheter/ port  • Hyponatremia  • Hypervolemia/Volume overload, patient with gross B?L lower extremity edema  • Dilated Cardiomyopathy  • CAD on Brilanta  • H/o Heart dfailure  • H/o COPD  • H/o Diabetes Mellitus  • Anemia of chronic disease  • Artherosclorotic heart disease  • Secondary hyperparathyroidism of Renal disease  • Vitamin D deficiency  • S/p AICD  • H/o Hyperlipidemia on statins    EF 20% with mod-severe dilated left ventricle, echo on 4/18/2023. Severley decreased left ventricle systolic function Rt ventricle moderately dilated, left atrial dilation. RVSP >55      PLAN :     · Chronic Kidney disease/ESRD needing initiation of dialysis, patient has mature PD catheter/ port  · Will initiate Peritoneal dialysis,d/w floor charge nurse.  · Continue Bumex,  Spironolactone, and Brilanta /aspirin  · BP elevated, resume home medications  · Hemoglobin acceptable  · Watch sodium level closely, current sodium low, 128, other electrolytes acceptable  · Daily labs, CMP, Mag, Phosphorus  · Daily weight  · Intake output record  · Avoid nephrotoxic agents  · Thanks for the consultation  · We will continue follow up      Jaziel Reese MD  [facility]  4/27/2023  13:15 EDT        Electronically signed by Jaziel Reese MD at 04/27/23 1450       Immunization Summary  Esdras Peralta \"mary anne\"   MRN: [id number]     Patient Information    Patient Name   Esdras Peralta Legal Sex "   Male    1960     Immunizations by Immunization Family    COVID-19 (UNSPECIFIED) 3/20/2021 (60 y.o.) 4/10/2021 (60 y.o.)     Flu Vaccine Intradermal Quad 18-64YR 10/16/2018 (58 y.o.)      Flu Vaccine Quad PF >36MO 10/27/2016 (56 y.o.) 1/15/2018 (57 y.o.)     Flucelvax Quad Vial =>4yrs 10/15/2019 (59 y.o.)      Influenza, Unspecified 10/16/2018 (58 y.o.)      Pneumococcal Conjugate 13-Valent (PCV13) 10/27/2016 (56 y.o.)      Pneumococcal, Unspecified 2018 (57 y.o.)        [facility] MEDICAL INPATIENT  [address] [place] [address]  Phone:  [phone]  Fax:  [phone]        Patient:     Esdras Peralta MRN:  [id number]   [address] [place] [address] :  1960  SSN:    Phone: [phone] Sex:  M      [employer] # SUBSCRIBER ID   Primary:    SHERI BELL [id number]   Admitting Diagnosis: Weakness [R53.1]  Order Date:  2023        Hemodialysis Inpatient       (Order ID: [id number])     Diagnosis:         Priority:  Routine Expected Date:   Expiration Date:        Interval:  Once Count:    Duration of Treatment: 2.0 Hours  Access Site: Temporary Dialysis Catheter  Dialyzer: F160  DFR: 300 mL/min  Dialysate Temperature (C): 36  BFR-As tolerated to a maximum of: 250 mL/min  Dialysate Solution Bath: K+ = 3 mEq, Ca = 2.5mEq  Bicarb: 30 meq  Na+: 137 meq  Fluid Removal: 2 ltrs     Specimen Type:   Specimen Source:   Specimen Taken Date:   Specimen Taken Time:                  Authorizing Provider:Jaziel Reese MD  Authorizing Provider's NPI: [id number]  Order Entered By: Jaziel Reese MD 2023 11:03 AM     Electronically signed by: Jaziel Reese MD 2023 11:03 AM

## 2023-04-28 NOTE — PLAN OF CARE
Goal Outcome Evaluation:              Outcome Evaluation: Pt receiving bedside peritoneal dialysis every 3 hours per Nephro orders.  Some complaints of pain.  Pt stable at this time.

## 2023-04-28 NOTE — CASE MANAGEMENT/SOCIAL WORK
Continued Stay Note  JAVED Machado     Patient Name: Esdras Peralta  MRN: [id number]  Today's Date: 4/28/2023    Admit Date: 4/26/2023    Plan: Routine home with spouse. New [facility] HD chair time pending.   Discharge Plan       Row Name 04/28/23 1548       Plan    Plan Routine home with spouse. New [facility] HD chair time pending.    Patient/Family in Agreement with Plan yes    Plan Comments Per chart review, peritoneal dialysis is not functioning. Shiley cath placed today 4/28 with plans to start hemodialysis. LYNN Honeycutt initiated [facility] referral on portal. LYNN spoke to [facility] rep [facility]- primary nephrologist Dr Lagunas.            Megan Naegele, KIP     Office Phone: [phone]  Office Cell: [phone]

## 2023-04-28 NOTE — NURSING NOTE
Attempted to drain patient for the 3rd time doing the peritoneal dialysis.  After 25-30 min patient had not drained in the bag.  The emergency number for [facility] was contacted and a message was left.  Patient was put in several different position and still no drainage.  Waiting on a call back.  Pt stable at this time.

## 2023-04-29 LAB
ALBUMIN SERPL-MCNC: 2.8 G/DL (ref 3.5–5.2)
ALBUMIN/GLOB SERPL: 0.8 G/DL
ALP SERPL-CCNC: 111 U/L (ref 39–117)
ALT SERPL W P-5'-P-CCNC: 15 U/L (ref 1–41)
ANION GAP SERPL CALCULATED.3IONS-SCNC: 11 MMOL/L (ref 5–15)
AST SERPL-CCNC: 29 U/L (ref 1–40)
BASOPHILS # BLD AUTO: 0 10*3/MM3 (ref 0–0.2)
BASOPHILS NFR BLD AUTO: 0.6 % (ref 0–1.5)
BILIRUB SERPL-MCNC: 0.6 MG/DL (ref 0–1.2)
BUN SERPL-MCNC: 36 MG/DL (ref 8–23)
BUN/CREAT SERPL: 15.5 (ref 7–25)
CALCIUM SPEC-SCNC: 8.4 MG/DL (ref 8.6–10.5)
CHLORIDE SERPL-SCNC: 95 MMOL/L (ref 98–107)
CO2 SERPL-SCNC: 25 MMOL/L (ref 22–29)
CREAT SERPL-MCNC: 2.32 MG/DL (ref 0.76–1.27)
DEPRECATED RDW RBC AUTO: 67.4 FL (ref 37–54)
EGFRCR SERPLBLD CKD-EPI 2021: 31.4 ML/MIN/1.73
EOSINOPHIL # BLD AUTO: 0.1 10*3/MM3 (ref 0–0.4)
EOSINOPHIL NFR BLD AUTO: 1.7 % (ref 0.3–6.2)
ERYTHROCYTE [DISTWIDTH] IN BLOOD BY AUTOMATED COUNT: 20 % (ref 12.3–15.4)
GLOBULIN UR ELPH-MCNC: 3.7 GM/DL
GLUCOSE BLDC GLUCOMTR-MCNC: 102 MG/DL (ref 70–105)
GLUCOSE BLDC GLUCOMTR-MCNC: 146 MG/DL (ref 70–105)
GLUCOSE BLDC GLUCOMTR-MCNC: 82 MG/DL (ref 70–105)
GLUCOSE SERPL-MCNC: 135 MG/DL (ref 65–99)
HCT VFR BLD AUTO: 36.4 % (ref 37.5–51)
HGB BLD-MCNC: 11.8 G/DL (ref 13–17.7)
LYMPHOCYTES # BLD AUTO: 0.4 10*3/MM3 (ref 0.7–3.1)
LYMPHOCYTES NFR BLD AUTO: 6.6 % (ref 19.6–45.3)
MAGNESIUM SERPL-MCNC: 2.1 MG/DL (ref 1.6–2.4)
MCH RBC QN AUTO: 30.1 PG (ref 26.6–33)
MCHC RBC AUTO-ENTMCNC: 32.4 G/DL (ref 31.5–35.7)
MCV RBC AUTO: 92.8 FL (ref 79–97)
MONOCYTES # BLD AUTO: 1 10*3/MM3 (ref 0.1–0.9)
MONOCYTES NFR BLD AUTO: 15.3 % (ref 5–12)
NEUTROPHILS NFR BLD AUTO: 4.7 10*3/MM3 (ref 1.7–7)
NEUTROPHILS NFR BLD AUTO: 75.8 % (ref 42.7–76)
NRBC BLD AUTO-RTO: 0.1 /100 WBC (ref 0–0.2)
PHOSPHATE SERPL-MCNC: 3.3 MG/DL (ref 2.5–4.5)
PLATELET # BLD AUTO: 173 10*3/MM3 (ref 140–450)
PMV BLD AUTO: 10.3 FL (ref 6–12)
POTASSIUM SERPL-SCNC: 4.1 MMOL/L (ref 3.5–5.2)
PROT SERPL-MCNC: 6.5 G/DL (ref 6–8.5)
RBC # BLD AUTO: 3.92 10*6/MM3 (ref 4.14–5.8)
SODIUM SERPL-SCNC: 131 MMOL/L (ref 136–145)
WBC NRBC COR # BLD: 6.3 10*3/MM3 (ref 3.4–10.8)

## 2023-04-29 PROCEDURE — 5A1D70Z PERFORMANCE OF URINARY FILTRATION, INTERMITTENT, LESS THAN 6 HOURS PER DAY: ICD-10-PCS | Performed by: INTERNAL MEDICINE

## 2023-04-29 PROCEDURE — 82948 REAGENT STRIP/BLOOD GLUCOSE: CPT

## 2023-04-29 PROCEDURE — 85025 COMPLETE CBC W/AUTO DIFF WBC: CPT | Performed by: INTERNAL MEDICINE

## 2023-04-29 PROCEDURE — 84100 ASSAY OF PHOSPHORUS: CPT | Performed by: INTERNAL MEDICINE

## 2023-04-29 PROCEDURE — 83735 ASSAY OF MAGNESIUM: CPT | Performed by: INTERNAL MEDICINE

## 2023-04-29 PROCEDURE — 80053 COMPREHEN METABOLIC PANEL: CPT | Performed by: INTERNAL MEDICINE

## 2023-04-29 PROCEDURE — 25010000002 HEPARIN (PORCINE) PER 1000 UNITS: Performed by: INTERNAL MEDICINE

## 2023-04-29 RX ORDER — MIDODRINE HYDROCHLORIDE 5 MG/1
5 TABLET ORAL DAILY PRN
Status: DISCONTINUED | OUTPATIENT
Start: 2023-04-29 | End: 2023-05-02 | Stop reason: HOSPADM

## 2023-04-29 RX ORDER — HEPARIN SODIUM 1000 [USP'U]/ML
5000 INJECTION, SOLUTION INTRAVENOUS; SUBCUTANEOUS AS NEEDED
Status: DISCONTINUED | OUTPATIENT
Start: 2023-04-29 | End: 2023-05-02 | Stop reason: HOSPADM

## 2023-04-29 RX ORDER — CYCLOBENZAPRINE HCL 10 MG
10 TABLET ORAL 3 TIMES DAILY
Status: DISCONTINUED | OUTPATIENT
Start: 2023-04-29 | End: 2023-05-02 | Stop reason: HOSPADM

## 2023-04-29 RX ADMIN — TRAMADOL HYDROCHLORIDE 50 MG: 50 TABLET, COATED ORAL at 08:28

## 2023-04-29 RX ADMIN — Medication 10 ML: at 08:30

## 2023-04-29 RX ADMIN — TRAMADOL HYDROCHLORIDE 50 MG: 50 TABLET, COATED ORAL at 21:37

## 2023-04-29 RX ADMIN — HEPARIN SODIUM 5000 UNITS: 1000 INJECTION INTRAVENOUS; SUBCUTANEOUS at 15:00

## 2023-04-29 RX ADMIN — CYCLOBENZAPRINE 10 MG: 10 TABLET, FILM COATED ORAL at 20:10

## 2023-04-29 RX ADMIN — CYCLOBENZAPRINE 10 MG: 10 TABLET, FILM COATED ORAL at 15:18

## 2023-04-29 RX ADMIN — BUMETANIDE 1 MG: 1 TABLET ORAL at 15:18

## 2023-04-29 RX ADMIN — TRAZODONE HYDROCHLORIDE 50 MG: 50 TABLET ORAL at 20:10

## 2023-04-29 RX ADMIN — BUMETANIDE 1 MG: 1 TABLET ORAL at 08:28

## 2023-04-29 RX ADMIN — HYDRALAZINE HYDROCHLORIDE 25 MG: 25 TABLET, FILM COATED ORAL at 08:28

## 2023-04-29 RX ADMIN — ISOSORBIDE MONONITRATE 30 MG: 30 TABLET, EXTENDED RELEASE ORAL at 08:28

## 2023-04-29 RX ADMIN — TICAGRELOR 90 MG: 90 TABLET ORAL at 08:28

## 2023-04-29 RX ADMIN — TICAGRELOR 90 MG: 90 TABLET ORAL at 20:10

## 2023-04-29 RX ADMIN — ASPIRIN 81 MG: 81 TABLET, COATED ORAL at 08:28

## 2023-04-29 RX ADMIN — Medication 10 ML: at 20:11

## 2023-04-29 RX ADMIN — CALCITRIOL CAPSULES 0.25 MCG 0.25 MCG: 0.25 CAPSULE ORAL at 08:28

## 2023-04-29 RX ADMIN — HYDRALAZINE HYDROCHLORIDE 25 MG: 25 TABLET, FILM COATED ORAL at 20:10

## 2023-04-29 RX ADMIN — ATORVASTATIN CALCIUM 20 MG: 20 TABLET, FILM COATED ORAL at 20:10

## 2023-04-29 RX ADMIN — BUMETANIDE 1 MG: 1 TABLET ORAL at 20:10

## 2023-04-29 RX ADMIN — SPIRONOLACTONE 25 MG: 25 TABLET ORAL at 08:28

## 2023-04-29 RX ADMIN — METOPROLOL SUCCINATE 50 MG: 50 TABLET, EXTENDED RELEASE ORAL at 08:28

## 2023-04-29 NOTE — PLAN OF CARE
Goal Outcome Evaluation:              Outcome Evaluation: Patient received dialysis during the night.  Took 2 liters off.  Pt still have scrotum and penial edema.  Stable at this time.

## 2023-04-29 NOTE — PROGRESS NOTES
Paged at 2300 due to increased body wall/ble edema  Patient with increasing edema, PD cath not draining adequately. shiley placed earlier today but hd not yet completed.  Patient endorsing increased soa, was previously on RA, now on 2L  Adjusting hd orders to run stat.   Will follow   [facility]

## 2023-04-29 NOTE — PLAN OF CARE
Goal Outcome Evaluation:              Outcome Evaluation: pt requested pain medicine x1; c/o muscle spasms and restless legs, MD notified and new orders received; dialysis completed, 2L removed, vital signs stable; up ad jesus; continue to monitor

## 2023-04-29 NOTE — NURSING NOTE
Hd completed as ordered. Cath performed well. 2 liters net uf. Stable throughout. Post vss 129/80 hr68

## 2023-04-29 NOTE — PROGRESS NOTES
PROGRESS NOTE      Patient Name: Esdras Peralta  : 1960  MRN: [id number]  Primary Care Physician: Erika Hernandez MD  Date of admission: 2023    Patient Care Team:  Erika Hernandez MD as PCP - General  Erika Hernandez MD as PCP - Family Medicine        Subjective   Subjective:     No new active complaint, Patient on PD, tolerating well  Review of systems:    Constitutional:  Weakness, malaise/Fatigue  HEENT:           No headache, otalgia, itchy eyes, nasal discharge or sore throat.  Cardiac:           No chest pain, dyspnea, orthopnea or PND.  Chest:              No cough, phlegm or wheezing.  Abdomen:        No abdominal pain, nausea or vomiting.  Neuro:             No focal weakness, abnormal movements orseizure like activity.  :                  No hematuria, no pyuria, no dysuria, no flank pain.  ROS was otherwise negative except as mentioned in the [place].    Allergies:    Allergies   Allergen Reactions   • Codeine Unknown (See Comments)       Objective   Exam:     Vital Signs  Temp:  [97.3 °F (36.3 °C)-98.2 °F (36.8 °C)] 98.1 °F (36.7 °C)  Heart Rate:  [73-82] 73  Resp:  [12-16] 15  BP: (115-150)/() 115/72  SpO2:  [97 %-100 %] 99 %  on  Flow (L/min):  [2] 2;   Device (Oxygen Therapy): room air  Body mass index is 32.49 kg/m².    General:    male in no acute distress.    Head:      Normocephalic and atraumatic.    Eyes:      PERRL/EOM intact, conjunctiva and sclera clear with out nystagmus.    Neck:      No masses, thyromegaly,  trachea central with normal respiratory effort   Lungs:    Clear bilaterally to auscultation.    Heart:      Regular rate and rhythm, no murmur no gallop  Abd:        PD catheter with PD in progress  Pulses:   Pulses palpable  Extr:        No cyanosis or clubbing-+ edema.    Neuro:    No focal deficits.   alert oriented x3  Skin:       Intact without lesions or rashes.    Psych:    Alert and cooperative; normal mood and affect; .      Results Review:  I have  personally reviewed most recent Data :  CBC    Results from last 7 days   Lab Units 04/28/23  0203 04/27/23  0616 04/26/23  1805   WBC 10*3/mm3 6.10 6.60 8.10   HEMOGLOBIN g/dL 10.9* 10.9* 11.3*   PLATELETS 10*3/mm3 165 173 171     CMP   Results from last 7 days   Lab Units 04/28/23  0203 04/27/23  0616 04/26/23  1805   SODIUM mmol/L 127* 128* 126*   POTASSIUM mmol/L 4.6 4.6 5.2   CHLORIDE mmol/L 90* 90* 89*   CO2 mmol/L 26.0 25.0 26.0   BUN mg/dL 96* 105* 97*   CREATININE mg/dL 4.06* 4.07* 4.30*   GLUCOSE mg/dL 211* 209* 209*   ALBUMIN g/dL  --   --  2.9*   BILIRUBIN mg/dL  --   --  0.5   ALK PHOS U/L  --   --  107   AST (SGOT) U/L  --   --  31   ALT (SGPT) U/L  --   --  22     ABG      CT Abdomen Pelvis Without Contrast    Result Date: 4/28/2023  Impression: 1. Diffuse anasarca of the soft tissues which has progressed as compared to the previous study. No significant ascites or pleural effusion identified. Left-sided peritoneal drain versus dialysis catheter present terminating within the right lower quadrant present. No mesenteric collection identified. No evidence of adenopathy or hernia. Scrotal edema versus hydroceles present. 2. Ancillary findings as described above. Electronically Signed: Toyin Gant  4/28/2023 1:33 PM EDT  Workstation ID: [id number]    IR Insert Tunneled CV Catheter Without Port 5 Plus    Result Date: 4/28/2023  Impression: Technically successful placement, 15.5 [place] dual lumen tunneled hemodialysis catheter, utilizing the right internal jugular approach, as well as both sonographic and fluoroscopic control. Electronically Signed: Nithya Meehan  4/28/2023 1:00 PM EDT  Workstation ID: [id number]    XR Abdomen KUB    Result Date: 4/28/2023  Impression: Distal peritoneal dialysis catheter is looped in the right lower quadrant. Nonobstructive bowel gas pattern. Electronically Signed: Nona Walls  4/28/2023 8:37 AM EDT  Workstation ID: [id number]      Results for orders placed during the hospital  encounter of 04/16/23    Adult Transthoracic Echo Complete W/ Cont if Necessary Per Protocol    Interpretation Summary  •  Left ventricular systolic function is severely decreased. Left ventricular ejection fraction appears to be less than 20%.  •  The left ventricular cavity is moderate to severely dilated.  •  The right ventricular cavity is moderately dilated.  •  Estimated right ventricular systolic pressure from tricuspid regurgitation is markedly elevated (>55 mmHg). Calculated right ventricular systolic pressure from tricuspid regurgitation is 58 mmHg.    Scheduled Meds:aspirin, 81 mg, Oral, Daily  atorvastatin, 20 mg, Oral, Nightly  bumetanide, 1 mg, Oral, TID  calcitriol, 0.25 mcg, Oral, Daily  heparin (porcine), 5,000 Units, Subcutaneous, Q8H  hydrALAZINE, 25 mg, Oral, BID  isosorbide mononitrate, 30 mg, Oral, Q24H  metoprolol succinate XL, 50 mg, Oral, Daily  sodium chloride, 10 mL, Intravenous, Q12H  spironolactone, 25 mg, Oral, Daily  ticagrelor, 90 mg, Oral, BID  traZODone, 50 mg, Oral, Nightly      Continuous Infusions:insulin,       PRN Meds:•  butalbital-acetaminophen-caffeine  •  dextrose  •  dextrose  •  glucagon (human recombinant)  •  ipratropium-albuterol  •  nitroglycerin  •  ondansetron **OR** ondansetron  •  [COMPLETED] Insert Peripheral IV **AND** sodium chloride  •  sodium chloride  •  sodium chloride  •  traMADol    Assessment & Plan   Assessment and Plan:         ESRD (end stage renal disease)    Chronic kidney disease, unspecified CKD stage    ASSESSMENT:  • Chronic Kidney disease/ESRD needing initiation of dialysis, patient has mature PD catheter/ port  • Hyponatremia  • Hypervolemia/Volume overload, patient with gross B?L lower extremity edema  • Dilated Cardiomyopathy  • CAD on Brilanta  • H/o Heart dfailure  • H/o COPD  • H/o Diabetes Mellitus  • Anemia of chronic disease  • Artherosclorotic heart disease  • Secondary hyperparathyroidism of Renal disease  • Vitamin D  deficiency  • S/p AICD  • H/o Hyperlipidemia on statins     EF 20% with mod-severe dilated left ventricle, echo on 4/18/2023. Severley decreased left ventricle systolic function Rt ventricle moderately dilated, left atrial dilation. RVSP >55       PLAN :      • Chronic Kidney disease/ESRD needing initiation of dialysis, patient has mature PD catheter/ port  • Peritoneal Dialysis initiated on 4/27, using Dianeal solution, but didn't get effluent back, kub unremarkable.  • Will check ct scan for hernia.  • Switch to hd x 3 days, low flow with low dfr and uf 1-2 ltrs.explained to the pt who is in agreement.  • tdc requested by ir.if not possible fatoumata catheter,and then switch to tdc next week.  • Continue Bumex,  Spironolactone, and Brilanta /aspirin  • BP aacptable  • Hemoglobin acceptable  • Hyponatremia, sodium 127, watch closely, potassium stable  • Daily labs, BMp, Mag, Phosphorus  • Daily weight  • Intake output record  • Avoid nephrotoxic agen  • We will continue follow up        Electronically signed by Jaziel Reese MD,   [facility]  [phone]  4/29/2023  10:05 EDT

## 2023-04-29 NOTE — PROGRESS NOTES
[facility] Medicine Services   Daily Progress Note    Patient Name: Esdras Peralta  : 1960  MRN: [id number]  Primary Care Physician:  Erika Hernandez MD  Date of admission: 2023  Date and Time of Service: 23  at 12:56 EDT       Subjective      Chief Complaint: gen weakness, malaise    Patient S&E.  Tolerated first session of HD well yesterday.  Now having less difficulty with ambulation after some volume off.  Will have another session today.    Review of Systems   Constitutional: Positive for malaise/fatigue. Negative for chills, fever, weight gain and weight loss.   HENT: Negative for hearing loss, nosebleeds and sore throat.    Eyes: Negative for blurred vision, pain and redness.   Cardiovascular: Negative for chest pain, leg swelling, palpitations and syncope.   Respiratory: Negative for cough, shortness of breath and sputum production.    Endocrine: Negative for polyuria.   Skin: Negative for color change, rash and suspicious lesions.   Musculoskeletal: Positive for muscle weakness. Negative for back pain and joint pain.   Gastrointestinal: Negative for abdominal pain, anorexia, diarrhea, dysphagia, heartburn, melena, nausea and vomiting.   Genitourinary: Negative for dysuria, frequency and urgency.        Decreased urine output   Neurological: Negative for dizziness, numbness and weakness.   Psychiatric/Behavioral: Negative for memory loss. The patient does not have insomnia and is not nervous/anxious.           Objective      Vitals:   Temp:  [97.3 °F (36.3 °C)-98.2 °F (36.8 °C)] 98.1 °F (36.7 °C)  Heart Rate:  [73-79] 73  Resp:  [12-16] 15  BP: (115-138)/(72-92) 115/72  Flow (L/min):  [2] 2    Physical Exam  Vitals and nursing note reviewed.   Constitutional:       General: He is not in acute distress.     Appearance: Normal appearance. He is obese. He is ill-appearing.   HENT:      Head: Normocephalic and atraumatic.      Mouth/Throat:      Mouth: Mucous membranes are  moist.      Pharynx: Oropharynx is clear.   Eyes:      Extraocular Movements: Extraocular movements intact.      Conjunctiva/sclera: Conjunctivae normal.      Pupils: Pupils are equal, round, and reactive to light.   Cardiovascular:      Rate and Rhythm: Normal rate and regular rhythm.      Pulses: Normal pulses.      Heart sounds: Normal heart sounds.   Pulmonary:      Effort: Pulmonary effort is normal. No respiratory distress.      Breath sounds: Normal breath sounds. No wheezing.   Abdominal:      General: Abdomen is flat. Bowel sounds are normal. There is no distension.      Palpations: Abdomen is soft.      Tenderness: There is no abdominal tenderness. There is no guarding.   Musculoskeletal:         General: No swelling, tenderness or signs of injury.      Cervical back: Normal range of motion and neck supple.   Skin:     General: Skin is warm and dry.      Capillary Refill: Capillary refill takes less than 2 seconds.      Comments: Bronzed Skin of ESRD   Neurological:      General: No focal deficit present.      Mental Status: He is alert and oriented to person, place, and time. Mental status is at baseline.      Motor: Weakness present.      Gait: Gait normal.      Comments: Strength 4-/5 B/L UE & LE     Psychiatric:         Mood and Affect: Mood normal.         Behavior: Behavior normal.         Judgment: Judgment normal.             Result Review    Result Review:  I have personally reviewed the results from the time of this admission to 4/29/2023 12:56 EDT and agree with these findings:  [x]  Laboratory  [x]  Microbiology  [x]  Radiology  []  EKG/Telemetry   []  Cardiology/Vascular   []  Pathology  []  Old records  []  Other:  Most notable findings include:         Assessment & Plan      Brief Patient Summary:  Esdras Peralta is a 62 y.o. male who came in from home 2/2 inability to carry out ADLs. Will start PD here and have PT/OT eval his functional status.       aspirin, 81 mg, Oral,  Daily  atorvastatin, 20 mg, Oral, Nightly  bumetanide, 1 mg, Oral, TID  calcitriol, 0.25 mcg, Oral, Daily  heparin (porcine), 5,000 Units, Subcutaneous, Q8H  hydrALAZINE, 25 mg, Oral, BID  isosorbide mononitrate, 30 mg, Oral, Q24H  metoprolol succinate XL, 50 mg, Oral, Daily  sodium chloride, 10 mL, Intravenous, Q12H  spironolactone, 25 mg, Oral, Daily  ticagrelor, 90 mg, Oral, BID  traZODone, 50 mg, Oral, Nightly       insulin,          Active Hospital Problems:  Active Hospital Problems    Diagnosis    • **ESRD (end stage renal disease)    • Chronic kidney disease, unspecified CKD stage      Plan:   #ESRD  #Uremia    - Cr 4.3 on admission has been worsneing    - BUN 97    - Port placed for PD    - Patient with edema and uremic symptoms, sent to [facility] by nephrology,  HD today after a session yesterday.    - nephrology consulted, Patient of Dr Bebo Treviño PRN    - sodium 126 and Cl 89, suspect hypervolemic, manage with diuresis and fluid removal by dialysis    -PT / OT eval     #HFrEF  #Dilated Cardiomyopathy    - echo on 4/18/23 shows EF 20% with mod-severe dilated left ventricle     - s/p ICD    - Dialysis for fluid management    - aspirin daily    - Brilinta 90mg PO BID    - Will continue home Bumex as making urine    - continue home Aldactone    - resume home Toprol    - resume home imdur    - resume home trazodone and Ultram     #COPD    - chronic    - stable on room air    - Duoneb PRN     #DM1    - continue insulin pump     #Anemia    - hgb 11.3 on admission, base near 12.8    - check iron and ferritin    - suspect anemia of chronic renal disease    - no overt bleeding, follow labs     #HTN    - hold home hydralazine, may resume depending on BP     #HLD    -reconcile home meds    DVT prophylaxis:  Medical DVT prophylaxis orders are present.    CODE STATUS:    Code Status (Patient has no pulse and is not breathing): CPR (Attempt to Resuscitate)  Medical Interventions (Patient has pulse or is breathing):  Full Support      Disposition:  I expect patient to be discharged home in 2-3 days.    This patient has been examined wearing appropriate Personal Protective Equipment and discussed with Nurse. 04/29/23      Electronically signed by Preethi Zaman DO, 04/29/23, 12:56 EDT.  [facility] Hospitalist Team

## 2023-04-30 LAB
ALBUMIN SERPL-MCNC: 2.8 G/DL (ref 3.5–5.2)
ALBUMIN/GLOB SERPL: 0.8 G/DL
ALP SERPL-CCNC: 102 U/L (ref 39–117)
ALT SERPL W P-5'-P-CCNC: 12 U/L (ref 1–41)
ANION GAP SERPL CALCULATED.3IONS-SCNC: 10 MMOL/L (ref 5–15)
AST SERPL-CCNC: 27 U/L (ref 1–40)
BILIRUB SERPL-MCNC: 0.5 MG/DL (ref 0–1.2)
BUN SERPL-MCNC: 49 MG/DL (ref 8–23)
BUN/CREAT SERPL: 14.8 (ref 7–25)
CALCIUM SPEC-SCNC: 8.7 MG/DL (ref 8.6–10.5)
CHLORIDE SERPL-SCNC: 95 MMOL/L (ref 98–107)
CO2 SERPL-SCNC: 26 MMOL/L (ref 22–29)
CREAT SERPL-MCNC: 3.3 MG/DL (ref 0.76–1.27)
DEPRECATED RDW RBC AUTO: 64.3 FL (ref 37–54)
EGFRCR SERPLBLD CKD-EPI 2021: 20.6 ML/MIN/1.73
ERYTHROCYTE [DISTWIDTH] IN BLOOD BY AUTOMATED COUNT: 19.8 % (ref 12.3–15.4)
GLOBULIN UR ELPH-MCNC: 3.6 GM/DL
GLUCOSE SERPL-MCNC: 88 MG/DL (ref 65–99)
HCT VFR BLD AUTO: 34.5 % (ref 37.5–51)
HGB BLD-MCNC: 11 G/DL (ref 13–17.7)
MAGNESIUM SERPL-MCNC: 2 MG/DL (ref 1.6–2.4)
MCH RBC QN AUTO: 30.1 PG (ref 26.6–33)
MCHC RBC AUTO-ENTMCNC: 31.8 G/DL (ref 31.5–35.7)
MCV RBC AUTO: 94.7 FL (ref 79–97)
PHOSPHATE SERPL-MCNC: 3.9 MG/DL (ref 2.5–4.5)
PLATELET # BLD AUTO: 189 10*3/MM3 (ref 140–450)
PMV BLD AUTO: 10.1 FL (ref 6–12)
POTASSIUM SERPL-SCNC: 4.4 MMOL/L (ref 3.5–5.2)
PROT SERPL-MCNC: 6.4 G/DL (ref 6–8.5)
RBC # BLD AUTO: 3.65 10*6/MM3 (ref 4.14–5.8)
SODIUM SERPL-SCNC: 131 MMOL/L (ref 136–145)
WBC NRBC COR # BLD: 5.6 10*3/MM3 (ref 3.4–10.8)

## 2023-04-30 PROCEDURE — 83735 ASSAY OF MAGNESIUM: CPT | Performed by: INTERNAL MEDICINE

## 2023-04-30 PROCEDURE — 80053 COMPREHEN METABOLIC PANEL: CPT | Performed by: INTERNAL MEDICINE

## 2023-04-30 PROCEDURE — 85027 COMPLETE CBC AUTOMATED: CPT | Performed by: STUDENT IN AN ORGANIZED HEALTH CARE EDUCATION/TRAINING PROGRAM

## 2023-04-30 PROCEDURE — 84100 ASSAY OF PHOSPHORUS: CPT | Performed by: INTERNAL MEDICINE

## 2023-04-30 RX ADMIN — Medication 10 ML: at 20:33

## 2023-04-30 RX ADMIN — CYCLOBENZAPRINE 10 MG: 10 TABLET, FILM COATED ORAL at 15:57

## 2023-04-30 RX ADMIN — ISOSORBIDE MONONITRATE 30 MG: 30 TABLET, EXTENDED RELEASE ORAL at 11:16

## 2023-04-30 RX ADMIN — CYCLOBENZAPRINE 10 MG: 10 TABLET, FILM COATED ORAL at 11:16

## 2023-04-30 RX ADMIN — TICAGRELOR 90 MG: 90 TABLET ORAL at 20:32

## 2023-04-30 RX ADMIN — HYDRALAZINE HYDROCHLORIDE 25 MG: 25 TABLET, FILM COATED ORAL at 11:16

## 2023-04-30 RX ADMIN — BUMETANIDE 1 MG: 1 TABLET ORAL at 20:32

## 2023-04-30 RX ADMIN — ASPIRIN 81 MG: 81 TABLET, COATED ORAL at 11:16

## 2023-04-30 RX ADMIN — BUMETANIDE 1 MG: 1 TABLET ORAL at 15:57

## 2023-04-30 RX ADMIN — TRAZODONE HYDROCHLORIDE 50 MG: 50 TABLET ORAL at 20:32

## 2023-04-30 RX ADMIN — TICAGRELOR 90 MG: 90 TABLET ORAL at 11:16

## 2023-04-30 RX ADMIN — CALCITRIOL CAPSULES 0.25 MCG 0.25 MCG: 0.25 CAPSULE ORAL at 11:16

## 2023-04-30 RX ADMIN — Medication 10 ML: at 11:17

## 2023-04-30 RX ADMIN — BUMETANIDE 1 MG: 1 TABLET ORAL at 11:16

## 2023-04-30 RX ADMIN — ATORVASTATIN CALCIUM 20 MG: 20 TABLET, FILM COATED ORAL at 20:32

## 2023-04-30 RX ADMIN — SPIRONOLACTONE 25 MG: 25 TABLET ORAL at 11:16

## 2023-04-30 RX ADMIN — TRAMADOL HYDROCHLORIDE 50 MG: 50 TABLET, COATED ORAL at 23:22

## 2023-04-30 RX ADMIN — METOPROLOL SUCCINATE 50 MG: 50 TABLET, EXTENDED RELEASE ORAL at 11:16

## 2023-04-30 RX ADMIN — TRAMADOL HYDROCHLORIDE 50 MG: 50 TABLET, COATED ORAL at 11:16

## 2023-04-30 RX ADMIN — CYCLOBENZAPRINE 10 MG: 10 TABLET, FILM COATED ORAL at 20:32

## 2023-04-30 RX ADMIN — HYDRALAZINE HYDROCHLORIDE 25 MG: 25 TABLET, FILM COATED ORAL at 20:32

## 2023-04-30 NOTE — PLAN OF CARE
Goal Outcome Evaluation:              Outcome Evaluation: Patient slept well during the night.  no complaints during the night.  Stable at this time.

## 2023-04-30 NOTE — PROGRESS NOTES
[facility] Medicine Services   Daily Progress Note    Patient Name: Esdras Peralta  : 1960  MRN: [id number]  Primary Care Physician:  Erika Hernandez MD  Date of admission: 2023  Date and Time of Service: 23  at 11:34 EDT       Subjective      Chief Complaint: gen weakness, malaise    Patient S&E.  Having another session of HD today.  No new complaints.  Nurse reporting no acute overnight events.    Review of Systems   Constitutional: Positive for malaise/fatigue. Negative for chills, fever, weight gain and weight loss.   HENT: Negative for hearing loss, nosebleeds and sore throat.    Eyes: Negative for blurred vision, pain and redness.   Cardiovascular: Negative for chest pain, leg swelling, palpitations and syncope.   Respiratory: Negative for cough, shortness of breath and sputum production.    Endocrine: Negative for polyuria.   Skin: Negative for color change, rash and suspicious lesions.   Musculoskeletal: Positive for muscle weakness. Negative for back pain and joint pain.   Gastrointestinal: Negative for abdominal pain, anorexia, diarrhea, dysphagia, heartburn, melena, nausea and vomiting.   Genitourinary: Negative for dysuria, frequency and urgency.        Decreased urine output   Neurological: Negative for dizziness, numbness and weakness.   Psychiatric/Behavioral: Negative for memory loss. The patient does not have insomnia and is not nervous/anxious.           Objective      Vitals:   Temp:  [95.1 °F (35.1 °C)-98.5 °F (36.9 °C)] 98.5 °F (36.9 °C)  Heart Rate:  [61-82] 69  Resp:  [13-18] 15  BP: (103-127)/(60-84) 127/82    Physical Exam  Vitals and nursing note reviewed.   Constitutional:       General: He is not in acute distress.     Appearance: Normal appearance. He is obese. He is ill-appearing.   HENT:      Head: Normocephalic and atraumatic.      Mouth/Throat:      Mouth: Mucous membranes are moist.      Pharynx: Oropharynx is clear.   Eyes:      Extraocular  Movements: Extraocular movements intact.      Conjunctiva/sclera: Conjunctivae normal.      Pupils: Pupils are equal, round, and reactive to light.   Cardiovascular:      Rate and Rhythm: Normal rate and regular rhythm.      Pulses: Normal pulses.      Heart sounds: Normal heart sounds.   Pulmonary:      Effort: Pulmonary effort is normal. No respiratory distress.      Breath sounds: Normal breath sounds. No wheezing.   Abdominal:      General: Abdomen is flat. Bowel sounds are normal. There is no distension.      Palpations: Abdomen is soft.      Tenderness: There is no abdominal tenderness. There is no guarding.   Musculoskeletal:         General: No swelling, tenderness or signs of injury.      Cervical back: Normal range of motion and neck supple.   Skin:     General: Skin is warm and dry.      Capillary Refill: Capillary refill takes less than 2 seconds.      Comments: Bronzed Skin of ESRD   Neurological:      General: No focal deficit present.      Mental Status: He is alert and oriented to person, place, and time. Mental status is at baseline.      Motor: Weakness present.      Gait: Gait normal.      Comments: Strength 4-/5 B/L UE & LE     Psychiatric:         Mood and Affect: Mood normal.         Behavior: Behavior normal.         Judgment: Judgment normal.             Result Review    Result Review:  I have personally reviewed the results from the time of this admission to 4/30/2023 11:34 EDT and agree with these findings:  [x]  Laboratory  [x]  Microbiology  [x]  Radiology  []  EKG/Telemetry   []  Cardiology/Vascular   []  Pathology  []  Old records  []  Other:  Most notable findings include:         Assessment & Plan      Brief Patient Summary:  Esdras Peralta is a 62 y.o. male who came in from home 2/2 inability to carry out ADLs. Will start PD here and have PT/OT eval his functional status.       aspirin, 81 mg, Oral, Daily  atorvastatin, 20 mg, Oral, Nightly  bumetanide, 1 mg, Oral,  TID  calcitriol, 0.25 mcg, Oral, Daily  cyclobenzaprine, 10 mg, Oral, TID  heparin (porcine), 5,000 Units, Subcutaneous, Q8H  hydrALAZINE, 25 mg, Oral, BID  isosorbide mononitrate, 30 mg, Oral, Q24H  metoprolol succinate XL, 50 mg, Oral, Daily  sodium chloride, 10 mL, Intravenous, Q12H  spironolactone, 25 mg, Oral, Daily  ticagrelor, 90 mg, Oral, BID  traZODone, 50 mg, Oral, Nightly       insulin,          Active Hospital Problems:  Active Hospital Problems    Diagnosis    • **ESRD (end stage renal disease)    • Chronic kidney disease, unspecified CKD stage      Plan:   #ESRD  #Uremia    - Cr 4.3 on admission has been worsneing    - BUN 97    - Port placed for PD    - Patient with edema and uremic symptoms, sent to [facility] by nephrology,  HD today after a session yesterday.    - nephrology consulted, Patient of Dr Lagunas, Dr. Mary Ann canchola.    - Zofran PRN    - sodium 126 and Cl 89, suspect hypervolemic, manage with diuresis and fluid removal by dialysis    -PT / OT eval     #HFrEF  #Dilated Cardiomyopathy    - echo on 4/18/23 shows EF 20% with mod-severe dilated left ventricle     - s/p ICD    - Dialysis for fluid management    - aspirin daily    - Brilinta 90mg PO BID    - Will continue home Bumex as making urine    - continue home Aldactone    - resume home Toprol    - resume home imdur    - resume home trazodone and Ultram     #COPD    - chronic    - stable on room air    - Duoneb PRN     #DM1    - continue insulin pump     #Anemia    - hgb 11.3 on admission, base near 12.8    - check iron and ferritin    - suspect anemia of chronic renal disease    - no overt bleeding, follow labs     #HTN    - hold home hydralazine, may resume depending on BP     #HLD    -reconcile home meds    DVT prophylaxis:  Medical DVT prophylaxis orders are present.    CODE STATUS:    Code Status (Patient has no pulse and is not breathing): CPR (Attempt to Resuscitate)  Medical Interventions (Patient has pulse or is breathing): Full  Support      Disposition:  I expect patient to be discharged home in 2-3 days.    This patient has been examined wearing appropriate Personal Protective Equipment and discussed with Nurse. 04/30/23      Electronically signed by Preethi Zaman DO, 04/30/23, 11:34 EDT.  [facility] Hospitalist Team

## 2023-04-30 NOTE — PROGRESS NOTES
PROGRESS NOTE      Patient Name: Esdras Peralta  : 1960  MRN: [id number]  Primary Care Physician: Erika Hernandez MD  Date of admission: 2023    Patient Care Team:  Erika Hernandez MD as PCP - General  Erika Hernandez MD as PCP - Family Medicine        Subjective   Subjective:     No new active complaint, Patient on PD, tolerating well  Review of systems:    Constitutional:  Weakness, malaise/Fatigue  HEENT:           No headache, otalgia, itchy eyes, nasal discharge or sore throat.  Cardiac:           No chest pain, dyspnea, orthopnea or PND.  Chest:              No cough, phlegm or wheezing.  Abdomen:        No abdominal pain, nausea or vomiting.  Neuro:             No focal weakness, abnormal movements orseizure like activity.  :                  No hematuria, no pyuria, no dysuria, no flank pain.  ROS was otherwise negative except as mentioned in the [place].    Allergies:    Allergies   Allergen Reactions   • Codeine Unknown (See Comments)       Objective   Exam:     Vital Signs  Temp:  [95.1 °F (35.1 °C)-98.5 °F (36.9 °C)] 98.5 °F (36.9 °C)  Heart Rate:  [61-82] 69  Resp:  [13-18] 13  BP: (103-127)/(60-84) 127/84  SpO2:  [97 %-98 %] 98 %  on   ;   Device (Oxygen Therapy): room air  Body mass index is 32.61 kg/m².    General:    male in no acute distress.    Head:      Normocephalic and atraumatic.    Eyes:      PERRL/EOM intact, conjunctiva and sclera clear with out nystagmus.    Neck:      No masses, thyromegaly,  trachea central with normal respiratory effort   Lungs:    Clear bilaterally to auscultation.    Heart:      Regular rate and rhythm, no murmur no gallop  Abd:        PD catheter with PD in progress  Pulses:   Pulses palpable  Extr:        No cyanosis or clubbing-+ edema.    Neuro:    No focal deficits.   alert oriented x3  Skin:       Intact without lesions or rashes.    Psych:    Alert and cooperative; normal mood and affect; .      Results Review:  I have personally reviewed  most recent Data :  CBC    Results from last 7 days   Lab Units 04/30/23  0903 04/29/23  1451 04/28/23  0203 04/27/23  0616 04/26/23  1805   WBC 10*3/mm3 5.60 6.30 6.10 6.60 8.10   HEMOGLOBIN g/dL 11.0* 11.8* 10.9* 10.9* 11.3*   PLATELETS 10*3/mm3 189 173 165 173 171     CMP   Results from last 7 days   Lab Units 04/30/23  0903 04/29/23  1451 04/28/23  0203 04/27/23  0616 04/26/23  1805   SODIUM mmol/L 131* 131* 127* 128* 126*   POTASSIUM mmol/L 4.4 4.1 4.6 4.6 5.2   CHLORIDE mmol/L 95* 95* 90* 90* 89*   CO2 mmol/L 26.0 25.0 26.0 25.0 26.0   BUN mg/dL 49* 36* 96* 105* 97*   CREATININE mg/dL 3.30* 2.32* 4.06* 4.07* 4.30*   GLUCOSE mg/dL 88 135* 211* 209* 209*   ALBUMIN g/dL 2.8* 2.8*  --   --  2.9*   BILIRUBIN mg/dL 0.5 0.6  --   --  0.5   ALK PHOS U/L 102 111  --   --  107   AST (SGOT) U/L 27 29  --   --  31   ALT (SGPT) U/L 12 15  --   --  22     ABG      CT Abdomen Pelvis Without Contrast    Result Date: 4/28/2023  Impression: 1. Diffuse anasarca of the soft tissues which has progressed as compared to the previous study. No significant ascites or pleural effusion identified. Left-sided peritoneal drain versus dialysis catheter present terminating within the right lower quadrant present. No mesenteric collection identified. No evidence of adenopathy or hernia. Scrotal edema versus hydroceles present. 2. Ancillary findings as described above. Electronically Signed: Toyin Gant  4/28/2023 1:33 PM EDT  Workstation ID: [id number]    IR Insert Tunneled CV Catheter Without Port 5 Plus    Result Date: 4/28/2023  Impression: Technically successful placement, 15.5 Danish dual lumen tunneled hemodialysis catheter, utilizing the right internal jugular approach, as well as both sonographic and fluoroscopic control. Electronically Signed: Nithya Meehan  4/28/2023 1:00 PM EDT  Workstation ID: [id number]      Results for orders placed during the hospital encounter of 04/16/23    Adult Transthoracic Echo Complete W/ Cont if Necessary  Per Protocol    Interpretation Summary  •  Left ventricular systolic function is severely decreased. Left ventricular ejection fraction appears to be less than 20%.  •  The left ventricular cavity is moderate to severely dilated.  •  The right ventricular cavity is moderately dilated.  •  Estimated right ventricular systolic pressure from tricuspid regurgitation is markedly elevated (>55 mmHg). Calculated right ventricular systolic pressure from tricuspid regurgitation is 58 mmHg.    Scheduled Meds:aspirin, 81 mg, Oral, Daily  atorvastatin, 20 mg, Oral, Nightly  bumetanide, 1 mg, Oral, TID  calcitriol, 0.25 mcg, Oral, Daily  cyclobenzaprine, 10 mg, Oral, TID  heparin (porcine), 5,000 Units, Subcutaneous, Q8H  hydrALAZINE, 25 mg, Oral, BID  isosorbide mononitrate, 30 mg, Oral, Q24H  metoprolol succinate XL, 50 mg, Oral, Daily  sodium chloride, 10 mL, Intravenous, Q12H  spironolactone, 25 mg, Oral, Daily  ticagrelor, 90 mg, Oral, BID  traZODone, 50 mg, Oral, Nightly      Continuous Infusions:insulin,       PRN Meds:•  butalbital-acetaminophen-caffeine  •  dextrose  •  dextrose  •  glucagon (human recombinant)  •  heparin (porcine)  •  ipratropium-albuterol  •  midodrine  •  nitroglycerin  •  ondansetron **OR** ondansetron  •  [COMPLETED] Insert Peripheral IV **AND** sodium chloride  •  sodium chloride  •  sodium chloride  •  traMADol    Assessment & Plan   Assessment and Plan:         ESRD (end stage renal disease)    Chronic kidney disease, unspecified CKD stage    ASSESSMENT:  • Chronic Kidney disease/ESRD needing initiation of dialysis, patient has mature PD catheter/ port  • Hyponatremia  • Hypervolemia/Volume overload, patient with gross B?L lower extremity edema  • Dilated Cardiomyopathy  • CAD on Brilanta  • H/o Heart dfailure  • H/o COPD  • H/o Diabetes Mellitus  • Anemia of chronic disease  • Artherosclorotic heart disease  • Secondary hyperparathyroidism of Renal disease  • Vitamin D deficiency  • S/p  AICD  • H/o Hyperlipidemia on statins     EF 20% with mod-severe dilated left ventricle, echo on 4/18/2023. [place] decreased left ventricle systolic function Rt ventricle moderately dilated, left atrial dilation. RVSP >55       PLAN :      • Chronic Kidney disease/ESRD needing initiation of dialysis, patient has mature PD catheter/ port  • Peritoneal Dialysis initiated on 4/27, using Dianeal solution, but didn't get effluent back, kub unremarkable.  •   ct scan negative for hernia.  • Will dialyze again tomorrow.  • tdc by ir.  • Continue Bumex,  Spironolactone, and Brilanta /aspirin  • BP aacptable  • Hemoglobin acceptable  • Hyponatremia, sodium 127, watch closely, potassium stable  • Daily labs, BMp, Mag, Phosphorus  • Daily weight  • Intake output record  • Avoid nephrotoxic agen  • We will continue follow up        Electronically signed by Jaziel Reese MD,   [facility] kidney consultant  [phone]  4/30/2023  09:52 EDT

## 2023-04-30 NOTE — PLAN OF CARE
Goal Outcome Evaluation:  Plan of Care Reviewed With: patient           Outcome Evaluation: pt requested pain medicine x2; dialysis completed, 2.5L removed; up ad jesus; continue to monitor

## 2023-05-01 LAB
ANION GAP SERPL CALCULATED.3IONS-SCNC: 12 MMOL/L (ref 5–15)
BUN SERPL-MCNC: 43 MG/DL (ref 8–23)
BUN/CREAT SERPL: 12.5 (ref 7–25)
CALCIUM SPEC-SCNC: 8.8 MG/DL (ref 8.6–10.5)
CHLORIDE SERPL-SCNC: 90 MMOL/L (ref 98–107)
CO2 SERPL-SCNC: 23 MMOL/L (ref 22–29)
CREAT SERPL-MCNC: 3.44 MG/DL (ref 0.76–1.27)
DEPRECATED RDW RBC AUTO: 63.9 FL (ref 37–54)
EGFRCR SERPLBLD CKD-EPI 2021: 19.6 ML/MIN/1.73
ERYTHROCYTE [DISTWIDTH] IN BLOOD BY AUTOMATED COUNT: 19.8 % (ref 12.3–15.4)
GLUCOSE BLDC GLUCOMTR-MCNC: 66 MG/DL (ref 70–105)
GLUCOSE BLDC GLUCOMTR-MCNC: 84 MG/DL (ref 70–105)
GLUCOSE SERPL-MCNC: 83 MG/DL (ref 65–99)
HCT VFR BLD AUTO: 37.4 % (ref 37.5–51)
HGB BLD-MCNC: 12 G/DL (ref 13–17.7)
MAGNESIUM SERPL-MCNC: 1.8 MG/DL (ref 1.6–2.4)
MCH RBC QN AUTO: 30.2 PG (ref 26.6–33)
MCHC RBC AUTO-ENTMCNC: 32.2 G/DL (ref 31.5–35.7)
MCV RBC AUTO: 93.8 FL (ref 79–97)
PHOSPHATE SERPL-MCNC: 3.8 MG/DL (ref 2.5–4.5)
PLATELET # BLD AUTO: 203 10*3/MM3 (ref 140–450)
PMV BLD AUTO: 9.7 FL (ref 6–12)
POTASSIUM SERPL-SCNC: 4.6 MMOL/L (ref 3.5–5.2)
RBC # BLD AUTO: 3.99 10*6/MM3 (ref 4.14–5.8)
SODIUM SERPL-SCNC: 125 MMOL/L (ref 136–145)
WBC NRBC COR # BLD: 6.3 10*3/MM3 (ref 3.4–10.8)

## 2023-05-01 PROCEDURE — 83735 ASSAY OF MAGNESIUM: CPT | Performed by: INTERNAL MEDICINE

## 2023-05-01 PROCEDURE — 84100 ASSAY OF PHOSPHORUS: CPT | Performed by: INTERNAL MEDICINE

## 2023-05-01 PROCEDURE — 63710000001 ONDANSETRON PER 8 MG: Performed by: STUDENT IN AN ORGANIZED HEALTH CARE EDUCATION/TRAINING PROGRAM

## 2023-05-01 PROCEDURE — 80048 BASIC METABOLIC PNL TOTAL CA: CPT | Performed by: INTERNAL MEDICINE

## 2023-05-01 PROCEDURE — 25010000002 FUROSEMIDE PER 20 MG: Performed by: INTERNAL MEDICINE

## 2023-05-01 PROCEDURE — 82948 REAGENT STRIP/BLOOD GLUCOSE: CPT

## 2023-05-01 PROCEDURE — 85027 COMPLETE CBC AUTOMATED: CPT | Performed by: INTERNAL MEDICINE

## 2023-05-01 RX ORDER — FUROSEMIDE 10 MG/ML
40 INJECTION INTRAMUSCULAR; INTRAVENOUS ONCE
Status: COMPLETED | OUTPATIENT
Start: 2023-05-01 | End: 2023-05-01

## 2023-05-01 RX ADMIN — ISOSORBIDE MONONITRATE 30 MG: 30 TABLET, EXTENDED RELEASE ORAL at 09:21

## 2023-05-01 RX ADMIN — CYCLOBENZAPRINE 10 MG: 10 TABLET, FILM COATED ORAL at 20:56

## 2023-05-01 RX ADMIN — CYCLOBENZAPRINE 10 MG: 10 TABLET, FILM COATED ORAL at 15:40

## 2023-05-01 RX ADMIN — BUMETANIDE 1 MG: 1 TABLET ORAL at 15:40

## 2023-05-01 RX ADMIN — TRAZODONE HYDROCHLORIDE 50 MG: 50 TABLET ORAL at 20:56

## 2023-05-01 RX ADMIN — Medication 10 ML: at 20:56

## 2023-05-01 RX ADMIN — CALCITRIOL CAPSULES 0.25 MCG 0.25 MCG: 0.25 CAPSULE ORAL at 09:21

## 2023-05-01 RX ADMIN — TICAGRELOR 90 MG: 90 TABLET ORAL at 09:21

## 2023-05-01 RX ADMIN — Medication 10 ML: at 09:21

## 2023-05-01 RX ADMIN — CYCLOBENZAPRINE 10 MG: 10 TABLET, FILM COATED ORAL at 09:21

## 2023-05-01 RX ADMIN — TRAMADOL HYDROCHLORIDE 50 MG: 50 TABLET, COATED ORAL at 20:56

## 2023-05-01 RX ADMIN — ASPIRIN 81 MG: 81 TABLET, COATED ORAL at 09:21

## 2023-05-01 RX ADMIN — TICAGRELOR 90 MG: 90 TABLET ORAL at 20:56

## 2023-05-01 RX ADMIN — BUMETANIDE 1 MG: 1 TABLET ORAL at 20:56

## 2023-05-01 RX ADMIN — HYDRALAZINE HYDROCHLORIDE 25 MG: 25 TABLET, FILM COATED ORAL at 09:21

## 2023-05-01 RX ADMIN — FUROSEMIDE 40 MG: 10 INJECTION, SOLUTION INTRAMUSCULAR; INTRAVENOUS at 15:40

## 2023-05-01 RX ADMIN — METOPROLOL SUCCINATE 50 MG: 50 TABLET, EXTENDED RELEASE ORAL at 09:21

## 2023-05-01 RX ADMIN — BUMETANIDE 1 MG: 1 TABLET ORAL at 09:21

## 2023-05-01 RX ADMIN — ATORVASTATIN CALCIUM 20 MG: 20 TABLET, FILM COATED ORAL at 20:56

## 2023-05-01 RX ADMIN — HYDRALAZINE HYDROCHLORIDE 25 MG: 25 TABLET, FILM COATED ORAL at 20:56

## 2023-05-01 RX ADMIN — SPIRONOLACTONE 25 MG: 25 TABLET ORAL at 09:21

## 2023-05-01 RX ADMIN — ONDANSETRON HYDROCHLORIDE 4 MG: 4 TABLET, FILM COATED ORAL at 13:20

## 2023-05-01 NOTE — CASE MANAGEMENT/SOCIAL WORK
Continued Stay Note  JAVED Machado     Patient Name: Esdras Peralta  MRN: [id number]  Today's Date: 5/1/2023    Admit Date: 4/26/2023    Plan: Home with spouse. New [facility] HD chair time TTS 12:35 PM.   Discharge Plan       Row Name 05/01/23 1529       Plan    Plan Home with spouse. New [facility] HD chair time TTS 12:35 PM.    Patient/Family in Agreement with Plan yes    Plan Comments CM provided Hep B labs and progress notes from weekend to [facility] portal. Spoke to [facility] rep Helms and verified pt's chair time is TTS at 12:35 PM at [facility] clinic: [facility] [place]. Plans for HD tomorrow 5/2 and then pt to start outpatient Thursday 5/4. Provided schedule letter to pt at bedside.                  Case Management Readmission Assessment Note    Case Management Readmission Assessment (all recorded)       Readmission Interview       Row Name 05/01/23 1532             Readmission Indications    Is this hospitalization related to the prior hospital diagnosis? Yes      What was the reason you were admitted? Chronic kidney disease, initiation of dialysis        Row Name 05/01/23 1537             Recommendation for rehospitalization    Did you speak with your physician prior to coming to the hospital Yes      If yes, what physician did you speak with? Kidney doctor      Who recommended you return to the hospital? Specialist      Did you seek care elsewhere prior to coming to the hospital? No        Row Name 05/01/23 1532             Follow up appointment    Do you have a PCP? Yes      Did you have an appointment with PCP/specialist after hospitalization within 7 days? No      Did you keep your appointment? No      If you did not keep appointment, why? Other (comment)  Pt readmitted prior to available times        Row Name 05/01/23 1531             Medications    Did you have newly prescribed medications at discharge? Yes  Trazadone      Did you understand the reasons for your medications at discharge  and how to take them? Yes      Did you understand the side effects of your medications? Yes      Are you taking all of you prescribed medications? Yes        Row Name 05/01/23 1532             Discharge Instructions    Did you understand your discharge instructions? Yes      Did your family/caregiver hear your instructions? Yes      Were you told to eat a special diet? Yes      Did you adhere to the diet? Yes      Were you given a number of someone to call if you had questions or concerns? Yes        Row Name 05/01/23 1532             Index discharge location/services    Where did you go upon discharge? Home      Do you have supportive family or friends in the home? Yes        Row Name 05/01/23 1534             Discharge Readiness    On a scale of 1-5 (5 being well prepared), how ready were you for discharge 4                    Megan Naegele, RN     Office Phone: [phone]  Office Cell: [phone]

## 2023-05-01 NOTE — PROGRESS NOTES
[facility] Medicine Services   Daily Progress Note    Patient Name: Esdras Peralta  : 1960  MRN: [id number]  Primary Care Physician:  Erika Hernandez MD  Date of admission: 2023  Date and Time of Service: 23  at 11:22 EDT       Subjective      Chief Complaint: gen weakness, malaise    Patient S&E.  No HD today 2/2 malaise and fatigue.  No new complaints.  Nurse reporting no acute overnight events.    Review of Systems   Constitutional: Positive for malaise/fatigue. Negative for chills, fever, weight gain and weight loss.   HENT: Negative for hearing loss, nosebleeds and sore throat.    Eyes: Negative for blurred vision, pain and redness.   Cardiovascular: Negative for chest pain, leg swelling, palpitations and syncope.   Respiratory: Negative for cough, shortness of breath and sputum production.    Endocrine: Negative for polyuria.   Skin: Negative for color change, rash and suspicious lesions.   Musculoskeletal: Positive for muscle weakness. Negative for back pain and joint pain.   Gastrointestinal: Negative for abdominal pain, anorexia, diarrhea, dysphagia, heartburn, melena, nausea and vomiting.   Genitourinary: Negative for dysuria, frequency and urgency.        Decreased urine output   Neurological: Negative for dizziness, numbness and weakness.   Psychiatric/Behavioral: Negative for memory loss. The patient does not have insomnia and is not nervous/anxious.           Objective      Vitals:   Temp:  [97.2 °F (36.2 °C)-97.7 °F (36.5 °C)] 97.7 °F (36.5 °C)  Heart Rate:  [73-87] 73  Resp:  [15-17] 15  BP: (118-133)/(82-90) 120/84    Physical Exam  Vitals and nursing note reviewed.   Constitutional:       General: He is not in acute distress.     Appearance: Normal appearance. He is obese. He is ill-appearing.   HENT:      Head: Normocephalic and atraumatic.      Mouth/Throat:      Mouth: Mucous membranes are moist.      Pharynx: Oropharynx is clear.   Eyes:      Extraocular  Movements: Extraocular movements intact.      Conjunctiva/sclera: Conjunctivae normal.      Pupils: Pupils are equal, round, and reactive to light.   Cardiovascular:      Rate and Rhythm: Normal rate and regular rhythm.      Pulses: Normal pulses.      Heart sounds: Normal heart sounds.   Pulmonary:      Effort: Pulmonary effort is normal. No respiratory distress.      Breath sounds: Normal breath sounds. No wheezing.   Abdominal:      General: Abdomen is flat. Bowel sounds are normal. There is no distension.      Palpations: Abdomen is soft.      Tenderness: There is no abdominal tenderness. There is no guarding.   Musculoskeletal:         General: No swelling, tenderness or signs of injury.      Cervical back: Normal range of motion and neck supple.   Skin:     General: Skin is warm and dry.      Capillary Refill: Capillary refill takes less than 2 seconds.      Comments: Bronzed Skin of ESRD   Neurological:      General: No focal deficit present.      Mental Status: He is alert and oriented to person, place, and time. Mental status is at baseline.      Motor: Weakness present.      Gait: Gait normal.      Comments: Strength 4-/5 B/L UE & LE     Psychiatric:         Mood and Affect: Mood normal.         Behavior: Behavior normal.         Judgment: Judgment normal.             Result Review    Result Review:  I have personally reviewed the results from the time of this admission to 5/1/2023 11:22 EDT and agree with these findings:  [x]  Laboratory  [x]  Microbiology  [x]  Radiology  []  EKG/Telemetry   []  Cardiology/Vascular   []  Pathology  []  Old records  []  Other:  Most notable findings include:         Assessment & Plan      Brief Patient Summary:  Esdras Peralta is a 62 y.o. male who came in from home 2/2 inability to carry out ADLs. Will start PD here and have PT/OT eval his functional status.       aspirin, 81 mg, Oral, Daily  atorvastatin, 20 mg, Oral, Nightly  bumetanide, 1 mg, Oral,  TID  calcitriol, 0.25 mcg, Oral, Daily  cyclobenzaprine, 10 mg, Oral, TID  heparin (porcine), 5,000 Units, Subcutaneous, Q8H  hydrALAZINE, 25 mg, Oral, BID  isosorbide mononitrate, 30 mg, Oral, Q24H  metoprolol succinate XL, 50 mg, Oral, Daily  sodium chloride, 10 mL, Intravenous, Q12H  spironolactone, 25 mg, Oral, Daily  ticagrelor, 90 mg, Oral, BID  traZODone, 50 mg, Oral, Nightly       insulin,          Active Hospital Problems:  Active Hospital Problems    Diagnosis    • **ESRD (end stage renal disease)    • Chronic kidney disease, unspecified CKD stage      Plan:   #ESRD  #Uremia    - Cr 4.3 on admission has been worsneing    - BUN 97    - Port placed for PD    - Patient with edema and uremic symptoms, sent to [facility] by nephrology,  HD today after a session yesterday.    - nephrology consulted, Patient of Dr Lagunas, Dr. Mary Ann canchola.    - Zofran PRN    - sodium 126 and Cl 89, suspect hypervolemic, manage with diuresis and fluid removal by dialysis    -PT / OT eval     #HFrEF  #Dilated Cardiomyopathy    - echo on 4/18/23 shows EF 20% with mod-severe dilated left ventricle     - s/p ICD    - Dialysis for fluid management    - aspirin daily    - Brilinta 90mg PO BID    - Will continue home Bumex as making urine    - continue home Aldactone    - resume home Toprol    - resume home imdur    - resume home trazodone and Ultram     #COPD    - chronic    - stable on room air    - Duoneb PRN     #DM1    - continue insulin pump     #Anemia    - hgb 11.3 on admission, base near 12.8    - check iron and ferritin    - suspect anemia of chronic renal disease    - no overt bleeding, follow labs     #HTN    - hold home hydralazine, may resume depending on BP     #HLD    -reconcile home meds    DVT prophylaxis:  Medical DVT prophylaxis orders are present.    CODE STATUS:    Code Status (Patient has no pulse and is not breathing): CPR (Attempt to Resuscitate)  Medical Interventions (Patient has pulse or is breathing): Full  Support      Disposition:  I expect patient to be discharged home in 1-2 days with outpatient HD.    This patient has been examined wearing appropriate Personal Protective Equipment and discussed with Nurse. 05/01/23      Electronically signed by Preethi Zaman DO, 05/01/23, 11:22 EDT.  [facility] Hospitalist Team

## 2023-05-01 NOTE — NURSING NOTE
POC blood sugar resulted as 66, however patient's dexcom is resulting 110. Pt states he has no symptoms of hypoglycemia. Will continue to monitor.

## 2023-05-01 NOTE — PROGRESS NOTES
PROGRESS NOTE      Patient Name: Esdras Peralta  : 1960  MRN: [id number]  Primary Care Physician: Erika Hernandez MD  Date of admission: 2023    Patient Care Team:  Erika Hernandez MD as PCP - General  Erika Hernandez MD as PCP - Family Medicine        Subjective   Subjective:     No new active complaint, Patient on PD, tolerating well  Review of systems:    Constitutional:  Weakness, malaise/Fatigue  HEENT:           No headache, otalgia, itchy eyes, nasal discharge or sore throat.  Cardiac:           No chest pain, dyspnea, orthopnea or PND.  Chest:              No cough, phlegm or wheezing.  Abdomen:        No abdominal pain, nausea or vomiting.  Neuro:             No focal weakness, abnormal movements orseizure like activity.  :                  No hematuria, no pyuria, no dysuria, no flank pain.  ROS was otherwise negative except as mentioned in the [place].    Allergies:    Allergies   Allergen Reactions   • Codeine Unknown (See Comments)       Objective   Exam:     Vital Signs  Temp:  [97.2 °F (36.2 °C)-97.7 °F (36.5 °C)] 97.5 °F (36.4 °C)  Heart Rate:  [73-87] 80  Resp:  [10-17] 10  BP: (118-136)/(82-93) 136/93  SpO2:  [95 %-100 %] 99 %  on   ;   Device (Oxygen Therapy): room air  Body mass index is 32.49 kg/m².    General:    male in no acute distress.    Head:      Normocephalic and atraumatic.    Eyes:      PERRL/EOM intact, conjunctiva and sclera clear with out nystagmus.    Neck:      No masses, thyromegaly,  trachea central with normal respiratory effort   Lungs:    Clear bilaterally to auscultation.    Heart:      Regular rate and rhythm, no murmur no gallop  Abd:        PD catheter with PD in progress  Pulses:   Pulses palpable  Extr:        No cyanosis or clubbing-+ edema.    Neuro:    No focal deficits.   alert oriented x3  Skin:       Intact without lesions or rashes.    Psych:    Alert and cooperative; normal mood and affect; .      Results Review:  I have personally reviewed  most recent Data :  CBC    Results from last 7 days   Lab Units 05/01/23  0735 04/30/23  0903 04/29/23  1451 04/28/23  0203 04/27/23  0616 04/26/23  1805   WBC 10*3/mm3 6.30 5.60 6.30 6.10 6.60 8.10   HEMOGLOBIN g/dL 12.0* 11.0* 11.8* 10.9* 10.9* 11.3*   PLATELETS 10*3/mm3 203 189 173 165 173 171     CMP   Results from last 7 days   Lab Units 05/01/23  0735 04/30/23  0903 04/29/23  1451 04/28/23  0203 04/27/23  0616 04/26/23  1805   SODIUM mmol/L 125* 131* 131* 127* 128* 126*   POTASSIUM mmol/L 4.6 4.4 4.1 4.6 4.6 5.2   CHLORIDE mmol/L 90* 95* 95* 90* 90* 89*   CO2 mmol/L 23.0 26.0 25.0 26.0 25.0 26.0   BUN mg/dL 43* 49* 36* 96* 105* 97*   CREATININE mg/dL 3.44* 3.30* 2.32* 4.06* 4.07* 4.30*   GLUCOSE mg/dL 83 88 135* 211* 209* 209*   ALBUMIN g/dL  --  2.8* 2.8*  --   --  2.9*   BILIRUBIN mg/dL  --  0.5 0.6  --   --  0.5   ALK PHOS U/L  --  102 111  --   --  107   AST (SGOT) U/L  --  27 29  --   --  31   ALT (SGPT) U/L  --  12 15  --   --  22     ABG      No radiology results for the last day    Results for orders placed during the hospital encounter of 04/16/23    Adult Transthoracic Echo Complete W/ Cont if Necessary Per Protocol    Interpretation Summary  •  Left ventricular systolic function is severely decreased. Left ventricular ejection fraction appears to be less than 20%.  •  The left ventricular cavity is moderate to severely dilated.  •  The right ventricular cavity is moderately dilated.  •  Estimated right ventricular systolic pressure from tricuspid regurgitation is markedly elevated (>55 mmHg). Calculated right ventricular systolic pressure from tricuspid regurgitation is 58 mmHg.    Scheduled Meds:aspirin, 81 mg, Oral, Daily  atorvastatin, 20 mg, Oral, Nightly  bumetanide, 1 mg, Oral, TID  calcitriol, 0.25 mcg, Oral, Daily  cyclobenzaprine, 10 mg, Oral, TID  heparin (porcine), 5,000 Units, Subcutaneous, Q8H  hydrALAZINE, 25 mg, Oral, BID  isosorbide mononitrate, 30 mg, Oral, Q24H  metoprolol  succinate XL, 50 mg, Oral, Daily  sodium chloride, 10 mL, Intravenous, Q12H  spironolactone, 25 mg, Oral, Daily  ticagrelor, 90 mg, Oral, BID  traZODone, 50 mg, Oral, Nightly      Continuous Infusions:insulin,       PRN Meds:•  butalbital-acetaminophen-caffeine  •  dextrose  •  dextrose  •  glucagon (human recombinant)  •  heparin (porcine)  •  ipratropium-albuterol  •  midodrine  •  nitroglycerin  •  ondansetron **OR** ondansetron  •  [COMPLETED] Insert Peripheral IV **AND** sodium chloride  •  sodium chloride  •  sodium chloride  •  traMADol    Assessment & Plan   Assessment and Plan:         ESRD (end stage renal disease)    Chronic kidney disease, unspecified CKD stage    ASSESSMENT:  • Chronic Kidney disease/ESRD needing initiation of dialysis, patient has mature PD catheter/ port  • Hyponatremia  • Hypervolemia/Volume overload, patient with gross B?L lower extremity edema  • Dilated Cardiomyopathy  • CAD on Brilanta  • H/o Heart dfailure  • H/o COPD  • H/o Diabetes Mellitus  • Anemia of chronic disease  • Artherosclorotic heart disease  • Secondary hyperparathyroidism of Renal disease  • Vitamin D deficiency  • S/p AICD  • H/o Hyperlipidemia on statins     EF 20% with mod-severe dilated left ventricle, echo on 4/18/2023. [place] decreased left ventricle systolic function Rt ventricle moderately dilated, left atrial dilation. RVSP >55       PLAN :      • Chronic Kidney disease/ESRD needing initiation of dialysis, patient has mature PD catheter/ port  • Peritoneal Dialysis initiated on 4/27, using Dianeal solution, but didn't get effluent back, kub unremarkable.  • CT scan negative for hernia.  • HD treatment completed on 4/29 with 2 L UF, tolerated well, Patient has HD access on Rt. IJ.  • Plan was to give another HD today but patient refused, ordered HD for tomorrow, with sodium 130, will give a dose of lasix 40 mg IV Stat today,   • Fluid restriction 800 ml, communicated with nursing for fluid  restriction  • tdc by ir.  • Continue Bumex,  Spironolactone, and Brilanta /aspirin  • BP aacptable  • Hemoglobin acceptable  • Hyponatremia, sodium 125, watch closely, potassium stable, changed setting of NA for HD tomorrow, as sodium 130  • Daily labs, BMp, Mag, Phosphorus  • Daily weight  • Intake output record  • Avoid nephrotoxic agen  • We will continue follow up        Electronically signed by Jaziel Reese MD,   [facility] kidney consultant  [phone]  5/1/2023  15:11 EDT

## 2023-05-01 NOTE — DISCHARGE PLACEMENT REQUEST
"DanielEsdras anthony \"mary anne\" (62 y.o. Male)       Date of Birth   1960    Social Security Number       Address   [address] [place] [facility]    Home Phone   [phone]    MRN   [id number]       [place]   None    Marital Status                               Admission Date   4/26/23    Admission Type   Emergency    Admitting Provider   Payton Toussaint DO    Attending Provider   rPeethi Zaman DO    Department, Room/Bed   [facility] 3C MEDICAL INPATIENT, 357/1       Discharge Date       Discharge Disposition       Discharge Destination                                 Attending Provider: Preethi Zaman DO    Allergies: Codeine    Isolation: None   Infection: None   Code Status: CPR    Ht: 175.3 cm (69\")   Wt: 99.8 kg (220 lb 0.3 oz)    Admission Cmt: None   Principal Problem: ESRD (end stage renal disease) [N18.6]                   Active Insurance as of 4/26/2023       Primary Coverage       Payor Plan Insurance Group Employer/Plan Group    [employer] BLUE [employer]OSS [employer] BLUE CROSS BLUE [employer]IELD PPO [id number]       Payor Plan Address Payor Plan Phone Number Payor Plan Fax Number Effective Dates    [address] [phone]  4/1/2019 - None Entered    [place] [address]         Subscriber Name Subscriber Birth Date Member ID       CHRISTIAN MURPHY 12/15/1967 [id number]                     Emergency Contacts        (Rel.) Home Phone Work Phone Mobile Phone    christian murphy (Spouse) [phone] -- [phone]            Imaging Results (Last 72 Hours)       Procedure Component Value Units Date/Time    CT Abdomen Pelvis Without Contrast [[id number]] Collected: 04/28/23 1329     Updated: 04/28/23 1335    Narrative:      CT ABDOMEN PELVIS WO CONTRAST    Date of Exam: 4/28/2023 1:08 PM EDT    Indication: Lymphadenopathy, groin.    Comparison: 4/20/2023, 4/16/2023    Technique: Axial CT images were obtained of the abdomen and pelvis without the administration of " contrast. Sagittal and coronal reconstructions were performed.  Automated exposure control and iterative reconstruction methods were used.      Findings:  Lung Bases:     The visualized lung bases and lower mediastinal structures demonstrates no acute process. The heart appears enlarged.    Limited evaluation of the solid organs due to lack of intravenous contrast. Left-sided stimulator device present. Anasarca of the soft tissues present..    Liver:  Liver is normal in size and CT density. No focal lesions.    Biliary/Gallbladder:    The gallbladder is normal without evidence of radiopaque stones. The biliary tree is nondilated.    Spleen:  Spleen is normal in size and CT density.    Pancreas:    Pancreas is normal. There is no evidence of pancreatic mass or peripancreatic fluid.    Kidneys:    Kidneys are normal in size. There are no stones or hydronephrosis.    Adrenals:    Adrenal glands are unremarkable.    Retroperitoneal/Lymph Nodes/Vasculature:    No retroperitoneal adenopathy is identified. Atherosclerotic calcifications are present.    Gastrointestinal/Mesentery:    The bowel loops are non-dilated without wall thickening or mass. The appendix appears within normal limits. No evidence of obstruction. No free air. No mesenteric fluid collections identified. Left-sided drain present terminating within the right lower   quadrant. No significant collection identified. No evidence of adenopathy. No evidence of hernia. Tiny inguinal nodes present bilaterally which appear morphologically normal.    Bladder:    The bladder is normal.    Genital:     Scrotal edema is present.        Bony Structures:     Visualized bony structures are consistent with the patient's age.        Impression:      Impression:    1. Diffuse anasarca of the soft tissues which has progressed as compared to the previous study. No significant ascites or pleural effusion identified. Left-sided peritoneal drain versus dialysis catheter present  terminating within the right lower   quadrant present. No mesenteric collection identified. No evidence of adenopathy or hernia. Scrotal edema versus hydroceles present.  2. Ancillary findings as described above.            Electronically Signed: Toyin Gant    4/28/2023 1:33 PM EDT    Workstation ID: [id number]    IR Insert Tunneled CV Catheter Without Port 5 Plus [[id number]] Collected: 04/28/23 1257     Updated: 04/28/23 1303    Narrative:      IR INS TUNNELED CV CATHETER WO PORT 5 PLUS    Date of Exam: 4/28/2023 12:10 PM EDT    Indication: 62-year-old male with end-stage renal disease and failed peritoneal dialysis.    Comparison: None available.    Fluoroscopic time used: 56 seconds (11.4 mGy)    Sedation: Fentanyl only used.    Technique: The patient's medications were reviewed prior to the procedure. Following detailed explanation of the procedures risks and benefits, the patient understood and written informed consent was provided. Intravenous cefazolin was administered   approximately 30 minutes prior to the procedure. He was placed onto the radiographic table in the supine position and a timeout performed. His right lower neck and upper chest areas were prepped and draped using maximum sterile barrier technique. After   obtaining adequate local anesthesia using 1% lidocaine, the right IJ was cannulated using a 21-gauge singlewall needle and strict ultrasound guidance. The needle was visualized entering the vein directly under ultrasound. A 5 Beninese dilator was placed.   Following the administration of additional local anesthetic, a subcutaneous tunnel was created along the right superior chest wall, through which the 15.5 Beninese dual-lumen tunneled dialysis catheter was delivered without difficulty. The right IJ access   was dilated over wire to accommodate placement of this catheter into the SVC and further position its tip at the cavoatrial junction. The catheter was secured externally with 0 Prolene suture  and the IJ access dermatotomy closed with interrupted Vicryl   sutures. Both limbs of the cath were flushed and appropriately heparinized. Sterile occlusive dressings were applied and the right IJ access dermatotomy sealed using Dermabond. The patient tolerated the entire procedure rather well and there were no   immediate complications. His vital signs were monitored throughout.    Findings:  The right IJ is widely patent by ultrasound evaluation. A hard copy image was retained.      Impression:      Impression:  Technically successful placement, 15.5 Costa Rican dual lumen tunneled hemodialysis catheter, utilizing the right internal jugular approach, as well as both sonographic and fluoroscopic control.      Electronically Signed: kevinmanan Shefali    2023 1:00 PM EDT    Workstation ID: [id number]             Physician Progress Notes (last 72 hours)        Preethi Zaman DO at 23 1133              [facility] Medicine Services   Daily Progress Note    Patient Name: Esdras Peralta  : 1960  MRN: [id number]  Primary Care Physician:  Erika Hernandez MD  Date of admission: 2023  Date and Time of Service: 23  at 11:34 EDT       Subjective       Chief Complaint: gen weakness, malaise    Patient S&E.  Having another session of HD today.  No new complaints.  Nurse reporting no acute overnight events.    Review of Systems   Constitutional: Positive for malaise/fatigue. Negative for chills, fever, weight gain and weight loss.   HENT: Negative for hearing loss, nosebleeds and sore throat.    Eyes: Negative for blurred vision, pain and redness.   Cardiovascular: Negative for chest pain, leg swelling, palpitations and syncope.   Respiratory: Negative for cough, shortness of breath and sputum production.    Endocrine: Negative for polyuria.   Skin: Negative for color change, rash and suspicious lesions.   Musculoskeletal: Positive for muscle weakness. Negative for back pain and joint pain.    Gastrointestinal: Negative for abdominal pain, anorexia, diarrhea, dysphagia, heartburn, melena, nausea and vomiting.   Genitourinary: Negative for dysuria, frequency and urgency.        Decreased urine output   Neurological: Negative for dizziness, numbness and weakness.   Psychiatric/Behavioral: Negative for memory loss. The patient does not have insomnia and is not nervous/anxious.           Objective       Vitals:   Temp:  [95.1 °F (35.1 °C)-98.5 °F (36.9 °C)] 98.5 °F (36.9 °C)  Heart Rate:  [61-82] 69  Resp:  [13-18] 15  BP: (103-127)/(60-84) 127/82    Physical Exam  Vitals and nursing note reviewed.   Constitutional:       General: He is not in acute distress.     Appearance: Normal appearance. He is obese. He is ill-appearing.   HENT:      Head: Normocephalic and atraumatic.      Mouth/Throat:      Mouth: Mucous membranes are moist.      Pharynx: Oropharynx is clear.   Eyes:      Extraocular Movements: Extraocular movements intact.      Conjunctiva/sclera: Conjunctivae normal.      Pupils: Pupils are equal, round, and reactive to light.   Cardiovascular:      Rate and Rhythm: Normal rate and regular rhythm.      Pulses: Normal pulses.      Heart sounds: Normal heart sounds.   Pulmonary:      Effort: Pulmonary effort is normal. No respiratory distress.      Breath sounds: Normal breath sounds. No wheezing.   Abdominal:      General: Abdomen is flat. Bowel sounds are normal. There is no distension.      Palpations: Abdomen is soft.      Tenderness: There is no abdominal tenderness. There is no guarding.   Musculoskeletal:         General: No swelling, tenderness or signs of injury.      Cervical back: Normal range of motion and neck supple.   Skin:     General: Skin is warm and dry.      Capillary Refill: Capillary refill takes less than 2 seconds.      Comments: Bronzed Skin of ESRD   Neurological:      General: No focal deficit present.      Mental Status: He is alert and oriented to person, place, and  time. Mental status is at baseline.      Motor: Weakness present.      Gait: Gait normal.      Comments: Strength 4-/5 B/L UE & LE     Psychiatric:         Mood and Affect: Mood normal.         Behavior: Behavior normal.         Judgment: Judgment normal.             Result Review    Result Review:  I have personally reviewed the results from the time of this admission to 4/30/2023 11:34 EDT and agree with these findings:  [x]  Laboratory  [x]  Microbiology  [x]  Radiology  []  EKG/Telemetry   []  Cardiology/Vascular   []  Pathology  []  Old records  []  Other:  Most notable findings include:         Assessment & Plan      Brief Patient Summary:  Esdras Peralta is a 62 y.o. male who came in from home 2/2 inability to carry out ADLs. Will start PD here and have PT/OT eval his functional status.       aspirin, 81 mg, Oral, Daily  atorvastatin, 20 mg, Oral, Nightly  bumetanide, 1 mg, Oral, TID  calcitriol, 0.25 mcg, Oral, Daily  cyclobenzaprine, 10 mg, Oral, TID  heparin (porcine), 5,000 Units, Subcutaneous, Q8H  hydrALAZINE, 25 mg, Oral, BID  isosorbide mononitrate, 30 mg, Oral, Q24H  metoprolol succinate XL, 50 mg, Oral, Daily  sodium chloride, 10 mL, Intravenous, Q12H  spironolactone, 25 mg, Oral, Daily  ticagrelor, 90 mg, Oral, BID  traZODone, 50 mg, Oral, Nightly       insulin,          Active Hospital Problems:  Active Hospital Problems    Diagnosis     **ESRD (end stage renal disease)     Chronic kidney disease, unspecified CKD stage      Plan:   #ESRD  #Uremia    - Cr 4.3 on admission has been worsneing    - BUN 97    - Port placed for PD    - Patient with edema and uremic symptoms, sent to [facility] by nephrology,  HD today after a session yesterday.    - nephrology consulted, Patient of Dr Lagunas, Dr. Mary Ann canchola.    - Zofran PRN    - sodium 126 and Cl 89, suspect hypervolemic, manage with diuresis and fluid removal by dialysis    -PT / OT eval     #HFrEF  #Dilated Cardiomyopathy    - echo on 4/18/23 shows EF  20% with mod-severe dilated left ventricle     - s/p ICD    - Dialysis for fluid management    - aspirin daily    - Brilinta 90mg PO BID    - Will continue home Bumex as making urine    - continue home Aldactone    - resume home Toprol    - resume home imdur    - resume home trazodone and Ultram     #COPD    - chronic    - stable on room air    - Duoneb PRN     #DM1    - continue insulin pump     #Anemia    - hgb 11.3 on admission, base near 12.8    - check iron and ferritin    - suspect anemia of chronic renal disease    - no overt bleeding, follow labs     #HTN    - hold home hydralazine, may resume depending on BP     #HLD    -reconcile home meds    DVT prophylaxis:  Medical DVT prophylaxis orders are present.    CODE STATUS:    Code Status (Patient has no pulse and is not breathing): CPR (Attempt to Resuscitate)  Medical Interventions (Patient has pulse or is breathing): Full Support      Disposition:  I expect patient to be discharged home in 2-3 days.    This patient has been examined wearing appropriate Personal Protective Equipment and discussed with Nurse. 23      Electronically signed by Preethi Zaman DO, 23, 11:34 EDT.  [facility] Hospitalist Team             Electronically signed by Preethi Zaman DO at 23 1134       Jaziel Reese MD at 23 0952              PROGRESS NOTE      Patient Name: Esdras Peralta  : 1960  MRN: [id number]  Primary Care Physician: Erika Hernandez MD  Date of admission: 2023    Patient Care Team:  Erika Hernandez MD as PCP - General  Erika Hernandez MD as PCP - Family Medicine        Subjective   Subjective:     No new active complaint, Patient on PD, tolerating well  Review of systems:    Constitutional:  Weakness, malaise/Fatigue  HEENT:           No headache, otalgia, itchy eyes, nasal discharge or sore throat.  Cardiac:           No chest pain, dyspnea, orthopnea or PND.  Chest:              No cough, phlegm or wheezing.  Abdomen:         No abdominal pain, nausea or vomiting.  Neuro:             No focal weakness, abnormal movements orseizure like activity.  :                  No hematuria, no pyuria, no dysuria, no flank pain.  ROS was otherwise negative except as mentioned in the [place].    Allergies:    Allergies   Allergen Reactions    Codeine Unknown (See Comments)       Objective   Exam:     Vital Signs  Temp:  [95.1 °F (35.1 °C)-98.5 °F (36.9 °C)] 98.5 °F (36.9 °C)  Heart Rate:  [61-82] 69  Resp:  [13-18] 13  BP: (103-127)/(60-84) 127/84  SpO2:  [97 %-98 %] 98 %  on   ;   Device (Oxygen Therapy): room air  Body mass index is 32.61 kg/m².    General:    male in no acute distress.    Head:      Normocephalic and atraumatic.    Eyes:      PERRL/EOM intact, conjunctiva and sclera clear with out nystagmus.    Neck:      No masses, thyromegaly,  trachea central with normal respiratory effort   Lungs:    Clear bilaterally to auscultation.    Heart:      Regular rate and rhythm, no murmur no gallop  Abd:        PD catheter with PD in progress  Pulses:   Pulses palpable  Extr:        No cyanosis or clubbing-+ edema.    Neuro:    No focal deficits.   alert oriented x3  Skin:       Intact without lesions or rashes.    Psych:    Alert and cooperative; normal mood and affect; .      Results Review:  I have personally reviewed most recent Data :  CBC    Results from last 7 days   Lab Units 04/30/23  0903 04/29/23  1451 04/28/23  0203 04/27/23  0616 04/26/23  1805   WBC 10*3/mm3 5.60 6.30 6.10 6.60 8.10   HEMOGLOBIN g/dL 11.0* 11.8* 10.9* 10.9* 11.3*   PLATELETS 10*3/mm3 189 173 165 173 171     CMP   Results from last 7 days   Lab Units 04/30/23  0903 04/29/23  1451 04/28/23  0203 04/27/23  0616 04/26/23  1805   SODIUM mmol/L 131* 131* 127* 128* 126*   POTASSIUM mmol/L 4.4 4.1 4.6 4.6 5.2   CHLORIDE mmol/L 95* 95* 90* 90* 89*   CO2 mmol/L 26.0 25.0 26.0 25.0 26.0   BUN mg/dL 49* 36* 96* 105* 97*   CREATININE mg/dL 3.30* 2.32* 4.06* 4.07* 4.30*   GLUCOSE  mg/dL 88 135* 211* 209* 209*   ALBUMIN g/dL 2.8* 2.8*  --   --  2.9*   BILIRUBIN mg/dL 0.5 0.6  --   --  0.5   ALK PHOS U/L 102 111  --   --  107   AST (SGOT) U/L 27 29  --   --  31   ALT (SGPT) U/L 12 15  --   --  22     ABG      CT Abdomen Pelvis Without Contrast    Result Date: 4/28/2023  Impression: 1. Diffuse anasarca of the soft tissues which has progressed as compared to the previous study. No significant ascites or pleural effusion identified. Left-sided peritoneal drain versus dialysis catheter present terminating within the right lower quadrant present. No mesenteric collection identified. No evidence of adenopathy or hernia. Scrotal edema versus hydroceles present. 2. Ancillary findings as described above. Electronically Signed: Toyin Gant  4/28/2023 1:33 PM EDT  Workstation ID: [id number]    IR Insert Tunneled CV Catheter Without Port 5 Plus    Result Date: 4/28/2023  Impression: Technically successful placement, 15.5 Filipino dual lumen tunneled hemodialysis catheter, utilizing the right internal jugular approach, as well as both sonographic and fluoroscopic control. Electronically Signed: Nithya Meehan  4/28/2023 1:00 PM EDT  Workstation ID: [id number]      Results for orders placed during the hospital encounter of 04/16/23    Adult Transthoracic Echo Complete W/ Cont if Necessary Per Protocol    Interpretation Summary    Left ventricular systolic function is severely decreased. Left ventricular ejection fraction appears to be less than 20%.    The left ventricular cavity is moderate to severely dilated.    The right ventricular cavity is moderately dilated.    Estimated right ventricular systolic pressure from tricuspid regurgitation is markedly elevated (>55 mmHg). Calculated right ventricular systolic pressure from tricuspid regurgitation is 58 mmHg.    Scheduled Meds:aspirin, 81 mg, Oral, Daily  atorvastatin, 20 mg, Oral, Nightly  bumetanide, 1 mg, Oral, TID  calcitriol, 0.25 mcg, Oral,  Daily  cyclobenzaprine, 10 mg, Oral, TID  heparin (porcine), 5,000 Units, Subcutaneous, Q8H  hydrALAZINE, 25 mg, Oral, BID  isosorbide mononitrate, 30 mg, Oral, Q24H  metoprolol succinate XL, 50 mg, Oral, Daily  sodium chloride, 10 mL, Intravenous, Q12H  spironolactone, 25 mg, Oral, Daily  ticagrelor, 90 mg, Oral, BID  traZODone, 50 mg, Oral, Nightly      Continuous Infusions:insulin,       PRN Meds:  butalbital-acetaminophen-caffeine    dextrose    dextrose    glucagon (human recombinant)    heparin (porcine)    ipratropium-albuterol    midodrine    nitroglycerin    ondansetron **OR** ondansetron    [COMPLETED] Insert Peripheral IV **AND** sodium chloride    sodium chloride    sodium chloride    traMADol    Assessment & Plan   Assessment and Plan:         ESRD (end stage renal disease)    Chronic kidney disease, unspecified CKD stage    ASSESSMENT:  Chronic Kidney disease/ESRD needing initiation of dialysis, patient has mature PD catheter/ port  Hyponatremia  Hypervolemia/Volume overload, patient with gross B?L lower extremity edema  Dilated Cardiomyopathy  CAD on Brilanta  H/o Heart dfailure  H/o COPD  H/o Diabetes Mellitus  Anemia of chronic disease  Artherosclorotic heart disease  Secondary hyperparathyroidism of Renal disease  Vitamin D deficiency  S/p AICD  H/o Hyperlipidemia on statins     EF 20% with mod-severe dilated left ventricle, echo on 4/18/2023. Severley decreased left ventricle systolic function Rt ventricle moderately dilated, left atrial dilation. RVSP >55       PLAN :      Chronic Kidney disease/ESRD needing initiation of dialysis, patient has mature PD catheter/ port  Peritoneal Dialysis initiated on 4/27, using Dianeal solution, but didn't get effluent back, kub unremarkable.    ct scan negative for hernia.  Will dialyze again tomorrow.  tdc by ir.  Continue Bumex,  Spironolactone, and Brilanta /aspirin  BP aacptable  Hemoglobin acceptable  Hyponatremia, sodium 127, watch closely, potassium  stable  Daily labs, BMp, Mag, Phosphorus  Daily weight  Intake output record  Avoid nephrotoxic agen  We will continue follow up        Electronically signed by Jaziel Reese MD,   [facility] kidney consultant  [phone]  2023  09:52 EDT      Electronically signed by Jaziel Reese MD at 23 1811       Preethi Zaman DO at 23 1256              [facility] Medicine Services   Daily Progress Note    Patient Name: Esdras Peralta  : 1960  MRN: [id number]  Primary Care Physician:  Erika Hernandez MD  Date of admission: 2023  Date and Time of Service: 23  at 12:56 EDT       Subjective       Chief Complaint: gen weakness, malaise    Patient S&E.  Tolerated first session of HD well yesterday.  Now having less difficulty with ambulation after some volume off.  Will have another session today.    Review of Systems   Constitutional: Positive for malaise/fatigue. Negative for chills, fever, weight gain and weight loss.   HENT: Negative for hearing loss, nosebleeds and sore throat.    Eyes: Negative for blurred vision, pain and redness.   Cardiovascular: Negative for chest pain, leg swelling, palpitations and syncope.   Respiratory: Negative for cough, shortness of breath and sputum production.    Endocrine: Negative for polyuria.   Skin: Negative for color change, rash and suspicious lesions.   Musculoskeletal: Positive for muscle weakness. Negative for back pain and joint pain.   Gastrointestinal: Negative for abdominal pain, anorexia, diarrhea, dysphagia, heartburn, melena, nausea and vomiting.   Genitourinary: Negative for dysuria, frequency and urgency.        Decreased urine output   Neurological: Negative for dizziness, numbness and weakness.   Psychiatric/Behavioral: Negative for memory loss. The patient does not have insomnia and is not nervous/anxious.           Objective       Vitals:   Temp:  [97.3 °F (36.3 °C)-98.2 °F (36.8 °C)] 98.1 °F (36.7 °C)  Heart Rate:  [73-79]  73  Resp:  [12-16] 15  BP: (115-138)/(72-92) 115/72  Flow (L/min):  [2] 2    Physical Exam  Vitals and nursing note reviewed.   Constitutional:       General: He is not in acute distress.     Appearance: Normal appearance. He is obese. He is ill-appearing.   HENT:      Head: Normocephalic and atraumatic.      Mouth/Throat:      Mouth: Mucous membranes are moist.      Pharynx: Oropharynx is clear.   Eyes:      Extraocular Movements: Extraocular movements intact.      Conjunctiva/sclera: Conjunctivae normal.      Pupils: Pupils are equal, round, and reactive to light.   Cardiovascular:      Rate and Rhythm: Normal rate and regular rhythm.      Pulses: Normal pulses.      Heart sounds: Normal heart sounds.   Pulmonary:      Effort: Pulmonary effort is normal. No respiratory distress.      Breath sounds: Normal breath sounds. No wheezing.   Abdominal:      General: Abdomen is flat. Bowel sounds are normal. There is no distension.      Palpations: Abdomen is soft.      Tenderness: There is no abdominal tenderness. There is no guarding.   Musculoskeletal:         General: No swelling, tenderness or signs of injury.      Cervical back: Normal range of motion and neck supple.   Skin:     General: Skin is warm and dry.      Capillary Refill: Capillary refill takes less than 2 seconds.      Comments: Bronzed Skin of ESRD   Neurological:      General: No focal deficit present.      Mental Status: He is alert and oriented to person, place, and time. Mental status is at baseline.      Motor: Weakness present.      Gait: Gait normal.      Comments: Strength 4-/5 B/L UE & LE     Psychiatric:         Mood and Affect: Mood normal.         Behavior: Behavior normal.         Judgment: Judgment normal.             Result Review    Result Review:  I have personally reviewed the results from the time of this admission to 4/29/2023 12:56 EDT and agree with these findings:  [x]  Laboratory  [x]  Microbiology  [x]  Radiology  []   EKG/Telemetry   []  Cardiology/Vascular   []  Pathology  []  Old records  []  Other:  Most notable findings include:         Assessment & Plan      Brief Patient Summary:  Esdras Peralta is a 62 y.o. male who came in from home 2/2 inability to carry out ADLs. Will start PD here and have PT/OT eval his functional status.       aspirin, 81 mg, Oral, Daily  atorvastatin, 20 mg, Oral, Nightly  bumetanide, 1 mg, Oral, TID  calcitriol, 0.25 mcg, Oral, Daily  heparin (porcine), 5,000 Units, Subcutaneous, Q8H  hydrALAZINE, 25 mg, Oral, BID  isosorbide mononitrate, 30 mg, Oral, Q24H  metoprolol succinate XL, 50 mg, Oral, Daily  sodium chloride, 10 mL, Intravenous, Q12H  spironolactone, 25 mg, Oral, Daily  ticagrelor, 90 mg, Oral, BID  traZODone, 50 mg, Oral, Nightly       insulin,          Active Hospital Problems:  Active Hospital Problems    Diagnosis     **ESRD (end stage renal disease)     Chronic kidney disease, unspecified CKD stage      Plan:   #ESRD  #Uremia    - Cr 4.3 on admission has been worsneing    - BUN 97    - Port placed for PD    - Patient with edema and uremic symptoms, sent to [facility] by nephrology,  HD today after a session yesterday.    - nephrology consulted, Patient of Dr Bebo Treviño PRN    - sodium 126 and Cl 89, suspect hypervolemic, manage with diuresis and fluid removal by dialysis    -PT / OT eval     #HFrEF  #Dilated Cardiomyopathy    - echo on 4/18/23 shows EF 20% with mod-severe dilated left ventricle     - s/p ICD    - Dialysis for fluid management    - aspirin daily    - Brilinta 90mg PO BID    - Will continue home Bumex as making urine    - continue home Aldactone    - resume home Toprol    - resume home imdur    - resume home trazodone and Ultram     #COPD    - chronic    - stable on room air    - Duoneb PRN     #DM1    - continue insulin pump     #Anemia    - hgb 11.3 on admission, base near 12.8    - check iron and ferritin    - suspect anemia of chronic renal disease    - no  overt bleeding, follow labs     #HTN    - hold home hydralazine, may resume depending on BP     #HLD    -reconcile home meds    DVT prophylaxis:  Medical DVT prophylaxis orders are present.    CODE STATUS:    Code Status (Patient has no pulse and is not breathing): CPR (Attempt to Resuscitate)  Medical Interventions (Patient has pulse or is breathing): Full Support      Disposition:  I expect patient to be discharged home in 2-3 days.    This patient has been examined wearing appropriate Personal Protective Equipment and discussed with Nurse. 23      Electronically signed by Preethi Zaman DO, 23, 12:56 EDT.  [facility] Hospitalist Team             Electronically signed by Preethi Zaman DO at 23 1256       Jaziel Reese MD at 23 1005              PROGRESS NOTE      Patient Name: Esdras Peralta  : 1960  MRN: [id number]  Primary Care Physician: Erika Hernandez MD  Date of admission: 2023    Patient Care Team:  Erika Hernandez MD as PCP - General  Erika Hernandez MD as PCP - Family Medicine        Subjective   Subjective:     No new active complaint, Patient on PD, tolerating well  Review of systems:    Constitutional:  Weakness, malaise/Fatigue  HEENT:           No headache, otalgia, itchy eyes, nasal discharge or sore throat.  Cardiac:           No chest pain, dyspnea, orthopnea or PND.  Chest:              No cough, phlegm or wheezing.  Abdomen:        No abdominal pain, nausea or vomiting.  Neuro:             No focal weakness, abnormal movements orseizure like activity.  :                  No hematuria, no pyuria, no dysuria, no flank pain.  ROS was otherwise negative except as mentioned in the [place].    Allergies:    Allergies   Allergen Reactions    Codeine Unknown (See Comments)       Objective   Exam:     Vital Signs  Temp:  [97.3 °F (36.3 °C)-98.2 °F (36.8 °C)] 98.1 °F (36.7 °C)  Heart Rate:  [73-82] 73  Resp:  [12-16] 15  BP: (115-150)/() 115/72  SpO2:  [97  %-100 %] 99 %  on  Flow (L/min):  [2] 2;   Device (Oxygen Therapy): room air  Body mass index is 32.49 kg/m².    General:    male in no acute distress.    Head:      Normocephalic and atraumatic.    Eyes:      PERRL/EOM intact, conjunctiva and sclera clear with out nystagmus.    Neck:      No masses, thyromegaly,  trachea central with normal respiratory effort   Lungs:    Clear bilaterally to auscultation.    Heart:      Regular rate and rhythm, no murmur no gallop  Abd:        PD catheter with PD in progress  Pulses:   Pulses palpable  Extr:        No cyanosis or clubbing-+ edema.    Neuro:    No focal deficits.   alert oriented x3  Skin:       Intact without lesions or rashes.    Psych:    Alert and cooperative; normal mood and affect; .      Results Review:  I have personally reviewed most recent Data :  CBC    Results from last 7 days   Lab Units 04/28/23  0203 04/27/23  0616 04/26/23  1805   WBC 10*3/mm3 6.10 6.60 8.10   HEMOGLOBIN g/dL 10.9* 10.9* 11.3*   PLATELETS 10*3/mm3 165 173 171     CMP   Results from last 7 days   Lab Units 04/28/23  0203 04/27/23  0616 04/26/23  1805   SODIUM mmol/L 127* 128* 126*   POTASSIUM mmol/L 4.6 4.6 5.2   CHLORIDE mmol/L 90* 90* 89*   CO2 mmol/L 26.0 25.0 26.0   BUN mg/dL 96* 105* 97*   CREATININE mg/dL 4.06* 4.07* 4.30*   GLUCOSE mg/dL 211* 209* 209*   ALBUMIN g/dL  --   --  2.9*   BILIRUBIN mg/dL  --   --  0.5   ALK PHOS U/L  --   --  107   AST (SGOT) U/L  --   --  31   ALT (SGPT) U/L  --   --  22     ABG      CT Abdomen Pelvis Without Contrast    Result Date: 4/28/2023  Impression: 1. Diffuse anasarca of the soft tissues which has progressed as compared to the previous study. No significant ascites or pleural effusion identified. Left-sided peritoneal drain versus dialysis catheter present terminating within the right lower quadrant present. No mesenteric collection identified. No evidence of adenopathy or hernia. Scrotal edema versus hydroceles present. 2. Ancillary  findings as described above. Electronically Signed: Toyin Gant  4/28/2023 1:33 PM EDT  Workstation ID: [id number]    IR Insert Tunneled CV Catheter Without Port 5 Plus    Result Date: 4/28/2023  Impression: Technically successful placement, 15.5 [place] dual lumen tunneled hemodialysis catheter, utilizing the right internal jugular approach, as well as both sonographic and fluoroscopic control. Electronically Signed: Nithya Meehan  4/28/2023 1:00 PM EDT  Workstation ID: [id number]    XR Abdomen KUB    Result Date: 4/28/2023  Impression: Distal peritoneal dialysis catheter is looped in the right lower quadrant. Nonobstructive bowel gas pattern. Electronically Signed: Nona Walls  4/28/2023 8:37 AM EDT  Workstation ID: [id number]      Results for orders placed during the hospital encounter of 04/16/23    Adult Transthoracic Echo Complete W/ Cont if Necessary Per Protocol    Interpretation Summary    Left ventricular systolic function is severely decreased. Left ventricular ejection fraction appears to be less than 20%.    The left ventricular cavity is moderate to severely dilated.    The right ventricular cavity is moderately dilated.    Estimated right ventricular systolic pressure from tricuspid regurgitation is markedly elevated (>55 mmHg). Calculated right ventricular systolic pressure from tricuspid regurgitation is 58 mmHg.    Scheduled Meds:aspirin, 81 mg, Oral, Daily  atorvastatin, 20 mg, Oral, Nightly  bumetanide, 1 mg, Oral, TID  calcitriol, 0.25 mcg, Oral, Daily  heparin (porcine), 5,000 Units, Subcutaneous, Q8H  hydrALAZINE, 25 mg, Oral, BID  isosorbide mononitrate, 30 mg, Oral, Q24H  metoprolol succinate XL, 50 mg, Oral, Daily  sodium chloride, 10 mL, Intravenous, Q12H  spironolactone, 25 mg, Oral, Daily  ticagrelor, 90 mg, Oral, BID  traZODone, 50 mg, Oral, Nightly      Continuous Infusions:insulin,       PRN Meds:  butalbital-acetaminophen-caffeine    dextrose    dextrose    glucagon (human recombinant)     ipratropium-albuterol    nitroglycerin    ondansetron **OR** ondansetron    [COMPLETED] Insert Peripheral IV **AND** sodium chloride    sodium chloride    sodium chloride    traMADol    Assessment & Plan   Assessment and Plan:         ESRD (end stage renal disease)    Chronic kidney disease, unspecified CKD stage    ASSESSMENT:  Chronic Kidney disease/ESRD needing initiation of dialysis, patient has mature PD catheter/ port  Hyponatremia  Hypervolemia/Volume overload, patient with gross B?L lower extremity edema  Dilated Cardiomyopathy  CAD on Brilanta  H/o Heart dfailure  H/o COPD  H/o Diabetes Mellitus  Anemia of chronic disease  Artherosclorotic heart disease  Secondary hyperparathyroidism of Renal disease  Vitamin D deficiency  S/p AICD  H/o Hyperlipidemia on statins     EF 20% with mod-severe dilated left ventricle, echo on 4/18/2023. [place] decreased left ventricle systolic function Rt ventricle moderately dilated, left atrial dilation. RVSP >55       PLAN :      Chronic Kidney disease/ESRD needing initiation of dialysis, patient has mature PD catheter/ port  Peritoneal Dialysis initiated on 4/27, using Dianeal solution, but didn't get effluent back, kub unremarkable.  Will check ct scan for hernia.  Switch to hd x 3 days, low flow with low dfr and uf 1-2 ltrs.explained to the pt who is in agreement.  tdc requested by ir.if not possible fatoumata catheter,and then switch to tdc next week.  Continue Bumex,  Spironolactone, and Brilanta /aspirin  BP aacptable  Hemoglobin acceptable  Hyponatremia, sodium 127, watch closely, potassium stable  Daily labs, BMp, Mag, Phosphorus  Daily weight  Intake output record  Avoid nephrotoxic agen  We will continue follow up        Electronically signed by Jaziel Reese MD,   [facility] kidney consultant  [phone]  4/29/2023  10:05 EDT      Electronically signed by Jaziel Reese MD at 04/29/23 3188       Sapna Corey, CATERINA at 04/28/23 4552          Paged at 2309 due to  increased body wall/ble edema  Patient with increasing edema, PD cath not draining adequately. shiley placed earlier today but hd not yet completed.  Patient endorsing increased soa, was previously on RA, now on 2L  Adjusting hd orders to run stat.   Will follow   Sapna DIGGS  [facility]    Electronically signed by Sapna Corey APRN at 23 2301       Preethi Zaman DO at 23 1711              [facility]   Daily Progress Note    Patient Name: Esdras Peralta  : 1960  MRN: [id number]  Primary Care Physician:  Erika Hernandez MD  Date of admission: 2023  Date and Time of Service: 23  at 17:11 EDT       Subjective       Chief Complaint: gen weakness, malaise    Patient S&E.  No new symptoms since arrival.  Agreeable for HD initiation as PD port is nonfunctional    Review of Systems   Constitutional: Positive for malaise/fatigue. Negative for chills, fever, weight gain and weight loss.   HENT: Negative for hearing loss, nosebleeds and sore throat.    Eyes: Negative for blurred vision, pain and redness.   Cardiovascular: Negative for chest pain, leg swelling, palpitations and syncope.   Respiratory: Negative for cough, shortness of breath and sputum production.    Endocrine: Negative for polyuria.   Skin: Negative for color change, rash and suspicious lesions.   Musculoskeletal: Positive for muscle weakness. Negative for back pain and joint pain.   Gastrointestinal: Negative for abdominal pain, anorexia, diarrhea, dysphagia, heartburn, melena, nausea and vomiting.   Genitourinary: Negative for dysuria, frequency and urgency.        Decreased urine output   Neurological: Negative for dizziness, numbness and weakness.   Psychiatric/Behavioral: Negative for memory loss. The patient does not have insomnia and is not nervous/anxious.           Objective       Vitals:   Temp:  [97 °F (36.1 °C)-98 °F (36.7 °C)] 97.3 °F (36.3 °C)  Heart  Rate:  [74-86] 82  Resp:  [12-18] 16  BP: (127-150)/() 138/87    Physical Exam  Vitals and nursing note reviewed.   Constitutional:       General: He is not in acute distress.     Appearance: Normal appearance. He is obese. He is ill-appearing.   HENT:      Head: Normocephalic and atraumatic.      Mouth/Throat:      Mouth: Mucous membranes are moist.      Pharynx: Oropharynx is clear.   Eyes:      Extraocular Movements: Extraocular movements intact.      Conjunctiva/sclera: Conjunctivae normal.      Pupils: Pupils are equal, round, and reactive to light.   Cardiovascular:      Rate and Rhythm: Normal rate and regular rhythm.      Pulses: Normal pulses.      Heart sounds: Normal heart sounds.   Pulmonary:      Effort: Pulmonary effort is normal. No respiratory distress.      Breath sounds: Normal breath sounds. No wheezing.   Abdominal:      General: Abdomen is flat. Bowel sounds are normal. There is no distension.      Palpations: Abdomen is soft.      Tenderness: There is no abdominal tenderness. There is no guarding.   Musculoskeletal:         General: No swelling, tenderness or signs of injury.      Cervical back: Normal range of motion and neck supple.   Skin:     General: Skin is warm and dry.      Capillary Refill: Capillary refill takes less than 2 seconds.      Comments: Bronzed Skin of ESRD   Neurological:      General: No focal deficit present.      Mental Status: He is alert and oriented to person, place, and time. Mental status is at baseline.      Motor: Weakness present.      Gait: Gait normal.      Comments: Strength 4-/5 B/L UE & LE     Psychiatric:         Mood and Affect: Mood normal.         Behavior: Behavior normal.         Judgment: Judgment normal.             Result Review    Result Review:  I have personally reviewed the results from the time of this admission to 4/28/2023 17:11 EDT and agree with these findings:  [x]  Laboratory  [x]  Microbiology  [x]  Radiology  []  EKG/Telemetry    []  Cardiology/Vascular   []  Pathology  []  Old records  []  Other:  Most notable findings include:         Assessment & Plan      Brief Patient Summary:  Esrdas Peralta is a 62 y.o. male who came in from home 2/2 inability to carry out ADLs. Will start PD here and have PT/OT eval his functional status.       aspirin, 81 mg, Oral, Daily  atorvastatin, 20 mg, Oral, Nightly  bumetanide, 1 mg, Oral, TID  calcitriol, 0.25 mcg, Oral, Daily  heparin (porcine), 5,000 Units, Subcutaneous, Q8H  hydrALAZINE, 25 mg, Oral, BID  isosorbide mononitrate, 30 mg, Oral, Q24H  metoprolol succinate XL, 50 mg, Oral, Daily  sodium chloride, 10 mL, Intravenous, Q12H  spironolactone, 25 mg, Oral, Daily  ticagrelor, 90 mg, Oral, BID  traZODone, 50 mg, Oral, Nightly       insulin,          Active Hospital Problems:  Active Hospital Problems    Diagnosis     **ESRD (end stage renal disease)     Chronic kidney disease, unspecified CKD stage      Plan:   #ESRD  #Uremia    - Cr 4.3 on admission has been worsneing    - BUN 97    - Port placed for PD    - Patient with edema and uremic symptoms, sent to [facility] by nephrology,  HD catheter to be placed today    - nephrology consulted, Patient of Dr Lagunas     - Zofran PRN    - sodium 126 and Cl 89, suspect hypervolemic, manage with diuresis and fluid removal by dialysis    -PT / OT eval     #HFrEF  #Dilated Cardiomyopathy    - echo on 4/18/23 shows EF 20% with mod-severe dilated left ventricle     - s/p ICD    - Dialysis for fluid management    - aspirin daily    - Brilinta 90mg PO BID    - Will continue home Bumex as making urine    - continue home Aldactone    - resume home Toprol    - resume home imdur    - resume home trazodone and Ultram     #COPD    - chronic    - stable on room air    - Duoneb PRN     #DM1    - continue insulin pump     #Anemia    - hgb 11.3 on admission, base near 12.8    - check iron and ferritin    - suspect anemia of chronic renal disease    - no overt bleeding, follow  labs     #HTN    - hold home hydralazine, may resume depending on BP     #HLD    -reconcile home meds    DVT prophylaxis:  Medical DVT prophylaxis orders are present.    CODE STATUS:    Code Status (Patient has no pulse and is not breathing): CPR (Attempt to Resuscitate)  Medical Interventions (Patient has pulse or is breathing): Full Support      Disposition:  I expect patient to be discharged home in 2-3 days.    This patient has been examined wearing appropriate Personal Protective Equipment and discussed with Nurse. 23      Electronically signed by Preethi Zaman DO, 23, 17:11 EDT.  [facility] Hospitalist Team             Electronically signed by Preethi Zaman DO at 23 1711       Jaziel Reese MD at 23 1014              PROGRESS NOTE      Patient Name: Esdras Peralta  : 1960  MRN: [id number]  Primary Care Physician: Erika Hernandez MD  Date of admission: 2023    Patient Care Team:  Erika Hernandez MD as PCP - General  Erika Hernandez MD as PCP - Family Medicine        Subjective   Subjective:     No new active complaint, Patient on PD, tolerating well  Review of systems:    Constitutional:  Weakness, malaise/Fatigue  HEENT:           No headache, otalgia, itchy eyes, nasal discharge or sore throat.  Cardiac:           No chest pain, dyspnea, orthopnea or PND.  Chest:              No cough, phlegm or wheezing.  Abdomen:        No abdominal pain, nausea or vomiting.  Neuro:             No focal weakness, abnormal movements orseizure like activity.  :                  No hematuria, no pyuria, no dysuria, no flank pain.  ROS was otherwise negative except as mentioned in the [place].    Allergies:    Allergies   Allergen Reactions    Codeine Unknown (See Comments)       Objective   Exam:     Vital Signs  Temp:  [97 °F (36.1 °C)-98 °F (36.7 °C)] 97.3 °F (36.3 °C)  Heart Rate:  [69-86] 83  Resp:  [15-18] 18  BP: (108-137)/(68-94) 137/94  SpO2:  [97 %-100 %] 99 %  on   ;    Device (Oxygen Therapy): room air  Body mass index is 32.16 kg/m².    General:    male in no acute distress.    Head:      Normocephalic and atraumatic.    Eyes:      PERRL/EOM intact, conjunctiva and sclera clear with out nystagmus.    Neck:      No masses, thyromegaly,  trachea central with normal respiratory effort   Lungs:    Clear bilaterally to auscultation.    Heart:      Regular rate and rhythm, no murmur no gallop  Abd:        PD catheter with PD in progress  Pulses:   Pulses palpable  Extr:        No cyanosis or clubbing-+ edema.    Neuro:    No focal deficits.   alert oriented x3  Skin:       Intact without lesions or rashes.    Psych:    Alert and cooperative; normal mood and affect; .      Results Review:  I have personally reviewed most recent Data :  CBC    Results from last 7 days   Lab Units 04/28/23  0203 04/27/23  0616 04/26/23  1805   WBC 10*3/mm3 6.10 6.60 8.10   HEMOGLOBIN g/dL 10.9* 10.9* 11.3*   PLATELETS 10*3/mm3 165 173 171     CMP   Results from last 7 days   Lab Units 04/28/23  0203 04/27/23  0616 04/26/23  1805   SODIUM mmol/L 127* 128* 126*   POTASSIUM mmol/L 4.6 4.6 5.2   CHLORIDE mmol/L 90* 90* 89*   CO2 mmol/L 26.0 25.0 26.0   BUN mg/dL 96* 105* 97*   CREATININE mg/dL 4.06* 4.07* 4.30*   GLUCOSE mg/dL 211* 209* 209*   ALBUMIN g/dL  --   --  2.9*   BILIRUBIN mg/dL  --   --  0.5   ALK PHOS U/L  --   --  107   AST (SGOT) U/L  --   --  31   ALT (SGPT) U/L  --   --  22     ABG      XR Abdomen KUB    Result Date: 4/28/2023  Impression: Distal peritoneal dialysis catheter is looped in the right lower quadrant. Nonobstructive bowel gas pattern. Electronically Signed: Nona Walls  4/28/2023 8:37 AM EDT  Workstation ID: [id number]      Results for orders placed during the hospital encounter of 04/16/23    Adult Transthoracic Echo Complete W/ Cont if Necessary Per Protocol    Interpretation Summary    Left ventricular systolic function is severely decreased. Left ventricular ejection fraction  appears to be less than 20%.    The left ventricular cavity is moderate to severely dilated.    The right ventricular cavity is moderately dilated.    Estimated right ventricular systolic pressure from tricuspid regurgitation is markedly elevated (>55 mmHg). Calculated right ventricular systolic pressure from tricuspid regurgitation is 58 mmHg.    Scheduled Meds:aspirin, 81 mg, Oral, Daily  bumetanide, 1 mg, Oral, TID  heparin (porcine), 5,000 Units, Subcutaneous, Q8H  isosorbide mononitrate, 30 mg, Oral, Q24H  metoprolol succinate XL, 50 mg, Oral, Daily  sodium chloride, 10 mL, Intravenous, Q12H  spironolactone, 25 mg, Oral, Daily  ticagrelor, 90 mg, Oral, BID  traZODone, 50 mg, Oral, Nightly      Continuous Infusions:insulin,       PRN Meds:  Delflex-LC/2.5% Dextrose    dextrose    dextrose    glucagon (human recombinant)    ipratropium-albuterol    nitroglycerin    ondansetron **OR** ondansetron    [COMPLETED] Insert Peripheral IV **AND** sodium chloride    sodium chloride    sodium chloride    traMADol    Assessment & Plan   Assessment and Plan:         ESRD (end stage renal disease)    ASSESSMENT:  Chronic Kidney disease/ESRD needing initiation of dialysis, patient has mature PD catheter/ port  Hyponatremia  Hypervolemia/Volume overload, patient with gross B?L lower extremity edema  Dilated Cardiomyopathy  CAD on Brilanta  H/o Heart dfailure  H/o COPD  H/o Diabetes Mellitus  Anemia of chronic disease  Artherosclorotic heart disease  Secondary hyperparathyroidism of Renal disease  Vitamin D deficiency  S/p AICD  H/o Hyperlipidemia on statins     EF 20% with mod-severe dilated left ventricle, echo on 4/18/2023. [place] decreased left ventricle systolic function Rt ventricle moderately dilated, left atrial dilation. RVSP >55       PLAN :      Chronic Kidney disease/ESRD needing initiation of dialysis, patient has mature PD catheter/ port  Peritoneal Dialysis initiated on 4/27, using Dianeal solution, but didn't  get effluent back, kub unremarkable.  Will check ct scan for hernia.  Switch to hd x 3 days, low flow with low dfr and uf 1-2 ltrs.explained to the pt who is in agreement.  tdc requested by ir.if not possible shiley catheter,and then switch to tdc next week.  Continue Bumex,  Spironolactone, and Brilanta /aspirin  BP aacptable  Hemoglobin acceptable  Hyponatremia, sodium 127, watch closely, potassium stable  Daily labs, BMp, Mag, Phosphorus  Daily weight  Intake output record  Avoid nephrotoxic agen  We will continue follow up        Electronically signed by Jaziel Reese MD,   [facility] kidney consultant  [phone]  4/28/2023  10:14 EDT      Electronically signed by Jaziel Reese MD at 04/28/23 2008       Lab Results (last 72 hours)       Hepatitis B Surface Antibody [[id number]]  (Normal) Collected: 04/28/23 1455    Specimen: Blood Updated: 04/28/23 2007     Hep B S Ab Non-Reactive    Narrative:      Non-Reactive - Individual is considered to be not immune to infection with HBV.    Equivocal - Unable to determine if anti-HBs is present at levels consistent with immunity. The individual should be further assessed by associated risk factors and the use of additional diagnositc information, or another sample may be collected and tested.    Reactive - Anti-HBs concentration detected at >10 mIU/mL. Individual is considered to be immune to infection with HBV.      Results may be falsely decreased if patient taking Biotin.      Hepatitis B Core Antibody, IgM [[id number]]  (Normal) Collected: 04/28/23 1455    Specimen: Blood Updated: 04/28/23 1659     Hep B C IgM Non-Reactive    Narrative:      Results may be falsely decreased if patient taking Biotin.      Hepatitis B Surface Antigen [[id number]]  (Normal) Collected: 04/28/23 1455    Specimen: Blood Updated: 04/28/23 5688     Hepatitis B Surface Ag Non-Reactive

## 2023-05-01 NOTE — PLAN OF CARE
Goal Outcome Evaluation:      Pt A&Ox4, VS stable, pt on RA. Pt monitoring blood glucose with dexcom and treating with insulin pump, per order. Plan for HD tomorrow then d/c once placement for outpt HD is obtained. Will continue to monitor.

## 2023-05-02 ENCOUNTER — READMISSION MANAGEMENT (OUTPATIENT)
Dept: CALL CENTER | Facility: HOSPITAL | Age: 63
End: 2023-05-02
Payer: COMMERCIAL

## 2023-05-02 VITALS
HEIGHT: 69 IN | RESPIRATION RATE: 16 BRPM | OXYGEN SATURATION: 100 % | HEART RATE: 70 BPM | TEMPERATURE: 97.4 F | BODY MASS INDEX: 32.16 KG/M2 | SYSTOLIC BLOOD PRESSURE: 123 MMHG | DIASTOLIC BLOOD PRESSURE: 83 MMHG | WEIGHT: 217.15 LBS

## 2023-05-02 LAB
ANION GAP SERPL CALCULATED.3IONS-SCNC: 10 MMOL/L (ref 5–15)
BUN SERPL-MCNC: 52 MG/DL (ref 8–23)
BUN/CREAT SERPL: 13.4 (ref 7–25)
CALCIUM SPEC-SCNC: 8.9 MG/DL (ref 8.6–10.5)
CHLORIDE SERPL-SCNC: 90 MMOL/L (ref 98–107)
CO2 SERPL-SCNC: 25 MMOL/L (ref 22–29)
CREAT SERPL-MCNC: 3.88 MG/DL (ref 0.76–1.27)
DEPRECATED RDW RBC AUTO: 67.8 FL (ref 37–54)
EGFRCR SERPLBLD CKD-EPI 2021: 16.9 ML/MIN/1.73
ERYTHROCYTE [DISTWIDTH] IN BLOOD BY AUTOMATED COUNT: 20.2 % (ref 12.3–15.4)
GLUCOSE SERPL-MCNC: 135 MG/DL (ref 65–99)
HCT VFR BLD AUTO: 34.3 % (ref 37.5–51)
HGB BLD-MCNC: 11.1 G/DL (ref 13–17.7)
MAGNESIUM SERPL-MCNC: 1.9 MG/DL (ref 1.6–2.4)
MCH RBC QN AUTO: 30 PG (ref 26.6–33)
MCHC RBC AUTO-ENTMCNC: 32.5 G/DL (ref 31.5–35.7)
MCV RBC AUTO: 92.2 FL (ref 79–97)
PHOSPHATE SERPL-MCNC: 4.7 MG/DL (ref 2.5–4.5)
PLATELET # BLD AUTO: 181 10*3/MM3 (ref 140–450)
PMV BLD AUTO: 9.7 FL (ref 6–12)
POTASSIUM SERPL-SCNC: 5.2 MMOL/L (ref 3.5–5.2)
RBC # BLD AUTO: 3.72 10*6/MM3 (ref 4.14–5.8)
SODIUM SERPL-SCNC: 125 MMOL/L (ref 136–145)
WBC NRBC COR # BLD: 5.3 10*3/MM3 (ref 3.4–10.8)

## 2023-05-02 PROCEDURE — 36415 COLL VENOUS BLD VENIPUNCTURE: CPT | Performed by: STUDENT IN AN ORGANIZED HEALTH CARE EDUCATION/TRAINING PROGRAM

## 2023-05-02 PROCEDURE — 85027 COMPLETE CBC AUTOMATED: CPT | Performed by: STUDENT IN AN ORGANIZED HEALTH CARE EDUCATION/TRAINING PROGRAM

## 2023-05-02 PROCEDURE — 84100 ASSAY OF PHOSPHORUS: CPT | Performed by: INTERNAL MEDICINE

## 2023-05-02 PROCEDURE — 80048 BASIC METABOLIC PNL TOTAL CA: CPT | Performed by: INTERNAL MEDICINE

## 2023-05-02 PROCEDURE — 83735 ASSAY OF MAGNESIUM: CPT | Performed by: INTERNAL MEDICINE

## 2023-05-02 RX ORDER — BUMETANIDE 1 MG/1
2 TABLET ORAL 3 TIMES DAILY
Status: DISCONTINUED | OUTPATIENT
Start: 2023-05-02 | End: 2023-05-02 | Stop reason: HOSPADM

## 2023-05-02 RX ORDER — CYCLOBENZAPRINE HCL 10 MG
10 TABLET ORAL 3 TIMES DAILY
Qty: 45 TABLET | Refills: 0 | Status: SHIPPED | OUTPATIENT
Start: 2023-05-02 | End: 2023-05-17

## 2023-05-02 RX ORDER — BUMETANIDE 2 MG/1
2 TABLET ORAL 3 TIMES DAILY
Qty: 90 TABLET | Refills: 0 | Status: SHIPPED | OUTPATIENT
Start: 2023-05-02 | End: 2023-06-01

## 2023-05-02 RX ADMIN — TRAMADOL HYDROCHLORIDE 50 MG: 50 TABLET, COATED ORAL at 12:10

## 2023-05-02 RX ADMIN — SPIRONOLACTONE 25 MG: 25 TABLET ORAL at 07:53

## 2023-05-02 RX ADMIN — CYCLOBENZAPRINE 10 MG: 10 TABLET, FILM COATED ORAL at 15:14

## 2023-05-02 RX ADMIN — ISOSORBIDE MONONITRATE 30 MG: 30 TABLET, EXTENDED RELEASE ORAL at 07:51

## 2023-05-02 RX ADMIN — ASPIRIN 81 MG: 81 TABLET, COATED ORAL at 07:52

## 2023-05-02 RX ADMIN — HYDRALAZINE HYDROCHLORIDE 25 MG: 25 TABLET, FILM COATED ORAL at 07:52

## 2023-05-02 RX ADMIN — BUMETANIDE 2 MG: 1 TABLET ORAL at 15:14

## 2023-05-02 RX ADMIN — METOPROLOL SUCCINATE 50 MG: 50 TABLET, EXTENDED RELEASE ORAL at 07:52

## 2023-05-02 RX ADMIN — BUMETANIDE 1 MG: 1 TABLET ORAL at 07:52

## 2023-05-02 RX ADMIN — TICAGRELOR 90 MG: 90 TABLET ORAL at 07:52

## 2023-05-02 RX ADMIN — Medication 10 ML: at 07:53

## 2023-05-02 RX ADMIN — CYCLOBENZAPRINE 10 MG: 10 TABLET, FILM COATED ORAL at 07:52

## 2023-05-02 RX ADMIN — CALCITRIOL CAPSULES 0.25 MCG 0.25 MCG: 0.25 CAPSULE ORAL at 07:51

## 2023-05-02 NOTE — CASE MANAGEMENT/SOCIAL WORK
Social Work Assessment  [facility]     Patient Name: Esdras Peralta  MRN: [id number]  Today's Date: 5/2/2023    Admit Date: 4/26/2023       Discharge Plan     Row Name 05/02/23 1102       Plan    Plan Comments LSW met with patient at bedside and completed LACE screen. Patient reported no concerns or needs at this time.                 Psychosocial     Row Name 05/02/23 1054       Values/Beliefs    Spiritual, Cultural Beliefs, Amish Practices, Values that Affect Care no       Behavior WDL    Behavior WDL WDL       Emotion Mood WDL    Emotion/Mood/Affect WDL WDL       Mental Health    Little Interest or Pleasure in Doing Things 0-->not at all    Feeling Down, Depressed or Hopeless 0-->not at all    Do you feel stress - tense, restless, nervous, or anxious, or unable to sleep at night because your mind is troubled all the time - these days? Not at all       Speech WDL    Speech WDL WDL       Perceptual State WDL    Perceptual State WDL WDL       Thought Process WDL    Thought Process WDL WDL       Intellectual Performance WDL    Intellectual Performance WDL WDL    Level of Consciousness Alert       Coping/Stress    Major Change/Loss/Stressor medical condition/diagnosis    Patient Personal Strengths able to adapt;strong support system;vale/spirituality;tolerant    Sources of Support spouse;community support;Islam/Mu-ism organization    Techniques to [place] with Loss/Stress/Change play    Reaction to Health Status accepting;realistic    Understanding of Condition and Treatment adequate understanding of medical condition;adequate understanding of treatment    Coping/Stress Comments Patient reported his support system includes his spouse and Mormonism. He stated he is a Jehovah Witness. 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       Developmental Stage (Eriksson's)    Developmental Stage Stage 7 (35-65 years/Middle Adulthood) Generativity vs. Stagnation       C-SSRS (Recent)    Q1 Wished to be Dead  (Past Month) no    Q2 Suicidal Thoughts (Past Month) no    Q6 Suicide Behavior (Lifetime) no       Violence Risk    Feels Like Hurting Others no    Previous Attempt to Harm Others no               Abuse/Neglect     Row Name 05/02/23 1057       Personal Safety    Feels Unsafe at Home or Work/School no    Feels Threatened by Someone no    Does Anyone Try to Keep You From Having Contact with Others or Doing Things Outside Your Home? no    Physical Signs of Abuse Present no               Legal     Row Name 05/02/23 1057       Financial Resource Strain    How hard is it for you to pay for the very basics like food, housing, medical care, and heating? Not hard       Financial/Legal    Finance Comments Patient reported he is able to afford his medications and monthly expenses.               Substance Abuse     Row Name 05/02/23 1101       Substance Use    Substance Use Status current caffeine use    Caffeine Type coffee    Caffeine Amount/Frequency occasional use       AUDIT-C (Alcohol Use Disorders ID Test)    Q1: How often do you have a drink containing alcohol? Never    Q2: How many drinks containing alcohol do you have on a typical day when you are drinking? None    Q3: How often do you have six or more drinks on one occasion? Never    Audit-C Score 0              Met with patient in room. Maintained distance greater than six feet and spent less than 15 minutes in the room.      MARIO Glaser, LSW    Phone: [phone]  Cell: [phone]  Fax: [phone]  [email or web address]

## 2023-05-02 NOTE — DISCHARGE SUMMARY
[facility] Medicine Services  Discharge Summary    Date of Service: 23   Patient Name: Esdras Peralta  : 1960  MRN: [id number]    Date of Admission: 2023  Discharge Diagnosis:    Diagnosis Plan   1. Chronic kidney disease, unspecified CKD stage        2. Weakness        3. Hypervolemia, unspecified hypervolemia type           Date of Discharge:  23   Primary Care Physician: Erika Hernandez MD      Presenting Problem:   Weakness [R53.1]  ESRD (end stage renal disease) [N18.6]  Hypervolemia, unspecified hypervolemia type [E87.70]  Chronic kidney disease, unspecified CKD stage [N18.9]    Active and Resolved Hospital Problems:  Active Hospital Problems    Diagnosis POA   • **ESRD (end stage renal disease) [N18.6] Yes   • Chronic kidney disease, unspecified CKD stage [N18.9] Yes      Resolved Hospital Problems   No resolved problems to display.         Hospital Course     Hospital Course:  Esdras Peralta is a 62 y.o. male came in for initiation of HD 2/2 Vol overload, azotemia. Patient had HD catheter placed. Had a few sessions of HD. Started on TTS outpatient schedule. Will cont to f/u with Dr Lagunas outpatient.         DISCHARGE Follow Up Recommendations for labs and diagnostics: Close Nephro f/u for HD      Reasons For Change In Medications and Indications for New Medications:      Day of Discharge     Vital Signs:  Temp:  [96.2 °F (35.7 °C)-97.7 °F (36.5 °C)] 97.4 °F (36.3 °C)  Heart Rate:  [70-87] 70  Resp:  [11-16] 16  BP: (112-138)/(77-91) 123/83    Physical Exam:  Physical Exam  Vitals and nursing note reviewed.   Constitutional:       General: He is not in acute distress.     Appearance: Normal appearance. He is normal weight. He is not ill-appearing.   HENT:      Head: Normocephalic and atraumatic.      Mouth/Throat:      Mouth: Mucous membranes are moist.      Pharynx: Oropharynx is clear.   Eyes:      Extraocular Movements: Extraocular movements intact.       Conjunctiva/sclera: Conjunctivae normal.      Pupils: Pupils are equal, round, and reactive to light.   Cardiovascular:      Rate and Rhythm: Normal rate and regular rhythm.      Pulses: Normal pulses.      Heart sounds: Normal heart sounds.   Pulmonary:      Effort: Pulmonary effort is normal. No respiratory distress.      Breath sounds: Normal breath sounds. No wheezing.   Abdominal:      General: Abdomen is flat. Bowel sounds are normal. There is no distension.      Palpations: Abdomen is soft.      Tenderness: There is no abdominal tenderness. There is no guarding.   Musculoskeletal:         General: No swelling, tenderness or signs of injury.      Cervical back: Normal range of motion and neck supple.   Skin:     General: Skin is warm and dry.      Capillary Refill: Capillary refill takes less than 2 seconds.   Neurological:      General: No focal deficit present.      Mental Status: He is alert and oriented to person, place, and time. Mental status is at baseline.      Motor: No weakness.   Psychiatric:         Mood and Affect: Mood normal.         Behavior: Behavior normal.         Judgment: Judgment normal.            Pertinent  and/or Most Recent Results     LAB RESULTS:      Lab 05/02/23  0422 05/01/23  0735 04/30/23  0903 04/29/23  1451 04/28/23  1138 04/28/23  0203 04/27/23  0616 04/26/23  1805   WBC 5.30 6.30 5.60 6.30  --  6.10   < > 8.10   HEMOGLOBIN 11.1* 12.0* 11.0* 11.8*  --  10.9*   < > 11.3*   HEMATOCRIT 34.3* 37.4* 34.5* 36.4*  --  32.6*   < > 34.9*   PLATELETS 181 203 189 173  --  165   < > 171   NEUTROS ABS  --   --   --  4.70  --   --   --  6.20   LYMPHS ABS  --   --   --  0.40*  --   --   --  0.60*   MONOS ABS  --   --   --  1.00*  --   --   --  1.10*   EOS ABS  --   --   --  0.10  --   --   --  0.10   MCV 92.2 93.8 94.7 92.8  --  92.7   < > 93.0   PROTIME  --   --   --   --  12.4*  --   --   --    APTT  --   --   --   --  28.5  --   --   --     < > = values in this interval not  displayed.         Lab 05/02/23  0422 05/01/23  0735 04/30/23  0903 04/29/23  1451 04/28/23  0203 04/27/23  0616 04/26/23  1805   SODIUM 125* 125* 131* 131* 127*   < > 126*   POTASSIUM 5.2 4.6 4.4 4.1 4.6   < > 5.2   CHLORIDE 90* 90* 95* 95* 90*   < > 89*   CO2 25.0 23.0 26.0 25.0 26.0   < > 26.0   ANION GAP 10.0 12.0 10.0 11.0 11.0   < > 11.0   BUN 52* 43* 49* 36* 96*   < > 97*   CREATININE 3.88* 3.44* 3.30* 2.32* 4.06*   < > 4.30*   EGFR 16.9* 19.6* 20.6* 31.4* 16.0*   < > 15.0*   GLUCOSE 135* 83 88 135* 211*   < > 209*   CALCIUM 8.9 8.8 8.7 8.4* 8.7   < > 9.0   MAGNESIUM 1.9 1.8 2.0 2.1  --   --  2.0   PHOSPHORUS 4.7* 3.8 3.9 3.3  --   --   --     < > = values in this interval not displayed.         Lab 04/30/23  0903 04/29/23  1451 04/26/23  1805   TOTAL PROTEIN 6.4 6.5 6.6   ALBUMIN 2.8* 2.8* 2.9*   GLOBULIN 3.6 3.7 3.7   ALT (SGPT) 12 15 22   AST (SGOT) 27 29 31   BILIRUBIN 0.5 0.6 0.5   ALK PHOS 102 111 107         Lab 04/28/23  1138   PROTIME 12.4*   INR 1.17*                 Brief Urine Lab Results  (Last result in the past 365 days)      Color   Clarity   Blood   Leuk Est   Nitrite   Protein   CREAT   Urine HCG        04/16/23 1451 Yellow   Clear   Negative   Trace   Negative   >=300 mg/dL (3+)               Microbiology Results (last 10 days)     ** No results found for the last 240 hours. **          CT Abdomen Pelvis Without Contrast    Result Date: 4/28/2023  Impression: Impression: 1. Diffuse anasarca of the soft tissues which has progressed as compared to the previous study. No significant ascites or pleural effusion identified. Left-sided peritoneal drain versus dialysis catheter present terminating within the right lower quadrant present. No mesenteric collection identified. No evidence of adenopathy or hernia. Scrotal edema versus hydroceles present. 2. Ancillary findings as described above. Electronically Signed: Toyin Gant  4/28/2023 1:33 PM EDT  Workstation ID: [id number]    CT Abdomen Pelvis  Without Contrast    Result Date: 4/16/2023  Impression: Impression: No acute abdominal or pelvic abnormality. Electronically Signed: Prudencio Saez  4/16/2023 2:21 PM EDT  Workstation ID: [id number]    XR Chest 1 View    Result Date: 4/16/2023  Impression: Impression: Cardiomegaly with mild pulmonary vascular congestion. Electronically Signed: Prudencio Saez  4/16/2023 1:53 PM EDT  Workstation ID: [id number]    IR Insert Tunneled CV Catheter Without Port 5 Plus    Result Date: 4/28/2023  Impression: Impression: Technically successful placement, 15.5 [place] dual lumen tunneled hemodialysis catheter, utilizing the right internal jugular approach, as well as both sonographic and fluoroscopic control. Electronically Signed: Nithya Meehan  4/28/2023 1:00 PM EDT  Workstation ID: [id number]    XR Abdomen KUB    Result Date: 4/28/2023  Impression: Impression: Distal peritoneal dialysis catheter is looped in the right lower quadrant. Nonobstructive bowel gas pattern. Electronically Signed: Nona Walls  4/28/2023 8:37 AM EDT  Workstation ID: [id number]      Results for orders placed during the hospital encounter of 06/09/21    Doppler Arterial Multi Level Lower Extremity - Bilateral CAR    Interpretation Summary  · Right Conclusion: The right LEONCIO is normal. Normal digital pressures.  · Left Conclusion: The left LEONCIO is moderately reduced. Waveforms are consistent with femoral disease and popliteal disease. Moderate digital ischemia.      Results for orders placed during the hospital encounter of 06/09/21    Doppler Arterial Multi Level Lower Extremity - Bilateral CAR    Interpretation Summary  · Right Conclusion: The right LEONCIO is normal. Normal digital pressures.  · Left Conclusion: The left LEONCIO is moderately reduced. Waveforms are consistent with femoral disease and popliteal disease. Moderate digital ischemia.      Results for orders placed during the hospital encounter of 04/16/23    Adult Transthoracic Echo Complete W/ Cont if  Necessary Per Protocol    Interpretation Summary  •  Left ventricular systolic function is severely decreased. Left ventricular ejection fraction appears to be less than 20%.  •  The left ventricular cavity is moderate to severely dilated.  •  The right ventricular cavity is moderately dilated.  •  Estimated right ventricular systolic pressure from tricuspid regurgitation is markedly elevated (>55 mmHg). Calculated right ventricular systolic pressure from tricuspid regurgitation is 58 mmHg.      Labs Pending at Discharge:      Procedures Performed           Consults:   Consults     Date and Time Order Name Status Description    4/27/2023 10:44 AM Inpatient Nephrology Consult Completed     4/26/2023  7:04 PM Hospitalist (on-call MD unless specified)      4/17/2023  2:28 PM Inpatient Endocrinology Consult Completed     4/16/2023  6:07 PM Inpatient Cardiology Consult Completed             Discharge Details        Discharge Medications      New Medications      Instructions Start Date   cyclobenzaprine 10 MG tablet  Commonly known as: FLEXERIL   10 mg, Oral, 3 Times Daily         Changes to Medications      Instructions Start Date   bumetanide 2 MG tablet  Commonly known as: BUMEX  What changed: how much to take   2 mg, Oral, 3 Times Daily         Continue These Medications      Instructions Start Date   aspirin 81 MG EC tablet   81 mg, Oral, Daily      calcitriol 0.25 MCG capsule  Commonly known as: ROCALTROL   1 capsule, Oral, Daily      Daily-Vitamin tablet tablet  Generic drug: multivitamin   1 tablet, Oral, Daily      fish oil 1000 MG capsule capsule   1 capsule, Oral, Every 24 Hours      hydrALAZINE 25 MG tablet  Commonly known as: APRESOLINE   Take 1 tablet by mouth twice daily.      insulin  patient supplied pump   Subcutaneous, Continuous, Per pt. Pump is currently on and running Omnipod  Humalog  Max daily: 50u       isosorbide mononitrate 30 MG 24 hr tablet  Commonly known as: IMDUR   30 mg, Oral, Daily       metOLazone 2.5 MG tablet  Commonly known as: ZAROXOLYN   2.5 mg, Oral, Daily      metoprolol succinate XL 50 MG 24 hr tablet  Commonly known as: TOPROL-XL   50 mg, Oral, Daily      sevelamer 800 MG tablet  Commonly known as: RENVELA   800 mg, Oral, 3 Times Daily With Meals      simvastatin 40 MG tablet  Commonly known as: ZOCOR   40 mg, Oral, Nightly      sodium chloride 0.65 % nasal spray   1 spray, Nasal, As Needed      spironolactone 25 MG tablet  Commonly known as: ALDACTONE   25 mg, Oral, Daily      ticagrelor 90 MG tablet tablet  Commonly known as: BRILINTA   90 mg, Oral, 2 Times Daily      traZODone 50 MG tablet  Commonly known as: DESYREL   50 mg, Oral, Nightly      VITAMIN B COMPLEX PO   1 tablet, Oral, Daily      vitamin D3 125 MCG (5000 UT) capsule capsule   1 capsule, Oral, Every Other Day             Allergies   Allergen Reactions   • Codeine Unknown (See Comments)         Discharge Disposition: Stable to DC to   Home or Self Care    Diet:  Hospital:  Diet Order   Procedures   • Diet: Renal Diets; Low Sodium (2-3g), Low Potassium, Low Phosphorus; Texture: Regular Texture (IDDSI 7); Fluid Consistency: Thin (IDDSI 0)         Discharge Activity:   Activity Instructions     Activity as Tolerated              CODE STATUS:  Code Status and Medical Interventions:   Ordered at: 04/26/23 1957     Code Status (Patient has no pulse and is not breathing):    CPR (Attempt to Resuscitate)     Medical Interventions (Patient has pulse or is breathing):    Full Support         Future Appointments   Date Time Provider Department Center   5/5/2023  3:30 PM [facility] CT 3 [facility] CT [facility]   5/8/2023  2:15 PM Jena Cabrera MD [facility]   9/29/2023 10:00 AM Roman Blanco MD [facility]       Additional Instructions for the Follow-ups that You Need to Schedule     Call MD With Problems / Concerns   As directed      Instructions: Call [phone] or email [email or web address] for problems or concerns.     Order Comments: Instructions: Call [phone] or email [email or web address] for problems or concerns.          Discharge Follow-up with PCP   As directed       Currently Documented PCP:    Erika Hernandez MD    PCP Phone Number:    [phone]     Follow Up Details: new HD               Time spent on Discharge including face to face service:  34 minutes    This patient has been examined wearing appropriate Personal Protective Equipment and discussed with Nurse. 05/02/23      Signature: Electronically signed by Preethi Zaman DO, 05/02/23, 14:17 EDT.  [facility] Hospitalist Team

## 2023-05-02 NOTE — PROGRESS NOTES
PROGRESS NOTE      Patient Name: Esdras Peralta  : 1960  MRN: [id number]  Primary Care Physician: rEika Hernandez MD  Date of admission: 2023    Patient Care Team:  Erika Hernandez MD as PCP - General  Erika Hernandez MD as PCP - Family Medicine        Subjective   Subjective:     No new active complaint, Patient on PD, tolerating well  Review of systems:    Constitutional:  Weakness, malaise/Fatigue  HEENT:           No headache, otalgia, itchy eyes, nasal discharge or sore throat.  Cardiac:           No chest pain, dyspnea, orthopnea or PND.  Chest:              No cough, phlegm or wheezing.  Abdomen:        No abdominal pain, nausea or vomiting.  Neuro:             No focal weakness, abnormal movements orseizure like activity.  :                  No hematuria, no pyuria, no dysuria, no flank pain.  ROS was otherwise negative except as mentioned in the [place].    Allergies:    Allergies   Allergen Reactions    Codeine Unknown (See Comments)       Objective   Exam:     Vital Signs  Temp:  [96.2 °F (35.7 °C)-97.7 °F (36.5 °C)] 97.4 °F (36.3 °C)  Heart Rate:  [70-87] 70  Resp:  [11-16] 16  BP: (112-138)/(77-91) 123/83  SpO2:  [99 %-100 %] 100 %  on   ;   Device (Oxygen Therapy): room air  Body mass index is 32.07 kg/m².    General:    male in no acute distress.    Head:      Normocephalic and atraumatic.    Eyes:      PERRL/EOM intact, conjunctiva and sclera clear with out nystagmus.    Neck:      No masses, thyromegaly,  trachea central with normal respiratory effort   Lungs:    Clear bilaterally to auscultation.    Heart:      Regular rate and rhythm, no murmur no gallop  Abd:        PD catheter with PD in progress  Pulses:   Pulses palpable  Extr:        No cyanosis or clubbing-+ edema.    Neuro:    No focal deficits.   alert oriented x3  Skin:       Intact without lesions or rashes.    Psych:    Alert and cooperative; normal mood and affect; .      Results Review:  I have personally reviewed  most recent Data :  CBC    Results from last 7 days   Lab Units 05/02/23  0422 05/01/23  0735 04/30/23  0903 04/29/23  1451 04/28/23  0203 04/27/23  0616 04/26/23  1805   WBC 10*3/mm3 5.30 6.30 5.60 6.30 6.10 6.60 8.10   HEMOGLOBIN g/dL 11.1* 12.0* 11.0* 11.8* 10.9* 10.9* 11.3*   PLATELETS 10*3/mm3 181 203 189 173 165 173 171     CMP   Results from last 7 days   Lab Units 05/02/23  0422 05/01/23  0735 04/30/23  0903 04/29/23  1451 04/28/23  0203 04/27/23  0616 04/26/23  1805   SODIUM mmol/L 125* 125* 131* 131* 127* 128* 126*   POTASSIUM mmol/L 5.2 4.6 4.4 4.1 4.6 4.6 5.2   CHLORIDE mmol/L 90* 90* 95* 95* 90* 90* 89*   CO2 mmol/L 25.0 23.0 26.0 25.0 26.0 25.0 26.0   BUN mg/dL 52* 43* 49* 36* 96* 105* 97*   CREATININE mg/dL 3.88* 3.44* 3.30* 2.32* 4.06* 4.07* 4.30*   GLUCOSE mg/dL 135* 83 88 135* 211* 209* 209*   ALBUMIN g/dL  --   --  2.8* 2.8*  --   --  2.9*   BILIRUBIN mg/dL  --   --  0.5 0.6  --   --  0.5   ALK PHOS U/L  --   --  102 111  --   --  107   AST (SGOT) U/L  --   --  27 29  --   --  31   ALT (SGPT) U/L  --   --  12 15  --   --  22     ABG      No radiology results for the last day    Results for orders placed during the hospital encounter of 04/16/23    Adult Transthoracic Echo Complete W/ Cont if Necessary Per Protocol    Interpretation Summary    Left ventricular systolic function is severely decreased. Left ventricular ejection fraction appears to be less than 20%.    The left ventricular cavity is moderate to severely dilated.    The right ventricular cavity is moderately dilated.    Estimated right ventricular systolic pressure from tricuspid regurgitation is markedly elevated (>55 mmHg). Calculated right ventricular systolic pressure from tricuspid regurgitation is 58 mmHg.    Scheduled Meds:aspirin, 81 mg, Oral, Daily  atorvastatin, 20 mg, Oral, Nightly  bumetanide, 2 mg, Oral, TID  calcitriol, 0.25 mcg, Oral, Daily  cyclobenzaprine, 10 mg, Oral, TID  heparin (porcine), 5,000 Units, Subcutaneous,  Q8H  hydrALAZINE, 25 mg, Oral, BID  isosorbide mononitrate, 30 mg, Oral, Q24H  metoprolol succinate XL, 50 mg, Oral, Daily  sodium chloride, 10 mL, Intravenous, Q12H  spironolactone, 25 mg, Oral, Daily  ticagrelor, 90 mg, Oral, BID  traZODone, 50 mg, Oral, Nightly      Continuous Infusions:insulin,       PRN Meds:  butalbital-acetaminophen-caffeine    dextrose    dextrose    glucagon (human recombinant)    heparin (porcine)    ipratropium-albuterol    midodrine    nitroglycerin    ondansetron **OR** ondansetron    [COMPLETED] Insert Peripheral IV **AND** sodium chloride    sodium chloride    sodium chloride    traMADol    Assessment & Plan   Assessment and Plan:         ESRD (end stage renal disease)    Chronic kidney disease, unspecified CKD stage    ASSESSMENT:  Chronic Kidney disease/ESRD needing initiation of dialysis, patient has mature PD catheter/ port  Hyponatremia  Hypervolemia/Volume overload, patient with gross B?L lower extremity edema  Dilated Cardiomyopathy  CAD on Brilanta  H/o Heart dfailure  H/o COPD  H/o Diabetes Mellitus  Anemia of chronic disease  Artherosclorotic heart disease  Secondary hyperparathyroidism of Renal disease  Vitamin D deficiency  S/p AICD  H/o Hyperlipidemia on statins     EF 20% with mod-severe dilated left ventricle, echo on 4/18/2023. [place] decreased left ventricle systolic function Rt ventricle moderately dilated, left atrial dilation. RVSP >55       PLAN :      Chronic Kidney disease/ESRD needing initiation of dialysis, patient has mature PD catheter/ port  Peritoneal Dialysis initiated on 4/27, using Dianeal solution, but didn't get effluent back, kub unremarkable.  CT scan negative for hernia.  HD treatment completed on 4/29 with 2 L UF, tolerated well, Patient has HD access on Rt. IJ.  Plan was to give another HD today but patient refused, ordered HD for tomorrow, with sodium 130, will give a dose of lasix 40 mg IV Stat today,   Fluid restriction 800 ml, communicated  with nursing for fluid restriction  tdc by ir.  Continue Bumex,  Spironolactone, and Brilanta /aspirin  BP aacptable  Hemoglobin acceptable  Hyponatremia, sodium 125, watch closely, potassium stable, changed setting of NA for HD tomorrow, as sodium 130  Daily labs, BMp, Mag, Phosphorus  Daily weight  Intake output record  Avoid nephrotoxic agen  We will continue follow up        Electronically signed by Jaziel Reese MD,   [facility] kidney consultant  [phone]  5/2/2023  18:51 EDT

## 2023-05-02 NOTE — NURSING NOTE
patient insisted on only running 2 hours, md notified, only able to remove 1.5L due to shortened tx, vs stable throughout, no issues with access, no complications

## 2023-05-02 NOTE — PLAN OF CARE
Problem: Adult Inpatient Plan of Care  Goal: Plan of Care Review  Outcome: Met  Goal: Patient-Specific Goal (Individualized)  Outcome: Met  Goal: Absence of Hospital-Acquired Illness or Injury  Outcome: Met  Intervention: Identify and Manage Fall Risk  Recent Flowsheet Documentation  Taken 5/2/2023 1400 by Daisy Schultz RN  Safety Promotion/Fall Prevention:   safety round/check completed   room organization consistent  Taken 5/2/2023 1300 by Daisy Schultz RN  Safety Promotion/Fall Prevention:   safety round/check completed   room organization consistent  Taken 5/2/2023 1200 by Daisy Schultz RN  Safety Promotion/Fall Prevention:   safety round/check completed   room organization consistent  Taken 5/2/2023 1100 by Daisy Schultz RN  Safety Promotion/Fall Prevention:   safety round/check completed   room organization consistent  Taken 5/2/2023 1000 by Daisy Schultz RN  Safety Promotion/Fall Prevention:   safety round/check completed   room organization consistent  Taken 5/2/2023 0900 by Daisy Schultz RN  Safety Promotion/Fall Prevention:   safety round/check completed   room organization consistent  Taken 5/2/2023 0800 by Daisy Schultz RN  Safety Promotion/Fall Prevention:   safety round/check completed   room organization consistent  Taken 5/2/2023 0755 by Daisy Schultz RN  Safety Promotion/Fall Prevention:   safety round/check completed   room organization consistent  Taken 5/2/2023 0700 by Daisy Schultz RN  Safety Promotion/Fall Prevention:   safety round/check completed   room organization consistent   lighting adjusted  Intervention: Prevent Skin Injury  Recent Flowsheet Documentation  Taken 5/2/2023 1500 by Daisy Schultz RN  Body Position: position changed independently  Taken 5/2/2023 1300 by Daisy Schultz RN  Body Position: position changed independently  Taken 5/2/2023 1100 by Daisy Schultz RN  Body Position: position changed  independently  Taken 5/2/2023 0900 by Daisy Schultz RN  Body Position: position changed independently  Taken 5/2/2023 0700 by Daisy Schultz RN  Body Position: position changed independently  Intervention: Prevent and Manage VTE (Venous Thromboembolism) Risk  Recent Flowsheet Documentation  Taken 5/2/2023 0755 by Daisy Schultz RN  Activity Management: sitting, edge of bed  VTE Prevention/Management: patient refused intervention  Intervention: Prevent Infection  Recent Flowsheet Documentation  Taken 5/2/2023 0755 by Daisy Schultz RN  Infection Prevention:   hand hygiene promoted   personal protective equipment utilized   rest/sleep promoted   single patient room provided   visitors restricted/screened   equipment surfaces disinfected  Goal: Optimal Comfort and Wellbeing  Outcome: Met  Intervention: Provide Person-Centered Care  Recent Flowsheet Documentation  Taken 5/2/2023 0755 by Daisy Schultz RN  Trust Relationship/Rapport:   care explained   thoughts/feelings acknowledged  Goal: Readiness for Transition of Care  Outcome: Met     Problem: Diabetes Comorbidity  Goal: Blood Glucose Level Within Targeted Range  Outcome: Met  Intervention: Monitor and Manage Glycemia  Recent Flowsheet Documentation  Taken 5/2/2023 0755 by Daisy Schultz RN  Glycemic Management: blood glucose monitored     Problem: Adjustment to Illness (Chronic Kidney Disease)  Goal: Optimal Coping with Chronic Illness  Outcome: Met  Intervention: Support Psychosocial Response  Recent Flowsheet Documentation  Taken 5/2/2023 0755 by Daisy Schultz RN  Supportive Measures: active listening utilized     Problem: Electrolyte Imbalance (Chronic Kidney Disease)  Goal: Electrolyte Balance  Outcome: Met     Problem: Fluid Volume Excess (Chronic Kidney Disease)  Goal: Fluid Balance  Outcome: Met     Problem: Functional Decline (Chronic Kidney Disease)  Goal: Optimal Functional Ability  Outcome: Met  Intervention: Optimize  Functional Ability  Recent Flowsheet Documentation  Taken 5/2/2023 0755 by Daisy Schultz RN  Activity Management: sitting, edge of bed     Problem: Hematologic Alteration (Chronic Kidney Disease)  Goal: Absence of Anemia Signs and Symptoms  Outcome: Met  Intervention: Manage Signs of Anemia and Bleeding  Recent Flowsheet Documentation  Taken 5/2/2023 0755 by Daisy Schultz RN  Environmental Support: calm environment promoted     Problem: Oral Intake Inadequate (Chronic Kidney Disease)  Goal: Optimal Oral Intake  Outcome: Met     Problem: Pain (Chronic Kidney Disease)  Goal: Acceptable Pain Control  Outcome: Met     Problem: Renal Function Impairment (Chronic Kidney Disease)  Goal: Minimize Renal Failure Effects  Outcome: Met  Intervention: Protect and Monitor Dialysis Access Site  Recent Flowsheet Documentation  Taken 5/2/2023 0755 by Daisy Schultz RN  Safety Precautions: emergency equipment at bedside  Intervention: Monitor and Support Renal Function  Recent Flowsheet Documentation  Taken 5/2/2023 1500 by Daisy Schultz RN  Medication Review/Management: medications reviewed  Taken 5/2/2023 1400 by Daisy Schultz RN  Medication Review/Management: medications reviewed  Taken 5/2/2023 1300 by Daisy Schultz RN  Medication Review/Management: medications reviewed  Taken 5/2/2023 1200 by Daisy Schultz RN  Medication Review/Management: medications reviewed  Taken 5/2/2023 1100 by Daisy Schultz RN  Medication Review/Management: medications reviewed  Taken 5/2/2023 0900 by Daisy Schultz RN  Medication Review/Management: medications reviewed  Taken 5/2/2023 0800 by Daisy Schultz RN  Medication Review/Management: medications reviewed  Taken 5/2/2023 0755 by Daisy Schultz RN  Medication Review/Management: medications reviewed  Taken 5/2/2023 0700 by Daisy Schultz RN  Medication Review/Management: medications reviewed   Goal Outcome Evaluation:

## 2023-05-03 NOTE — CASE MANAGEMENT/SOCIAL WORK
Case Management Discharge Note      Final Note: Routine home.         Selected Continued Care - Discharged on 5/2/2023 Admission date: 4/26/2023 - Discharge disposition: Home or Self Care       Transportation Services  Private: Car    Final Discharge Disposition Code: 01 - home or self-care

## 2023-05-03 NOTE — PAYOR COMM NOTE
"NOTIFICATION OF DISCHARGE:          Esdras Murphy \"mary anne\" (62 y.o. Male) 1960  AUTH # [id number]          DISCHARGED TO HOME ON 05/02/2023              Michelle Chino RN MSN  /UR  [facility]  [phone] office  [phone] fax  [email or web address]    [facility]  NPI: [id number]  Tax: [id number]          Esdras Murphy \"mary anne\" (62 y.o. Male)     Date of Birth   1960    Social Security Number       Address   [address] [place] [address] [facility]    Home Phone   [phone]    MRN   [id number]       [employer]   None    Marital Status                               Admission Date   4/26/23    Admission Type   Emergency    Admitting Provider   Payton Toussaint DO    Attending Provider       Department, Room/Bed   [facility] 3C MEDICAL INPATIENT, 357/1       Discharge Date   5/2/2023    Discharge Disposition   Home or Self Care    Discharge Destination                               Attending Provider: (none)   Allergies: Codeine    Isolation: None   Infection: None   Code Status: Prior    Ht: 175.3 cm (69\")   Wt: 98.5 kg (217 lb 2.5 oz)    Admission Cmt: None   Principal Problem: ESRD (end stage renal disease) [N18.6]                 Active Insurance as of 4/26/2023     Primary Coverage     Payor Plan Insurance Group Employer/Plan Group    [facility] [employer] [facility] [employer] BLUE [facility] PPO [id number]     Payor Plan Address Payor Plan Phone Number Payor Plan Fax Number Effective Dates    [address] [phone]  4/1/2019 - None Entered    [facility] [id number]       Subscriber Name Subscriber Birth Date Member ID       CHRISTIAN MURPHY 12/15/1967 [id number]                 Emergency Contacts      (Rel.) Home Phone Work Phone Mobile Phone    christian murphy (Spouse) [phone] -- [phone]               Discharge Summary      Preethi Zaman DO at 05/02/23 [address] [place] [address] " Medicine Services  Discharge Summary    Date of Service: 23   Patient Name: Esdras Perlata  : 1960  MRN: [id number]    Date of Admission: 2023  Discharge Diagnosis:    Diagnosis Plan   1. Chronic kidney disease, unspecified CKD stage        2. Weakness        3. Hypervolemia, unspecified hypervolemia type           Date of Discharge:  23   Primary Care Physician: Erika Hernandez MD      Presenting Problem:   Weakness [R53.1]  ESRD (end stage renal disease) [N18.6]  Hypervolemia, unspecified hypervolemia type [E87.70]  Chronic kidney disease, unspecified CKD stage [N18.9]    Active and Resolved Hospital Problems:  Active Hospital Problems    Diagnosis POA   • **ESRD (end stage renal disease) [N18.6] Yes   • Chronic kidney disease, unspecified CKD stage [N18.9] Yes      Resolved Hospital Problems   No resolved problems to display.         Hospital Course     Hospital Course:  Esdras Peralta is a 62 y.o. male came in for initiation of HD 2/2 Vol overload, azotemia. Patient had HD catheter placed. Had a few sessions of HD. Started on TTS outpatient schedule. Will cont to f/u with Dr Lagunas outpatient.         DISCHARGE Follow Up Recommendations for labs and diagnostics: Close Nephro f/u for HD      Reasons For Change In Medications and Indications for New Medications:      Day of Discharge     Vital Signs:  Temp:  [96.2 °F (35.7 °C)-97.7 °F (36.5 °C)] 97.4 °F (36.3 °C)  Heart Rate:  [70-87] 70  Resp:  [11-16] 16  BP: (112-138)/(77-91) 123/83    Physical Exam:  Physical Exam  Vitals and nursing note reviewed.   Constitutional:       General: He is not in acute distress.     Appearance: Normal appearance. He is normal weight. He is not ill-appearing.   HENT:      Head: Normocephalic and atraumatic.      Mouth/Throat:      Mouth: Mucous membranes are moist.      Pharynx: Oropharynx is clear.   Eyes:      Extraocular Movements: Extraocular movements intact.      Conjunctiva/sclera:  Conjunctivae normal.      Pupils: Pupils are equal, round, and reactive to light.   Cardiovascular:      Rate and Rhythm: Normal rate and regular rhythm.      Pulses: Normal pulses.      Heart sounds: Normal heart sounds.   Pulmonary:      Effort: Pulmonary effort is normal. No respiratory distress.      Breath sounds: Normal breath sounds. No wheezing.   Abdominal:      General: Abdomen is flat. Bowel sounds are normal. There is no distension.      Palpations: Abdomen is soft.      Tenderness: There is no abdominal tenderness. There is no guarding.   Musculoskeletal:         General: No swelling, tenderness or signs of injury.      Cervical back: Normal range of motion and neck supple.   Skin:     General: Skin is warm and dry.      Capillary Refill: Capillary refill takes less than 2 seconds.   Neurological:      General: No focal deficit present.      Mental Status: He is alert and oriented to person, place, and time. Mental status is at baseline.      Motor: No weakness.   Psychiatric:         Mood and Affect: Mood normal.         Behavior: Behavior normal.         Judgment: Judgment normal.            Pertinent  and/or Most Recent Results     LAB RESULTS:      Lab 05/02/23  0422 05/01/23  0735 04/30/23  0903 04/29/23  1451 04/28/23  1138 04/28/23  0203 04/27/23  0616 04/26/23  1805   WBC 5.30 6.30 5.60 6.30  --  6.10   < > 8.10   HEMOGLOBIN 11.1* 12.0* 11.0* 11.8*  --  10.9*   < > 11.3*   HEMATOCRIT 34.3* 37.4* 34.5* 36.4*  --  32.6*   < > 34.9*   PLATELETS 181 203 189 173  --  165   < > 171   NEUTROS ABS  --   --   --  4.70  --   --   --  6.20   LYMPHS ABS  --   --   --  0.40*  --   --   --  0.60*   MONOS ABS  --   --   --  1.00*  --   --   --  1.10*   EOS ABS  --   --   --  0.10  --   --   --  0.10   MCV 92.2 93.8 94.7 92.8  --  92.7   < > 93.0   PROTIME  --   --   --   --  12.4*  --   --   --    APTT  --   --   --   --  28.5  --   --   --     < > = values in this interval not displayed.         Lab  05/02/23  0422 05/01/23  0735 04/30/23  0903 04/29/23  1451 04/28/23  0203 04/27/23  0616 04/26/23  1805   SODIUM 125* 125* 131* 131* 127*   < > 126*   POTASSIUM 5.2 4.6 4.4 4.1 4.6   < > 5.2   CHLORIDE 90* 90* 95* 95* 90*   < > 89*   CO2 25.0 23.0 26.0 25.0 26.0   < > 26.0   ANION GAP 10.0 12.0 10.0 11.0 11.0   < > 11.0   BUN 52* 43* 49* 36* 96*   < > 97*   CREATININE 3.88* 3.44* 3.30* 2.32* 4.06*   < > 4.30*   EGFR 16.9* 19.6* 20.6* 31.4* 16.0*   < > 15.0*   GLUCOSE 135* 83 88 135* 211*   < > 209*   CALCIUM 8.9 8.8 8.7 8.4* 8.7   < > 9.0   MAGNESIUM 1.9 1.8 2.0 2.1  --   --  2.0   PHOSPHORUS 4.7* 3.8 3.9 3.3  --   --   --     < > = values in this interval not displayed.         Lab 04/30/23  0903 04/29/23  1451 04/26/23  1805   TOTAL PROTEIN 6.4 6.5 6.6   ALBUMIN 2.8* 2.8* 2.9*   GLOBULIN 3.6 3.7 3.7   ALT (SGPT) 12 15 22   AST (SGOT) 27 29 31   BILIRUBIN 0.5 0.6 0.5   ALK PHOS 102 111 107         Lab 04/28/23  1138   PROTIME 12.4*   INR 1.17*                 Brief Urine Lab Results  (Last result in the past 365 days)      Color   Clarity   Blood   Leuk Est   Nitrite   Protein   CREAT   Urine HCG        04/16/23 1451 Yellow   Clear   Negative   Trace   Negative   >=300 mg/dL (3+)               Microbiology Results (last 10 days)     ** No results found for the last 240 hours. **          CT Abdomen Pelvis Without Contrast    Result Date: 4/28/2023  Impression: Impression: 1. Diffuse anasarca of the soft tissues which has progressed as compared to the previous study. No significant ascites or pleural effusion identified. Left-sided peritoneal drain versus dialysis catheter present terminating within the right lower quadrant present. No mesenteric collection identified. No evidence of adenopathy or hernia. Scrotal edema versus hydroceles present. 2. Ancillary findings as described above. Electronically Signed: Toyin Gant  4/28/2023 1:33 PM EDT  Workstation ID: [id number]    CT Abdomen Pelvis Without  Contrast    Result Date: 4/16/2023  Impression: Impression: No acute abdominal or pelvic abnormality. Electronically Signed: Prudencio Saez  4/16/2023 2:21 PM EDT  Workstation ID: [id number]    XR Chest 1 View    Result Date: 4/16/2023  Impression: Impression: Cardiomegaly with mild pulmonary vascular congestion. Electronically Signed: Prudencio Saez  4/16/2023 1:53 PM EDT  Workstation ID: [id number]    IR Insert Tunneled CV Catheter Without Port 5 Plus    Result Date: 4/28/2023  Impression: Impression: Technically successful placement, 15.5 Luxembourgish dual lumen tunneled hemodialysis catheter, utilizing the right internal jugular approach, as well as both sonographic and fluoroscopic control. Electronically Signed: Nithya Meehan  4/28/2023 1:00 PM EDT  Workstation ID: [id number]    XR Abdomen KUB    Result Date: 4/28/2023  Impression: Impression: Distal peritoneal dialysis catheter is looped in the right lower quadrant. Nonobstructive bowel gas pattern. Electronically Signed: Nona Walls  4/28/2023 8:37 AM EDT  Workstation ID: [id number]      Results for orders placed during the hospital encounter of 06/09/21    Doppler Arterial Multi Level Lower Extremity - Bilateral CAR    Interpretation Summary  · Right Conclusion: The right LEONCIO is normal. Normal digital pressures.  · Left Conclusion: The left LEONCIO is moderately reduced. Waveforms are consistent with femoral disease and popliteal disease. Moderate digital ischemia.      Results for orders placed during the hospital encounter of 06/09/21    Doppler Arterial Multi Level Lower Extremity - Bilateral CAR    Interpretation Summary  · Right Conclusion: The right LEONCIO is normal. Normal digital pressures.  · Left Conclusion: The left LEONCIO is moderately reduced. Waveforms are consistent with femoral disease and popliteal disease. Moderate digital ischemia.      Results for orders placed during the hospital encounter of 04/16/23    Adult Transthoracic Echo Complete W/ Cont if Necessary Per  Protocol    Interpretation Summary  •  Left ventricular systolic function is severely decreased. Left ventricular ejection fraction appears to be less than 20%.  •  The left ventricular cavity is moderate to severely dilated.  •  The right ventricular cavity is moderately dilated.  •  Estimated right ventricular systolic pressure from tricuspid regurgitation is markedly elevated (>55 mmHg). Calculated right ventricular systolic pressure from tricuspid regurgitation is 58 mmHg.      Labs Pending at Discharge:      Procedures Performed           Consults:   Consults     Date and Time Order Name Status Description    4/27/2023 10:44 AM Inpatient Nephrology Consult Completed     4/26/2023  7:04 PM Hospitalist (on-call MD unless specified)      4/17/2023  2:28 PM Inpatient Endocrinology Consult Completed     4/16/2023  6:07 PM Inpatient Cardiology Consult Completed             Discharge Details        Discharge Medications      New Medications      Instructions Start Date   cyclobenzaprine 10 MG tablet  Commonly known as: FLEXERIL   10 mg, Oral, 3 Times Daily         Changes to Medications      Instructions Start Date   bumetanide 2 MG tablet  Commonly known as: BUMEX  What changed: how much to take   2 mg, Oral, 3 Times Daily         Continue These Medications      Instructions Start Date   aspirin 81 MG EC tablet   81 mg, Oral, Daily      calcitriol 0.25 MCG capsule  Commonly known as: ROCALTROL   1 capsule, Oral, Daily      Daily-Vitamin tablet tablet  Generic drug: multivitamin   1 tablet, Oral, Daily      fish oil 1000 MG capsule capsule   1 capsule, Oral, Every 24 Hours      hydrALAZINE 25 MG tablet  Commonly known as: APRESOLINE   Take 1 tablet by mouth twice daily.      insulin  patient supplied pump   Subcutaneous, Continuous, Per pt. Pump is currently on and running Omnipod  Humalog  Max daily: 50u       isosorbide mononitrate 30 MG 24 hr tablet  Commonly known as: IMDUR   30 mg, Oral, Daily      metOLazone 2.5  MG tablet  Commonly known as: ZAROXOLYN   2.5 mg, Oral, Daily      metoprolol succinate XL 50 MG 24 hr tablet  Commonly known as: TOPROL-XL   50 mg, Oral, Daily      sevelamer 800 MG tablet  Commonly known as: RENVELA   800 mg, Oral, 3 Times Daily With Meals      simvastatin 40 MG tablet  Commonly known as: ZOCOR   40 mg, Oral, Nightly      sodium chloride 0.65 % nasal spray   1 spray, Nasal, As Needed      spironolactone 25 MG tablet  Commonly known as: ALDACTONE   25 mg, Oral, Daily      ticagrelor 90 MG tablet tablet  Commonly known as: BRILINTA   90 mg, Oral, 2 Times Daily      traZODone 50 MG tablet  Commonly known as: DESYREL   50 mg, Oral, Nightly      VITAMIN B COMPLEX PO   1 tablet, Oral, Daily      vitamin D3 125 MCG (5000 UT) capsule capsule   1 capsule, Oral, Every Other Day             Allergies   Allergen Reactions   • Codeine Unknown (See Comments)         Discharge Disposition: Stable to DC to   Home or Self Care    Diet:  Hospital:  Diet Order   Procedures   • Diet: Renal Diets; Low Sodium (2-3g), Low Potassium, Low Phosphorus; Texture: Regular Texture (IDDSI 7); Fluid Consistency: Thin (IDDSI 0)         Discharge Activity:   Activity Instructions     Activity as Tolerated              CODE STATUS:  Code Status and Medical Interventions:   Ordered at: 04/26/23 1957     Code Status (Patient has no pulse and is not breathing):    CPR (Attempt to Resuscitate)     Medical Interventions (Patient has pulse or is breathing):    Full Support         Future Appointments   Date Time Provider Department Center   5/5/2023  3:30 PM [facility] CT 3 [facility] CT [facility]   5/8/2023  2:15 PM Jena Cabrera MD [facility]   9/29/2023 10:00 AM Roman Blanco MD [facility]       Additional Instructions for the Follow-ups that You Need to Schedule     Call MD With Problems / Concerns   As directed      Instructions: Call [phone] or email [email or web address] for problems or concerns.    Order Comments:  Instructions: Call [phone] or email [email or web address] for problems or concerns.          Discharge Follow-up with PCP   As directed       Currently Documented PCP:    Erika Hernandez MD    PCP Phone Number:    [phone]     Follow Up Details: new HD               Time spent on Discharge including face to face service:  34 minutes    This patient has been examined wearing appropriate Personal Protective Equipment and discussed with Nurse. 05/02/23      Signature: Electronically signed by Becki Zaman DO, 05/02/23, 14:17 EDT.  [facility] Hospitalist Team    Electronically signed by Becki Zaman DO at 05/02/23 1419       Discharge Order (From admission, onward)     Start     Ordered    05/02/23 1400  Discharge patient  Once        Expected Discharge Date: 05/02/23    Expected Discharge Time: Afternoon    Discharge Disposition: Home or Self Care    Physician of Record for Attribution - Please select from Treatment Team: BECKI ZAMAN [[id number]]    Review needed by CMO to determine Physician of Record: No       Question Answer Comment   Physician of Record for Attribution - Please select from Treatment Team BECKI ZAMAN    Review needed by CMO to determine Physician of Record No        05/02/23 2118

## 2023-05-03 NOTE — OUTREACH NOTE
Prep Survey    Flowsheet Row Responses   [facility] facility patient discharged from? [facility]   Is LACE score < 7 ? No   Eligibility Readm Mgmt   Discharge diagnosis **ESRD (end stage renal disease   Does the patient have one of the following disease processes/diagnoses(primary or secondary)? Other   Does the patient have Home health ordered? No   Is there a DME ordered? No   General alerts for this patient HD Chair time given   Prep survey completed? Yes          Tania POOLE - Registered Nurse

## 2023-05-04 NOTE — PROGRESS NOTES
General Surgery Post-Operative Follow Up Note     Subjective:  Esdras Peralta is a 62 y.o. year old male here for post-operative follow up.  He was recently readmitted postoperatively for weakness.  Patient reports PD catheter worked the first 2 times and then they had issues with the catheter draining.  However on subsequent CT scan the following day the patient did not have any fluid within the peritoneal cavity.  He subsequently had a line placed and has been receiving HD.  He has not yet had follow-up with nephrology for PD catheter training.    Procedure:    • Laparoscopic peritoneal dialysis catheter placement, 4/24/2023    Vitals:  There were no vitals taken for this visit.     Physical Exam:   NAD, alert  Nonlabored respirations  Abdomen soft, NT/ND, no incisional hernias appreciated, incisions healing well  The catheter in place in left lower abdomen, no signs of infection    Assessment and Plan:  Esdras Peralta is a 62 y.o. year old male status post laparoscopic peritoneal dialysis catheter placement.  No fluid returned from PD catheter when attempted use on 4/27/2023.  Now with HD line in place.    -From a surgical standpoint may again attempt to use PD catheter as it does appear in good position CT scan; will defer to nephrology and plan to use PD catheter or to continue with HD  -Patient's preference would be to have PD if possible  -If no plans for PD catheter use can follow-up in clinic to discuss removal  -No further activity or dietary restrictions  -Followup on PRN basis    Jena Cabrera M.D.  General Surgery

## 2023-05-08 ENCOUNTER — OFFICE VISIT (OUTPATIENT)
Dept: SURGERY | Facility: CLINIC | Age: 63
End: 2023-05-08
Payer: COMMERCIAL

## 2023-05-08 ENCOUNTER — READMISSION MANAGEMENT (OUTPATIENT)
Dept: CALL CENTER | Facility: HOSPITAL | Age: 63
End: 2023-05-08
Payer: COMMERCIAL

## 2023-05-08 VITALS
HEART RATE: 112 BPM | BODY MASS INDEX: 29.62 KG/M2 | DIASTOLIC BLOOD PRESSURE: 86 MMHG | SYSTOLIC BLOOD PRESSURE: 138 MMHG | TEMPERATURE: 97.3 F | OXYGEN SATURATION: 99 % | WEIGHT: 200 LBS | HEIGHT: 69 IN

## 2023-05-08 DIAGNOSIS — Z09 POSTOP CHECK: Primary | ICD-10-CM

## 2023-05-08 NOTE — OUTREACH NOTE
Medical Week 1 Survey    Flowsheet Row Responses   [facility] facility patient discharged from? [facility]   Does the patient have one of the following disease processes/diagnoses(primary or secondary)? Other   Week 1 attempt successful? No   Unsuccessful attempts Attempt 1  [attempted pt and wife]          IVETTE LANDRY - Registered Nurse

## 2023-05-11 ENCOUNTER — READMISSION MANAGEMENT (OUTPATIENT)
Dept: CALL CENTER | Facility: HOSPITAL | Age: 63
End: 2023-05-11
Payer: COMMERCIAL

## 2023-05-11 DIAGNOSIS — I25.10 CORONARY ARTERY DISEASE INVOLVING NATIVE CORONARY ARTERY OF NATIVE HEART WITHOUT ANGINA PECTORIS: ICD-10-CM

## 2023-05-11 DIAGNOSIS — E78.2 MIXED HYPERLIPIDEMIA: ICD-10-CM

## 2023-05-11 DIAGNOSIS — Z95.810 ICD (IMPLANTABLE CARDIOVERTER-DEFIBRILLATOR), DUAL, IN SITU: ICD-10-CM

## 2023-05-11 DIAGNOSIS — I50.9 CONGESTIVE HEART FAILURE, UNSPECIFIED HF CHRONICITY, UNSPECIFIED HEART FAILURE TYPE: ICD-10-CM

## 2023-05-11 DIAGNOSIS — E10.49 TYPE 1 DIABETES MELLITUS WITH OTHER DIABETIC NEUROLOGICAL COMPLICATION: ICD-10-CM

## 2023-05-11 DIAGNOSIS — I42.0 CARDIOMYOPATHY, DILATED: ICD-10-CM

## 2023-05-11 RX ORDER — METOPROLOL SUCCINATE 50 MG/1
50 TABLET, EXTENDED RELEASE ORAL DAILY
Qty: 90 TABLET | Refills: 0 | Status: SHIPPED | OUTPATIENT
Start: 2023-05-11

## 2023-05-11 NOTE — OUTREACH NOTE
Medical Week 1 Survey    Flowsheet Row Responses   [facility] patient discharged from? [facility]   Does the patient have one of the following disease processes/diagnoses(primary or secondary)? Other   Week 1 attempt successful? Yes   Call start time 1006   Call end time 1012   Discharge diagnosis **ESRD (end stage renal disease   Person spoke with today (if not patient) and relationship spouse   Meds reviewed with patient/caregiver? Yes   Is the patient having any side effects they believe may be caused by any medication additions or changes? No   Does the patient have all medications ordered at discharge? Yes   Is the patient taking all medications as directed (includes completed medication regime)? Yes   Does the patient have a primary care provider?  Yes   Does the patient have an appointment with their PCP within 7 days of discharge? No   What is preventing the patient from scheduling follow up appointments within 7 days of discharge? --  [He sees his specialists]   Has the patient kept scheduled appointments due by today? N/A   Has home health visited the patient within 72 hours of discharge? N/A   Psychosocial issues? No   Did the patient receive a copy of their discharge instructions? Yes   Nursing interventions Reviewed instructions with patient   What is the patient's perception of their health status since discharge? Improving   Is the patient/caregiver able to teach back the hierarchy of who to call/visit for symptoms/problems? PCP, Specialist, Home health nurse, Urgent Care, ED, 911 Yes   Week 1 call completed? Yes          Dang CHADWICK - Registered Nurse

## 2023-05-12 ENCOUNTER — APPOINTMENT (OUTPATIENT)
Dept: CT IMAGING | Facility: HOSPITAL | Age: 63
End: 2023-05-12
Payer: COMMERCIAL

## 2023-05-12 ENCOUNTER — TELEPHONE (OUTPATIENT)
Dept: PULMONOLOGY | Facility: HOSPITAL | Age: 63
End: 2023-05-12
Payer: COMMERCIAL

## 2023-05-12 ENCOUNTER — HOSPITAL ENCOUNTER (OUTPATIENT)
Facility: HOSPITAL | Age: 63
Setting detail: OBSERVATION
Discharge: HOME | End: 2023-05-14
Attending: EMERGENCY MEDICINE | Admitting: HOSPITALIST
Payer: COMMERCIAL

## 2023-05-12 DIAGNOSIS — T82.898A PROBLEM WITH DIALYSIS ACCESS, INITIAL ENCOUNTER: Primary | ICD-10-CM

## 2023-05-12 LAB
ANION GAP SERPL CALCULATED.3IONS-SCNC: 13 MMOL/L (ref 5–15)
BASOPHILS # BLD AUTO: 0 10*3/MM3 (ref 0–0.2)
BASOPHILS NFR BLD AUTO: 1.1 % (ref 0–1.5)
BUN SERPL-MCNC: 48 MG/DL (ref 8–23)
BUN/CREAT SERPL: 10.3 (ref 7–25)
CALCIUM SPEC-SCNC: 9.1 MG/DL (ref 8.6–10.5)
CHLORIDE SERPL-SCNC: 91 MMOL/L (ref 98–107)
CO2 SERPL-SCNC: 25 MMOL/L (ref 22–29)
CREAT SERPL-MCNC: 4.65 MG/DL (ref 0.76–1.27)
DEPRECATED RDW RBC AUTO: 64.8 FL (ref 37–54)
EGFRCR SERPLBLD CKD-EPI 2021: 13.6 ML/MIN/1.73
EOSINOPHIL # BLD AUTO: 0.2 10*3/MM3 (ref 0–0.4)
EOSINOPHIL NFR BLD AUTO: 3.5 % (ref 0.3–6.2)
ERYTHROCYTE [DISTWIDTH] IN BLOOD BY AUTOMATED COUNT: 19.8 % (ref 12.3–15.4)
GLUCOSE SERPL-MCNC: 212 MG/DL (ref 65–99)
HCT VFR BLD AUTO: 36.6 % (ref 37.5–51)
HGB BLD-MCNC: 11.6 G/DL (ref 13–17.7)
LYMPHOCYTES # BLD AUTO: 0.7 10*3/MM3 (ref 0.7–3.1)
LYMPHOCYTES NFR BLD AUTO: 15.7 % (ref 19.6–45.3)
MCH RBC QN AUTO: 28.5 PG (ref 26.6–33)
MCHC RBC AUTO-ENTMCNC: 31.7 G/DL (ref 31.5–35.7)
MCV RBC AUTO: 89.8 FL (ref 79–97)
MONOCYTES # BLD AUTO: 0.8 10*3/MM3 (ref 0.1–0.9)
MONOCYTES NFR BLD AUTO: 16.4 % (ref 5–12)
NEUTROPHILS NFR BLD AUTO: 2.9 10*3/MM3 (ref 1.7–7)
NEUTROPHILS NFR BLD AUTO: 63.3 % (ref 42.7–76)
NRBC BLD AUTO-RTO: 0.3 /100 WBC (ref 0–0.2)
PLATELET # BLD AUTO: 204 10*3/MM3 (ref 140–450)
PMV BLD AUTO: 8.8 FL (ref 6–12)
POTASSIUM SERPL-SCNC: 3.7 MMOL/L (ref 3.5–5.2)
RBC # BLD AUTO: 4.07 10*6/MM3 (ref 4.14–5.8)
SODIUM SERPL-SCNC: 129 MMOL/L (ref 136–145)
WBC NRBC COR # BLD: 4.7 10*3/MM3 (ref 3.4–10.8)

## 2023-05-12 PROCEDURE — 85025 COMPLETE CBC W/AUTO DIFF WBC: CPT | Performed by: EMERGENCY MEDICINE

## 2023-05-12 PROCEDURE — 99285 EMERGENCY DEPT VISIT HI MDM: CPT

## 2023-05-12 PROCEDURE — 80048 BASIC METABOLIC PNL TOTAL CA: CPT | Performed by: EMERGENCY MEDICINE

## 2023-05-12 PROCEDURE — 74176 CT ABD & PELVIS W/O CONTRAST: CPT

## 2023-05-12 RX ORDER — SODIUM CHLORIDE 0.9 % (FLUSH) 0.9 %
10 SYRINGE (ML) INJECTION AS NEEDED
Status: DISCONTINUED | OUTPATIENT
Start: 2023-05-12 | End: 2023-05-14 | Stop reason: HOSPADM

## 2023-05-12 NOTE — TELEPHONE ENCOUNTER
Called patient to schedule sleep referral. He is not interested in scheduling at this time and will call back when ready

## 2023-05-12 NOTE — Clinical Note
Level of Care: Med/Surg [1]   Admitting Physician: CHRISTINA ESCOBAR [[id number]]   Attending Physician: CHRISTINA ESCOBAR [[id number]]

## 2023-05-13 ENCOUNTER — READMISSION MANAGEMENT (OUTPATIENT)
Dept: CALL CENTER | Facility: HOSPITAL | Age: 63
End: 2023-05-13
Payer: COMMERCIAL

## 2023-05-13 PROBLEM — T82.898A PROBLEM WITH DIALYSIS ACCESS, INITIAL ENCOUNTER: Status: ACTIVE | Noted: 2023-05-13

## 2023-05-13 LAB
ANION GAP SERPL CALCULATED.3IONS-SCNC: 13 MMOL/L (ref 5–15)
BUN SERPL-MCNC: 50 MG/DL (ref 8–23)
BUN/CREAT SERPL: 10.5 (ref 7–25)
CALCIUM SPEC-SCNC: 8.6 MG/DL (ref 8.6–10.5)
CHLORIDE SERPL-SCNC: 91 MMOL/L (ref 98–107)
CO2 SERPL-SCNC: 27 MMOL/L (ref 22–29)
CREAT SERPL-MCNC: 4.74 MG/DL (ref 0.76–1.27)
DEPRECATED RDW RBC AUTO: 63.4 FL (ref 37–54)
EGFRCR SERPLBLD CKD-EPI 2021: 13.3 ML/MIN/1.73
ERYTHROCYTE [DISTWIDTH] IN BLOOD BY AUTOMATED COUNT: 19.4 % (ref 12.3–15.4)
GLUCOSE SERPL-MCNC: 83 MG/DL (ref 65–99)
HCT VFR BLD AUTO: 33.5 % (ref 37.5–51)
HGB BLD-MCNC: 10.8 G/DL (ref 13–17.7)
MCH RBC QN AUTO: 29.1 PG (ref 26.6–33)
MCHC RBC AUTO-ENTMCNC: 32.3 G/DL (ref 31.5–35.7)
MCV RBC AUTO: 90.2 FL (ref 79–97)
MRSA DNA SPEC QL NAA+PROBE: NORMAL
PLATELET # BLD AUTO: 191 10*3/MM3 (ref 140–450)
PMV BLD AUTO: 8.6 FL (ref 6–12)
POTASSIUM SERPL-SCNC: 3.8 MMOL/L (ref 3.5–5.2)
RBC # BLD AUTO: 3.71 10*6/MM3 (ref 4.14–5.8)
SODIUM SERPL-SCNC: 131 MMOL/L (ref 136–145)
WBC NRBC COR # BLD: 4.9 10*3/MM3 (ref 3.4–10.8)

## 2023-05-13 PROCEDURE — 87205 SMEAR GRAM STAIN: CPT | Performed by: INTERNAL MEDICINE

## 2023-05-13 PROCEDURE — 85027 COMPLETE CBC AUTOMATED: CPT | Performed by: STUDENT IN AN ORGANIZED HEALTH CARE EDUCATION/TRAINING PROGRAM

## 2023-05-13 PROCEDURE — 87040 BLOOD CULTURE FOR BACTERIA: CPT | Performed by: STUDENT IN AN ORGANIZED HEALTH CARE EDUCATION/TRAINING PROGRAM

## 2023-05-13 PROCEDURE — G0378 HOSPITAL OBSERVATION PER HR: HCPCS

## 2023-05-13 PROCEDURE — 25010000002 CEFTRIAXONE PER 250 MG: Performed by: STUDENT IN AN ORGANIZED HEALTH CARE EDUCATION/TRAINING PROGRAM

## 2023-05-13 PROCEDURE — 87070 CULTURE OTHR SPECIMN AEROBIC: CPT | Performed by: INTERNAL MEDICINE

## 2023-05-13 PROCEDURE — 87641 MR-STAPH DNA AMP PROBE: CPT | Performed by: STUDENT IN AN ORGANIZED HEALTH CARE EDUCATION/TRAINING PROGRAM

## 2023-05-13 PROCEDURE — 25010000002 VANCOMYCIN 10 G RECONSTITUTED SOLUTION: Performed by: STUDENT IN AN ORGANIZED HEALTH CARE EDUCATION/TRAINING PROGRAM

## 2023-05-13 PROCEDURE — 97161 PT EVAL LOW COMPLEX 20 MIN: CPT

## 2023-05-13 PROCEDURE — G0257 UNSCHED DIALYSIS ESRD PT HOS: HCPCS

## 2023-05-13 PROCEDURE — 36415 COLL VENOUS BLD VENIPUNCTURE: CPT | Performed by: STUDENT IN AN ORGANIZED HEALTH CARE EDUCATION/TRAINING PROGRAM

## 2023-05-13 PROCEDURE — 80048 BASIC METABOLIC PNL TOTAL CA: CPT | Performed by: STUDENT IN AN ORGANIZED HEALTH CARE EDUCATION/TRAINING PROGRAM

## 2023-05-13 RX ORDER — SODIUM CHLORIDE 0.9 % (FLUSH) 0.9 %
10 SYRINGE (ML) INJECTION AS NEEDED
Status: DISCONTINUED | OUTPATIENT
Start: 2023-05-13 | End: 2023-05-14 | Stop reason: HOSPADM

## 2023-05-13 RX ORDER — ASPIRIN 81 MG/1
81 TABLET ORAL DAILY
Status: DISCONTINUED | OUTPATIENT
Start: 2023-05-13 | End: 2023-05-14 | Stop reason: HOSPADM

## 2023-05-13 RX ORDER — HYDRALAZINE HYDROCHLORIDE 25 MG/1
25 TABLET, FILM COATED ORAL 2 TIMES DAILY
Status: DISCONTINUED | OUTPATIENT
Start: 2023-05-13 | End: 2023-05-14 | Stop reason: HOSPADM

## 2023-05-13 RX ORDER — BUMETANIDE 1 MG/1
1 TABLET ORAL 3 TIMES DAILY
Status: DISCONTINUED | OUTPATIENT
Start: 2023-05-13 | End: 2023-05-14 | Stop reason: HOSPADM

## 2023-05-13 RX ORDER — IBUPROFEN 600 MG/1
1 TABLET ORAL
Status: DISCONTINUED | OUTPATIENT
Start: 2023-05-13 | End: 2023-05-14 | Stop reason: HOSPADM

## 2023-05-13 RX ORDER — ONDANSETRON 2 MG/ML
4 INJECTION INTRAMUSCULAR; INTRAVENOUS EVERY 6 HOURS PRN
Status: DISCONTINUED | OUTPATIENT
Start: 2023-05-13 | End: 2023-05-14 | Stop reason: HOSPADM

## 2023-05-13 RX ORDER — NICOTINE POLACRILEX 4 MG
15 LOZENGE BUCCAL
Status: DISCONTINUED | OUTPATIENT
Start: 2023-05-13 | End: 2023-05-14 | Stop reason: HOSPADM

## 2023-05-13 RX ORDER — DEXTROSE MONOHYDRATE 25 G/50ML
25 INJECTION, SOLUTION INTRAVENOUS
Status: DISCONTINUED | OUTPATIENT
Start: 2023-05-13 | End: 2023-05-14 | Stop reason: HOSPADM

## 2023-05-13 RX ORDER — SODIUM CHLORIDE 0.9 % (FLUSH) 0.9 %
10 SYRINGE (ML) INJECTION EVERY 12 HOURS SCHEDULED
Status: DISCONTINUED | OUTPATIENT
Start: 2023-05-13 | End: 2023-05-14 | Stop reason: HOSPADM

## 2023-05-13 RX ORDER — ONDANSETRON 4 MG/1
4 TABLET, FILM COATED ORAL EVERY 6 HOURS PRN
Status: DISCONTINUED | OUTPATIENT
Start: 2023-05-13 | End: 2023-05-14 | Stop reason: HOSPADM

## 2023-05-13 RX ORDER — ISOSORBIDE MONONITRATE 30 MG/1
30 TABLET, EXTENDED RELEASE ORAL DAILY
Status: DISCONTINUED | OUTPATIENT
Start: 2023-05-13 | End: 2023-05-14 | Stop reason: HOSPADM

## 2023-05-13 RX ORDER — IPRATROPIUM BROMIDE AND ALBUTEROL SULFATE 2.5; .5 MG/3ML; MG/3ML
3 SOLUTION RESPIRATORY (INHALATION) EVERY 4 HOURS PRN
Status: DISCONTINUED | OUTPATIENT
Start: 2023-05-13 | End: 2023-05-14 | Stop reason: HOSPADM

## 2023-05-13 RX ORDER — METOLAZONE 2.5 MG/1
2.5 TABLET ORAL DAILY
Status: DISCONTINUED | OUTPATIENT
Start: 2023-05-13 | End: 2023-05-14 | Stop reason: HOSPADM

## 2023-05-13 RX ORDER — TRAZODONE HYDROCHLORIDE 50 MG/1
50 TABLET ORAL NIGHTLY
Status: DISCONTINUED | OUTPATIENT
Start: 2023-05-13 | End: 2023-05-14 | Stop reason: HOSPADM

## 2023-05-13 RX ORDER — SPIRONOLACTONE 25 MG/1
25 TABLET ORAL DAILY
Status: DISCONTINUED | OUTPATIENT
Start: 2023-05-13 | End: 2023-05-14 | Stop reason: HOSPADM

## 2023-05-13 RX ORDER — SEVELAMER CARBONATE 800 MG/1
800 TABLET, FILM COATED ORAL
Status: DISCONTINUED | OUTPATIENT
Start: 2023-05-13 | End: 2023-05-14 | Stop reason: HOSPADM

## 2023-05-13 RX ORDER — BUMETANIDE 1 MG/1
2 TABLET ORAL 3 TIMES DAILY
Status: DISCONTINUED | OUTPATIENT
Start: 2023-05-13 | End: 2023-05-13

## 2023-05-13 RX ORDER — NITROGLYCERIN 0.4 MG/1
0.4 TABLET SUBLINGUAL
Status: DISCONTINUED | OUTPATIENT
Start: 2023-05-13 | End: 2023-05-14 | Stop reason: HOSPADM

## 2023-05-13 RX ORDER — SODIUM CHLORIDE 9 MG/ML
40 INJECTION, SOLUTION INTRAVENOUS AS NEEDED
Status: DISCONTINUED | OUTPATIENT
Start: 2023-05-13 | End: 2023-05-14 | Stop reason: HOSPADM

## 2023-05-13 RX ORDER — ATORVASTATIN CALCIUM 20 MG/1
20 TABLET, FILM COATED ORAL DAILY
Status: DISCONTINUED | OUTPATIENT
Start: 2023-05-13 | End: 2023-05-14 | Stop reason: HOSPADM

## 2023-05-13 RX ORDER — METOPROLOL SUCCINATE 50 MG/1
50 TABLET, EXTENDED RELEASE ORAL DAILY
Status: DISCONTINUED | OUTPATIENT
Start: 2023-05-13 | End: 2023-05-14 | Stop reason: HOSPADM

## 2023-05-13 RX ADMIN — BUMETANIDE 1 MG: 1 TABLET ORAL at 17:36

## 2023-05-13 RX ADMIN — VANCOMYCIN HYDROCHLORIDE 1500 MG: 10 INJECTION, POWDER, LYOPHILIZED, FOR SOLUTION INTRAVENOUS at 04:34

## 2023-05-13 RX ADMIN — HYDRALAZINE HYDROCHLORIDE 25 MG: 25 TABLET, FILM COATED ORAL at 21:03

## 2023-05-13 RX ADMIN — SEVELAMER CARBONATE 800 MG: 800 TABLET, FILM COATED ORAL at 08:36

## 2023-05-13 RX ADMIN — ISOSORBIDE MONONITRATE 30 MG: 30 TABLET, EXTENDED RELEASE ORAL at 08:36

## 2023-05-13 RX ADMIN — ATORVASTATIN CALCIUM 20 MG: 20 TABLET, FILM COATED ORAL at 08:36

## 2023-05-13 RX ADMIN — SPIRONOLACTONE 25 MG: 25 TABLET ORAL at 08:36

## 2023-05-13 RX ADMIN — TICAGRELOR 90 MG: 90 TABLET ORAL at 21:03

## 2023-05-13 RX ADMIN — METOLAZONE 2.5 MG: 2.5 TABLET ORAL at 08:36

## 2023-05-13 RX ADMIN — ASPIRIN 81 MG: 81 TABLET, COATED ORAL at 08:36

## 2023-05-13 RX ADMIN — Medication 10 ML: at 02:30

## 2023-05-13 RX ADMIN — SEVELAMER CARBONATE 800 MG: 800 TABLET, FILM COATED ORAL at 17:36

## 2023-05-13 RX ADMIN — CEFTRIAXONE 2 G: 2 INJECTION, POWDER, FOR SOLUTION INTRAMUSCULAR; INTRAVENOUS at 02:29

## 2023-05-13 RX ADMIN — TICAGRELOR 90 MG: 90 TABLET ORAL at 08:36

## 2023-05-13 RX ADMIN — METOPROLOL SUCCINATE 50 MG: 50 TABLET, EXTENDED RELEASE ORAL at 08:36

## 2023-05-13 RX ADMIN — BUMETANIDE 2 MG: 1 TABLET ORAL at 08:36

## 2023-05-13 RX ADMIN — Medication 10 ML: at 08:36

## 2023-05-13 RX ADMIN — TRAZODONE HYDROCHLORIDE 50 MG: 50 TABLET ORAL at 02:29

## 2023-05-13 RX ADMIN — TRAZODONE HYDROCHLORIDE 50 MG: 50 TABLET ORAL at 21:00

## 2023-05-13 RX ADMIN — Medication 10 ML: at 21:03

## 2023-05-13 RX ADMIN — SEVELAMER CARBONATE 800 MG: 800 TABLET, FILM COATED ORAL at 12:38

## 2023-05-13 RX ADMIN — BUMETANIDE 1 MG: 1 TABLET ORAL at 21:03

## 2023-05-13 RX ADMIN — HYDRALAZINE HYDROCHLORIDE 25 MG: 25 TABLET, FILM COATED ORAL at 08:36

## 2023-05-13 NOTE — PROGRESS NOTES
[facility] Services Daily Progress Note    Patient Name: Esdras Peralta  : 1960  MRN: [id number]  Primary Care Physician:  Erika Hernandez MD  Date of admission: 2023      Subjective      Chief Complaint: Peritoneal dialysis site leakage.    Subjective   Patient denies for any new complaint. No short of breath, no chest pain.    Review of Systems   All other systems reviewed and are negative.         Objective      Vitals:   Temp:  [97.8 °F (36.6 °C)-98.5 °F (36.9 °C)] 98.5 °F (36.9 °C)  Heart Rate:  [] 106  Resp:  [16-20] 20  BP: (119-134)/(73-94) 131/91    Physical Exam  Vitals and nursing note reviewed.   Constitutional:       General: He is not in acute distress.     Appearance: Normal appearance. He is well-developed. He is not ill-appearing, toxic-appearing or diaphoretic.   HENT:      Head: Normocephalic and atraumatic.      Right Ear: Ear canal and external ear normal.      Left Ear: Ear canal and external ear normal.      Nose: Nose normal. No congestion or rhinorrhea.      Mouth/Throat:      Mouth: Mucous membranes are moist.      Pharynx: No oropharyngeal exudate.   Eyes:      General: No scleral icterus.        Right eye: No discharge.         Left eye: No discharge.      Extraocular Movements: Extraocular movements intact.      Conjunctiva/sclera: Conjunctivae normal.      Pupils: Pupils are equal, round, and reactive to light.   Neck:      Thyroid: No thyromegaly.      Vascular: No carotid bruit or JVD.      Trachea: No tracheal deviation.   Cardiovascular:      Rate and Rhythm: Normal rate and regular rhythm.      Pulses: Normal pulses.      Heart sounds: Normal heart sounds. No murmur heard.    No friction rub. No gallop.   Pulmonary:      Effort: Pulmonary effort is normal. No respiratory distress.      Breath sounds: Normal breath sounds. No stridor. No wheezing, rhonchi or rales.   Chest:      Chest wall: No tenderness.   Abdominal:       General: Bowel sounds are normal. There is no distension.      Palpations: Abdomen is soft. There is no mass.      Tenderness: There is no abdominal tenderness. There is no guarding or rebound.      Hernia: No hernia is present.   Musculoskeletal:         General: No swelling, tenderness, deformity or signs of injury. Normal range of motion.      Cervical back: Normal range of motion and neck supple. No rigidity. No muscular tenderness.      Right lower leg: No edema.      Left lower leg: No edema.   Lymphadenopathy:      Cervical: No cervical adenopathy.   Skin:     General: Skin is warm and dry.      Coloration: Skin is not jaundiced or pale.      Findings: No bruising, erythema or rash.   Neurological:      General: No focal deficit present.      Mental Status: He is alert and oriented to person, place, and time. Mental status is at baseline.      Cranial Nerves: No cranial nerve deficit.      Sensory: No sensory deficit.      Motor: No weakness or abnormal muscle tone.      Coordination: Coordination normal.   Psychiatric:         Mood and Affect: Mood normal.         Behavior: Behavior normal.         Thought Content: Thought content normal.         Judgment: Judgment normal.                 Result Review    Result Review:  I have personally reviewed the results from the time of this admission to 5/13/2023 10:54 EDT and agree with these findings:  [x]  Laboratory  [x]  Microbiology  [x]  Radiology  []  EKG/Telemetry   []  Cardiology/Vascular   []  Pathology  []  Old records  []  Other:  Most notable findings include:           Assessment & Plan      Brief Patient Summary:  Esdras Peralta is a 62 y.o. male who       aspirin, 81 mg, Oral, Daily  atorvastatin, 20 mg, Oral, Daily  bumetanide, 1 mg, Oral, TID  cefTRIAXone, 2 g, Intravenous, Q24H  hydrALAZINE, 25 mg, Oral, BID  isosorbide mononitrate, 30 mg, Oral, Daily  metOLazone, 2.5 mg, Oral, Daily  metoprolol succinate XL, 50 mg, Oral, Daily  sevelamer, 800  mg, Oral, TID With Meals  sodium chloride, 10 mL, Intravenous, Q12H  spironolactone, 25 mg, Oral, Daily  ticagrelor, 90 mg, Oral, BID  traZODone, 50 mg, Oral, Nightly       insulin,          Active Hospital Problems:  Active Hospital Problems    Diagnosis    • **Problem with dialysis access, initial encounter      Plan:     #Non-function Peritoneal Catheter    -CT a/p shows LLQ PD catheter in expected position without abnormal fluid along SC tract however there was trace residual fluid present within pelvix complicated by mild residual body wall edema.    - nephrology wishes for fluid to be drained and patient watched overnight, concern for infection    - ED discussed with surgery who placed catheter, surgery stated they would evaluate patient if admitted, consulted by ED    - blood cultures    - wound culture on peritoneal fluid    - fluid culture once drained    - d/c vancomycin iv    - Rocephin 2g IV daily    - wbc 4.7  - follow peritoneal fluid culture.     #ESRD  #Uremia    - Of note, patient hospitalized at [facility] on 4/26/23-5/2/23 due to fluid overload, his PD catheter failed and he was started on HD before discharge    - HD TTS    - consult nephrology for HD    - Cr. 4.65    - sodium 129 and Cl 91, defer to nephrology    - continue home Bumex and Aldacton     #HFrEF  #Dilated Cardiomyopathy    - echo on 4/18/23 shows EF 20% with mod-severe dilated left ventricle     - s/p ICD    - Dialysis for fluid management    - aspirin daily    - Brilinta 90mg PO BID    - Will continue home Bumex as making urine    - continue home Metolazone daily    - continue home Aldactone    - resume home Toprol    - resume home imdur    - resume home trazodone and Ultram     #COPD    - chronic    - stable on room air    - Duoneb PRN     #DM1    - continue insulin pump     #Anemia    - hgb 11.6 on admission, base near 12.8    - suspect anemia of chronic renal disease    - no overt bleeding, follow labs     #HTN    - resume home  hydralazine     #HLD    -reconcile home meds     DVT prophylaxis: SCDs     CODE STATUS:    Admission Status:  I believe this patient meets observation status      DVT prophylaxis:  Mechanical DVT prophylaxis orders are present.    CODE STATUS:    Code Status (Patient has no pulse and is not breathing): CPR (Attempt to Resuscitate)  Medical Interventions (Patient has pulse or is breathing): Full Support      Disposition:  I expect patient to be discharged as per clinical course.    This patient has been examined wearing appropriate Personal Protective Equipment and discussed with hospital infection control department. 05/13/23      Electronically signed by Nery Sawyer MD, 05/13/23, 10:54 EDT.  [facility] Hospitalist Team

## 2023-05-13 NOTE — NURSING NOTE
PRE VITALS   /79  P 95  R 18  T 98.0  WT 93.9 KG      POST VITALS   BP  P  R  T  WT          TOLERATED TX WELL ACCEESS FLOW GOOD REMOVED 2433 ML
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
## 2023-05-13 NOTE — PROGRESS NOTES
"Pharmacy Antimicrobial Dosing Service    Subjective:  Esdras Peralta is a 62 y.o.male admitted with peritoneal dialysis site leakage. Pharmacy has been consulted to dose Vancomycin for possible intra-abdominal infection.    PMH: Non-functional peritoneal catheter, started HD last admission; COPD; DM1      Assessment/Plan    1. Day #1/5 Vancomycin: Pulse dosing d/t HD status. Will give 1500mg (~19mg/kg DBW) IV once on 5/13. Will obtain random level on 5/14 w/AM labs and plan to redose when level is expected to be <20mcg/mL.     2. Day #1/5 Ceftriaxone: 2g IV q24h.    3. MRSA screen ordered    Will continue to monitor drug levels, renal function, culture and sensitivities, and patient clinical status.       Objective:  Relevant clinical data and objective history reviewed:  175.3 cm (69\")   90.2 kg (198 lb 13.7 oz)   Ideal body weight: 70.7 kg (155 lb 13.8 oz)  Adjusted ideal body weight: 78.5 kg (173 lb 1 oz)  Body mass index is 29.37 kg/m².        Results from last 7 days   Lab Units 05/12/23  2121   CREATININE mg/dL 4.65*     Estimated Creatinine Clearance: 18.8 mL/min (A) (by C-G formula based on SCr of 4.65 mg/dL (H)).  No intake/output data recorded.    Results from last 7 days   Lab Units 05/12/23  2121   WBC 10*3/mm3 4.70     Temperature    05/12/23 2006   Temp: 98.1 °F (36.7 °C)     Baseline culture/source/susceptibility:  Microbiology Results (last 10 days)       ** No results found for the last 240 hours. **            Anti-Infectives (From admission, onward)      Ordered     Dose/Rate Route Frequency Start Stop    05/13/23 0021  vancomycin 1500 mg/500 mL 0.9% NS IVPB ([facility])        Ordering Provider: Payton Toussaint DO    20 mg/kg × 78.5 kg (Adjusted)  over 90 Minutes Intravenous Once 05/13/23 0130      05/13/23 0116  Vancomycin Pharmacy Intermittent/Pulse Dosing        Ordering Provider: Payton Toussaint DO     Does not apply As Needed 05/13/23 0115 05/18/23 0114    05/13/23 0016  cefTRIAXone " (ROCEPHIN) 2 g in sodium chloride 0.9 % 100 mL IVPB        Ordering Provider: Payton Toussaint DO    2 g  200 mL/hr over 30 Minutes Intravenous Every 24 Hours 05/13/23 0100 05/18/23 0059    05/13/23 0016  Pharmacy to dose vancomycin        Ordering Provider: Payton Toussaint DO     Does not apply Continuous PRN 05/13/23 0016 05/18/23 0015            Chantel Wei, PharmD  05/13/23 01:17 EDT

## 2023-05-13 NOTE — CONSULTS
> Patient seen in the nephrology office today by Dr. Bobby Lagunas  > Patient with PD catheter drainage  > Peritoneal fluid cell count with differential and fluid culture  > on Hemodialysis  > CMP and CBC reviewed, K 3.7, WBC 4.70  > Full consult to follow in the morning  > plan discussed with ED nurse

## 2023-05-13 NOTE — OUTREACH NOTE
Medical Week 2 Survey    Flowsheet Row Responses   [facility] facility patient discharged from? [facility]   Does the patient have one of the following disease processes/diagnoses(primary or secondary)? Other   Week 2 attempt successful? No   Unsuccessful attempts Attempt 1   Revoke Readmitted          Helga RAIN - Registered Nurse

## 2023-05-13 NOTE — PROGRESS NOTES
General Surgery:    Patient is a couple weeks out from laparoscopic peritoneal dialysis catheter placement and recently saw Dr. Cabrera in the office.  Says that he has started using the peritoneal dialysis catheter initially and he did okay but as the volume started to increase he started to notice some leakage around the tube.  As he described leakage around the tube he was sent to the emergency department for evaluation.  On arrival he had a CT scan which did not show any significant fluid collections around the tube itself to suggest any type of infection.  He was placed in the hospital and is going to receive hemodialysis today.  I was asked to see him to evaluate his tube.    On exam his tube site looks okay there is some mild hyperemia at the exit site but no significant fluctuance and no evidence of infection.  Abdomen is soft and nontender.     Unfortunately these new tubes frequently leak and it sounds like it started to leak as he increased volumes.  Options would be to continue hemodialysis for a few weeks to allow to scar in order to try lower volumes and see if that helps prevent the leaking.  Will need to follow-up with Dr. Cabrera if there are ongoing issues if revision is going to be necessary but from my standpoint the tube is too new to consider revision at this point.    Keo Pavon MD  13:49 EDT
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
## 2023-05-13 NOTE — PLAN OF CARE
Goal Outcome Evaluation:  Plan of Care Reviewed With: patient        Progress: no change  Outcome Evaluation: Patient was sent here for a leaking peritoneal dialysis cath site. Patient is a T/Th/S hemodialysis patient. Nephro on board. General Surgery consult called this morning.

## 2023-05-13 NOTE — PLAN OF CARE
Goal Outcome Evaluation:  Plan of Care Reviewed With: patient        Progress: no change  Outcome Evaluation: Patient resting in bed throughout shift. culture obtained of peritoneal dialysis cath site. HD orders placed on patient. no further complaints.

## 2023-05-14 ENCOUNTER — READMISSION MANAGEMENT (OUTPATIENT)
Dept: CALL CENTER | Facility: HOSPITAL | Age: 63
End: 2023-05-14
Payer: COMMERCIAL

## 2023-05-14 VITALS
HEART RATE: 97 BPM | BODY MASS INDEX: 30.07 KG/M2 | HEIGHT: 69 IN | TEMPERATURE: 98 F | SYSTOLIC BLOOD PRESSURE: 108 MMHG | DIASTOLIC BLOOD PRESSURE: 75 MMHG | WEIGHT: 203.04 LBS | RESPIRATION RATE: 19 BRPM | OXYGEN SATURATION: 97 %

## 2023-05-14 LAB
ALBUMIN SERPL-MCNC: 3 G/DL (ref 3.5–5.2)
ALBUMIN/GLOB SERPL: 0.8 G/DL
ALP SERPL-CCNC: 110 U/L (ref 39–117)
ALT SERPL W P-5'-P-CCNC: 14 U/L (ref 1–41)
ANION GAP SERPL CALCULATED.3IONS-SCNC: 13 MMOL/L (ref 5–15)
AST SERPL-CCNC: 20 U/L (ref 1–40)
BILIRUB SERPL-MCNC: 0.3 MG/DL (ref 0–1.2)
BUN SERPL-MCNC: 33 MG/DL (ref 8–23)
BUN/CREAT SERPL: 8.9 (ref 7–25)
CALCIUM SPEC-SCNC: 8.4 MG/DL (ref 8.6–10.5)
CHLORIDE SERPL-SCNC: 95 MMOL/L (ref 98–107)
CO2 SERPL-SCNC: 23 MMOL/L (ref 22–29)
CREAT SERPL-MCNC: 3.71 MG/DL (ref 0.76–1.27)
DEPRECATED RDW RBC AUTO: 65.2 FL (ref 37–54)
EGFRCR SERPLBLD CKD-EPI 2021: 17.9 ML/MIN/1.73
ERYTHROCYTE [DISTWIDTH] IN BLOOD BY AUTOMATED COUNT: 19.6 % (ref 12.3–15.4)
GLOBULIN UR ELPH-MCNC: 3.7 GM/DL
GLUCOSE BLDC GLUCOMTR-MCNC: 231 MG/DL (ref 70–105)
GLUCOSE BLDC GLUCOMTR-MCNC: 86 MG/DL (ref 70–105)
GLUCOSE SERPL-MCNC: 223 MG/DL (ref 65–99)
HCT VFR BLD AUTO: 33.6 % (ref 37.5–51)
HGB BLD-MCNC: 10.7 G/DL (ref 13–17.7)
MAGNESIUM SERPL-MCNC: 2.1 MG/DL (ref 1.6–2.4)
MCH RBC QN AUTO: 28.9 PG (ref 26.6–33)
MCHC RBC AUTO-ENTMCNC: 31.8 G/DL (ref 31.5–35.7)
MCV RBC AUTO: 90.9 FL (ref 79–97)
NT-PROBNP SERPL-MCNC: ABNORMAL PG/ML (ref 0–900)
PHOSPHATE SERPL-MCNC: 2.7 MG/DL (ref 2.5–4.5)
PLATELET # BLD AUTO: 184 10*3/MM3 (ref 140–450)
PMV BLD AUTO: 9.1 FL (ref 6–12)
POTASSIUM SERPL-SCNC: 3.6 MMOL/L (ref 3.5–5.2)
PROT SERPL-MCNC: 6.7 G/DL (ref 6–8.5)
RBC # BLD AUTO: 3.7 10*6/MM3 (ref 4.14–5.8)
SODIUM SERPL-SCNC: 131 MMOL/L (ref 136–145)
WBC NRBC COR # BLD: 5.3 10*3/MM3 (ref 3.4–10.8)

## 2023-05-14 PROCEDURE — G0378 HOSPITAL OBSERVATION PER HR: HCPCS

## 2023-05-14 PROCEDURE — 83880 ASSAY OF NATRIURETIC PEPTIDE: CPT | Performed by: INTERNAL MEDICINE

## 2023-05-14 PROCEDURE — 80053 COMPREHEN METABOLIC PANEL: CPT | Performed by: INTERNAL MEDICINE

## 2023-05-14 PROCEDURE — 84100 ASSAY OF PHOSPHORUS: CPT | Performed by: INTERNAL MEDICINE

## 2023-05-14 PROCEDURE — 82948 REAGENT STRIP/BLOOD GLUCOSE: CPT

## 2023-05-14 PROCEDURE — 85027 COMPLETE CBC AUTOMATED: CPT | Performed by: STUDENT IN AN ORGANIZED HEALTH CARE EDUCATION/TRAINING PROGRAM

## 2023-05-14 PROCEDURE — 36415 COLL VENOUS BLD VENIPUNCTURE: CPT | Performed by: STUDENT IN AN ORGANIZED HEALTH CARE EDUCATION/TRAINING PROGRAM

## 2023-05-14 PROCEDURE — 25010000002 CEFTRIAXONE PER 250 MG: Performed by: STUDENT IN AN ORGANIZED HEALTH CARE EDUCATION/TRAINING PROGRAM

## 2023-05-14 PROCEDURE — 83735 ASSAY OF MAGNESIUM: CPT | Performed by: INTERNAL MEDICINE

## 2023-05-14 RX ORDER — TRAMADOL HYDROCHLORIDE 50 MG/1
50 TABLET ORAL EVERY 12 HOURS PRN
Status: DISCONTINUED | OUTPATIENT
Start: 2023-05-14 | End: 2023-05-14 | Stop reason: HOSPADM

## 2023-05-14 RX ADMIN — HYDRALAZINE HYDROCHLORIDE 25 MG: 25 TABLET, FILM COATED ORAL at 09:34

## 2023-05-14 RX ADMIN — ATORVASTATIN CALCIUM 20 MG: 20 TABLET, FILM COATED ORAL at 09:34

## 2023-05-14 RX ADMIN — SEVELAMER CARBONATE 800 MG: 800 TABLET, FILM COATED ORAL at 09:34

## 2023-05-14 RX ADMIN — ISOSORBIDE MONONITRATE 30 MG: 30 TABLET, EXTENDED RELEASE ORAL at 09:34

## 2023-05-14 RX ADMIN — METOLAZONE 2.5 MG: 2.5 TABLET ORAL at 09:34

## 2023-05-14 RX ADMIN — CEFTRIAXONE 2 G: 2 INJECTION, POWDER, FOR SOLUTION INTRAMUSCULAR; INTRAVENOUS at 00:01

## 2023-05-14 RX ADMIN — SPIRONOLACTONE 25 MG: 25 TABLET ORAL at 09:34

## 2023-05-14 RX ADMIN — SEVELAMER CARBONATE 800 MG: 800 TABLET, FILM COATED ORAL at 12:45

## 2023-05-14 RX ADMIN — ASPIRIN 81 MG: 81 TABLET, COATED ORAL at 09:34

## 2023-05-14 RX ADMIN — TICAGRELOR 90 MG: 90 TABLET ORAL at 09:34

## 2023-05-14 RX ADMIN — METOPROLOL SUCCINATE 50 MG: 50 TABLET, EXTENDED RELEASE ORAL at 09:33

## 2023-05-14 RX ADMIN — TRAMADOL HYDROCHLORIDE 50 MG: 50 TABLET, COATED ORAL at 00:21

## 2023-05-14 RX ADMIN — BUMETANIDE 1 MG: 1 TABLET ORAL at 09:34

## 2023-05-14 RX ADMIN — Medication 10 ML: at 09:33

## 2023-05-14 NOTE — PLAN OF CARE
Goal Outcome Evaluation:  Plan of Care Reviewed With: patient        Progress: improving  Outcome Evaluation: Patient 63 yo male admitted to the hospital with the complaint of Peritoneal dialysis site leakage. Recent Dx includes Non-function Peritoneal Catheter, ESRD,  Uremia, HFrEF,  Dilated Cardiomyopathy. At baseline, patient resides with spouse in a house and is independent in mobility and ADLs. As per today's evaluation, patient is Independent in Bed mobility, transfers and ambulating 30ft. Once medically stable, patient is safe to go home with familiy at d/c.

## 2023-05-14 NOTE — DISCHARGE SUMMARY
[facility] Medicine Services  DISCHARGE SUMMARY    Patient Name: Esdras Peralta  : 1960  MRN: [id number]    Date of Admission: 2023  Discharge Diagnosis:  Date of Discharge:  2023  Primary Care Physician: Erika Hernandez MD      Presenting Problem:   Problem with dialysis access, initial encounter [T88.675N]    Active and Resolved Hospital Problems:  Active Hospital Problems    Diagnosis POA   • **Problem with dialysis access, initial encounter [K95.004Y] Yes      Resolved Hospital Problems   No resolved problems to display.         Hospital Course     Hospital Course by problem list.    #Non-function Peritoneal Catheter    -CT a/p shows LLQ PD catheter in expected position without abnormal fluid along SC tract however there was trace residual fluid present within pelvix complicated by mild residual body wall edema.    - General surgery evaluated the patient and advised outpatient follow up.  Receiving HD through tunneled catheter.   Received empiric antibiotic in the beginning, surgery advised no evidence for infection.       #ESRD  #Uremia    - Of note, patient hospitalized at [facility] on 23-23 due to fluid overload, his PD catheter failed and he was started on HD before discharge    - HD TTS    - consult nephrology for HD    - Cr. 4.65    - sodium 129 and Cl 91, defer to nephrology    - continue home Bumex and Aldacton     #HFrEF  #Dilated Cardiomyopathy    - echo on 23 shows EF 20% with mod-severe dilated left ventricle     - s/p ICD    - Dialysis for fluid management    - aspirin daily    - Brilinta 90mg PO BID    - Will continue home Bumex as making urine    - continue home Metolazone daily    - continue home Aldactone    - resume home Toprol    - resume home imdur    - resume home trazodone and Ultram     #COPD    - chronic    - stable on room air    - Duoneb PRN     #DM1    - continue insulin pump     #Anemia    - hgb 11.6 on admission, base  near 12.8    - suspect anemia of chronic renal disease    - no overt bleeding, follow labs     #HTN    - resume home hydralazine     #HLD    -reconcile home meds    Condition on discharge stable.    Follow up with nephrology and general surgery outpatient for dialysis.        DISCHARGE Follow Up with PCP in a week time.  Follow up with nephrology service outpatient follow HD      Reasons For Change In Medications and Indications for New Medications:      Day of Discharge     Vital Signs:  Temp:  [97.5 °F (36.4 °C)-98.1 °F (36.7 °C)] 97.8 °F (36.6 °C)  Heart Rate:  [] 98  Resp:  [16-22] 16  BP: (106-124)/(70-87) 112/80    Physical Exam:  Physical Exam  Vitals and nursing note reviewed.   Constitutional:       General: He is not in acute distress.     Appearance: Normal appearance. He is well-developed. He is not ill-appearing, toxic-appearing or diaphoretic.   HENT:      Head: Normocephalic and atraumatic.      Right Ear: Ear canal and external ear normal.      Left Ear: Ear canal and external ear normal.      Nose: Nose normal. No congestion or rhinorrhea.      Mouth/Throat:      Mouth: Mucous membranes are moist.      Pharynx: No oropharyngeal exudate.   Eyes:      General: No scleral icterus.        Right eye: No discharge.         Left eye: No discharge.      Extraocular Movements: Extraocular movements intact.      Conjunctiva/sclera: Conjunctivae normal.      Pupils: Pupils are equal, round, and reactive to light.   Neck:      Thyroid: No thyromegaly.      Vascular: No carotid bruit or JVD.      Trachea: No tracheal deviation.   Cardiovascular:      Rate and Rhythm: Normal rate and regular rhythm.      Pulses: Normal pulses.      Heart sounds: Normal heart sounds. No murmur heard.    No friction rub. No gallop.   Pulmonary:      Effort: Pulmonary effort is normal. No respiratory distress.      Breath sounds: Normal breath sounds. No stridor. No wheezing, rhonchi or rales.   Chest:      Chest wall: No  tenderness.   Abdominal:      General: Bowel sounds are normal. There is no distension.      Palpations: Abdomen is soft. There is no mass.      Tenderness: There is no abdominal tenderness. There is no guarding or rebound.      Hernia: No hernia is present.   Musculoskeletal:         General: No swelling, tenderness, deformity or signs of injury. Normal range of motion.      Cervical back: Normal range of motion and neck supple. No rigidity. No muscular tenderness.      Right lower leg: No edema.      Left lower leg: No edema.   Lymphadenopathy:      Cervical: No cervical adenopathy.   Skin:     General: Skin is warm and dry.      Coloration: Skin is not jaundiced or pale.      Findings: No bruising, erythema or rash.   Neurological:      General: No focal deficit present.      Mental Status: He is alert and oriented to person, place, and time. Mental status is at baseline.      Cranial Nerves: No cranial nerve deficit.      Sensory: No sensory deficit.      Motor: No weakness or abnormal muscle tone.      Coordination: Coordination normal.   Psychiatric:         Mood and Affect: Mood normal.         Behavior: Behavior normal.         Thought Content: Thought content normal.         Judgment: Judgment normal.                Pertinent  and/or Most Recent Results     LAB RESULTS:      Lab 05/14/23  0604 05/13/23  1132 05/12/23 2121   WBC 5.30 4.90 4.70   HEMOGLOBIN 10.7* 10.8* 11.6*   HEMATOCRIT 33.6* 33.5* 36.6*   PLATELETS 184 191 204   NEUTROS ABS  --   --  2.90   LYMPHS ABS  --   --  0.70   MONOS ABS  --   --  0.80   EOS ABS  --   --  0.20   MCV 90.9 90.2 89.8         Lab 05/14/23  0604 05/13/23  1132 05/12/23 2121   SODIUM 131* 131* 129*   POTASSIUM 3.6 3.8 3.7   CHLORIDE 95* 91* 91*   CO2 23.0 27.0 25.0   ANION GAP 13.0 13.0 13.0   BUN 33* 50* 48*   CREATININE 3.71* 4.74* 4.65*   EGFR 17.9* 13.3* 13.6*   GLUCOSE 223* 83 212*   CALCIUM 8.4* 8.6 9.1   MAGNESIUM 2.1  --   --    PHOSPHORUS 2.7  --   --           Lab 05/14/23  0604   TOTAL PROTEIN 6.7   ALBUMIN 3.0*   GLOBULIN 3.7   ALT (SGPT) 14   AST (SGOT) 20   BILIRUBIN 0.3   ALK PHOS 110         Lab 05/14/23  0604   PROBNP 20,018.0*                 Brief Urine Lab Results  (Last result in the past 365 days)      Color   Clarity   Blood   Leuk Est   Nitrite   Protein   CREAT   Urine HCG        04/16/23 1451 Yellow   Clear   Negative   Trace   Negative   >=300 mg/dL (3+)               Microbiology Results (last 10 days)     Procedure Component Value - Date/Time    Wound Culture - Surgical Site, Abdominal Wall [[id number]] Collected: 05/13/23 0849    Lab Status: Preliminary result Specimen: Surgical Site from Abdominal Wall Updated: 05/14/23 0830     Wound Culture No growth     Gram Stain Few (2+) WBCs per low power field      No organisms seen    MRSA Screen, PCR (Inpatient) - Swab, Nares [[id number]]  (Normal) Collected: 05/13/23 0351    Lab Status: Final result Specimen: Swab from Nares Updated: 05/13/23 0559     MRSA PCR No MRSA Detected    Narrative:      The negative predictive value of this diagnostic test is high and should only be used to consider de-escalating anti-MRSA therapy. A positive result may indicate colonization with MRSA and must be correlated clinically.    Blood Culture - Blood, Arm, Left [[id number]]  (Normal) Collected: 05/13/23 0118    Lab Status: Preliminary result Specimen: Blood from Arm, Left Updated: 05/14/23 0131     Blood Culture No growth at 24 hours    Narrative:      Less than seven (7) mL's of blood was collected.  Insufficient quantity may yield false negative results.    Blood Culture - Blood, Arm, Right [[id number]]  (Normal) Collected: 05/13/23 0112    Lab Status: Preliminary result Specimen: Blood from Arm, Right Updated: 05/14/23 0116     Blood Culture No growth at 24 hours          CT Abdomen Pelvis Without Contrast    Result Date: 5/12/2023  Impression: Impression: 1. Left lower quadrant peritoneal dialysis catheter in expected  position. No abnormal fluid along the subcutaneous tract of the catheter through the skin. Trace residual fluid is present within the pelvis. 2. Mild residual body wall edema. 3. Cardiomegaly Electronically Signed: Guillaume Bullock  5/12/2023 10:54 PM EDT  Workstation ID: [id number]    CT Abdomen Pelvis Without Contrast    Result Date: 4/28/2023  Impression: Impression: 1. Diffuse anasarca of the soft tissues which has progressed as compared to the previous study. No significant ascites or pleural effusion identified. Left-sided peritoneal drain versus dialysis catheter present terminating within the right lower quadrant present. No mesenteric collection identified. No evidence of adenopathy or hernia. Scrotal edema versus hydroceles present. 2. Ancillary findings as described above. Electronically Signed: Toyin Gant  4/28/2023 1:33 PM EDT  Workstation ID: [id number]    CT Abdomen Pelvis Without Contrast    Result Date: 4/16/2023  Impression: Impression: No acute abdominal or pelvic abnormality. Electronically Signed: Prudencio Saez  4/16/2023 2:21 PM EDT  Workstation ID: [id number]    XR Chest 1 View    Result Date: 4/16/2023  Impression: Impression: Cardiomegaly with mild pulmonary vascular congestion. Electronically Signed: Prudencio Saez  4/16/2023 1:53 PM EDT  Workstation ID: [id number]    IR Insert Tunneled CV Catheter Without Port 5 Plus    Result Date: 4/28/2023  Impression: Impression: Technically successful placement, 15.5 [place] dual lumen tunneled hemodialysis catheter, utilizing the right internal jugular approach, as well as both sonographic and fluoroscopic control. Electronically Signed: Nithya Meehan  4/28/2023 1:00 PM EDT  Workstation ID: [id number]    XR Abdomen KUB    Result Date: 4/28/2023  Impression: Impression: Distal peritoneal dialysis catheter is looped in the right lower quadrant. Nonobstructive bowel gas pattern. Electronically Signed: Nona Walls  4/28/2023 8:37 AM EDT  Workstation ID:  [id number]      Results for orders placed during the hospital encounter of 06/09/21    Doppler Arterial Multi Level Lower Extremity - Bilateral CAR    Interpretation Summary  · Right Conclusion: The right LEONCIO is normal. Normal digital pressures.  · Left Conclusion: The left LEONCIO is moderately reduced. Waveforms are consistent with femoral disease and popliteal disease. Moderate digital ischemia.      Results for orders placed during the hospital encounter of 06/09/21    Doppler Arterial Multi Level Lower Extremity - Bilateral CAR    Interpretation Summary  · Right Conclusion: The right LEONCIO is normal. Normal digital pressures.  · Left Conclusion: The left LEONCIO is moderately reduced. Waveforms are consistent with femoral disease and popliteal disease. Moderate digital ischemia.      Results for orders placed during the hospital encounter of 04/16/23    Adult Transthoracic Echo Complete W/ Cont if Necessary Per Protocol    Interpretation Summary  •  Left ventricular systolic function is severely decreased. Left ventricular ejection fraction appears to be less than 20%.  •  The left ventricular cavity is moderate to severely dilated.  •  The right ventricular cavity is moderately dilated.  •  Estimated right ventricular systolic pressure from tricuspid regurgitation is markedly elevated (>55 mmHg). Calculated right ventricular systolic pressure from tricuspid regurgitation is 58 mmHg.      Labs Pending at Discharge:  Pending Labs     Order Current Status    Blood Culture - Blood, Arm, Left Preliminary result    Blood Culture - Blood, Arm, Right Preliminary result    Wound Culture - Surgical Site, Abdominal Wall Preliminary result          Procedures Performed           Consults:   Consults     Date and Time Order Name Status Description    5/13/2023  8:49 AM Inpatient General Surgery Consult      5/13/2023 12:16 AM Inpatient Nephrology Consult      5/12/2023 11:27 PM Hospitalist (on-call MD unless specified)      5/12/2023  11:03 PM Nephrology (on -call MD unless specified) Completed     5/12/2023  8:52 PM Nephrology (on -call MD unless specified) Completed     5/12/2023  8:21 PM Surgery (on-call MD unless specified)      4/27/2023 10:44 AM Inpatient Nephrology Consult Completed     4/17/2023  2:28 PM Inpatient Endocrinology Consult Completed     4/16/2023  6:07 PM Inpatient Cardiology Consult Completed             Discharge Details        Discharge Medications      Continue These Medications      Instructions Start Date   aspirin 81 MG EC tablet   81 mg, Oral, Daily      bumetanide 2 MG tablet  Commonly known as: BUMEX   2 mg, Oral, 3 Times Daily      cyclobenzaprine 10 MG tablet  Commonly known as: FLEXERIL   10 mg, Oral, 3 Times Daily      Daily-Vitamin tablet tablet  Generic drug: multivitamin   1 tablet, Oral, Daily      fish oil 1000 MG capsule capsule   1 capsule, Oral, Every 24 Hours      hydrALAZINE 25 MG tablet  Commonly known as: APRESOLINE   Take 1 tablet by mouth twice daily.      insulin  patient supplied pump   Subcutaneous, Continuous, Per pt. Pump is currently on and running Omnipod  Humalog  Max daily: 50u       isosorbide mononitrate 30 MG 24 hr tablet  Commonly known as: IMDUR   30 mg, Oral, Daily      metOLazone 2.5 MG tablet  Commonly known as: ZAROXOLYN   2.5 mg, Oral, Daily      metoprolol succinate XL 50 MG 24 hr tablet  Commonly known as: TOPROL-XL   50 mg, Oral, Daily      sevelamer 800 MG tablet  Commonly known as: RENVELA   800 mg, Oral, 3 Times Daily With Meals      simvastatin 40 MG tablet  Commonly known as: ZOCOR   40 mg, Oral, Nightly      sodium chloride 0.65 % nasal spray   1 spray, Nasal, As Needed      spironolactone 25 MG tablet  Commonly known as: ALDACTONE   25 mg, Oral, Daily      ticagrelor 90 MG tablet tablet  Commonly known as: BRILINTA   90 mg, Oral, 2 Times Daily      traZODone 50 MG tablet  Commonly known as: DESYREL   50 mg, Oral, Nightly      VITAMIN B COMPLEX PO   1 tablet, Oral,  Daily             Allergies   Allergen Reactions   • Codeine Unknown (See Comments)         Discharge Disposition:   Home or Self Care    Diet:  Hospital:  Diet Order   Procedures   • Diet: Renal Diets, Diabetic Diets; Consistent Carbohydrate; Low Sodium (2-3g), Low Potassium, Low Phosphorus; Texture: Regular Texture (IDDSI 7); Fluid Consistency: Thin (IDDSI 0)         Discharge Activity:         CODE STATUS:  Code Status and Medical Interventions:   Ordered at: 05/13/23 0016     Code Status (Patient has no pulse and is not breathing):    CPR (Attempt to Resuscitate)     Medical Interventions (Patient has pulse or is breathing):    Full Support         Future Appointments   Date Time Provider Department Center   9/29/2023 10:00 AM Roman Blanco MD [facility]           Time spent on Discharge including face to face service 33 minutes    This patient has been examined wearing appropriate Personal Protective Equipment and discussed with hospital infection control department. 05/14/23      Signature:

## 2023-05-14 NOTE — OUTREACH NOTE
Prep Survey    Flowsheet Row Responses   [facility] facility patient discharged from? [facility]   Is LACE score < 7 ? No   Eligibility Readm Mgmt   Discharge diagnosis **Problem with dialysis accessESRD   Does the patient have one of the following disease processes/diagnoses(primary or secondary)? Other   Does the patient have Home health ordered? No   Is there a DME ordered? No   Prep survey completed? Yes          NELSON OLIVAS - Registered Nurse

## 2023-05-14 NOTE — PLAN OF CARE
Goal Outcome Evaluation:  Plan of Care Reviewed With: patient        Progress: improving

## 2023-05-14 NOTE — PROGRESS NOTES
PROGRESS NOTE      Patient Name: Esdras Peralta  : 1960  MRN: [id number]  Primary Care Physician: Erika Hernandez MD  Date of admission: 2023    Patient Care Team:  Erika Hernandez MD as PCP - General  Erika Hernandez MD as PCP - Family Medicine        Subjective   Subjective:     Patient is feeling much better respiratory condition is better edema is somewhat better  Review of systems:  ***      Allergies:    Allergies   Allergen Reactions   • Codeine Unknown (See Comments)       Objective   Exam:     Vital Signs  Temp:  [97.8 °F (36.6 °C)-98.1 °F (36.7 °C)] 98 °F (36.7 °C)  Heart Rate:  [] 97  Resp:  [16-22] 19  BP: (106-119)/(70-81) 108/75  SpO2:  [97 %-99 %] 97 %  on   ;   Device (Oxygen Therapy): room air  Body mass index is 29.98 kg/m².    General:  ***  male in no acute distress.    Head:      Normocephalic and atraumatic.    Eyes:      PERRL/EOM intact, conjunctiva and sclera clear with out nystagmus.    Neck:      No masses, thyromegaly,  trachea central with normal respiratory effort   Lungs:    Clear bilaterally to auscultation.    Heart:      Regular rate and rhythm, no murmur no gallop  Abd:        Soft, nontender, not distended, bowel sounds positive, no shifting dullness   Pulses:   Pulses palpable  Extr:        No cyanosis or clubbing--*** edema.    Neuro:    No focal deficits.   alert oriented x3  Skin:       Intact without lesions or rashes.    Psych:    Alert and cooperative; normal mood and affect; .      Results Review:  I have personally reviewed most recent Data :  CBC    Results from last 7 days   Lab Units 23  0604 23  1132 23  2121   WBC 10*3/mm3 5.30 4.90 4.70   HEMOGLOBIN g/dL 10.7* 10.8* 11.6*   PLATELETS 10*3/mm3 184 191 204     CMP   Results from last 7 days   Lab Units 23  0604 23  1132 23  2121   SODIUM mmol/L 131* 131* 129*   POTASSIUM mmol/L 3.6 3.8 3.7   CHLORIDE mmol/L 95* 91* 91*   CO2 mmol/L 23.0 27.0 25.0   BUN  mg/dL 33* 50* 48*   CREATININE mg/dL 3.71* 4.74* 4.65*   GLUCOSE mg/dL 223* 83 212*   ALBUMIN g/dL 3.0*  --   --    BILIRUBIN mg/dL 0.3  --   --    ALK PHOS U/L 110  --   --    AST (SGOT) U/L 20  --   --    ALT (SGPT) U/L 14  --   --      ABG      CT Abdomen Pelvis Without Contrast    Result Date: 5/12/2023  Impression: 1. Left lower quadrant peritoneal dialysis catheter in expected position. No abnormal fluid along the subcutaneous tract of the catheter through the skin. Trace residual fluid is present within the pelvis. 2. Mild residual body wall edema. 3. Cardiomegaly Electronically Signed: Guillaume Bullock  5/12/2023 10:54 PM EDT  Workstation ID: [id number]      Results for orders placed during the hospital encounter of 04/16/23    Adult Transthoracic Echo Complete W/ Cont if Necessary Per Protocol    Interpretation Summary  •  Left ventricular systolic function is severely decreased. Left ventricular ejection fraction appears to be less than 20%.  •  The left ventricular cavity is moderate to severely dilated.  •  The right ventricular cavity is moderately dilated.  •  Estimated right ventricular systolic pressure from tricuspid regurgitation is markedly elevated (>55 mmHg). Calculated right ventricular systolic pressure from tricuspid regurgitation is 58 mmHg.    Scheduled Meds:  Continuous Infusions:No current facility-administered medications for this encounter.    PRN Meds:    Assessment & Plan   Assessment and Plan:         Problem with dialysis access, initial encounter    ASSESSMENT:  • ESRD with patient having PD catheter at this time and is with significant edema  • Hyponatremia  • Hypervolemia/Volume overload, patient with gross B?L lower extremity edema  • Dilated Cardiomyopathy  • CAD on Brilanta  • H/o Heart dfailure  • H/o COPD  • H/o Diabetes Mellitus  • Anemia of chronic disease  • Artherosclorotic heart disease  • Secondary hyperparathyroidism of Renal disease  • Vitamin D deficiency  • S/p  AICD  • H/o Hyperlipidemia on statins     EF 20% with mod-severe dilated left ventricle, echo on 4/18/2023. [place] decreased left ventricle systolic function Rt ventricle moderately dilated, left atrial dilation. RVSP >55        PLAN :      • Patient will get hemodialysis today and depending upon the volume status may need hemodialysis again tomorrow morning  • Continue hemodialysis I wanted to do hemodialysis today but patient really want to go home  • Next hemodialysis Tuesday on dialysis  • Meanwhile patient will continue PD training  • Continue diuretics at this time   • Blood pressure stable  • Hemoglobin acceptable  • We will follow patient on PD  • Daily weight  • Intake output record  • Avoid nephrotoxic agents  • Thanks for the consultation  • We will continue follow up          Electronically signed by Harish Lagunas MD,   [facility] kidney consultant  [phone]  5/14/2023  19:34 EDT
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
## 2023-05-15 NOTE — CASE MANAGEMENT/SOCIAL WORK
Case Management Discharge Note      Final Note: Routine home.         Selected Continued Care - Discharged on 5/14/2023 Admission date: 5/12/2023 - Discharge disposition: Home or Self Care     Transportation Services  Private: Car    Final Discharge Disposition Code: 01 - home or self-care

## 2023-05-16 LAB
BACTERIA SPEC AEROBE CULT: NORMAL
GRAM STN SPEC: NORMAL
GRAM STN SPEC: NORMAL

## 2023-05-18 ENCOUNTER — READMISSION MANAGEMENT (OUTPATIENT)
Dept: CALL CENTER | Facility: HOSPITAL | Age: 63
End: 2023-05-18
Payer: COMMERCIAL

## 2023-05-18 LAB
BACTERIA SPEC AEROBE CULT: NORMAL
BACTERIA SPEC AEROBE CULT: NORMAL

## 2023-05-18 NOTE — OUTREACH NOTE
Medical Week 1 Survey    Flowsheet Row Responses   [facility] facility patient discharged from? [facility]   Does the patient have one of the following disease processes/diagnoses(primary or secondary)? Other   Week 1 attempt successful? No   Unsuccessful attempts Attempt 1          Crista CHADWICK - Licensed Nurse

## 2023-05-23 ENCOUNTER — READMISSION MANAGEMENT (OUTPATIENT)
Dept: CALL CENTER | Facility: HOSPITAL | Age: 63
End: 2023-05-23
Payer: COMMERCIAL

## 2023-05-23 NOTE — OUTREACH NOTE
Medical Week 1 Survey    Flowsheet Row Responses   [facility] patient discharged from? [facility]   Does the patient have one of the following disease processes/diagnoses(primary or secondary)? Other   Week 1 attempt successful? Yes   Call start time 1306   Call end time 1307   Discharge diagnosis **Problem with dialysis accessESRD   Person spoke with today (if not patient) and relationship spouse   Meds reviewed with patient/caregiver? Yes   Is the patient having any side effects they believe may be caused by any medication additions or changes? No   Does the patient have all medications ordered at discharge? N/A   Is the patient taking all medications as directed (includes completed medication regime)? Yes   Does the patient have a primary care provider?  Yes   Does the patient have an appointment with their PCP within 7 days of discharge? No   Nursing Interventions Educated patient on importance of making appointment, Advised patient to make appointment   Has the patient kept scheduled appointments due by today? N/A   Psychosocial issues? No   Did the patient receive a copy of their discharge instructions? Yes   Nursing interventions Reviewed instructions with patient   What is the patient's perception of their health status since discharge? Improving   Is the patient/caregiver able to teach back signs and symptoms related to disease process for when to call PCP? Yes   Is the patient/caregiver able to teach back signs and symptoms related to disease process for when to call 911? Yes   Is the patient/caregiver able to teach back the hierarchy of who to call/visit for symptoms/problems? PCP, Specialist, Home health nurse, Urgent Care, ED, 911 Yes   If the patient is a current smoker, are they able to teach back resources for cessation? Not a smoker   Week 1 call completed? Yes   Is the patient interested in additional calls from an ambulatory ?  NOTE:  applies to high risk patients requiring additional  follow-up. No   Wrap up additional comments Pt states he is doing better. No medication changes. Pt advised to make a PCP fu appt.          Indy CHADWICK - Registered Nurse

## 2023-06-01 ENCOUNTER — APPOINTMENT (OUTPATIENT)
Dept: GENERAL RADIOLOGY | Facility: HOSPITAL | Age: 63
End: 2023-06-01
Payer: COMMERCIAL

## 2023-06-01 ENCOUNTER — HOSPITAL ENCOUNTER (OUTPATIENT)
Facility: HOSPITAL | Age: 63
Setting detail: OBSERVATION
Discharge: LEFT BEFORE DISCHARGE | End: 2023-06-02
Attending: EMERGENCY MEDICINE | Admitting: HOSPITALIST
Payer: COMMERCIAL

## 2023-06-01 DIAGNOSIS — R06.00 DYSPNEA, UNSPECIFIED TYPE: Primary | ICD-10-CM

## 2023-06-01 DIAGNOSIS — I50.9 ACUTE ON CHRONIC CONGESTIVE HEART FAILURE, UNSPECIFIED HEART FAILURE TYPE: ICD-10-CM

## 2023-06-01 DIAGNOSIS — N17.9 ACUTE RENAL FAILURE SUPERIMPOSED ON CHRONIC KIDNEY DISEASE, UNSPECIFIED CKD STAGE, UNSPECIFIED ACUTE RENAL FAILURE TYPE: ICD-10-CM

## 2023-06-01 DIAGNOSIS — N18.9 ACUTE RENAL FAILURE SUPERIMPOSED ON CHRONIC KIDNEY DISEASE, UNSPECIFIED CKD STAGE, UNSPECIFIED ACUTE RENAL FAILURE TYPE: ICD-10-CM

## 2023-06-01 LAB
ANION GAP SERPL CALCULATED.3IONS-SCNC: 19 MMOL/L (ref 5–15)
BACTERIA UR QL AUTO: ABNORMAL /HPF
BASOPHILS # BLD AUTO: 0 10*3/MM3 (ref 0–0.2)
BASOPHILS NFR BLD AUTO: 0.6 % (ref 0–1.5)
BILIRUB UR QL STRIP: NEGATIVE
BUN SERPL-MCNC: 114 MG/DL (ref 8–23)
BUN/CREAT SERPL: 20.5 (ref 7–25)
CALCIUM SPEC-SCNC: 9.2 MG/DL (ref 8.6–10.5)
CHLORIDE SERPL-SCNC: 84 MMOL/L (ref 98–107)
CLARITY UR: CLEAR
CO2 SERPL-SCNC: 22 MMOL/L (ref 22–29)
COLOR UR: YELLOW
CREAT SERPL-MCNC: 5.56 MG/DL (ref 0.76–1.27)
DEPRECATED RDW RBC AUTO: 82.7 FL (ref 37–54)
EGFRCR SERPLBLD CKD-EPI 2021: 11 ML/MIN/1.73
EOSINOPHIL # BLD AUTO: 0.3 10*3/MM3 (ref 0–0.4)
EOSINOPHIL NFR BLD AUTO: 4.2 % (ref 0.3–6.2)
ERYTHROCYTE [DISTWIDTH] IN BLOOD BY AUTOMATED COUNT: 25.5 % (ref 12.3–15.4)
GLUCOSE SERPL-MCNC: 179 MG/DL (ref 65–99)
GLUCOSE UR STRIP-MCNC: ABNORMAL MG/DL
HCT VFR BLD AUTO: 31.5 % (ref 37.5–51)
HGB BLD-MCNC: 10.5 G/DL (ref 13–17.7)
HGB UR QL STRIP.AUTO: NEGATIVE
HYALINE CASTS UR QL AUTO: ABNORMAL /LPF
KETONES UR QL STRIP: NEGATIVE
LEUKOCYTE ESTERASE UR QL STRIP.AUTO: NEGATIVE
LYMPHOCYTES # BLD AUTO: 0.6 10*3/MM3 (ref 0.7–3.1)
LYMPHOCYTES NFR BLD AUTO: 7.5 % (ref 19.6–45.3)
MCH RBC QN AUTO: 30.8 PG (ref 26.6–33)
MCHC RBC AUTO-ENTMCNC: 33.2 G/DL (ref 31.5–35.7)
MCV RBC AUTO: 92.6 FL (ref 79–97)
MONOCYTES # BLD AUTO: 0.9 10*3/MM3 (ref 0.1–0.9)
MONOCYTES NFR BLD AUTO: 11 % (ref 5–12)
NEUTROPHILS NFR BLD AUTO: 6.2 10*3/MM3 (ref 1.7–7)
NEUTROPHILS NFR BLD AUTO: 76.7 % (ref 42.7–76)
NITRITE UR QL STRIP: NEGATIVE
NRBC BLD AUTO-RTO: 0.1 /100 WBC (ref 0–0.2)
NT-PROBNP SERPL-MCNC: ABNORMAL PG/ML (ref 0–900)
PH UR STRIP.AUTO: <=5 [PH] (ref 5–8)
PLATELET # BLD AUTO: 223 10*3/MM3 (ref 140–450)
PMV BLD AUTO: 9.3 FL (ref 6–12)
POTASSIUM SERPL-SCNC: 4.5 MMOL/L (ref 3.5–5.2)
PROT UR QL STRIP: ABNORMAL
RBC # BLD AUTO: 3.39 10*6/MM3 (ref 4.14–5.8)
RBC # UR STRIP: ABNORMAL /HPF
REF LAB TEST METHOD: ABNORMAL
SODIUM SERPL-SCNC: 125 MMOL/L (ref 136–145)
SP GR UR STRIP: 1.02 (ref 1–1.03)
SQUAMOUS #/AREA URNS HPF: ABNORMAL /HPF
UROBILINOGEN UR QL STRIP: ABNORMAL
WBC # UR STRIP: ABNORMAL /HPF
WBC NRBC COR # BLD: 8.1 10*3/MM3 (ref 3.4–10.8)

## 2023-06-01 PROCEDURE — 36415 COLL VENOUS BLD VENIPUNCTURE: CPT

## 2023-06-01 PROCEDURE — 96374 THER/PROPH/DIAG INJ IV PUSH: CPT

## 2023-06-01 PROCEDURE — 25010000002 MORPHINE PER 10 MG: Performed by: EMERGENCY MEDICINE

## 2023-06-01 PROCEDURE — 96375 TX/PRO/DX INJ NEW DRUG ADDON: CPT

## 2023-06-01 PROCEDURE — 25010000002 ONDANSETRON PER 1 MG: Performed by: EMERGENCY MEDICINE

## 2023-06-01 PROCEDURE — 80048 BASIC METABOLIC PNL TOTAL CA: CPT | Performed by: EMERGENCY MEDICINE

## 2023-06-01 PROCEDURE — 85025 COMPLETE CBC W/AUTO DIFF WBC: CPT | Performed by: EMERGENCY MEDICINE

## 2023-06-01 PROCEDURE — 83880 ASSAY OF NATRIURETIC PEPTIDE: CPT | Performed by: EMERGENCY MEDICINE

## 2023-06-01 PROCEDURE — 81001 URINALYSIS AUTO W/SCOPE: CPT | Performed by: EMERGENCY MEDICINE

## 2023-06-01 PROCEDURE — 71045 X-RAY EXAM CHEST 1 VIEW: CPT

## 2023-06-01 PROCEDURE — 93005 ELECTROCARDIOGRAM TRACING: CPT | Performed by: EMERGENCY MEDICINE

## 2023-06-01 PROCEDURE — G0378 HOSPITAL OBSERVATION PER HR: HCPCS

## 2023-06-01 PROCEDURE — 99284 EMERGENCY DEPT VISIT MOD MDM: CPT

## 2023-06-01 PROCEDURE — 25010000002 FUROSEMIDE PER 20 MG: Performed by: EMERGENCY MEDICINE

## 2023-06-01 RX ORDER — OMEPRAZOLE 20 MG/1
20 CAPSULE, DELAYED RELEASE ORAL DAILY
COMMUNITY

## 2023-06-01 RX ORDER — SODIUM CHLORIDE 0.9 % (FLUSH) 0.9 %
10 SYRINGE (ML) INJECTION AS NEEDED
Status: DISCONTINUED | OUTPATIENT
Start: 2023-06-01 | End: 2023-06-03 | Stop reason: HOSPADM

## 2023-06-01 RX ORDER — MORPHINE SULFATE 2 MG/ML
2 INJECTION, SOLUTION INTRAMUSCULAR; INTRAVENOUS EVERY 4 HOURS PRN
Status: DISCONTINUED | OUTPATIENT
Start: 2023-06-01 | End: 2023-06-03 | Stop reason: HOSPADM

## 2023-06-01 RX ORDER — ONDANSETRON 2 MG/ML
4 INJECTION INTRAMUSCULAR; INTRAVENOUS EVERY 6 HOURS PRN
Status: DISCONTINUED | OUTPATIENT
Start: 2023-06-01 | End: 2023-06-03 | Stop reason: HOSPADM

## 2023-06-01 RX ORDER — ONDANSETRON 2 MG/ML
4 INJECTION INTRAMUSCULAR; INTRAVENOUS ONCE
Status: COMPLETED | OUTPATIENT
Start: 2023-06-01 | End: 2023-06-01

## 2023-06-01 RX ORDER — MORPHINE SULFATE 2 MG/ML
2 INJECTION, SOLUTION INTRAMUSCULAR; INTRAVENOUS ONCE
Status: COMPLETED | OUTPATIENT
Start: 2023-06-01 | End: 2023-06-01

## 2023-06-01 RX ORDER — FUROSEMIDE 10 MG/ML
80 INJECTION INTRAMUSCULAR; INTRAVENOUS ONCE
Status: COMPLETED | OUTPATIENT
Start: 2023-06-01 | End: 2023-06-01

## 2023-06-01 RX ADMIN — FUROSEMIDE 80 MG: 40 INJECTION, SOLUTION INTRAMUSCULAR; INTRAVENOUS at 22:53

## 2023-06-01 RX ADMIN — MORPHINE SULFATE 2 MG: 2 INJECTION, SOLUTION INTRAMUSCULAR; INTRAVENOUS at 22:08

## 2023-06-01 RX ADMIN — ONDANSETRON 4 MG: 2 INJECTION INTRAMUSCULAR; INTRAVENOUS at 22:08

## 2023-06-02 ENCOUNTER — READMISSION MANAGEMENT (OUTPATIENT)
Dept: CALL CENTER | Facility: HOSPITAL | Age: 63
End: 2023-06-02

## 2023-06-02 ENCOUNTER — APPOINTMENT (OUTPATIENT)
Dept: CT IMAGING | Facility: HOSPITAL | Age: 63
End: 2023-06-02
Payer: COMMERCIAL

## 2023-06-02 VITALS
DIASTOLIC BLOOD PRESSURE: 75 MMHG | OXYGEN SATURATION: 95 % | HEART RATE: 98 BPM | BODY MASS INDEX: 28.7 KG/M2 | SYSTOLIC BLOOD PRESSURE: 116 MMHG | HEIGHT: 69 IN | TEMPERATURE: 97 F | WEIGHT: 193.78 LBS | RESPIRATION RATE: 22 BRPM

## 2023-06-02 LAB
ANION GAP SERPL CALCULATED.3IONS-SCNC: 20 MMOL/L (ref 5–15)
BASOPHILS # BLD AUTO: 0 10*3/MM3 (ref 0–0.2)
BASOPHILS NFR BLD AUTO: 0.6 % (ref 0–1.5)
BUN SERPL-MCNC: 112 MG/DL (ref 8–23)
BUN/CREAT SERPL: 21.1 (ref 7–25)
CALCIUM SPEC-SCNC: 8.9 MG/DL (ref 8.6–10.5)
CHLORIDE SERPL-SCNC: 85 MMOL/L (ref 98–107)
CO2 SERPL-SCNC: 21 MMOL/L (ref 22–29)
CREAT SERPL-MCNC: 5.31 MG/DL (ref 0.76–1.27)
DEPRECATED RDW RBC AUTO: 82.7 FL (ref 37–54)
EGFRCR SERPLBLD CKD-EPI 2021: 11.6 ML/MIN/1.73
EOSINOPHIL # BLD AUTO: 0.1 10*3/MM3 (ref 0–0.4)
EOSINOPHIL NFR BLD AUTO: 2 % (ref 0.3–6.2)
ERYTHROCYTE [DISTWIDTH] IN BLOOD BY AUTOMATED COUNT: 25.6 % (ref 12.3–15.4)
GLUCOSE SERPL-MCNC: 223 MG/DL (ref 65–99)
HBV SURFACE AB SER RIA-ACNC: NORMAL
HBV SURFACE AG SERPL QL IA: NORMAL
HCT VFR BLD AUTO: 30.7 % (ref 37.5–51)
HGB BLD-MCNC: 10.2 G/DL (ref 13–17.7)
LYMPHOCYTES # BLD AUTO: 0.4 10*3/MM3 (ref 0.7–3.1)
LYMPHOCYTES NFR BLD AUTO: 6.3 % (ref 19.6–45.3)
MCH RBC QN AUTO: 30.7 PG (ref 26.6–33)
MCHC RBC AUTO-ENTMCNC: 33.1 G/DL (ref 31.5–35.7)
MCV RBC AUTO: 92.6 FL (ref 79–97)
MONOCYTES # BLD AUTO: 0.5 10*3/MM3 (ref 0.1–0.9)
MONOCYTES NFR BLD AUTO: 8.5 % (ref 5–12)
NEUTROPHILS NFR BLD AUTO: 5.1 10*3/MM3 (ref 1.7–7)
NEUTROPHILS NFR BLD AUTO: 82.6 % (ref 42.7–76)
NRBC BLD AUTO-RTO: 0.3 /100 WBC (ref 0–0.2)
PLATELET # BLD AUTO: 202 10*3/MM3 (ref 140–450)
PMV BLD AUTO: 9.1 FL (ref 6–12)
POTASSIUM SERPL-SCNC: 3.8 MMOL/L (ref 3.5–5.2)
RBC # BLD AUTO: 3.31 10*6/MM3 (ref 4.14–5.8)
SODIUM SERPL-SCNC: 126 MMOL/L (ref 136–145)
WBC NRBC COR # BLD: 6.1 10*3/MM3 (ref 3.4–10.8)

## 2023-06-02 PROCEDURE — G0378 HOSPITAL OBSERVATION PER HR: HCPCS

## 2023-06-02 PROCEDURE — 87340 HEPATITIS B SURFACE AG IA: CPT | Performed by: INTERNAL MEDICINE

## 2023-06-02 PROCEDURE — 80048 BASIC METABOLIC PNL TOTAL CA: CPT | Performed by: NURSE PRACTITIONER

## 2023-06-02 PROCEDURE — 74176 CT ABD & PELVIS W/O CONTRAST: CPT

## 2023-06-02 PROCEDURE — G0257 UNSCHED DIALYSIS ESRD PT HOS: HCPCS

## 2023-06-02 PROCEDURE — 86706 HEP B SURFACE ANTIBODY: CPT | Performed by: INTERNAL MEDICINE

## 2023-06-02 PROCEDURE — 85025 COMPLETE CBC W/AUTO DIFF WBC: CPT | Performed by: NURSE PRACTITIONER

## 2023-06-02 RX ORDER — SEVELAMER CARBONATE 800 MG/1
800 TABLET, FILM COATED ORAL
Status: DISCONTINUED | OUTPATIENT
Start: 2023-06-02 | End: 2023-06-03 | Stop reason: HOSPADM

## 2023-06-02 RX ORDER — POLYETHYLENE GLYCOL 3350 17 G/17G
17 POWDER, FOR SOLUTION ORAL DAILY PRN
Status: DISCONTINUED | OUTPATIENT
Start: 2023-06-02 | End: 2023-06-03 | Stop reason: HOSPADM

## 2023-06-02 RX ORDER — ONDANSETRON 2 MG/ML
4 INJECTION INTRAMUSCULAR; INTRAVENOUS EVERY 6 HOURS PRN
Status: DISCONTINUED | OUTPATIENT
Start: 2023-06-02 | End: 2023-06-02 | Stop reason: SDUPTHER

## 2023-06-02 RX ORDER — DIPHENOXYLATE HYDROCHLORIDE AND ATROPINE SULFATE 2.5; .025 MG/1; MG/1
1 TABLET ORAL DAILY
Status: DISCONTINUED | OUTPATIENT
Start: 2023-06-02 | End: 2023-06-03 | Stop reason: HOSPADM

## 2023-06-02 RX ORDER — BISACODYL 10 MG
10 SUPPOSITORY, RECTAL RECTAL DAILY PRN
Status: DISCONTINUED | OUTPATIENT
Start: 2023-06-02 | End: 2023-06-03 | Stop reason: HOSPADM

## 2023-06-02 RX ORDER — METOLAZONE 2.5 MG/1
2.5 TABLET ORAL DAILY
Status: DISCONTINUED | OUTPATIENT
Start: 2023-06-02 | End: 2023-06-03 | Stop reason: HOSPADM

## 2023-06-02 RX ORDER — BISACODYL 5 MG/1
5 TABLET, DELAYED RELEASE ORAL DAILY PRN
Status: DISCONTINUED | OUTPATIENT
Start: 2023-06-02 | End: 2023-06-03 | Stop reason: HOSPADM

## 2023-06-02 RX ORDER — NITROGLYCERIN 0.4 MG/1
0.4 TABLET SUBLINGUAL
Status: DISCONTINUED | OUTPATIENT
Start: 2023-06-02 | End: 2023-06-03 | Stop reason: HOSPADM

## 2023-06-02 RX ORDER — ISOSORBIDE MONONITRATE 30 MG/1
30 TABLET, EXTENDED RELEASE ORAL DAILY
Status: DISCONTINUED | OUTPATIENT
Start: 2023-06-02 | End: 2023-06-03 | Stop reason: HOSPADM

## 2023-06-02 RX ORDER — HYDRALAZINE HYDROCHLORIDE 25 MG/1
25 TABLET, FILM COATED ORAL 2 TIMES DAILY
Status: DISCONTINUED | OUTPATIENT
Start: 2023-06-02 | End: 2023-06-03 | Stop reason: HOSPADM

## 2023-06-02 RX ORDER — BUMETANIDE 1 MG/1
2 TABLET ORAL 3 TIMES DAILY
Status: DISCONTINUED | OUTPATIENT
Start: 2023-06-02 | End: 2023-06-03 | Stop reason: HOSPADM

## 2023-06-02 RX ORDER — ATORVASTATIN CALCIUM 20 MG/1
20 TABLET, FILM COATED ORAL DAILY
Status: DISCONTINUED | OUTPATIENT
Start: 2023-06-02 | End: 2023-06-03 | Stop reason: HOSPADM

## 2023-06-02 RX ORDER — GABAPENTIN 100 MG/1
100 CAPSULE ORAL 3 TIMES DAILY
Status: DISCONTINUED | OUTPATIENT
Start: 2023-06-02 | End: 2023-06-03 | Stop reason: HOSPADM

## 2023-06-02 RX ORDER — SODIUM CHLORIDE 0.9 % (FLUSH) 0.9 %
10 SYRINGE (ML) INJECTION AS NEEDED
Status: DISCONTINUED | OUTPATIENT
Start: 2023-06-02 | End: 2023-06-03 | Stop reason: HOSPADM

## 2023-06-02 RX ORDER — METOPROLOL SUCCINATE 50 MG/1
50 TABLET, EXTENDED RELEASE ORAL DAILY
Status: DISCONTINUED | OUTPATIENT
Start: 2023-06-02 | End: 2023-06-03 | Stop reason: HOSPADM

## 2023-06-02 RX ORDER — SODIUM CHLORIDE 0.9 % (FLUSH) 0.9 %
10 SYRINGE (ML) INJECTION EVERY 12 HOURS SCHEDULED
Status: DISCONTINUED | OUTPATIENT
Start: 2023-06-02 | End: 2023-06-03 | Stop reason: HOSPADM

## 2023-06-02 RX ORDER — SODIUM CHLORIDE 9 MG/ML
40 INJECTION, SOLUTION INTRAVENOUS AS NEEDED
Status: DISCONTINUED | OUTPATIENT
Start: 2023-06-02 | End: 2023-06-03 | Stop reason: HOSPADM

## 2023-06-02 RX ORDER — TRAZODONE HYDROCHLORIDE 50 MG/1
50 TABLET ORAL NIGHTLY
Status: DISCONTINUED | OUTPATIENT
Start: 2023-06-02 | End: 2023-06-03 | Stop reason: HOSPADM

## 2023-06-02 RX ORDER — PANTOPRAZOLE SODIUM 40 MG/1
40 TABLET, DELAYED RELEASE ORAL
Status: DISCONTINUED | OUTPATIENT
Start: 2023-06-02 | End: 2023-06-03 | Stop reason: HOSPADM

## 2023-06-02 RX ORDER — SPIRONOLACTONE 25 MG/1
25 TABLET ORAL DAILY
Status: DISCONTINUED | OUTPATIENT
Start: 2023-06-02 | End: 2023-06-03 | Stop reason: HOSPADM

## 2023-06-02 RX ORDER — ASPIRIN 81 MG/1
81 TABLET ORAL DAILY
Status: DISCONTINUED | OUTPATIENT
Start: 2023-06-02 | End: 2023-06-03 | Stop reason: HOSPADM

## 2023-06-02 RX ADMIN — TICAGRELOR 90 MG: 90 TABLET ORAL at 08:35

## 2023-06-02 RX ADMIN — SEVELAMER CARBONATE 800 MG: 800 TABLET, FILM COATED ORAL at 08:35

## 2023-06-02 RX ADMIN — ISOSORBIDE MONONITRATE 30 MG: 30 TABLET, EXTENDED RELEASE ORAL at 08:35

## 2023-06-02 RX ADMIN — METOLAZONE 2.5 MG: 2.5 TABLET ORAL at 08:35

## 2023-06-02 RX ADMIN — ASPIRIN 81 MG: 81 TABLET, COATED ORAL at 08:35

## 2023-06-02 RX ADMIN — Medication 10 ML: at 08:35

## 2023-06-02 RX ADMIN — BUMETANIDE 2 MG: 1 TABLET ORAL at 08:35

## 2023-06-02 RX ADMIN — PANTOPRAZOLE SODIUM 40 MG: 40 TABLET, DELAYED RELEASE ORAL at 05:25

## 2023-06-02 RX ADMIN — SEVELAMER CARBONATE 800 MG: 800 TABLET, FILM COATED ORAL at 12:47

## 2023-06-02 RX ADMIN — Medication 10 ML: at 01:33

## 2023-06-02 RX ADMIN — TRAZODONE HYDROCHLORIDE 50 MG: 50 TABLET ORAL at 01:33

## 2023-06-02 RX ADMIN — HYDRALAZINE HYDROCHLORIDE 25 MG: 25 TABLET, FILM COATED ORAL at 08:35

## 2023-06-02 RX ADMIN — GABAPENTIN 100 MG: 100 CAPSULE ORAL at 12:47

## 2023-06-02 RX ADMIN — SPIRONOLACTONE 25 MG: 25 TABLET ORAL at 08:35

## 2023-06-02 RX ADMIN — ATORVASTATIN CALCIUM 20 MG: 20 TABLET, FILM COATED ORAL at 08:35

## 2023-06-02 RX ADMIN — METOPROLOL SUCCINATE 50 MG: 50 TABLET, EXTENDED RELEASE ORAL at 08:35

## 2023-06-02 RX ADMIN — THERA TABS 1 TABLET: TAB at 08:35
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
## 2023-06-02 NOTE — PROGRESS NOTES
[facility] Medicine Services   Daily Progress Note    Patient Name: Esdras Peralta  : 1960  MRN: [id number]  Primary Care Physician:  Erika Hernandez MD  Date of admission: 2023  Date and Time of Service:       Subjective      Chief Complaint:     Still has nausea  Currently no abdominal pain  No fevers chills or sweats  Has been complaining of exertional dyspnea  No orthopnea or significant LE edema       Objective      Vitals:   Temp:  [97 °F (36.1 °C)-98.2 °F (36.8 °C)] 97 °F (36.1 °C)  Heart Rate:  [] 98  Resp:  [20-22] 22  BP: (116-140)/(75-92) 116/75    Physical Exam   General: No acute distress  HEENT: Neck supple, normal oral mucosa, no masses, no lymphadenopathy. Slightly elevated JVD  Lungs: mild bibasilar crackles, no wheezing,  no rhonchi. Equal excursions.   CV - Normal S1/S2, no murmur, regular rate and rhythm   Abdomen - Soft, nontender, nondistended, normal bowel sounds  Extremities - mild bilateral LE pitting edema, no erythema  Neuro - No focal weakness, normal sensation  Psych - Alert and oriented x3  Skin - no wounds or lesions.          Result Review    Result Review:  I have personally reviewed the results from the time of this admission to 2023 09:45 EDT and agree with these findings:  [x]  Laboratory  [x]  Microbiology  [x]  Radiology  [x]  EKG/Telemetry   [x]  Cardiology/Vascular   []  Pathology  [x]  Old records  []  Other:  Most notable findings include:           Assessment & Plan      Brief Patient Summary:  Esdras Peralta is a 62 y.o. male who       aspirin, 81 mg, Oral, Daily  atorvastatin, 20 mg, Oral, Daily  bumetanide, 2 mg, Oral, TID  hydrALAZINE, 25 mg, Oral, BID  isosorbide mononitrate, 30 mg, Oral, Daily  metOLazone, 2.5 mg, Oral, Daily  metoprolol succinate XL, 50 mg, Oral, Daily  multivitamin, 1 tablet, Oral, Daily  pantoprazole, 40 mg, Oral, Q AM  sevelamer, 800 mg, Oral, TID With Meals  sodium chloride, 10 mL, Intravenous,  Q12H  spironolactone, 25 mg, Oral, Daily  ticagrelor, 90 mg, Oral, BID  traZODone, 50 mg, Oral, Nightly       insulin,          Active Hospital Problems:  Active Hospital Problems    Diagnosis    • **Dyspnea, unspecified type      Plan:     Abdominal pain, nausea and vomiting, generalized malaise and fatigue  - HD on hold and has been on daily PD  - new reports of abdominal pain, abdominal bloating, gas discomfort and multiple loose stools a day  - negative for leukocytosis  - CT Abd and Pelvis pending  - consider gastroenteritis- continue PPI     Acute on chronic systolic heart failure   - echo 4/18/23 EF <20%  - will consult nehrology ( pt followed by Dr. Lagunas) for HD/PD management  - given 80mg lasix in ED  - resume home diuretics- pt reports has been compliant  - Denies chest pain/ palpitation  - Aldactone, Hydralazine, Imdur, Metolazone bumex, Metoprolol      Hyponatremia, acute on chronic  - nephrology consult.      DM type 1  - may continue home insulin pump     New symptom of RLS  - will follow nephrology recommendation  - pt reports symptoms previously resolved with HD     CAD s/p stent - no chest pain. ASA, Brilinta, Statin, BB    DL - Statin      Chronic anemia - stable     DVT prophylaxis:  Mechanical DVT prophylaxis orders are present.    CODE STATUS:    Code Status (Patient has no pulse and is not breathing): CPR (Attempt to Resuscitate)  Medical Interventions (Patient has pulse or is breathing): Full Support      Disposition:  I expect patient to be discharged TBD.    This patient has been  and discussed with . 06/02/23      Electronically signed by Sara Dumont MD, 06/02/23, 09:45 EDT.  [facility] Hospitalist Team
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
## 2023-06-02 NOTE — PLAN OF CARE
Goal Outcome Evaluation:      Pt alert and oriented. Had CT of the abdomen today and went to HD around 1330.

## 2023-06-02 NOTE — PLAN OF CARE
Goal Outcome Evaluation:         Patient able to make needs known. Vss on Room air. No complaints of pain this shift. Insulin pump log at bedside. Patient educated on how to use the log. Personal items and call light within reach. Plan of care on going.

## 2023-06-02 NOTE — CONSULTS
Diabetes Education    Patient Name:  Esdras Peralta  YOB: 1960  MRN: [id number]  Admit Date:  6/1/2023        MD consult for insulin pump.  Insulin Pump Contract completed and in chart: Yes  Insulin Pump Log at bedside:  Yes  LDA started: Yes  Patient Supplied Insulin Pump (orders initiated): Yes  Educator Consult entered: Yes    Pump Brand/Model: Omnipod model 5  CGMS Brand/Model: Dexcom G6    Type of Insulin Used: Humalog    Basal Rate: (units/hour) 12am - 1 U/H                                                    5pm - 0.7 U/H    Bolus Rate: (insulin:carbohydrate ratio) 1:15    Last Bolus Given:                                                                  Insulin Sensitivity Factor/Correction Dose: 1:50     Blood Glucose Target: 100    Active Insulin: 4 hours    Date of Last Site Change: 6/1/2023    Location of Catheter: right shoulder    Site Assessment: clean, dry, intact    Educator assessed:       pump supplies at bedside: Yes       Insulin Expiration Date: 2/6/2026 Humalog    Patient receiving dialysis assessed at bedside. Patient alert and oriented and appropriate for pump use. Patient has no further questions or concerns related to diabetes at this time.              Electronically signed by:  Mae Sidhu RN  06/02/23 15:49 EDT

## 2023-06-02 NOTE — OUTREACH NOTE
Medical Week 2 Survey    Flowsheet Row Responses   [facility] facility patient discharged from? [facility]   Does the patient have one of the following disease processes/diagnoses(primary or secondary)? Other   Week 2 attempt successful? No   Unsuccessful attempts Attempt 2   Revoke Readmitted          Mary GRECO - Registered Nurse

## 2023-06-02 NOTE — CASE MANAGEMENT/SOCIAL WORK
Discharge Planning Assessment   [facility]     Patient Name: Esdras Peralta  MRN: [id number]  Today's Date: 6/2/2023    Admit Date: 6/1/2023    Plan: Anticipate routine home with spouse. Current OP HD [facility].   Discharge Needs Assessment     Row Name 06/02/23 1435       Living Environment    People in Home spouse    Current Living Arrangements home    Potentially Unsafe Housing Conditions none    Primary Care Provided by self    Provides Primary Care For no one, unable/limited ability to care for self    Family Caregiver if Needed spouse    Quality of Family Relationships helpful    Able to Return to Prior Arrangements yes       Resource/Environmental Concerns    Resource/Environmental Concerns none    Transportation Concerns none       Transition Planning    Patient/Family Anticipates Transition to home with family    Patient/Family Anticipated Services at Transition none    Transportation Anticipated family or friend will provide       Discharge Needs Assessment    Equipment Currently Used at Home medication pump    Concerns to be Addressed denies needs/concerns at this time    Anticipated Changes Related to Illness none    Equipment Needed After Discharge none               Discharge Plan     Row Name 06/02/23 1436       Plan    Plan Anticipate routine home with spouse. Current OP HD [facility].    Patient/Family in Agreement with Plan yes    Plan Comments CM met with patient at bedside. Patient lives with spouse, is independent with ADLs and drives. PCP and pharmacy verified-denies any difficulty affording meds. Patient denies any d/c needs at this time and spouse will provide transport at d/c. Barrier to D/C: HD today, CT abd/pelvis today.                  Expected Discharge Date and Time     Expected Discharge Date Expected Discharge Time    Randal 3, 2023          Demographic Summary     Row Name 06/02/23 1435       General Information    Admission Type observation    Arrived  From emergency department    Referral Source admission list    Reason for Consult discharge planning    Preferred Language English       Contact Information    Permission Granted to Share Info With                Functional Status     Row Name 06/02/23 1435       Functional Status    Usual Activity Tolerance moderate    Current Activity Tolerance moderate       Functional Status, IADL    Medications independent    Meal Preparation independent    Housekeeping independent    Laundry independent    Shopping independent       Mental Status    General Appearance WDL WDL       Mental Status Summary    Recent Changes in Mental Status/Cognitive Functioning no changes              Met with patient in room. Maintained distance greater than six feet and spent less than 15 minutes in the room.      BRENDA OvalleN, [place] [address]    Office: [phone]  Fax: [phone]

## 2023-06-03 NOTE — NURSING NOTE
Talked with patient and explained to him that Dr Lagunas is only a consulting physician while he is in the hospital and he can only tell patient he is okay with him discharging but he can't put in the actual discharge orders.  Pt upset that he was admitted to a hospitalist and not to Dr Lagunas.  Again I tried to explain to him that specialist are not normally the admitting physician.  Dr Lagunas did not enter his note until 1932 stating he was okay with patient leaving after dialysis.  NP on for hopsitalist not comfortable with placing discharge orders. Pt was concerned about not having a ride home tomorrow and told him we could work with him on that.  Pt states he still is leaving [facility] and wants to get a copy of his medical records.  Explained to him records are available on My Chart or he can submit a request to medical records after discharge.

## 2023-06-03 NOTE — OUTREACH NOTE
Prep Survey    Flowsheet Row Responses   [facility] facility patient discharged from? [facility]   Is LACE score < 7 ? No   Eligibility Readm Mgmt   Discharge diagnosis Acute on chronic systolic heart failure, hyponatremia   Does the patient have one of the following disease processes/diagnoses(primary or secondary)? CHF   Does the patient have Home health ordered? No   Is there a DME ordered? No   Prep survey completed? Yes          Katie LANDRY - Registered Nurse

## 2023-06-05 LAB — QT INTERVAL: 340 MS

## 2023-06-05 NOTE — CASE MANAGEMENT/SOCIAL WORK
Case Management Discharge Note      Final Note: Discharged AMA             Transportation Services  Private: Car    Final Discharge Disposition Code: 07 - left AMA

## 2023-06-08 ENCOUNTER — READMISSION MANAGEMENT (OUTPATIENT)
Dept: CALL CENTER | Facility: HOSPITAL | Age: 63
End: 2023-06-08
Payer: COMMERCIAL

## 2023-06-08 NOTE — OUTREACH NOTE
CHF Week 1 Survey      Flowsheet Row Responses   [facility] facility patient discharged from? [facility]   Does the patient have one of the following disease processes/diagnoses(primary or secondary)? CHF   CHF Week 1 attempt successful? No   Unsuccessful attempts Attempt 1   Revoke Decline to participate  [Left AMA]            Indy CHADWICK - Registered Nurse

## 2023-06-12 RX ORDER — TICAGRELOR 90 MG/1
TABLET ORAL
Qty: 180 TABLET | Refills: 0 | Status: SHIPPED | OUTPATIENT
Start: 2023-06-12

## 2023-06-12 RX ORDER — HYDRALAZINE HYDROCHLORIDE 25 MG/1
TABLET, FILM COATED ORAL
Qty: 180 TABLET | Refills: 0 | Status: SHIPPED | OUTPATIENT
Start: 2023-06-12

## 2023-07-07 ENCOUNTER — OFFICE (AMBULATORY)
Dept: URBAN - METROPOLITAN AREA CLINIC 64 | Facility: CLINIC | Age: 63
End: 2023-07-07
Payer: COMMERCIAL

## 2023-07-07 VITALS
HEIGHT: 69 IN | HEART RATE: 98 BPM | DIASTOLIC BLOOD PRESSURE: 60 MMHG | SYSTOLIC BLOOD PRESSURE: 104 MMHG | WEIGHT: 181 LBS

## 2023-07-07 DIAGNOSIS — R10.31 RIGHT LOWER QUADRANT PAIN: ICD-10-CM

## 2023-07-07 DIAGNOSIS — R19.4 CHANGE IN BOWEL HABIT: ICD-10-CM

## 2023-07-07 DIAGNOSIS — R14.0 ABDOMINAL DISTENSION (GASEOUS): ICD-10-CM

## 2023-07-07 PROCEDURE — 99204 OFFICE O/P NEW MOD 45 MIN: CPT | Performed by: NURSE PRACTITIONER

## 2023-07-07 RX ORDER — DICYCLOMINE HYDROCHLORIDE 10 MG/1
40 CAPSULE ORAL
Qty: 60 | Refills: 11 | Status: ACTIVE
Start: 2023-07-07

## 2023-07-07 RX ORDER — METRONIDAZOLE 500 MG/1
1500 TABLET, FILM COATED ORAL
Qty: 42 | Refills: 0 | Status: COMPLETED
Start: 2023-07-07 | End: 2023-09-11

## 2023-07-13 PROBLEM — N18.9 ANEMIA IN CHRONIC KIDNEY DISEASE: Status: ACTIVE | Noted: 2023-06-15

## 2023-07-13 PROBLEM — D50.9 IRON DEFICIENCY ANEMIA: Status: ACTIVE | Noted: 2023-06-15

## 2023-07-13 PROBLEM — D63.1 ANEMIA IN CHRONIC KIDNEY DISEASE: Status: ACTIVE | Noted: 2023-06-15

## 2023-08-17 ENCOUNTER — TELEPHONE (OUTPATIENT)
Dept: ENDOCRINOLOGY | Facility: CLINIC | Age: 63
End: 2023-08-17

## 2023-08-17 NOTE — TELEPHONE ENCOUNTER
Provider: MACIEJ    Caller: LIZA MURPHY    Relationship to Patient: SELF    Pharmacy: [facility]    Phone Number: [phone]    Reason for Call: PATIENT NEEDS 1 PACK OF BD NEEDLES [facility]. PLEASE CALL IF ANY QUESTIONS.

## 2023-09-01 PROCEDURE — 93296 REM INTERROG EVL PM/IDS: CPT | Performed by: NURSE PRACTITIONER

## 2023-09-01 PROCEDURE — 93295 DEV INTERROG REMOTE 1/2/MLT: CPT | Performed by: NURSE PRACTITIONER

## 2023-09-02 ENCOUNTER — HOSPITAL ENCOUNTER (OUTPATIENT)
Facility: HOSPITAL | Age: 63
Setting detail: OBSERVATION
Discharge: HOME | End: 2023-09-03
Attending: EMERGENCY MEDICINE | Admitting: EMERGENCY MEDICINE
Payer: COMMERCIAL

## 2023-09-02 DIAGNOSIS — K92.2 GASTROINTESTINAL HEMORRHAGE, UNSPECIFIED GASTROINTESTINAL HEMORRHAGE TYPE: Primary | ICD-10-CM

## 2023-09-02 DIAGNOSIS — N18.32 STAGE 3B CHRONIC KIDNEY DISEASE: ICD-10-CM

## 2023-09-02 LAB
ABO GROUP BLD: NORMAL
ALBUMIN SERPL-MCNC: 3.4 G/DL (ref 3.5–5.2)
ALBUMIN/GLOB SERPL: 1 G/DL
ALP SERPL-CCNC: 118 U/L (ref 39–117)
ALT SERPL W P-5'-P-CCNC: 18 U/L (ref 1–41)
ANION GAP SERPL CALCULATED.3IONS-SCNC: 13 MMOL/L (ref 5–15)
APTT PPP: 26.8 SECONDS (ref 61–76.5)
AST SERPL-CCNC: 25 U/L (ref 1–40)
BASOPHILS # BLD AUTO: 0 10*3/MM3 (ref 0–0.2)
BASOPHILS NFR BLD AUTO: 0.7 % (ref 0–1.5)
BILIRUB SERPL-MCNC: 0.5 MG/DL (ref 0–1.2)
BLD GP AB SCN SERPL QL: NEGATIVE
BUN SERPL-MCNC: 56 MG/DL (ref 8–23)
BUN/CREAT SERPL: 12.7 (ref 7–25)
CALCIUM SPEC-SCNC: 9.2 MG/DL (ref 8.6–10.5)
CHLORIDE SERPL-SCNC: 86 MMOL/L (ref 98–107)
CO2 SERPL-SCNC: 28 MMOL/L (ref 22–29)
CREAT SERPL-MCNC: 4.4 MG/DL (ref 0.76–1.27)
DEPRECATED RDW RBC AUTO: 66.9 FL (ref 37–54)
EGFRCR SERPLBLD CKD-EPI 2021: 14.6 ML/MIN/1.73
EOSINOPHIL # BLD AUTO: 0.1 10*3/MM3 (ref 0–0.4)
EOSINOPHIL NFR BLD AUTO: 1.2 % (ref 0.3–6.2)
ERYTHROCYTE [DISTWIDTH] IN BLOOD BY AUTOMATED COUNT: 17.5 % (ref 12.3–15.4)
GLOBULIN UR ELPH-MCNC: 3.3 GM/DL
GLUCOSE SERPL-MCNC: 213 MG/DL (ref 65–99)
HBA1C MFR BLD: 7.8 % (ref 4.8–5.6)
HCT VFR BLD AUTO: 39.7 % (ref 37.5–51)
HGB BLD-MCNC: 13.6 G/DL (ref 13–17.7)
HOLD SPECIMEN: NORMAL
INR PPP: 1.05 (ref 0.93–1.1)
LYMPHOCYTES # BLD AUTO: 0.8 10*3/MM3 (ref 0.7–3.1)
LYMPHOCYTES NFR BLD AUTO: 13.1 % (ref 19.6–45.3)
MAGNESIUM SERPL-MCNC: 2.1 MG/DL (ref 1.6–2.4)
MCH RBC QN AUTO: 35.2 PG (ref 26.6–33)
MCHC RBC AUTO-ENTMCNC: 34.3 G/DL (ref 31.5–35.7)
MCV RBC AUTO: 102.8 FL (ref 79–97)
MONOCYTES # BLD AUTO: 0.8 10*3/MM3 (ref 0.1–0.9)
MONOCYTES NFR BLD AUTO: 14 % (ref 5–12)
NEUTROPHILS NFR BLD AUTO: 4.3 10*3/MM3 (ref 1.7–7)
NEUTROPHILS NFR BLD AUTO: 71 % (ref 42.7–76)
NRBC BLD AUTO-RTO: 0 /100 WBC (ref 0–0.2)
PLATELET # BLD AUTO: 195 10*3/MM3 (ref 140–450)
PMV BLD AUTO: 7.9 FL (ref 6–12)
POTASSIUM SERPL-SCNC: 3 MMOL/L (ref 3.5–5.2)
PROT SERPL-MCNC: 6.7 G/DL (ref 6–8.5)
PROTHROMBIN TIME: 11.2 SECONDS (ref 9.6–11.7)
RBC # BLD AUTO: 3.86 10*6/MM3 (ref 4.14–5.8)
RH BLD: POSITIVE
SODIUM SERPL-SCNC: 127 MMOL/L (ref 136–145)
T&S EXPIRATION DATE: NORMAL
WBC NRBC COR # BLD: 6 10*3/MM3 (ref 3.4–10.8)

## 2023-09-02 PROCEDURE — 36415 COLL VENOUS BLD VENIPUNCTURE: CPT | Performed by: EMERGENCY MEDICINE

## 2023-09-02 PROCEDURE — 86901 BLOOD TYPING SEROLOGIC RH(D): CPT

## 2023-09-02 PROCEDURE — 86900 BLOOD TYPING SEROLOGIC ABO: CPT

## 2023-09-02 PROCEDURE — 86900 BLOOD TYPING SEROLOGIC ABO: CPT | Performed by: EMERGENCY MEDICINE

## 2023-09-02 PROCEDURE — 85730 THROMBOPLASTIN TIME PARTIAL: CPT | Performed by: EMERGENCY MEDICINE

## 2023-09-02 PROCEDURE — 86901 BLOOD TYPING SEROLOGIC RH(D): CPT | Performed by: EMERGENCY MEDICINE

## 2023-09-02 PROCEDURE — 85610 PROTHROMBIN TIME: CPT | Performed by: EMERGENCY MEDICINE

## 2023-09-02 PROCEDURE — 86850 RBC ANTIBODY SCREEN: CPT | Performed by: EMERGENCY MEDICINE

## 2023-09-02 PROCEDURE — 85025 COMPLETE CBC W/AUTO DIFF WBC: CPT | Performed by: EMERGENCY MEDICINE

## 2023-09-02 PROCEDURE — 96375 TX/PRO/DX INJ NEW DRUG ADDON: CPT

## 2023-09-02 PROCEDURE — 25010000002 ONDANSETRON PER 1 MG: Performed by: EMERGENCY MEDICINE

## 2023-09-02 PROCEDURE — G0378 HOSPITAL OBSERVATION PER HR: HCPCS

## 2023-09-02 PROCEDURE — 99284 EMERGENCY DEPT VISIT MOD MDM: CPT

## 2023-09-02 PROCEDURE — 83735 ASSAY OF MAGNESIUM: CPT | Performed by: EMERGENCY MEDICINE

## 2023-09-02 PROCEDURE — 83036 HEMOGLOBIN GLYCOSYLATED A1C: CPT | Performed by: NURSE PRACTITIONER

## 2023-09-02 PROCEDURE — 80053 COMPREHEN METABOLIC PANEL: CPT | Performed by: EMERGENCY MEDICINE

## 2023-09-02 PROCEDURE — 96374 THER/PROPH/DIAG INJ IV PUSH: CPT

## 2023-09-02 RX ORDER — POTASSIUM CHLORIDE 750 MG/1
10 TABLET, FILM COATED, EXTENDED RELEASE ORAL ONCE
Status: COMPLETED | OUTPATIENT
Start: 2023-09-02 | End: 2023-09-02

## 2023-09-02 RX ORDER — SODIUM CHLORIDE 0.9 % (FLUSH) 0.9 %
10 SYRINGE (ML) INJECTION EVERY 12 HOURS SCHEDULED
Status: DISCONTINUED | OUTPATIENT
Start: 2023-09-02 | End: 2023-09-03 | Stop reason: HOSPADM

## 2023-09-02 RX ORDER — BISACODYL 10 MG
10 SUPPOSITORY, RECTAL RECTAL DAILY PRN
Status: DISCONTINUED | OUTPATIENT
Start: 2023-09-02 | End: 2023-09-03 | Stop reason: HOSPADM

## 2023-09-02 RX ORDER — AMOXICILLIN 250 MG
2 CAPSULE ORAL 2 TIMES DAILY
Status: DISCONTINUED | OUTPATIENT
Start: 2023-09-02 | End: 2023-09-03 | Stop reason: HOSPADM

## 2023-09-02 RX ORDER — SODIUM CHLORIDE 0.9 % (FLUSH) 0.9 %
10 SYRINGE (ML) INJECTION AS NEEDED
Status: DISCONTINUED | OUTPATIENT
Start: 2023-09-02 | End: 2023-09-03 | Stop reason: HOSPADM

## 2023-09-02 RX ORDER — TRAZODONE HYDROCHLORIDE 50 MG/1
50 TABLET ORAL NIGHTLY
COMMUNITY

## 2023-09-02 RX ORDER — SODIUM CHLORIDE 9 MG/ML
40 INJECTION, SOLUTION INTRAVENOUS AS NEEDED
Status: DISCONTINUED | OUTPATIENT
Start: 2023-09-02 | End: 2023-09-03 | Stop reason: HOSPADM

## 2023-09-02 RX ORDER — TRAZODONE HYDROCHLORIDE 50 MG/1
50 TABLET ORAL NIGHTLY PRN
Status: DISCONTINUED | OUTPATIENT
Start: 2023-09-02 | End: 2023-09-03 | Stop reason: HOSPADM

## 2023-09-02 RX ORDER — INSULIN LISPRO 100 [IU]/ML
INJECTION, SOLUTION INTRAVENOUS; SUBCUTANEOUS AS NEEDED
COMMUNITY

## 2023-09-02 RX ORDER — NICOTINE POLACRILEX 4 MG
15 LOZENGE BUCCAL
Status: DISCONTINUED | OUTPATIENT
Start: 2023-09-02 | End: 2023-09-03 | Stop reason: HOSPADM

## 2023-09-02 RX ORDER — DEXTROSE MONOHYDRATE 25 G/50ML
25 INJECTION, SOLUTION INTRAVENOUS
Status: DISCONTINUED | OUTPATIENT
Start: 2023-09-02 | End: 2023-09-03 | Stop reason: HOSPADM

## 2023-09-02 RX ORDER — BUMETANIDE 2 MG/1
2 TABLET ORAL 2 TIMES DAILY
COMMUNITY

## 2023-09-02 RX ORDER — POLYETHYLENE GLYCOL 3350 17 G/17G
17 POWDER, FOR SOLUTION ORAL DAILY PRN
Status: DISCONTINUED | OUTPATIENT
Start: 2023-09-02 | End: 2023-09-03 | Stop reason: HOSPADM

## 2023-09-02 RX ORDER — PANTOPRAZOLE SODIUM 40 MG/10ML
40 INJECTION, POWDER, LYOPHILIZED, FOR SOLUTION INTRAVENOUS
Status: DISCONTINUED | OUTPATIENT
Start: 2023-09-03 | End: 2023-09-03 | Stop reason: HOSPADM

## 2023-09-02 RX ORDER — ROPINIROLE 4 MG/1
4 TABLET, FILM COATED ORAL NIGHTLY
COMMUNITY

## 2023-09-02 RX ORDER — ROPINIROLE 1 MG/1
4 TABLET, FILM COATED ORAL NIGHTLY
Status: DISCONTINUED | OUTPATIENT
Start: 2023-09-02 | End: 2023-09-03 | Stop reason: HOSPADM

## 2023-09-02 RX ORDER — BUMETANIDE 1 MG/1
2 TABLET ORAL 2 TIMES DAILY
Status: DISCONTINUED | OUTPATIENT
Start: 2023-09-02 | End: 2023-09-03

## 2023-09-02 RX ORDER — PANTOPRAZOLE SODIUM 40 MG/10ML
80 INJECTION, POWDER, LYOPHILIZED, FOR SOLUTION INTRAVENOUS ONCE
Status: COMPLETED | OUTPATIENT
Start: 2023-09-02 | End: 2023-09-02

## 2023-09-02 RX ORDER — BISACODYL 5 MG/1
5 TABLET, DELAYED RELEASE ORAL DAILY PRN
Status: DISCONTINUED | OUTPATIENT
Start: 2023-09-02 | End: 2023-09-03 | Stop reason: HOSPADM

## 2023-09-02 RX ORDER — ONDANSETRON 2 MG/ML
4 INJECTION INTRAMUSCULAR; INTRAVENOUS EVERY 6 HOURS PRN
Status: DISCONTINUED | OUTPATIENT
Start: 2023-09-02 | End: 2023-09-03 | Stop reason: HOSPADM

## 2023-09-02 RX ORDER — IBUPROFEN 600 MG/1
1 TABLET ORAL
Status: DISCONTINUED | OUTPATIENT
Start: 2023-09-02 | End: 2023-09-03 | Stop reason: HOSPADM

## 2023-09-02 RX ORDER — ACETAMINOPHEN 325 MG/1
650 TABLET ORAL EVERY 4 HOURS PRN
Status: DISCONTINUED | OUTPATIENT
Start: 2023-09-02 | End: 2023-09-03 | Stop reason: HOSPADM

## 2023-09-02 RX ORDER — FERROUS SULFATE 325(65) MG
325 TABLET ORAL EVERY OTHER DAY
COMMUNITY

## 2023-09-02 RX ORDER — ONDANSETRON 4 MG/1
4 TABLET, FILM COATED ORAL EVERY 6 HOURS PRN
Status: DISCONTINUED | OUTPATIENT
Start: 2023-09-02 | End: 2023-09-03 | Stop reason: HOSPADM

## 2023-09-02 RX ORDER — ONDANSETRON 2 MG/ML
4 INJECTION INTRAMUSCULAR; INTRAVENOUS ONCE
Status: COMPLETED | OUTPATIENT
Start: 2023-09-02 | End: 2023-09-02

## 2023-09-02 RX ADMIN — BUMETANIDE 2 MG: 1 TABLET ORAL at 20:19

## 2023-09-02 RX ADMIN — ROPINIROLE HYDROCHLORIDE 4 MG: 1 TABLET, FILM COATED ORAL at 20:20

## 2023-09-02 RX ADMIN — PANTOPRAZOLE SODIUM 80 MG: 40 INJECTION, POWDER, LYOPHILIZED, FOR SOLUTION INTRAVENOUS at 12:51

## 2023-09-02 RX ADMIN — ONDANSETRON 4 MG: 2 INJECTION INTRAMUSCULAR; INTRAVENOUS at 12:51

## 2023-09-02 RX ADMIN — POTASSIUM CHLORIDE 10 MEQ: 750 TABLET, EXTENDED RELEASE ORAL at 20:20

## 2023-09-02 RX ADMIN — TRAZODONE HYDROCHLORIDE 50 MG: 50 TABLET ORAL at 20:20

## 2023-09-02 NOTE — ED NOTES
"Nursing report ED to floor  Esdras Peralta  62 y.o.  male    HPI:   Chief Complaint   Patient presents with    Vomiting Blood       Admitting doctor:   Hola Jorgensen MD    Admitting diagnosis:   The encounter diagnosis was Gastrointestinal hemorrhage, unspecified gastrointestinal hemorrhage type.    Code status:   Current Code Status       Date Active Code Status Order ID Comments User Context       Prior            Allergies:   Codeine    Isolation:  No active isolations     Fall Risk:  Fall Risk Assessment was completed, and patient is at moderate risk for falls.   Predictive Model Details   No score data available for Risk of Fall        Weight:       09/02/23  1220   Weight: 79.7 kg (175 lb 9.6 oz)       Intake and Output  No intake or output data in the 24 hours ending 09/02/23 1404    Diet:        Most recent vitals:   Vitals:    09/02/23 1220 09/02/23 1248 09/02/23 1300 09/02/23 1401   BP: 115/68 125/75 114/72 115/70   BP Location: Left arm      Patient Position: Sitting      Pulse: 102 94 90 97   Resp: 16      Temp: 97.7 °F (36.5 °C)      TempSrc: Oral      SpO2: 100% 100% 99% 97%   Weight: 79.7 kg (175 lb 9.6 oz)      Height: 175.3 cm (69\")          Active LDAs/IV Access:   Lines, Drains & Airways       Active LDAs       Name Placement date Placement time Site Days    Peripheral IV 09/02/23 1245 Left Antecubital 09/02/23  1245  Antecubital  less than 1    Peritoneal Dialysis Catheter Left lower abdomen 04/24/23  1042  Left lower abdomen  131    Insulin Pump 06/02/23  1452  -- 91    Hemodialysis Cath Double 04/28/23  1228  Chest  127                    Skin Condition:   Skin Assessments (last day)       Date/Time Skin WDL    09/02/23 1240 X     Skin WDL: peritoneal dialysis catheter in LLQ, site WDL, dressing dry/clean. hemodialysis catheter to right chest, dressing dry/clean. Pt has insulin pump in place, pt denies issues with any of these devices recently at 09/02/23 1240             Labs (abnormal " labs have a star):   Labs Reviewed   COMPREHENSIVE METABOLIC PANEL - Abnormal; Notable for the following components:       Result Value    Glucose 213 (*)     BUN 56 (*)     Creatinine 4.40 (*)     Sodium 127 (*)     Potassium 3.0 (*)     Chloride 86 (*)     Albumin 3.4 (*)     Alkaline Phosphatase 118 (*)     eGFR 14.6 (*)     All other components within normal limits    Narrative:     GFR Normal >60  Chronic Kidney Disease <60  Kidney Failure <15     APTT - Abnormal; Notable for the following components:    PTT 26.8 (*)     All other components within normal limits   CBC WITH AUTO DIFFERENTIAL - Abnormal; Notable for the following components:    RBC 3.86 (*)     .8 (*)     MCH 35.2 (*)     RDW 17.5 (*)     RDW-SD 66.9 (*)     Lymphocyte % 13.1 (*)     Monocyte % 14.0 (*)     All other components within normal limits   PROTIME-INR - Normal   MAGNESIUM   TYPE AND SCREEN   BB ARMBAND CHECK   CBC AND DIFFERENTIAL    Narrative:     The following orders were created for panel order CBC & Differential.  Procedure                               Abnormality         Status                     ---------                               -----------         ------                     CBC Auto Differential[[id number]]        Abnormal            Final result                 Please view results for these tests on the individual orders.   EXTRA TUBES    Narrative:     The following orders were created for panel order Extra Tubes.  Procedure                               Abnormality         Status                     ---------                               -----------         ------                     [facility] - SST[[id number]]                                   Final result                 Please view results for these tests on the individual orders.   GOLD TOP - [facility]       LOC: Person, Place, Time, and Situation    Telemetry:  Observation Unit    Cardiac Monitoring Ordered: yes    EKG:   No orders to display       Medications  Given in the ED:   Medications   sodium chloride 0.9 % flush 10 mL (has no administration in time range)   ondansetron (ZOFRAN) injection 4 mg (4 mg Intravenous Given 23 1251)   pantoprazole (PROTONIX) injection 80 mg (80 mg Intravenous Given 23 1251)       Imaging results:  No radiology results for the last day    Social issues:   Social History     Socioeconomic History    Marital status:    Tobacco Use    Smoking status: Former     Packs/day: 0.00     Types: Cigarettes     Quit date:      Years since quittin.6    Smokeless tobacco: Never    Tobacco comments:     2019 quit   Vaping Use    Vaping Use: Never used   Substance and Sexual Activity    Alcohol use: No    Drug use: No    Sexual activity: Defer       NIH Stroke Scale:  Interval: (not recorded)  1a. Level of Consciousness: (not recorded)  1b. LOC Questions: (not recorded)  1c. LOC Commands: (not recorded)  2. Best Gaze: (not recorded)  3. Visual: (not recorded)  4. Facial Palsy: (not recorded)  5a. Motor Arm, Left: (not recorded)  5b. Motor Arm, Right: (not recorded)  6a. Motor Leg, Left: (not recorded)  6b. Motor Leg, Right: (not recorded)  7. Limb Ataxia: (not recorded)  8. Sensory: (not recorded)  9. Best Language: (not recorded)  10. Dysarthria: (not recorded)  11. Extinction and Inattention (formerly Neglect): (not recorded)    Total (NIH Stroke Scale): (not recorded)     Additional notable assessment information: On dialysis, type 1 diabetic also     Nursing report ED to floor:  KIP Lan RN   23 14:04 EDT
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
## 2023-09-02 NOTE — PLAN OF CARE
Goal Outcome Evaluation:           Progress: improving  Outcome Evaluation: no s/s of distess at this time

## 2023-09-03 ENCOUNTER — ANESTHESIA (OUTPATIENT)
Dept: TELEMETRY | Facility: HOSPITAL | Age: 63
End: 2023-09-03

## 2023-09-03 ENCOUNTER — ON CAMPUS - OUTPATIENT (AMBULATORY)
Dept: URBAN - METROPOLITAN AREA HOSPITAL 85 | Facility: HOSPITAL | Age: 63
End: 2023-09-03
Payer: COMMERCIAL

## 2023-09-03 ENCOUNTER — READMISSION MANAGEMENT (OUTPATIENT)
Dept: CALL CENTER | Facility: HOSPITAL | Age: 63
End: 2023-09-03
Payer: COMMERCIAL

## 2023-09-03 ENCOUNTER — ANESTHESIA EVENT (OUTPATIENT)
Dept: TELEMETRY | Facility: HOSPITAL | Age: 63
End: 2023-09-03

## 2023-09-03 VITALS
WEIGHT: 176.2 LBS | BODY MASS INDEX: 26.1 KG/M2 | TEMPERATURE: 97 F | OXYGEN SATURATION: 99 % | HEART RATE: 103 BPM | HEIGHT: 69 IN | DIASTOLIC BLOOD PRESSURE: 79 MMHG | SYSTOLIC BLOOD PRESSURE: 126 MMHG | RESPIRATION RATE: 18 BRPM

## 2023-09-03 DIAGNOSIS — R74.8 ABNORMAL LEVELS OF OTHER SERUM ENZYMES: ICD-10-CM

## 2023-09-03 DIAGNOSIS — K92.0 HEMATEMESIS: ICD-10-CM

## 2023-09-03 DIAGNOSIS — K21.9 GASTRO-ESOPHAGEAL REFLUX DISEASE WITHOUT ESOPHAGITIS: ICD-10-CM

## 2023-09-03 PROBLEM — I50.23 ACUTE ON CHRONIC SYSTOLIC HEART FAILURE: Status: ACTIVE | Noted: 2023-09-03

## 2023-09-03 PROBLEM — I50.21 ACUTE SYSTOLIC HEART FAILURE: Status: ACTIVE | Noted: 2023-09-03

## 2023-09-03 LAB
ALBUMIN SERPL-MCNC: 3.4 G/DL (ref 3.5–5.2)
ALP SERPL-CCNC: 130 U/L (ref 39–117)
ALT SERPL W P-5'-P-CCNC: 20 U/L (ref 1–41)
ANION GAP SERPL CALCULATED.3IONS-SCNC: 15 MMOL/L (ref 5–15)
AST SERPL-CCNC: 33 U/L (ref 1–40)
BASOPHILS # BLD AUTO: 0.1 10*3/MM3 (ref 0–0.2)
BASOPHILS NFR BLD AUTO: 0.8 % (ref 0–1.5)
BILIRUB CONJ SERPL-MCNC: <0.2 MG/DL (ref 0–0.3)
BILIRUB INDIRECT SERPL-MCNC: ABNORMAL MG/DL
BILIRUB SERPL-MCNC: 0.4 MG/DL (ref 0–1.2)
BUN SERPL-MCNC: 57 MG/DL (ref 8–23)
BUN/CREAT SERPL: 13.3 (ref 7–25)
CALCIUM SPEC-SCNC: 9.1 MG/DL (ref 8.6–10.5)
CHLORIDE SERPL-SCNC: 87 MMOL/L (ref 98–107)
CO2 SERPL-SCNC: 26 MMOL/L (ref 22–29)
CREAT SERPL-MCNC: 4.28 MG/DL (ref 0.76–1.27)
DEPRECATED RDW RBC AUTO: 63 FL (ref 37–54)
EGFRCR SERPLBLD CKD-EPI 2021: 15.1 ML/MIN/1.73
EOSINOPHIL # BLD AUTO: 0.1 10*3/MM3 (ref 0–0.4)
EOSINOPHIL NFR BLD AUTO: 2.1 % (ref 0.3–6.2)
ERYTHROCYTE [DISTWIDTH] IN BLOOD BY AUTOMATED COUNT: 17.2 % (ref 12.3–15.4)
GLUCOSE BLDC GLUCOMTR-MCNC: 127 MG/DL (ref 70–105)
GLUCOSE SERPL-MCNC: 236 MG/DL (ref 65–99)
HCT VFR BLD AUTO: 40.5 % (ref 37.5–51)
HGB BLD-MCNC: 13.5 G/DL (ref 13–17.7)
LYMPHOCYTES # BLD AUTO: 1 10*3/MM3 (ref 0.7–3.1)
LYMPHOCYTES NFR BLD AUTO: 16 % (ref 19.6–45.3)
MCH RBC QN AUTO: 35.3 PG (ref 26.6–33)
MCHC RBC AUTO-ENTMCNC: 33.4 G/DL (ref 31.5–35.7)
MCV RBC AUTO: 105.8 FL (ref 79–97)
MONOCYTES # BLD AUTO: 0.8 10*3/MM3 (ref 0.1–0.9)
MONOCYTES NFR BLD AUTO: 11.7 % (ref 5–12)
NEUTROPHILS NFR BLD AUTO: 4.5 10*3/MM3 (ref 1.7–7)
NEUTROPHILS NFR BLD AUTO: 69.4 % (ref 42.7–76)
NRBC BLD AUTO-RTO: 0 /100 WBC (ref 0–0.2)
NT-PROBNP SERPL-MCNC: ABNORMAL PG/ML (ref 0–900)
PLATELET # BLD AUTO: 201 10*3/MM3 (ref 140–450)
PMV BLD AUTO: 8.4 FL (ref 6–12)
POTASSIUM SERPL-SCNC: 2.9 MMOL/L (ref 3.5–5.2)
PROT SERPL-MCNC: 6.9 G/DL (ref 6–8.5)
RBC # BLD AUTO: 3.83 10*6/MM3 (ref 4.14–5.8)
SODIUM SERPL-SCNC: 128 MMOL/L (ref 136–145)
WBC NRBC COR # BLD: 6.5 10*3/MM3 (ref 3.4–10.8)

## 2023-09-03 PROCEDURE — 83880 ASSAY OF NATRIURETIC PEPTIDE: CPT | Performed by: NURSE PRACTITIONER

## 2023-09-03 PROCEDURE — 82948 REAGENT STRIP/BLOOD GLUCOSE: CPT

## 2023-09-03 PROCEDURE — 80076 HEPATIC FUNCTION PANEL: CPT | Performed by: NURSE PRACTITIONER

## 2023-09-03 PROCEDURE — 96376 TX/PRO/DX INJ SAME DRUG ADON: CPT

## 2023-09-03 PROCEDURE — 85025 COMPLETE CBC W/AUTO DIFF WBC: CPT | Performed by: NURSE PRACTITIONER

## 2023-09-03 PROCEDURE — G0378 HOSPITAL OBSERVATION PER HR: HCPCS

## 2023-09-03 PROCEDURE — 99222 1ST HOSP IP/OBS MODERATE 55: CPT | Performed by: NURSE PRACTITIONER

## 2023-09-03 PROCEDURE — 80048 BASIC METABOLIC PNL TOTAL CA: CPT | Performed by: NURSE PRACTITIONER

## 2023-09-03 RX ORDER — ONDANSETRON 4 MG/1
4 TABLET, FILM COATED ORAL EVERY 6 HOURS PRN
Qty: 30 TABLET | Refills: 0 | Status: SHIPPED | OUTPATIENT
Start: 2023-09-03

## 2023-09-03 RX ORDER — SODIUM CHLORIDE 1 G/1
3 TABLET ORAL ONCE
Status: COMPLETED | OUTPATIENT
Start: 2023-09-03 | End: 2023-09-03

## 2023-09-03 RX ORDER — POTASSIUM CHLORIDE 20 MEQ/1
40 TABLET, EXTENDED RELEASE ORAL ONCE
Status: COMPLETED | OUTPATIENT
Start: 2023-09-03 | End: 2023-09-03

## 2023-09-03 RX ORDER — PANTOPRAZOLE SODIUM 40 MG/1
40 TABLET, DELAYED RELEASE ORAL DAILY
Qty: 30 TABLET | Refills: 0 | Status: SHIPPED | OUTPATIENT
Start: 2023-09-03

## 2023-09-03 RX ORDER — METOCLOPRAMIDE 10 MG/1
5 TABLET ORAL
Qty: 60 TABLET | Refills: 0 | Status: SHIPPED | OUTPATIENT
Start: 2023-09-03 | End: 2023-10-03

## 2023-09-03 RX ORDER — TRAZODONE HYDROCHLORIDE 50 MG/1
50 TABLET ORAL NIGHTLY
Status: DISCONTINUED | OUTPATIENT
Start: 2023-09-03 | End: 2023-09-03 | Stop reason: HOSPADM

## 2023-09-03 RX ORDER — DEXTROSE AND SODIUM CHLORIDE 5; .9 G/100ML; G/100ML
50 INJECTION, SOLUTION INTRAVENOUS CONTINUOUS
Status: DISCONTINUED | OUTPATIENT
Start: 2023-09-03 | End: 2023-09-03

## 2023-09-03 RX ADMIN — POTASSIUM CHLORIDE 40 MEQ: 1500 TABLET, EXTENDED RELEASE ORAL at 13:27

## 2023-09-03 RX ADMIN — Medication 10 ML: at 05:26

## 2023-09-03 RX ADMIN — SODIUM CHLORIDE 250 ML: 9 INJECTION, SOLUTION INTRAVENOUS at 13:34

## 2023-09-03 RX ADMIN — DEXTROSE AND SODIUM CHLORIDE 50 ML/HR: 5; 900 INJECTION, SOLUTION INTRAVENOUS at 08:42

## 2023-09-03 RX ADMIN — PANTOPRAZOLE SODIUM 40 MG: 40 INJECTION, POWDER, LYOPHILIZED, FOR SOLUTION INTRAVENOUS at 08:14

## 2023-09-03 RX ADMIN — BUMETANIDE 2 MG: 1 TABLET ORAL at 08:14

## 2023-09-03 RX ADMIN — SODIUM CHLORIDE 3 G: 1 TABLET ORAL at 13:27

## 2023-09-03 NOTE — PLAN OF CARE
Goal Outcome Evaluation:  Plan of Care Reviewed With: patient        Progress: improving  Outcome Evaluation: pt has rested well this shift, no complaints this shift and no episodes of distress this shift
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
## 2023-09-03 NOTE — NURSING NOTE
Dr. Shepard called writer back and requested pt have some electrolyte replacement.  Wants sodium >130 and K+ >3 prior to procedure.

## 2023-09-03 NOTE — PLAN OF CARE
Goal Outcome Evaluation:  Plan of Care Reviewed With: patient        Progress: improving  Outcome Evaluation: pt to d/c home

## 2023-09-03 NOTE — NURSING NOTE
Writer spoke with Dr. Shepard from [facility] nephrology and he stated he has seen and cleared pt for procedure and will put note in shortly.
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
## 2023-09-04 NOTE — OUTREACH NOTE
Prep Survey      Flowsheet Row Responses   [facility] facility patient discharged from? [facility]   Is LACE score < 7 ? No   Eligibility Readm Mgmt   Discharge diagnosis GI bleeding   Does the patient have one of the following disease processes/diagnoses(primary or secondary)? Other   Does the patient have Home health ordered? No   Is there a DME ordered? No   Prep survey completed? Yes            Estelita DAVILA - Registered Nurse

## 2023-09-05 ENCOUNTER — LAB (OUTPATIENT)
Dept: LAB | Facility: HOSPITAL | Age: 63
End: 2023-09-05
Payer: COMMERCIAL

## 2023-09-05 DIAGNOSIS — N18.32 STAGE 3B CHRONIC KIDNEY DISEASE: ICD-10-CM

## 2023-09-05 LAB
ANION GAP SERPL CALCULATED.3IONS-SCNC: 10 MMOL/L (ref 5–15)
BUN SERPL-MCNC: 45 MG/DL (ref 8–23)
BUN/CREAT SERPL: 10.3 (ref 7–25)
CALCIUM SPEC-SCNC: 9.4 MG/DL (ref 8.6–10.5)
CHLORIDE SERPL-SCNC: 96 MMOL/L (ref 98–107)
CO2 SERPL-SCNC: 30 MMOL/L (ref 22–29)
CREAT SERPL-MCNC: 4.39 MG/DL (ref 0.76–1.27)
EGFRCR SERPLBLD CKD-EPI 2021: 14.6 ML/MIN/1.73
GLUCOSE SERPL-MCNC: 169 MG/DL (ref 65–99)
POTASSIUM SERPL-SCNC: 3.8 MMOL/L (ref 3.5–5.2)
SODIUM SERPL-SCNC: 136 MMOL/L (ref 136–145)

## 2023-09-05 PROCEDURE — 36415 COLL VENOUS BLD VENIPUNCTURE: CPT

## 2023-09-05 PROCEDURE — 80048 BASIC METABOLIC PNL TOTAL CA: CPT

## 2023-09-07 ENCOUNTER — READMISSION MANAGEMENT (OUTPATIENT)
Dept: CALL CENTER | Facility: HOSPITAL | Age: 63
End: 2023-09-07
Payer: COMMERCIAL

## 2023-09-07 NOTE — OUTREACH NOTE
Medical Week 1 Survey      Flowsheet Row Responses   [facility] facility patient discharged from? [facility]   Does the patient have one of the following disease processes/diagnoses(primary or secondary)? Other   Week 1 attempt successful? No   Unsuccessful attempts Attempt 1            Crista CHADWICK - Licensed Nurse

## 2023-09-11 ENCOUNTER — OFFICE (AMBULATORY)
Dept: URBAN - METROPOLITAN AREA CLINIC 64 | Facility: CLINIC | Age: 63
End: 2023-09-11
Payer: COMMERCIAL

## 2023-09-11 VITALS
HEIGHT: 69 IN | SYSTOLIC BLOOD PRESSURE: 125 MMHG | DIASTOLIC BLOOD PRESSURE: 84 MMHG | HEART RATE: 100 BPM | WEIGHT: 181 LBS

## 2023-09-11 DIAGNOSIS — K92.0 HEMATEMESIS: ICD-10-CM

## 2023-09-11 DIAGNOSIS — R10.31 RIGHT LOWER QUADRANT PAIN: ICD-10-CM

## 2023-09-11 PROCEDURE — 99213 OFFICE O/P EST LOW 20 MIN: CPT | Performed by: NURSE PRACTITIONER

## 2023-09-11 RX ORDER — PANTOPRAZOLE SODIUM 40 MG/1
40 TABLET, DELAYED RELEASE ORAL
Qty: 90 | Refills: 3 | Status: ACTIVE
Start: 2023-09-11

## 2023-09-11 RX ORDER — DICYCLOMINE HYDROCHLORIDE 20 MG/1
80 TABLET ORAL
Qty: 120 | Refills: 12 | Status: ACTIVE
Start: 2023-09-11

## 2023-09-12 ENCOUNTER — READMISSION MANAGEMENT (OUTPATIENT)
Dept: CALL CENTER | Facility: HOSPITAL | Age: 63
End: 2023-09-12
Payer: COMMERCIAL

## 2023-09-12 NOTE — OUTREACH NOTE
Medical Week 1 Survey      Flowsheet Row Responses   [facility] facility patient discharged from? [facility]   Does the patient have one of the following disease processes/diagnoses(primary or secondary)? Other   Week 1 attempt successful? No   Unsuccessful attempts Attempt 2            Olga RAIN - Registered Nurse

## 2023-09-15 ENCOUNTER — READMISSION MANAGEMENT (OUTPATIENT)
Dept: CALL CENTER | Facility: HOSPITAL | Age: 63
End: 2023-09-15
Payer: COMMERCIAL

## 2023-09-15 NOTE — OUTREACH NOTE
Medical Week 1 Survey      Flowsheet Row Responses   [facility] patient discharged from? [facility]   Does the patient have one of the following disease processes/diagnoses(primary or secondary)? Other   Week 1 attempt successful? Yes   Call start time 1208   Call end time 1210   Discharge diagnosis GI bleeding   Meds reviewed with patient/caregiver? Yes   Is the patient having any side effects they believe may be caused by any medication additions or changes? No   Does the patient have all medications ordered at discharge? Yes   Is the patient taking all medications as directed (includes completed medication regime)? Yes   Does the patient have a primary care provider?  Yes   Does the patient have an appointment with their PCP within 7 days of discharge? No   What is preventing the patient from scheduling follow up appointments within 7 days of discharge? Haven't had time   Nursing Interventions Educated patient on importance of making appointment, Advised patient to make appointment   Has the patient kept scheduled appointments due by today? Yes   Has home health visited the patient within 72 hours of discharge? N/A   Psychosocial issues? No   Did the patient receive a copy of their discharge instructions? Yes   Nursing interventions Reviewed instructions with patient   What is the patient's perception of their health status since discharge? Improving   Is the patient/caregiver able to teach back signs and symptoms related to disease process for when to call PCP? Yes   Is the patient/caregiver able to teach back signs and symptoms related to disease process for when to call 911? Yes   Is the patient/caregiver able to teach back the hierarchy of who to call/visit for symptoms/problems? PCP, Specialist, Home health nurse, Urgent Care, ED, 911 Yes   If the patient is a current smoker, are they able to teach back resources for cessation? Not a smoker   Week 1 call completed? Yes   Call end time 1210            Crista  S - Licensed Nurse

## 2023-10-16 ENCOUNTER — APPOINTMENT (OUTPATIENT)
Dept: GENERAL RADIOLOGY | Facility: HOSPITAL | Age: 63
End: 2023-10-16
Payer: COMMERCIAL

## 2023-10-16 ENCOUNTER — HOSPITAL ENCOUNTER (OUTPATIENT)
Facility: HOSPITAL | Age: 63
Setting detail: OBSERVATION
Discharge: HOME | End: 2023-10-17
Attending: EMERGENCY MEDICINE | Admitting: STUDENT IN AN ORGANIZED HEALTH CARE EDUCATION/TRAINING PROGRAM
Payer: COMMERCIAL

## 2023-10-16 DIAGNOSIS — K92.0 HEMATEMESIS, UNSPECIFIED WHETHER NAUSEA PRESENT: ICD-10-CM

## 2023-10-16 DIAGNOSIS — A41.9 SEPSIS, DUE TO UNSPECIFIED ORGANISM, UNSPECIFIED WHETHER ACUTE ORGAN DYSFUNCTION PRESENT: Primary | ICD-10-CM

## 2023-10-16 LAB
APTT PPP: 28.7 SECONDS (ref 61–76.5)
BASOPHILS # BLD AUTO: 0.2 10*3/MM3 (ref 0–0.2)
BASOPHILS NFR BLD AUTO: 1.3 % (ref 0–1.5)
D-LACTATE SERPL-SCNC: 0.9 MMOL/L (ref 0.3–2)
DEPRECATED RDW RBC AUTO: 59.9 FL (ref 37–54)
EOSINOPHIL # BLD AUTO: 0 10*3/MM3 (ref 0–0.4)
EOSINOPHIL NFR BLD AUTO: 0 % (ref 0.3–6.2)
ERYTHROCYTE [DISTWIDTH] IN BLOOD BY AUTOMATED COUNT: 15.4 % (ref 12.3–15.4)
HCT VFR BLD AUTO: 40.6 % (ref 37.5–51)
HGB BLD-MCNC: 13.5 G/DL (ref 13–17.7)
INR PPP: 1.1 (ref 0.93–1.1)
LYMPHOCYTES # BLD AUTO: 0.2 10*3/MM3 (ref 0.7–3.1)
LYMPHOCYTES NFR BLD AUTO: 1.9 % (ref 19.6–45.3)
MCH RBC QN AUTO: 35.1 PG (ref 26.6–33)
MCHC RBC AUTO-ENTMCNC: 33.4 G/DL (ref 31.5–35.7)
MCV RBC AUTO: 105.3 FL (ref 79–97)
MONOCYTES # BLD AUTO: 0.7 10*3/MM3 (ref 0.1–0.9)
MONOCYTES NFR BLD AUTO: 5.3 % (ref 5–12)
NEUTROPHILS NFR BLD AUTO: 11.7 10*3/MM3 (ref 1.7–7)
NEUTROPHILS NFR BLD AUTO: 91.5 % (ref 42.7–76)
NRBC BLD AUTO-RTO: 0 /100 WBC (ref 0–0.2)
PHOSPHATE SERPL-MCNC: 1.9 MG/DL (ref 2.5–4.5)
PLATELET # BLD AUTO: 174 10*3/MM3 (ref 140–450)
PMV BLD AUTO: 8.4 FL (ref 6–12)
PROTHROMBIN TIME: 11.9 SECONDS (ref 9.6–11.7)
RBC # BLD AUTO: 3.86 10*6/MM3 (ref 4.14–5.8)
WBC NRBC COR # BLD: 12.8 10*3/MM3 (ref 3.4–10.8)

## 2023-10-16 PROCEDURE — 84080 ASSAY ALKALINE PHOSPHATASES: CPT | Performed by: NURSE PRACTITIONER

## 2023-10-16 PROCEDURE — 71045 X-RAY EXAM CHEST 1 VIEW: CPT

## 2023-10-16 PROCEDURE — 87040 BLOOD CULTURE FOR BACTERIA: CPT | Performed by: EMERGENCY MEDICINE

## 2023-10-16 PROCEDURE — 84075 ASSAY ALKALINE PHOSPHATASE: CPT | Performed by: NURSE PRACTITIONER

## 2023-10-16 PROCEDURE — 85610 PROTHROMBIN TIME: CPT | Performed by: EMERGENCY MEDICINE

## 2023-10-16 PROCEDURE — 25010000002 ONDANSETRON PER 1 MG: Performed by: EMERGENCY MEDICINE

## 2023-10-16 PROCEDURE — 86900 BLOOD TYPING SEROLOGIC ABO: CPT | Performed by: EMERGENCY MEDICINE

## 2023-10-16 PROCEDURE — 81001 URINALYSIS AUTO W/SCOPE: CPT | Performed by: EMERGENCY MEDICINE

## 2023-10-16 PROCEDURE — 36415 COLL VENOUS BLD VENIPUNCTURE: CPT

## 2023-10-16 PROCEDURE — 83735 ASSAY OF MAGNESIUM: CPT | Performed by: EMERGENCY MEDICINE

## 2023-10-16 PROCEDURE — 99284 EMERGENCY DEPT VISIT MOD MDM: CPT

## 2023-10-16 PROCEDURE — 96375 TX/PRO/DX INJ NEW DRUG ADDON: CPT

## 2023-10-16 PROCEDURE — 80053 COMPREHEN METABOLIC PANEL: CPT | Performed by: EMERGENCY MEDICINE

## 2023-10-16 PROCEDURE — 86901 BLOOD TYPING SEROLOGIC RH(D): CPT | Performed by: EMERGENCY MEDICINE

## 2023-10-16 PROCEDURE — 84145 PROCALCITONIN (PCT): CPT | Performed by: EMERGENCY MEDICINE

## 2023-10-16 PROCEDURE — 85025 COMPLETE CBC W/AUTO DIFF WBC: CPT | Performed by: EMERGENCY MEDICINE

## 2023-10-16 PROCEDURE — 0202U NFCT DS 22 TRGT SARS-COV-2: CPT | Performed by: EMERGENCY MEDICINE

## 2023-10-16 PROCEDURE — 86850 RBC ANTIBODY SCREEN: CPT | Performed by: EMERGENCY MEDICINE

## 2023-10-16 PROCEDURE — 82977 ASSAY OF GGT: CPT | Performed by: NURSE PRACTITIONER

## 2023-10-16 PROCEDURE — 84100 ASSAY OF PHOSPHORUS: CPT | Performed by: EMERGENCY MEDICINE

## 2023-10-16 PROCEDURE — 83605 ASSAY OF LACTIC ACID: CPT

## 2023-10-16 PROCEDURE — 85730 THROMBOPLASTIN TIME PARTIAL: CPT | Performed by: EMERGENCY MEDICINE

## 2023-10-16 RX ORDER — ONDANSETRON 2 MG/ML
4 INJECTION INTRAMUSCULAR; INTRAVENOUS ONCE
Status: COMPLETED | OUTPATIENT
Start: 2023-10-16 | End: 2023-10-16

## 2023-10-16 RX ORDER — ACETAMINOPHEN 500 MG
1000 TABLET ORAL ONCE
Status: COMPLETED | OUTPATIENT
Start: 2023-10-16 | End: 2023-10-16

## 2023-10-16 RX ORDER — SODIUM CHLORIDE 0.9 % (FLUSH) 0.9 %
10 SYRINGE (ML) INJECTION AS NEEDED
Status: DISCONTINUED | OUTPATIENT
Start: 2023-10-16 | End: 2023-10-17 | Stop reason: HOSPADM

## 2023-10-16 RX ADMIN — ONDANSETRON 4 MG: 2 INJECTION INTRAMUSCULAR; INTRAVENOUS at 23:24

## 2023-10-16 RX ADMIN — ACETAMINOPHEN 1000 MG: 500 TABLET, FILM COATED ORAL at 23:11

## 2023-10-17 ENCOUNTER — ANESTHESIA (OUTPATIENT)
Dept: GASTROENTEROLOGY | Facility: HOSPITAL | Age: 63
End: 2023-10-17
Payer: COMMERCIAL

## 2023-10-17 ENCOUNTER — ON CAMPUS - OUTPATIENT (AMBULATORY)
Dept: URBAN - METROPOLITAN AREA HOSPITAL 85 | Facility: HOSPITAL | Age: 63
End: 2023-10-17
Payer: COMMERCIAL

## 2023-10-17 ENCOUNTER — ANESTHESIA EVENT (OUTPATIENT)
Dept: GASTROENTEROLOGY | Facility: HOSPITAL | Age: 63
End: 2023-10-17
Payer: COMMERCIAL

## 2023-10-17 VITALS
HEIGHT: 69 IN | OXYGEN SATURATION: 98 % | TEMPERATURE: 99 F | RESPIRATION RATE: 17 BRPM | WEIGHT: 183 LBS | BODY MASS INDEX: 27.11 KG/M2 | HEART RATE: 95 BPM | DIASTOLIC BLOOD PRESSURE: 73 MMHG | SYSTOLIC BLOOD PRESSURE: 116 MMHG

## 2023-10-17 DIAGNOSIS — Z99.2 DEPENDENCE ON RENAL DIALYSIS: ICD-10-CM

## 2023-10-17 DIAGNOSIS — K31.9 DISEASE OF STOMACH AND DUODENUM, UNSPECIFIED: ICD-10-CM

## 2023-10-17 DIAGNOSIS — K92.0 HEMATEMESIS: ICD-10-CM

## 2023-10-17 DIAGNOSIS — N18.6 END STAGE RENAL DISEASE: ICD-10-CM

## 2023-10-17 DIAGNOSIS — E87.1 HYPO-OSMOLALITY AND HYPONATREMIA: ICD-10-CM

## 2023-10-17 DIAGNOSIS — R74.8 ABNORMAL LEVELS OF OTHER SERUM ENZYMES: ICD-10-CM

## 2023-10-17 DIAGNOSIS — E10.22 TYPE 1 DIABETES MELLITUS WITH DIABETIC CHRONIC KIDNEY DISEAS: ICD-10-CM

## 2023-10-17 PROBLEM — R11.2 NAUSEA AND VOMITING: Status: ACTIVE | Noted: 2023-10-17

## 2023-10-17 PROBLEM — A41.9 SEPSIS: Status: ACTIVE | Noted: 2023-10-17

## 2023-10-17 PROBLEM — A41.9 SEPSIS WITHOUT ACUTE ORGAN DYSFUNCTION: Status: ACTIVE | Noted: 2023-10-17

## 2023-10-17 LAB
ABO GROUP BLD: NORMAL
ALBUMIN SERPL-MCNC: 3.1 G/DL (ref 3.5–5.2)
ALBUMIN/GLOB SERPL: 0.9 G/DL
ALP SERPL-CCNC: 164 U/L (ref 39–117)
ALT SERPL W P-5'-P-CCNC: 9 U/L (ref 1–41)
ANION GAP SERPL CALCULATED.3IONS-SCNC: 10 MMOL/L (ref 5–15)
AST SERPL-CCNC: 11 U/L (ref 1–40)
B PARAPERT DNA SPEC QL NAA+PROBE: NOT DETECTED
B PERT DNA SPEC QL NAA+PROBE: NOT DETECTED
BACTERIA UR QL AUTO: NORMAL /HPF
BILIRUB SERPL-MCNC: 0.4 MG/DL (ref 0–1.2)
BILIRUB UR QL STRIP: NEGATIVE
BLD GP AB SCN SERPL QL: NEGATIVE
BUN SERPL-MCNC: 61 MG/DL (ref 8–23)
BUN/CREAT SERPL: 13.1 (ref 7–25)
C PNEUM DNA NPH QL NAA+NON-PROBE: NOT DETECTED
CALCIUM SPEC-SCNC: 8.9 MG/DL (ref 8.6–10.5)
CHLORIDE SERPL-SCNC: 95 MMOL/L (ref 98–107)
CLARITY UR: CLEAR
CO2 SERPL-SCNC: 25 MMOL/L (ref 22–29)
COLOR UR: YELLOW
CREAT SERPL-MCNC: 4.64 MG/DL (ref 0.76–1.27)
DEPRECATED RDW RBC AUTO: 61.7 FL (ref 37–54)
EGFRCR SERPLBLD CKD-EPI 2021: 13.6 ML/MIN/1.73
ERYTHROCYTE [DISTWIDTH] IN BLOOD BY AUTOMATED COUNT: 15.8 % (ref 12.3–15.4)
FLUAV SUBTYP SPEC NAA+PROBE: NOT DETECTED
FLUBV RNA ISLT QL NAA+PROBE: NOT DETECTED
GGT SERPL-CCNC: 63 U/L (ref 8–61)
GLOBULIN UR ELPH-MCNC: 3.4 GM/DL
GLUCOSE BLDC GLUCOMTR-MCNC: 156 MG/DL (ref 70–105)
GLUCOSE BLDC GLUCOMTR-MCNC: 168 MG/DL (ref 70–105)
GLUCOSE SERPL-MCNC: 194 MG/DL (ref 65–99)
GLUCOSE UR STRIP-MCNC: ABNORMAL MG/DL
HADV DNA SPEC NAA+PROBE: NOT DETECTED
HCOV 229E RNA SPEC QL NAA+PROBE: NOT DETECTED
HCOV HKU1 RNA SPEC QL NAA+PROBE: NOT DETECTED
HCOV NL63 RNA SPEC QL NAA+PROBE: NOT DETECTED
HCOV OC43 RNA SPEC QL NAA+PROBE: NOT DETECTED
HCT VFR BLD AUTO: 36.7 % (ref 37.5–51)
HGB BLD-MCNC: 12.3 G/DL (ref 13–17.7)
HGB UR QL STRIP.AUTO: ABNORMAL
HMPV RNA NPH QL NAA+NON-PROBE: NOT DETECTED
HOLD SPECIMEN: NORMAL
HOLD SPECIMEN: NORMAL
HPIV1 RNA ISLT QL NAA+PROBE: NOT DETECTED
HPIV2 RNA SPEC QL NAA+PROBE: NOT DETECTED
HPIV3 RNA NPH QL NAA+PROBE: NOT DETECTED
HPIV4 P GENE NPH QL NAA+PROBE: NOT DETECTED
HYALINE CASTS UR QL AUTO: NORMAL /LPF
KETONES UR QL STRIP: ABNORMAL
LEUKOCYTE ESTERASE UR QL STRIP.AUTO: NEGATIVE
M PNEUMO IGG SER IA-ACNC: NOT DETECTED
MAGNESIUM SERPL-MCNC: 1.9 MG/DL (ref 1.6–2.4)
MCH RBC QN AUTO: 35.5 PG (ref 26.6–33)
MCHC RBC AUTO-ENTMCNC: 33.4 G/DL (ref 31.5–35.7)
MCV RBC AUTO: 106.3 FL (ref 79–97)
MRSA DNA SPEC QL NAA+PROBE: NORMAL
NITRITE UR QL STRIP: NEGATIVE
PH UR STRIP.AUTO: <=5 [PH] (ref 5–8)
PLATELET # BLD AUTO: 165 10*3/MM3 (ref 140–450)
PMV BLD AUTO: 9.1 FL (ref 6–12)
POTASSIUM SERPL-SCNC: 4.3 MMOL/L (ref 3.5–5.2)
PROCALCITONIN SERPL-MCNC: 1.89 NG/ML (ref 0–0.25)
PROT SERPL-MCNC: 6.5 G/DL (ref 6–8.5)
PROT UR QL STRIP: ABNORMAL
RBC # BLD AUTO: 3.45 10*6/MM3 (ref 4.14–5.8)
RBC # UR STRIP: NORMAL /HPF
REF LAB TEST METHOD: NORMAL
RH BLD: POSITIVE
RHINOVIRUS RNA SPEC NAA+PROBE: NOT DETECTED
RSV RNA NPH QL NAA+NON-PROBE: NOT DETECTED
S PYO AG THROAT QL: NEGATIVE
SARS-COV-2 RNA NPH QL NAA+NON-PROBE: NOT DETECTED
SODIUM SERPL-SCNC: 130 MMOL/L (ref 136–145)
SP GR UR STRIP: 1.02 (ref 1–1.03)
SQUAMOUS #/AREA URNS HPF: NORMAL /HPF
T&S EXPIRATION DATE: NORMAL
UROBILINOGEN UR QL STRIP: ABNORMAL
VANCOMYCIN SERPL-MCNC: 16.7 MCG/ML (ref 5–40)
WBC # UR STRIP: NORMAL /HPF
WBC NRBC COR # BLD: 11.6 10*3/MM3 (ref 3.4–10.8)
WHOLE BLOOD HOLD COAG: NORMAL
WHOLE BLOOD HOLD SPECIMEN: NORMAL

## 2023-10-17 PROCEDURE — 25810000003 SODIUM CHLORIDE 0.9 % SOLUTION: Performed by: ANESTHESIOLOGY

## 2023-10-17 PROCEDURE — 25010000002 VANCOMYCIN HCL 1.25 G RECONSTITUTED SOLUTION 1 EACH VIAL: Performed by: EMERGENCY MEDICINE

## 2023-10-17 PROCEDURE — 87641 MR-STAPH DNA AMP PROBE: CPT | Performed by: NURSE PRACTITIONER

## 2023-10-17 PROCEDURE — 82948 REAGENT STRIP/BLOOD GLUCOSE: CPT

## 2023-10-17 PROCEDURE — 80202 ASSAY OF VANCOMYCIN: CPT | Performed by: NURSE PRACTITIONER

## 2023-10-17 PROCEDURE — 43239 EGD BIOPSY SINGLE/MULTIPLE: CPT | Performed by: INTERNAL MEDICINE

## 2023-10-17 PROCEDURE — G0378 HOSPITAL OBSERVATION PER HR: HCPCS

## 2023-10-17 PROCEDURE — 25810000003 SODIUM CHLORIDE 0.9 % SOLUTION: Performed by: INTERNAL MEDICINE

## 2023-10-17 PROCEDURE — 99284 EMERGENCY DEPT VISIT MOD MDM: CPT

## 2023-10-17 PROCEDURE — 85027 COMPLETE CBC AUTOMATED: CPT | Performed by: INTERNAL MEDICINE

## 2023-10-17 PROCEDURE — 25010000002 CEFEPIME PER 500 MG: Performed by: EMERGENCY MEDICINE

## 2023-10-17 PROCEDURE — 36415 COLL VENOUS BLD VENIPUNCTURE: CPT | Performed by: NURSE PRACTITIONER

## 2023-10-17 PROCEDURE — 87651 STREP A DNA AMP PROBE: CPT | Performed by: NURSE PRACTITIONER

## 2023-10-17 PROCEDURE — 25810000003 SODIUM CHLORIDE 0.9 % SOLUTION 250 ML FLEX CONT: Performed by: EMERGENCY MEDICINE

## 2023-10-17 PROCEDURE — 25010000002 PROPOFOL 200 MG/20ML EMULSION: Performed by: ANESTHESIOLOGY

## 2023-10-17 PROCEDURE — 99214 OFFICE O/P EST MOD 30 MIN: CPT | Performed by: NURSE PRACTITIONER

## 2023-10-17 PROCEDURE — 88305 TISSUE EXAM BY PATHOLOGIST: CPT | Performed by: INTERNAL MEDICINE

## 2023-10-17 PROCEDURE — 96367 TX/PROPH/DG ADDL SEQ IV INF: CPT

## 2023-10-17 PROCEDURE — 96365 THER/PROPH/DIAG IV INF INIT: CPT

## 2023-10-17 RX ORDER — SODIUM CHLORIDE 0.9 % (FLUSH) 0.9 %
10 SYRINGE (ML) INJECTION EVERY 12 HOURS SCHEDULED
Status: DISCONTINUED | OUTPATIENT
Start: 2023-10-17 | End: 2023-10-17 | Stop reason: HOSPADM

## 2023-10-17 RX ORDER — DEXTROSE MONOHYDRATE 25 G/50ML
25 INJECTION, SOLUTION INTRAVENOUS
Status: DISCONTINUED | OUTPATIENT
Start: 2023-10-17 | End: 2023-10-17 | Stop reason: HOSPADM

## 2023-10-17 RX ORDER — POLYETHYLENE GLYCOL 3350 17 G/17G
17 POWDER, FOR SOLUTION ORAL DAILY PRN
Status: DISCONTINUED | OUTPATIENT
Start: 2023-10-17 | End: 2023-10-17 | Stop reason: HOSPADM

## 2023-10-17 RX ORDER — PROCHLORPERAZINE EDISYLATE 5 MG/ML
5 INJECTION INTRAMUSCULAR; INTRAVENOUS EVERY 6 HOURS PRN
Status: DISCONTINUED | OUTPATIENT
Start: 2023-10-17 | End: 2023-10-17 | Stop reason: HOSPADM

## 2023-10-17 RX ORDER — ONDANSETRON 2 MG/ML
4 INJECTION INTRAMUSCULAR; INTRAVENOUS EVERY 6 HOURS PRN
Status: DISCONTINUED | OUTPATIENT
Start: 2023-10-17 | End: 2023-10-17 | Stop reason: HOSPADM

## 2023-10-17 RX ORDER — ALUMINA, MAGNESIA, AND SIMETHICONE 2400; 2400; 240 MG/30ML; MG/30ML; MG/30ML
15 SUSPENSION ORAL EVERY 6 HOURS PRN
Status: DISCONTINUED | OUTPATIENT
Start: 2023-10-17 | End: 2023-10-17 | Stop reason: HOSPADM

## 2023-10-17 RX ORDER — SODIUM CHLORIDE 9 MG/ML
INJECTION, SOLUTION INTRAVENOUS CONTINUOUS PRN
Status: DISCONTINUED | OUTPATIENT
Start: 2023-10-17 | End: 2023-10-17 | Stop reason: SURG

## 2023-10-17 RX ORDER — DEXTROSE MONOHYDRATE, SODIUM CHLORIDE, SODIUM LACTATE, CALCIUM CHLORIDE, MAGNESIUM CHLORIDE 1.5; 538; 448; 18.4; 5.08 G/100ML; MG/100ML; MG/100ML; MG/100ML; MG/100ML
2000 SOLUTION INTRAPERITONEAL
Status: DISCONTINUED | OUTPATIENT
Start: 2023-10-17 | End: 2023-10-17

## 2023-10-17 RX ORDER — IPRATROPIUM BROMIDE AND ALBUTEROL SULFATE 2.5; .5 MG/3ML; MG/3ML
3 SOLUTION RESPIRATORY (INHALATION) EVERY 4 HOURS PRN
Status: DISCONTINUED | OUTPATIENT
Start: 2023-10-17 | End: 2023-10-17 | Stop reason: HOSPADM

## 2023-10-17 RX ORDER — LIDOCAINE HYDROCHLORIDE 10 MG/ML
INJECTION, SOLUTION EPIDURAL; INFILTRATION; INTRACAUDAL; PERINEURAL AS NEEDED
Status: DISCONTINUED | OUTPATIENT
Start: 2023-10-17 | End: 2023-10-17 | Stop reason: SURG

## 2023-10-17 RX ORDER — SODIUM CHLORIDE 9 MG/ML
40 INJECTION, SOLUTION INTRAVENOUS AS NEEDED
Status: DISCONTINUED | OUTPATIENT
Start: 2023-10-17 | End: 2023-10-17 | Stop reason: HOSPADM

## 2023-10-17 RX ORDER — ONDANSETRON 4 MG/1
4 TABLET, FILM COATED ORAL EVERY 6 HOURS PRN
Status: DISCONTINUED | OUTPATIENT
Start: 2023-10-17 | End: 2023-10-17 | Stop reason: HOSPADM

## 2023-10-17 RX ORDER — AMOXICILLIN 250 MG
2 CAPSULE ORAL 2 TIMES DAILY
Status: DISCONTINUED | OUTPATIENT
Start: 2023-10-17 | End: 2023-10-17 | Stop reason: HOSPADM

## 2023-10-17 RX ORDER — PHENYLEPHRINE HCL IN 0.9% NACL 1 MG/10 ML
SYRINGE (ML) INTRAVENOUS AS NEEDED
Status: DISCONTINUED | OUTPATIENT
Start: 2023-10-17 | End: 2023-10-17 | Stop reason: SURG

## 2023-10-17 RX ORDER — PANTOPRAZOLE SODIUM 40 MG/10ML
40 INJECTION, POWDER, LYOPHILIZED, FOR SOLUTION INTRAVENOUS EVERY 12 HOURS SCHEDULED
Status: DISCONTINUED | OUTPATIENT
Start: 2023-10-17 | End: 2023-10-17 | Stop reason: HOSPADM

## 2023-10-17 RX ORDER — DEXTROSE MONOHYDRATE, SODIUM CHLORIDE, SODIUM LACTATE, CALCIUM CHLORIDE, MAGNESIUM CHLORIDE 2.5; 538; 448; 18.4; 5.08 G/100ML; MG/100ML; MG/100ML; MG/100ML; MG/100ML
2000 SOLUTION INTRAPERITONEAL
Status: DISCONTINUED | OUTPATIENT
Start: 2023-10-17 | End: 2023-10-17

## 2023-10-17 RX ORDER — ONDANSETRON 4 MG/1
4 TABLET, FILM COATED ORAL EVERY 6 HOURS PRN
Qty: 30 TABLET | Refills: 0 | Status: CANCELLED | OUTPATIENT
Start: 2023-10-17

## 2023-10-17 RX ORDER — IBUPROFEN 600 MG/1
1 TABLET ORAL
Status: DISCONTINUED | OUTPATIENT
Start: 2023-10-17 | End: 2023-10-17 | Stop reason: HOSPADM

## 2023-10-17 RX ORDER — GLYCOPYRROLATE 0.2 MG/ML
INJECTION INTRAMUSCULAR; INTRAVENOUS AS NEEDED
Status: DISCONTINUED | OUTPATIENT
Start: 2023-10-17 | End: 2023-10-17 | Stop reason: SURG

## 2023-10-17 RX ORDER — SODIUM CHLORIDE 0.9 % (FLUSH) 0.9 %
10 SYRINGE (ML) INJECTION AS NEEDED
Status: DISCONTINUED | OUTPATIENT
Start: 2023-10-17 | End: 2023-10-17 | Stop reason: HOSPADM

## 2023-10-17 RX ORDER — CEFDINIR 300 MG/1
300 CAPSULE ORAL
Qty: 7 CAPSULE | Refills: 0 | Status: SHIPPED | OUTPATIENT
Start: 2023-10-17 | End: 2023-10-24

## 2023-10-17 RX ORDER — NICOTINE POLACRILEX 4 MG
15 LOZENGE BUCCAL
Status: DISCONTINUED | OUTPATIENT
Start: 2023-10-17 | End: 2023-10-17 | Stop reason: HOSPADM

## 2023-10-17 RX ORDER — DICYCLOMINE HYDROCHLORIDE 10 MG/1
10 CAPSULE ORAL
COMMUNITY

## 2023-10-17 RX ORDER — SODIUM PHOSPHATE IN 0.9 % NACL 15MMOL/100
15 PLASTIC BAG, INJECTION (ML) INTRAVENOUS ONCE
Status: COMPLETED | OUTPATIENT
Start: 2023-10-17 | End: 2023-10-17

## 2023-10-17 RX ORDER — ACETAMINOPHEN 650 MG/1
650 SUPPOSITORY RECTAL EVERY 4 HOURS PRN
Status: DISCONTINUED | OUTPATIENT
Start: 2023-10-17 | End: 2023-10-17 | Stop reason: HOSPADM

## 2023-10-17 RX ORDER — CEFDINIR 300 MG/1
300 CAPSULE ORAL
Status: DISCONTINUED | OUTPATIENT
Start: 2023-10-17 | End: 2023-10-17 | Stop reason: HOSPADM

## 2023-10-17 RX ORDER — ACETAMINOPHEN 325 MG/1
650 TABLET ORAL EVERY 4 HOURS PRN
Status: DISCONTINUED | OUTPATIENT
Start: 2023-10-17 | End: 2023-10-17 | Stop reason: HOSPADM

## 2023-10-17 RX ORDER — BISACODYL 10 MG
10 SUPPOSITORY, RECTAL RECTAL DAILY PRN
Status: DISCONTINUED | OUTPATIENT
Start: 2023-10-17 | End: 2023-10-17 | Stop reason: HOSPADM

## 2023-10-17 RX ORDER — ACETAMINOPHEN 160 MG/5ML
650 SOLUTION ORAL EVERY 4 HOURS PRN
Status: DISCONTINUED | OUTPATIENT
Start: 2023-10-17 | End: 2023-10-17 | Stop reason: HOSPADM

## 2023-10-17 RX ORDER — PROPOFOL 10 MG/ML
INJECTION, EMULSION INTRAVENOUS AS NEEDED
Status: DISCONTINUED | OUTPATIENT
Start: 2023-10-17 | End: 2023-10-17 | Stop reason: SURG

## 2023-10-17 RX ORDER — BISACODYL 5 MG/1
5 TABLET, DELAYED RELEASE ORAL DAILY PRN
Status: DISCONTINUED | OUTPATIENT
Start: 2023-10-17 | End: 2023-10-17 | Stop reason: HOSPADM

## 2023-10-17 RX ORDER — CHOLECALCIFEROL (VITAMIN D3) 125 MCG
5 CAPSULE ORAL NIGHTLY PRN
Status: DISCONTINUED | OUTPATIENT
Start: 2023-10-17 | End: 2023-10-17 | Stop reason: HOSPADM

## 2023-10-17 RX ADMIN — GLYCOPYRROLATE 0.1 MG: 0.2 INJECTION INTRAMUSCULAR; INTRAVENOUS at 11:12

## 2023-10-17 RX ADMIN — PROPOFOL 60 MG: 10 INJECTION, EMULSION INTRAVENOUS at 11:20

## 2023-10-17 RX ADMIN — LIDOCAINE HYDROCHLORIDE 40 MG: 10 INJECTION, SOLUTION EPIDURAL; INFILTRATION; INTRACAUDAL; PERINEURAL at 11:20

## 2023-10-17 RX ADMIN — CEFEPIME 2000 MG: 2 INJECTION, POWDER, FOR SOLUTION INTRAVENOUS at 00:27

## 2023-10-17 RX ADMIN — SODIUM CHLORIDE 15 MMOL: 9 INJECTION, SOLUTION INTRAVENOUS at 13:03

## 2023-10-17 RX ADMIN — PROPOFOL 30 MG: 10 INJECTION, EMULSION INTRAVENOUS at 11:23

## 2023-10-17 RX ADMIN — Medication 10 ML: at 08:50

## 2023-10-17 RX ADMIN — Medication 200 MCG: at 11:25

## 2023-10-17 RX ADMIN — CEFDINIR 300 MG: 300 CAPSULE ORAL at 16:50

## 2023-10-17 RX ADMIN — VANCOMYCIN HYDROCHLORIDE 1250 MG: 1.25 INJECTION, POWDER, LYOPHILIZED, FOR SOLUTION INTRAVENOUS at 02:05

## 2023-10-17 RX ADMIN — Medication 10 ML: at 02:06

## 2023-10-17 RX ADMIN — Medication 200 MCG: at 11:19

## 2023-10-17 RX ADMIN — SODIUM CHLORIDE: 9 INJECTION, SOLUTION INTRAVENOUS at 11:13

## 2023-10-17 RX ADMIN — PANTOPRAZOLE SODIUM 40 MG: 40 INJECTION, POWDER, LYOPHILIZED, FOR SOLUTION INTRAVENOUS at 02:06

## 2023-10-17 NOTE — OP NOTE
ESOPHAGOGASTRODUODENOSCOPY Procedure Report    Patient Name:  Esdras Peralta  YOB: 1960    Date of Surgery:  10/17/2023     Pre-Op Diagnosis:  Hematemesis, unspecified whether nausea present [K92.0]         Procedure/CPT® Codes:      Procedure(s):  ESOPHAGOGASTRODUODENOSCOPY WITH BIOPSY X1 AREA    Staff:  Surgeon(s):  Milana Presley MD      Anesthesia: Monitored Anesthesia Care    Description of Procedure:  A description of the procedure as well as risks, benefits and alternative methods were explained to the patient who voiced understanding and signed the corresponding consent form. A physical exam was performed and vital signs were monitored throughout the procedure.    An upper GI endoscope was placed into the mouth and proceeded through the esophagus, stomach and second portion of the duodenum without difficulty. The scope was then retroflexed and the fundus was visualized. The procedure was not difficult and there were no immediate complications.    Findings  No esophageal varices were seen.  No obvious bleeding source noticed in the esophagus fundus at the body area no blood was noticed.  On retroflexion lamination, fundus was well-visualized no obvious varices seen.  Questionable 1 small area of venules were noticed in the fundus however does not suggest this to be a source of bleeding.  Changes suggestive of gastritis with some nodularity was noticed biopsy was performed.  Duodenal mucosa looks normal.    Impression:  No obvious source of bleeding noticed in upper GI tract.  No evidence of esophageal or gastric varices were seen.  Changes suggestive of chronic gastritis was noticed biopsy was performed.  Normal duodenal mucosa.    Recommendations:  Follow the hemoglobin.  There was no obvious gastric varices noticed however there was a small area of prominent fold was noticed with questionable varix we will perform CT of the abdomen to evaluate for subclinical pain.  Follow-up with a  hemoglobin.  Advance diet as tolerated.  Follow up with biopsy report      Milana Presley MD     Date: 10/17/2023    Time: 13:23 EDT
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
## 2023-10-17 NOTE — CONSULTS
Diabetes Education    Patient Name:  Esdras Peralta  YOB: 1960  MRN: [id number]  Admit Date:  10/16/2023    Insulin Pump Contract completed and in chart: Yes  Insulin Pump Log at bedside:  No, pt being discharged at this time so log sheet not left at bedside.   LDA started: Yes  Patient Supplied Insulin Pump (orders initiated): Yes  Educator Consult entered: Yes    Pump Brand/Model: Omnipod 5  CGMS Brand/Model: Dexcom G6    Type of Insulin Used: Lispro    Basal Rate: (units/hour)   12 am 1.0  5 am 0.7    Bolus Rate: (insulin:carbohydrate ratio)   12 am 1:5 grams CHO                                                                    Insulin Sensitivity Factor/Correction Dose:   12 am 1:50     Blood Glucose Target: 12 am 110-150    Active Insulin: 4 hours    Date of Last Site Change: 10/15/2023    Location of Catheter: Right upper, outer arm    Site Assessment: Clean, dry, intact    Educator assessed: Supplies not assessment. Pt being discharged.        pump supplies at bedside:        Insulin Expiration Date:       Electronically signed by:  Pauly Cuba RN  10/17/23 16:19 EDT

## 2023-10-17 NOTE — PROGRESS NOTES
"Pharmacy Antimicrobial Dosing Service    Subjective:  Esdras Peralta is a 63 y.o.male admitted with fever, nausea and vomiting. Pharmacy has been consulted to dose Vancomycin and Cefepime for possible sepsis.    PMH: T1DM, ESRD      Assessment/Plan    1. Day #1 Vancomycin:  pulse dosing.  On peritoneal dialysis . 1250mg IV x1 dose.  Random level ordered for 10/17 at 1400.  Re-dose when less than 20.    2. Day #1 Cefepime: 2000 mg IV x1 dose followed by 1000 mg IV q24h for peritoneal dialysis.    Will continue to monitor drug levels, renal function, culture and sensitivities, and patient clinical status.       Objective:  Relevant clinical data and objective history reviewed:  175.3 cm (69\")   83 kg (183 lb)   Ideal body weight: 70.7 kg (155 lb 13.8 oz)  Adjusted ideal body weight: 75.6 kg (166 lb 11.5 oz)  Body mass index is 27.02 kg/m².        Results from last 7 days   Lab Units 10/16/23  2322   CREATININE mg/dL 4.64*     Estimated Creatinine Clearance: 19.6 mL/min (A) (by C-G formula based on SCr of 4.64 mg/dL (H)).  No intake/output data recorded.    Results from last 7 days   Lab Units 10/16/23  2322   WBC 10*3/mm3 12.80*     Temperature    10/16/23 2220   Temp: (!) 101.7 °F (38.7 °C)  Comment: nurse notified     Baseline culture/source/susceptibility:  Microbiology Results (last 10 days)       Procedure Component Value - Date/Time    Respiratory Panel PCR w/COVID-19(SARS-CoV-2) [facility]I/POP/CLARA/PAD/COR/MAD/ANNY In-House, NP Swab in UTM/VTM, 3-4 HR TAT - Swab, Nasopharynx [[id number]]  (Normal) Collected: 10/16/23 2328    Lab Status: Final result Specimen: Swab from Nasopharynx Updated: 10/17/23 0027     ADENOVIRUS, PCR Not Detected     Coronavirus 229E Not Detected     Coronavirus HKU1 Not Detected     Coronavirus NL63 Not Detected     Coronavirus OC43 Not Detected     COVID19 Not Detected     Human Metapneumovirus Not Detected     Human Rhinovirus/Enterovirus Not Detected     Influenza A PCR Not Detected     " Influenza B PCR Not Detected     Parainfluenza Virus 1 Not Detected     Parainfluenza Virus 2 Not Detected     Parainfluenza Virus 3 Not Detected     Parainfluenza Virus 4 Not Detected     RSV, PCR Not Detected     Bordetella pertussis pcr Not Detected     Bordetella parapertussis PCR Not Detected     Chlamydophila pneumoniae PCR Not Detected     Mycoplasma pneumo by PCR Not Detected    Narrative:      In the setting of a positive respiratory panel with a viral infection PLUS a negative procalcitonin without other underlying concern for bacterial infection, consider observing off antibiotics or discontinuation of antibiotics and continue supportive care. If the respiratory panel is positive for atypical bacterial infection (Bordetella pertussis, Chlamydophila pneumoniae, or Mycoplasma pneumoniae), consider antibiotic de-escalation to target atypical bacterial infection.            Karsten Chow  10/17/23 02:54 EDT
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
## 2023-10-17 NOTE — PROGRESS NOTES
[facility] Medicine Services Daily Progress Note    Patient Name: Esdras Peralta  : 1960  MRN: [id number]  Primary Care Physician:  Erika Hernandez MD  Date of admission: 10/16/2023      Subjective      Chief Complaint: Nausea and vomiting, cough     History of Present Illness: Esdras Peralta is a 63 y.o. male with past medical history of ESRD on peritoneal dialysis nightly, HFrEF, CAD s/p PCIs, COPD, DM type I with insulin pump, hypertension, hyperlipidemia who presented to [facility] ED 10/16/2023 with complaints of fever, nausea, and vomiting that started around 5pm. He had some bright red blood in his emesis in the parking lot while walking into the ED. He denied any abdominal pain. He stated he ate around 1pm without any problems. He denied any painful urination. He started having a cough and scratchy, sore throat yesterday. He has had two episodes of blood in his sputum. He is supposed to have his shiley dialysis catheter removed this week. He stated he had some blood in his emesis back in September and is supposed to have an EGD with [facility] in November.      In the ED patient had temperature of 101.7, heart rate in 120s, normotensive, on room air with normal oxygen saturation.  Chest x-ray showed no acute cardiopulmonary process.  Stable cardiomegaly.  WBC 12.8, lactate 0.9, procalcitonin 1.89, sodium 130, chloride 95, BUN 61, creatinine 4.64, glucose 194, phosphorus 1.9, albumin 3.1.  Respiratory viral panel negative, blood cultures drawn.  Urinalysis showed no signs of infection.  Patient was started on broad-spectrum antibiotics IV cefepime and vancomycin for treatment of sepsis of unknown etiology at this time.     10/17/23 patient seen and examined in bed no acute distress.  Tolerating antibiotics, infectious disease cleared patient for discharge       ROS Review of Systems   Constitutional: Positive for fever.   HENT: Negative.     Eyes: Negative.    Cardiovascular:  Negative.  Negative for chest pain.   Respiratory:  Positive for cough. Negative for shortness of breath.    Endocrine: Negative.    Hematologic/Lymphatic: Negative.    Skin: Negative.    Musculoskeletal: Negative.    Gastrointestinal:  Positive for hematemesis, nausea and vomiting. Negative for abdominal pain and hematochezia.   Genitourinary: Negative.    Neurological: Negative.    Psychiatric/Behavioral: Negative.     Allergic/Immunologic: Negative.    All other systems reviewed and are negative.      Objective      Vitals:   Temp:  [98.1 °F (36.7 °C)-101.7 °F (38.7 °C)] 99 °F (37.2 °C)  Heart Rate:  [] 95  Resp:  [16-22] 17  BP: (104-142)/(66-86) 116/73  Flow (L/min):  [4] 4    Physical Exam Vitals and nursing note reviewed.   HENT:      Head: Normocephalic and atraumatic.   Eyes:      Extraocular Movements: Extraocular movements intact.      Pupils: Pupils are equal, round, and reactive to light.   Cardiovascular:      Rate and Rhythm: Regular rhythm. Tachycardia present.      Pulses: Normal pulses.      Heart sounds: Normal heart sounds.   Pulmonary:      Effort: Pulmonary effort is normal.      Breath sounds: Normal breath sounds.   Abdominal:      General: Bowel sounds are normal.      Palpations: Abdomen is soft.      Tenderness: There is no abdominal tenderness.   Musculoskeletal:         General: Normal range of motion.   Skin:     General: Skin is warm and dry.      Comments: Right subclavian tunneled dialysis catheter without signs of erythema or drainage  Left sided peritoneal dialysis catheter without signs of erythema or drainage   Neurological:      Mental Status: He is alert and oriented to person, place, and time.   Psychiatric:         Mood and Affect: Mood normal.         Behavior: Behavior normal.        Result Review    Result Review:  I have personally reviewed the results from the time of this admission to 10/17/2023 12:31 EDT and agree with these findings:  []  Laboratory  []   "Microbiology  []  Radiology  []  EKG/Telemetry   []  Cardiology/Vascular   []  Pathology  []  Old records  []  Other:  Most notable findings include:     CBC:      Lab 10/16/23  2322   WBC 12.80*   HEMOGLOBIN 13.5   HEMATOCRIT 40.6   PLATELETS 174   NEUTROS ABS 11.70*   LYMPHS ABS 0.20*   MONOS ABS 0.70   EOS ABS 0.00   .3*     CMP:        Lab 10/16/23  2322   SODIUM 130*   POTASSIUM 4.3   CHLORIDE 95*   CO2 25.0   ANION GAP 10.0   BUN 61*   CREATININE 4.64*   EGFR 13.6*   GLUCOSE 194*   CALCIUM 8.9   MAGNESIUM 1.9   PHOSPHORUS 1.9*   TOTAL PROTEIN 6.5   ALBUMIN 3.1*   GLOBULIN 3.4   ALT (SGPT) 9   AST (SGOT) 11   BILIRUBIN 0.4   ALK PHOS 164*     No results found for: \"ACANTHNAEG\", \"AFBCX\", \"BPERTUSSISCX\", \"BLOODCX\"  No results found for: \"BCIDPCR\", \"CXREFLEX\", \"CSFCX\", \"CULTURETIS\"  No results found for: \"CULTURES\", \"HSVCX\", \"URCX\"  No results found for: \"EYECULTURE\", \"GCCX\", \"HSVCULTURE\", \"LABHSV\"  No results found for: \"LEGIONELLA\", \"MRSACX\", \"MUMPSCX\", \"MYCOPLASCX\"  No results found for: \"NOCARDIACX\", \"STOOLCX\"  No results found for: \"THROATCX\", \"UNSTIMCULT\", \"URINECX\", \"CULTURE\", \"VZVCULTUR\"  No results found for: \"VIRALCULTU\", \"WOUNDCX\"      Assessment & Plan      Brief Patient Summary:  Esdras Peralta is a 63 y.o. male who       cefepime, 1,000 mg, Intravenous, Q24H  Delflex-LC/1.5% Dextrose, 2,000 mL, Intraperitoneal, 4 Exchanges Daily - Dwell Overnight  pantoprazole, 40 mg, Intravenous, Q12H  senna-docusate sodium, 2 tablet, Oral, BID  sodium chloride, 10 mL, Intravenous, Q12H  sodium phosphate, 15 mmol, Intravenous, Once       insulin, , Last Rate: Stopped (10/17/23 0709)  Pharmacy to Dose Cefepime,   Pharmacy to dose vancomycin,          Active Hospital Problems:  Active Hospital Problems    Diagnosis     **Sepsis without acute organ dysfunction     Nausea and vomiting     Hematemesis     Sepsis     Anemia in chronic kidney disease     Chronic HFrEF (heart failure with reduced ejection " fraction)     Mixed hyperlipidemia     Cardiomyopathy, dilated     ICD (implantable cardioverter-defibrillator), dual, in situ     Type 1 diabetes mellitus with diabetic polyneuropathy     Coronary artery disease involving native coronary artery of native heart without angina pectoris        Sepsis -Fever of unknown etiology  - temp 101.7, HR 120s initially now 116, BP stable, normal oxygen saturation on room air  - CXR: no acute cardiopulmonary abnormality. Stable cardiomegaly  - WBC 12.8, lactate 0.9, procalcitonin 1.89  - RVP negative, UA without signs of infection  - blood cultures drawn  - did not receive fluids due to being a dialysis patient and patient not hypotensive. Albumin 3.1 and will consider giving albumin if HR does not continue to improve  - IV cefepime and vancomycin started in ED and will continue while awaiting cultures. May need tunneled dialysis catheter removed and tip sent for culture  - antipyretics prn     Nausea and vomiting  Hematemesis  - hgb normal  - start PPI IV BID  - pt is on aspirin and brilinta at home  - prn antiemetics   - consult GI as patient was scheduled for EGD next month for similar complaints      ESRD on peritoneal dialysis  Anemia of CKD  - cont home ferrous sulfate  - nephrology consulted for further recommendations     HFrEF/cardiomyopathy s/p ICD  CAD s/p PCI  Hypertension  Hyperlipidemia  - echo 4/18/23 EF <20%  - pt is on brilinta at home and bumex     DM type 1 with insulin pump   - pt to manage own pump     COPD  - stable without exacerbation     Chronic hyponatremia  -   - monitor      DVT prophylaxis:  Mechanical DVT prophylaxis orders are present.    CODE STATUS:    Level Of Support Discussed With: Patient  Code Status (Patient has no pulse and is not breathing): CPR (Attempt to Resuscitate)  Medical Interventions (Patient has pulse or is breathing): Full Support      Disposition:  I expect patient to be discharged home in 24 hrs.    I have utilized all  available, immediate resources to obtain, update, or review the patient's current medications including all prescriptions, over-the-counter products, herbals, cannabis/cannabidiol products, and vitamin/mineral/dietary (nutritional) supplements.      Level Of Support Discussed With: Patient  Code Status (Patient has no pulse and is not breathing): CPR (Attempt to Resuscitate)  Medical Interventions (Patient has pulse or is breathing): Full Support    Next of kin or Power of :         Admission Status:  I believe this patient meets admit status.        Electronically signed by Winston Caldera MD, 10/17/23, 12:31 EDT.  [facility] Hospitalist Team
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
## 2023-10-17 NOTE — ANESTHESIA POSTPROCEDURE EVALUATION
Patient: Esdras Peralta    Procedure Summary       Date: 10/17/23 Room / Location: [facility] ENDOSCOPY 4 / [facility] ENDOSCOPY    Anesthesia Start: 1113 Anesthesia Stop: 1132    Procedure: ESOPHAGOGASTRODUODENOSCOPY WITH BIOPSY X1 AREA Diagnosis:       Hematemesis, unspecified whether nausea present      (Hematemesis, unspecified whether nausea present [K92.0])    Surgeons: Milana Presley MD Provider: Vaibhav Artis MD    Anesthesia Type: general ASA Status: 4            Anesthesia Type: general    Vitals  Vitals Value Taken Time   /68 10/17/23 1156   Temp     Pulse 64 10/17/23 1205   Resp 21 10/17/23 1154   SpO2 94 % 10/17/23 1205   Vitals shown include unfiled device data.        Post Anesthesia Care and Evaluation    Patient location during evaluation: PACU  Patient participation: complete - patient participated  Level of consciousness: awake  Pain scale: See nurse's notes for pain score.  Pain management: adequate    Airway patency: patent  Anesthetic complications: No anesthetic complications  PONV Status: none  Cardiovascular status: acceptable  Respiratory status: acceptable and spontaneous ventilation  Hydration status: acceptable    Comments: Patient seen and examined postoperatively; vital signs stable; SpO2 greater than or equal to 90%; cardiopulmonary status stable; nausea/vomiting adequately controlled; pain adequately controlled; no apparent anesthesia complications; patient discharged from anesthesia care when discharge criteria were met
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
## 2023-10-17 NOTE — PLAN OF CARE
Goal Outcome Evaluation:   Patient is aware and understands his needs and able to voice discomfort and needs

## 2023-10-17 NOTE — PLAN OF CARE
Problem: Adult Inpatient Plan of Care  Goal: Optimal Comfort and Wellbeing  Outcome: Adequate for Care Transition  Intervention: Provide Person-Centered Care  Recent Flowsheet Documentation  Taken 10/17/2023 0847 by Zuri Perez RN  Trust Relationship/Rapport:   care explained   thoughts/feelings acknowledged     Problem: Fever  Goal: Body Temperature in Desired Range  Outcome: Adequate for Care Transition     Problem: Adjustment to Illness (Sepsis/Septic Shock)  Goal: Optimal Coping  Outcome: Adequate for Care Transition     Problem: Bleeding (Sepsis/Septic Shock)  Goal: Absence of Bleeding  Outcome: Adequate for Care Transition     Problem: Glycemic Control Impaired (Sepsis/Septic Shock)  Goal: Blood Glucose Level Within Desired Range  Outcome: Adequate for Care Transition  Intervention: Optimize Glycemic Control  Recent Flowsheet Documentation  Taken 10/17/2023 0847 by Zuri Perez RN  Glycemic Management: blood glucose monitored     Problem: Infection Progression (Sepsis/Septic Shock)  Goal: Absence of Infection Signs and Symptoms  Outcome: Adequate for Care Transition  Intervention: Promote Recovery  Recent Flowsheet Documentation  Taken 10/17/2023 0847 by Zuri Perez RN  Activity Management:   activity encouraged   up ad jesus     Problem: Nutrition Impaired (Sepsis/Septic Shock)  Goal: Optimal Nutrition Intake  Outcome: Adequate for Care Transition   Goal Outcome Evaluation:

## 2023-10-18 ENCOUNTER — NURSE TRIAGE (OUTPATIENT)
Dept: CALL CENTER | Facility: HOSPITAL | Age: 63
End: 2023-10-18
Payer: COMMERCIAL

## 2023-10-18 LAB
ALP BONE CFR SERPL: 66 % (ref 12–68)
ALP INTEST CFR SERPL: 1 % (ref 0–18)
ALP LIVER CFR SERPL: 34 % (ref 13–88)
ALP SERPL-CCNC: 155 IU/L (ref 44–121)
LAB AP CASE REPORT: NORMAL
PATH REPORT.FINAL DX SPEC: NORMAL
PATH REPORT.GROSS SPEC: NORMAL

## 2023-10-18 NOTE — TELEPHONE ENCOUNTER
Patient's wife reports patient vomited over night. /92, . C/o nausea, no appetite. Patient still asleep and has not taken morning medications. Reviewed protocol with patient's wife. Advised on home care and to call PCP if symptoms do not improve.    Reason for Disposition   Unexplained nausea    Additional Information   Negative: Shock suspected (e.g., cold/pale/clammy skin, too weak to stand, low BP, rapid pulse)   Negative: Sounds like a life-threatening emergency to the triager   Negative: [1] Nausea or vomiting AND [2] pregnancy < 20 weeks   Negative: Menstrual Period - Missed or Late (i.e., pregnancy suspected)   Negative: Heat exhaustion suspected (i.e., dehydration from heat exposure)   Negative: Motion sickness suspected (i.e., nausea with car, plane, boat, or train travel)   Negative: Anxiety or stress suspected (i.e., nausea with anxiety attacks or stressful situations)   Negative: Traumatic Brain Injury (TBI) suspected   Negative: Nausea (or Vomiting) in a cancer patient who is currently (or recently) receiving chemotherapy or radiation therapy, or cancer patient who has metastatic or end-stage cancer and is receiving palliative care   Negative: Vomiting occurs   Negative: Other symptom is present, see that guideline (e.g., chest pain, headache, dizziness, abdominal pain, colds, sore throat, etc.).   Negative: Unable to walk, or can only walk with assistance (e.g., requires support)   Negative: Difficulty breathing   Negative: [1] Insulin-dependent diabetes (Type I) AND [2] glucose > 400 mg/dl (22 mmol/l)   Negative: [1] Drinking very little AND [2] dehydration suspected (e.g., no urine > 12 hours, very dry mouth, very lightheaded)   Negative: Patient sounds very sick or weak to the triager   Negative: Fever > 104 F (40 C)   Negative: [1] Fever > 101 F (38.3 C) AND [2] age > 60 years   Negative: [1] Fever > 100.0 F (37.8 C) AND [2] bedridden (e.g., CVA, chronic illness, recovering from  "surgery)   Negative: [1] Fever > 100.0 F (37.8 C) AND [2] diabetes mellitus or weak immune system (e.g., HIV positive, cancer chemo, splenectomy, organ transplant, chronic steroids)   Negative: Taking any of the following medications: digoxin (Lanoxin), lithium, theophylline, phenytoin (Dilantin)   Negative: Yellowish color of the skin or white of the eye (i.e., jaundice)   Negative: Fever present > 3 days (72 hours)   Negative: Receiving cancer chemotherapy medication   Negative: Taking prescription medication that could cause nausea (e.g., narcotics/opiates, antibiotics, OCPs, many others)   Negative: Nausea lasts > 1 week   Negative: Substance use (drug use) or unhealthy alcohol use, known or suspected   Negative: Nausea is a chronic symptom (recurrent or ongoing AND present > 4 weeks)    Answer Assessment - Initial Assessment Questions  1. NAUSEA SEVERITY: \"How bad is the nausea?\" (e.g., mild, moderate, severe; dehydration, weight loss)    - MILD: loss of appetite without change in eating habits    - MODERATE: decreased oral intake without significant weight loss, dehydration, or malnutrition    - SEVERE: inadequate caloric or fluid intake, significant weight loss, symptoms of dehydration      Moderate  2. ONSET: \"When did the nausea begin?\"      Since hospitalization  3. VOMITING: \"Any vomiting?\" If Yes, ask: \"How many times today?\"      Yes, at least once  4. RECURRENT SYMPTOM: \"Have you had nausea before?\" If Yes, ask: \"When was the last time?\" \"What happened that time?\"      Yes, during hospitalization  5. CAUSE: \"What do you think is causing the nausea?\"      Unknown infection  6. PREGNANCY: \"Is there any chance you are pregnant?\" (e.g., unprotected intercourse, missed birth control pill, broken condom)      NA    Protocols used: Nausea-ADULT-AH    "

## 2023-10-21 LAB — BACTERIA SPEC AEROBE CULT: NORMAL

## 2023-10-21 NOTE — DISCHARGE SUMMARY
[facility] Medicine Services  DISCHARGE SUMMARY    Patient Name: Esdras Peralta  : 1960  MRN: [id number]    Date of Admission: 10/16/2023  Discharge Diagnosis: sepsis  Date of Discharge:  10/17/23  Primary Care Physician: Erika Hernandez MD      Presenting Problem:   Sepsis [A41.9]  Sepsis, due to unspecified organism, unspecified whether acute organ dysfunction present [A41.9]  Hematemesis, unspecified whether nausea present [K92.0]    Active and Resolved Hospital Problems:  Active Hospital Problems    Diagnosis POA    **Sepsis without acute organ dysfunction [A41.9] Yes    Nausea and vomiting [R11.2] Yes    Hematemesis [K92.0] Yes    Sepsis [A41.9] Yes    Anemia in chronic kidney disease [N18.9, D63.1] Yes    Chronic HFrEF (heart failure with reduced ejection fraction) [I50.22] Yes    Mixed hyperlipidemia [E78.2] Yes    Cardiomyopathy, dilated [I42.0] Yes    ICD (implantable cardioverter-defibrillator), dual, in situ [Z95.810] Yes    Type 1 diabetes mellitus with diabetic polyneuropathy [E10.42] Yes    Coronary artery disease involving native coronary artery of native heart without angina pectoris [I25.10] Yes      Resolved Hospital Problems   No resolved problems to display.     Sepsis -Fever of unknown etiology  - temp 101.7, HR 120s initially now 116, BP stable, normal oxygen saturation on room air  - CXR: no acute cardiopulmonary abnormality. Stable cardiomegaly  - WBC 12.8, lactate 0.9, procalcitonin 1.89  - RVP negative, UA without signs of infection  - blood cultures neg  - did not receive fluids due to being a dialysis patient and patient not hypotensive. Albumin 3.1 and will consider giving albumin if HR does not continue to improve  - IV cefepime and vancomycin started in ED   Per ID-Discontinue IV cefepime  Start patient on p.o. Omnicef 300 mg daily for 7 days  discussed the case with the patient's nephrologist and he preferred to discharge him home today  since patient is missing dialysis.  The patient was informed if fever comes back or symptoms change to come back to the emergency room.  - antipyretics prn     Nausea and vomiting  Hematemesis  - hgb normal  - PPI IV BID  - pt is on aspirin and brilinta at home  - prn antiemetics   - consulted GI as patient was scheduled for EGD next month for similar complaints      ESRD on peritoneal dialysis  Anemia of CKD  - cont home ferrous sulfate  - nephrology consulted for further recommendations     HFrEF/cardiomyopathy s/p ICD  CAD s/p PCI  Hypertension  Hyperlipidemia  - echo 4/18/23 EF <20%  - pt is on brilinta at home and bumex     DM type 1 with insulin pump   - pt to manage own pump     COPD  - stable without exacerbation     Chronic hyponatremia  -   - monitor        Hospital Course     Hospital Course:  Esdras Peralta is a 63 y.o. male with past medical history of ESRD on peritoneal dialysis nightly, HFrEF, CAD s/p PCIs, COPD, DM type I with insulin pump, hypertension, hyperlipidemia who presented to [facility] ED 10/16/2023 with complaints of fever, nausea, and vomiting that started around 5pm. He had some bright red blood in his emesis in the parking lot while walking into the ED. He denied any abdominal pain. He stated he ate around 1pm without any problems. He denied any painful urination. He started having a cough and scratchy, sore throat yesterday. He has had two episodes of blood in his sputum. He is supposed to have his shiley dialysis catheter removed this week. He stated he had some blood in his emesis back in September and is supposed to have an EGD with [facility] in November.      In the ED patient had temperature of 101.7, heart rate in 120s, normotensive, on room air with normal oxygen saturation.  Chest x-ray showed no acute cardiopulmonary process.  Stable cardiomegaly.  WBC 12.8, lactate 0.9, procalcitonin 1.89, sodium 130, chloride 95, BUN 61, creatinine 4.64, glucose 194, phosphorus 1.9, albumin  3.1.  Respiratory viral panel negative, blood cultures drawn.  Urinalysis showed no signs of infection.  Patient was started on broad-spectrum antibiotics IV cefepime and vancomycin for treatment of sepsis of unknown etiology at this time.     10/17/23 patient seen and examined in bed no acute distress.  Tolerating antibiotics, infectious disease cleared patient for discharge, will dc home condition on dc stable.     DISCHARGE Follow Up Recommendations for labs and diagnostics: folloq with pcp in one week      Reasons For Change In Medications and Indications for New Medications:      Day of Discharge     Vital Signs:       Physical Exam HENT:      Head: Normocephalic and atraumatic.   Eyes:      Extraocular Movements: Extraocular movements intact.      Pupils: Pupils are equal, round, and reactive to light.   Cardiovascular:      Rate and Rhythm: Regular rhythm. Tachycardia present.      Pulses: Normal pulses.      Heart sounds: Normal heart sounds.   Pulmonary:      Effort: Pulmonary effort is normal.      Breath sounds: Normal breath sounds.   Abdominal:      General: Bowel sounds are normal.      Palpations: Abdomen is soft.      Tenderness: There is no abdominal tenderness.   Musculoskeletal:         General: Normal range of motion.   Skin:     General: Skin is warm and dry.      Comments: Right subclavian tunneled dialysis catheter without signs of erythema or drainage  Left sided peritoneal dialysis catheter without signs of erythema or drainage   Neurological:      Mental Status: He is alert and oriented to person, place, and time.   Psychiatric:         Mood and Affect: Mood normal.         Behavior: Behavior normal.       Pertinent  and/or Most Recent Results     LAB RESULTS:      Lab 10/17/23  1418 10/16/23  2332 10/16/23  2322   WBC 11.60*  --  12.80*   HEMOGLOBIN 12.3*  --  13.5   HEMATOCRIT 36.7*  --  40.6   PLATELETS 165  --  174   NEUTROS ABS  --   --  11.70*   LYMPHS ABS  --   --  0.20*   MONOS ABS   --   --  0.70   EOS ABS  --   --  0.00   .3*  --  105.3*   PROCALCITONIN  --   --  1.89*   LACTATE  --  0.9  --    PROTIME  --   --  11.9*   APTT  --   --  28.7*         Lab 10/16/23  2322   SODIUM 130*   POTASSIUM 4.3   CHLORIDE 95*   CO2 25.0   ANION GAP 10.0   BUN 61*   CREATININE 4.64*   EGFR 13.6*   GLUCOSE 194*   CALCIUM 8.9   MAGNESIUM 1.9   PHOSPHORUS 1.9*         Lab 10/16/23  2322   TOTAL PROTEIN 6.5   ALBUMIN 3.1*   GLOBULIN 3.4   ALT (SGPT) 9   AST (SGOT) 11   BILIRUBIN 0.4   ALK PHOS 164*  155*         Lab 10/16/23  2322   PROTIME 11.9*   INR 1.10             Lab 10/16/23  2322   ABO TYPING A   RH TYPING Positive   ANTIBODY SCREEN Negative         Brief Urine Lab Results  (Last result in the past 365 days)        Color   Clarity   Blood   Leuk Est   Nitrite   Protein   CREAT   Urine HCG        10/16/23 2349 Yellow   Clear   Trace   Negative   Negative   >=300 mg/dL (3+)                 Microbiology Results (last 10 days)       Procedure Component Value - Date/Time    MRSA Screen, PCR (Inpatient) - Swab, Nares [[id number]]  (Normal) Collected: 10/17/23 0517    Lab Status: Final result Specimen: Swab from Nares Updated: 10/17/23 0646     MRSA PCR No MRSA Detected    Narrative:      The negative predictive value of this diagnostic test is high and should only be used to consider de-escalating anti-MRSA therapy. A positive result may indicate colonization with MRSA and must be correlated clinically.    Rapid Strep A Screen - Swab, Throat [[id number]]  (Normal) Collected: 10/17/23 0517    Lab Status: Final result Specimen: Swab from Throat Updated: 10/17/23 0533     Strep A Ag Negative    Blood Culture - Blood, Arm, Right [[id number]]  (Normal) Collected: 10/16/23 2353    Lab Status: Preliminary result Specimen: Blood from Arm, Right Updated: 10/20/23 0000     Blood Culture No growth at 3 days    Respiratory Panel PCR w/COVID-19(SARS-CoV-2) [facility] In-House, NP Swab in UTM/VTM, 3-4  HR TAT - Swab, Nasopharynx [[id number]]  (Normal) Collected: 10/16/23 2328    Lab Status: Final result Specimen: Swab from Nasopharynx Updated: 10/17/23 0027     ADENOVIRUS, PCR Not Detected     Coronavirus 229E Not Detected     Coronavirus HKU1 Not Detected     Coronavirus NL63 Not Detected     Coronavirus OC43 Not Detected     COVID19 Not Detected     Human Metapneumovirus Not Detected     Human Rhinovirus/Enterovirus Not Detected     Influenza A PCR Not Detected     Influenza B PCR Not Detected     Parainfluenza Virus 1 Not Detected     Parainfluenza Virus 2 Not Detected     Parainfluenza Virus 3 Not Detected     Parainfluenza Virus 4 Not Detected     RSV, PCR Not Detected     Bordetella pertussis pcr Not Detected     Bordetella parapertussis PCR Not Detected     Chlamydophila pneumoniae PCR Not Detected     Mycoplasma pneumo by PCR Not Detected    Narrative:      In the setting of a positive respiratory panel with a viral infection PLUS a negative procalcitonin without other underlying concern for bacterial infection, consider observing off antibiotics or discontinuation of antibiotics and continue supportive care. If the respiratory panel is positive for atypical bacterial infection (Bordetella pertussis, Chlamydophila pneumoniae, or Mycoplasma pneumoniae), consider antibiotic de-escalation to target atypical bacterial infection.    Blood Culture - Blood, Arm, Left [[id number]]  (Normal) Collected: 10/16/23 2322    Lab Status: Preliminary result Specimen: Blood from Arm, Left Updated: 10/19/23 2345     Blood Culture No growth at 3 days            XR Chest 1 View    Result Date: 10/16/2023  Impression: Impression: No acute cardiopulmonary process. Stable cardiomegaly. Electronically Signed: Toyin Gant MD  10/16/2023 11:16 PM EDT  Workstation ID: [id number]     Results for orders placed during the hospital encounter of 06/09/21    Doppler Arterial Multi Level Lower Extremity - Bilateral CAR    Interpretation  Summary  · Right Conclusion: The right LEONCIO is normal. Normal digital pressures.  · Left Conclusion: The left LEONCIO is moderately reduced. Waveforms are consistent with femoral disease and popliteal disease. Moderate digital ischemia.      Results for orders placed during the hospital encounter of 06/09/21    Doppler Arterial Multi Level Lower Extremity - Bilateral CAR    Interpretation Summary  · Right Conclusion: The right LEONCIO is normal. Normal digital pressures.  · Left Conclusion: The left LEONCIO is moderately reduced. Waveforms are consistent with femoral disease and popliteal disease. Moderate digital ischemia.      Results for orders placed during the hospital encounter of 04/16/23    Adult Transthoracic Echo Complete W/ Cont if Necessary Per Protocol    Interpretation Summary    Left ventricular systolic function is severely decreased. Left ventricular ejection fraction appears to be less than 20%.    The left ventricular cavity is moderate to severely dilated.    The right ventricular cavity is moderately dilated.    Estimated right ventricular systolic pressure from tricuspid regurgitation is markedly elevated (>55 mmHg). Calculated right ventricular systolic pressure from tricuspid regurgitation is 58 mmHg.      Labs Pending at Discharge:  Pending Labs       Order Current Status    Blood Culture - Blood, Arm, Left Preliminary result    Blood Culture - Blood, Arm, Right Preliminary result            Procedures Performed  Procedure(s):  ESOPHAGOGASTRODUODENOSCOPY WITH BIOPSY X1 AREA         Consults:   Consults       Date and Time Order Name Status Description    10/17/2023 12:34 PM Inpatient Infectious Diseases Consult Completed     10/17/2023  1:59 AM Inpatient Gastroenterology Consult Completed           Assessment     Resolving fever.  Probably secondary to either viral illness or bronchitis.  Unlikely to be peritonitis since patient has no change in the peritoneal dialysis fluid color such as  cloudiness.  Patient had tunneled dialysis catheter but his blood culture are negative so far.  COVID-19 screen as well as viral PCR panel was negative     End-stage renal disease on peritoneal dialysis for the last 8 months.  No clinical signs of peritonitis such as abdominal pain or change in the peritoneal dialysis fluid color.     Plan  Discontinue IV cefepime  Start patient on p.o. Omnicef 300 mg daily for 7 days  I discussed the case with the patient's nephrologist and he preferred to discharge him home today since patient is missing dialysis.  The patient was informed if fever comes back or symptoms change to come back to the emergency room.  Please contact me if patient blood culture turn positive at 1 point.     Timothy Angelo MD  10/17/23  15:36 EDT       Discharge Details        Discharge Medications        New Medications        Instructions Start Date   cefdinir 300 MG capsule  Commonly known as: OMNICEF   300 mg, Oral, Every 24 Hours Scheduled             Continue These Medications        Instructions Start Date   aspirin 81 MG EC tablet   81 mg, Oral, Daily      Brilinta 90 MG tablet tablet  Generic drug: ticagrelor   Take 1 tablet by mouth twice daily.      bumetanide 2 MG tablet  Commonly known as: BUMEX   2 mg, Oral, 2 Times Daily      Daily-Vitamin tablet tablet  Generic drug: multivitamin   1 tablet, Oral, Daily      dicyclomine 10 MG capsule  Commonly known as: BENTYL   10 mg, Oral, 4 Times Daily Before Meals & Nightly      ferrous sulfate 325 (65 FE) MG tablet   325 mg, Oral, Every Other Day      fish oil 1000 MG capsule capsule   1 capsule, Oral, Every 24 Hours      insulin patient supplied pump   Subcutaneous, Continuous, Omnipod Insulin Lispro Pump is currently on Max daily dose of 50 units      ondansetron 4 MG tablet  Commonly known as: ZOFRAN   4 mg, Oral, Every 6 Hours PRN      rOPINIRole 4 MG tablet  Commonly known as: REQUIP   4 mg, Oral, Nightly      traZODone 50 MG tablet  Commonly  known as: DESYREL   50 mg, Oral, Nightly               Allergies   Allergen Reactions    Codeine Unknown (See Comments)         Discharge Disposition:   Home or Self Care    Diet:  Hospital:No active diet order        Discharge Activity:   Activity Instructions       Gradually Increase Activity Until at Pre-Hospitalization Level     Additional Activity Instructions:    Activity as tolerated.             CODE STATUS:  Code Status and Medical Interventions:   Ordered at: 10/17/23 0154     Level Of Support Discussed With:    Patient     Code Status (Patient has no pulse and is not breathing):    CPR (Attempt to Resuscitate)     Medical Interventions (Patient has pulse or is breathing):    Full Support     I have utilized all available, immediate resources to obtain, update, or review the patient's current medications including all prescriptions, over-the-counter products, herbals, cannabis/cannabidiol products, and vitamin.mineral/dietary (nutritional) supplements.      Level Of Support Discussed With: Patient  Code Status (Patient has no pulse and is not breathing): CPR (Attempt to Resuscitate)  Medical Interventions (Patient has pulse or is breathing): Full Support          Admission Status:  I believe this patient meets admit status.            Future Appointments   Date Time Provider Department Center   11/15/2023  9:45 AM Shirley Guido MD [facility]   1/15/2024  3:30 PM Seipel, Joseph F, MD [facility]       Additional Instructions for the Follow-ups that You Need to Schedule       Discharge Follow-up with PCP   As directed       Currently Documented PCP:    Erika Hernandez MD    PCP Phone Number:    [phone]     Follow Up Details: 1 week                Time spent on Discharge including face to face service:  34 minutes        Signature: Electronically signed by Winston Caldera MD, 10/20/23, 10:32 PM EDT.

## 2023-10-22 LAB — BACTERIA SPEC AEROBE CULT: NORMAL

## 2023-11-08 ENCOUNTER — HOSPITAL ENCOUNTER (OUTPATIENT)
Dept: INTERVENTIONAL RADIOLOGY/VASCULAR | Facility: HOSPITAL | Age: 63
Discharge: HOME | End: 2023-11-08
Admitting: INTERNAL MEDICINE
Payer: COMMERCIAL

## 2023-11-08 VITALS — HEIGHT: 69 IN | BODY MASS INDEX: 27.4 KG/M2 | WEIGHT: 185 LBS

## 2023-11-08 DIAGNOSIS — N18.9 CHRONIC KIDNEY DISEASE, UNSPECIFIED CKD STAGE: ICD-10-CM

## 2023-11-08 PROCEDURE — G0463 HOSPITAL OUTPT CLINIC VISIT: HCPCS

## 2023-11-08 NOTE — DISCHARGE INSTRUCTIONS
Take it easy at home for 48 hours.  No lifting more than 10 pounds for that period of time.  Keep dressing clean and dry, it can be removed in 2 days, if completely healed, it is safe to bathe as normal.  If not, cover with sterile dressing and check every 2 days until completely healed.  Some blood on the dressing is to be expected.  If dressing becomes saturated, hold firm but gentle pressure until it stops.  If you can't get it to stop, return to ER for treatment.  If severe pain, dizziness, racing heart, shortness of breath occur suddenly, call 911.  Look for signs of infection: redness, swelling, discharge, fever, etc., and report these to your family doctor immediately.  Follow up with referring provider for results and/or plan of care.

## 2023-11-15 ENCOUNTER — OFFICE VISIT (OUTPATIENT)
Dept: ENDOCRINOLOGY | Facility: CLINIC | Age: 63
End: 2023-11-15
Payer: COMMERCIAL

## 2023-11-15 VITALS
SYSTOLIC BLOOD PRESSURE: 110 MMHG | WEIGHT: 185 LBS | OXYGEN SATURATION: 99 % | DIASTOLIC BLOOD PRESSURE: 70 MMHG | HEIGHT: 69 IN | BODY MASS INDEX: 27.4 KG/M2 | HEART RATE: 77 BPM

## 2023-11-15 DIAGNOSIS — E03.8 HYPOTHYROIDISM DUE TO HASHIMOTO'S THYROIDITIS: ICD-10-CM

## 2023-11-15 DIAGNOSIS — E55.9 VITAMIN D DEFICIENCY: ICD-10-CM

## 2023-11-15 DIAGNOSIS — E78.2 MIXED HYPERLIPIDEMIA: ICD-10-CM

## 2023-11-15 DIAGNOSIS — Z46.81 INSULIN PUMP TITRATION: ICD-10-CM

## 2023-11-15 DIAGNOSIS — E06.3 HYPOTHYROIDISM DUE TO HASHIMOTO'S THYROIDITIS: ICD-10-CM

## 2023-11-15 DIAGNOSIS — E10.42 TYPE 1 DIABETES MELLITUS WITH DIABETIC POLYNEUROPATHY: Primary | ICD-10-CM

## 2023-11-15 PROCEDURE — 99214 OFFICE O/P EST MOD 30 MIN: CPT | Performed by: INTERNAL MEDICINE

## 2023-11-15 RX ORDER — INSULIN LISPRO 100 [IU]/ML
INJECTION, SOLUTION INTRAVENOUS; SUBCUTANEOUS
COMMUNITY
Start: 2023-11-07

## 2023-11-15 RX ORDER — METOPROLOL SUCCINATE 25 MG/1
25 TABLET, EXTENDED RELEASE ORAL
COMMUNITY
Start: 2023-11-07

## 2023-11-15 RX ORDER — PROMETHAZINE HYDROCHLORIDE 25 MG/1
TABLET ORAL
COMMUNITY
Start: 2023-10-20 | End: 2023-11-15

## 2023-11-15 RX ORDER — OMEPRAZOLE 20 MG/1
CAPSULE, DELAYED RELEASE ORAL
COMMUNITY
Start: 2023-11-11

## 2023-11-15 RX ORDER — METOLAZONE 2.5 MG/1
TABLET ORAL
COMMUNITY
Start: 2023-11-14 | End: 2023-11-15

## 2023-11-15 RX ORDER — INSULIN PMP CART,AUT,G6/7,CNTR
EACH SUBCUTANEOUS
COMMUNITY
Start: 2023-11-04

## 2023-11-15 RX ORDER — ONDANSETRON 4 MG/1
TABLET, ORALLY DISINTEGRATING ORAL
COMMUNITY
Start: 2023-10-24 | End: 2023-11-15

## 2023-11-15 RX ORDER — METOCLOPRAMIDE 10 MG/1
5 TABLET ORAL
COMMUNITY
Start: 2023-11-07

## 2023-11-15 RX ORDER — LEVOTHYROXINE SODIUM 0.03 MG/1
25 TABLET ORAL
COMMUNITY
Start: 2023-11-07

## 2023-11-15 NOTE — PATIENT INSTRUCTIONS
Keep up the good work!  See eye doctor.  Continue exercise.  Continue same pump settings, thyroid meds Omega 3 fish oil & multivitamins.  Call if blood sugars are running under 100 or over 200.  F/u in 6 months, with labs prior.

## 2023-11-17 NOTE — PROGRESS NOTES
[facility]        Patient Care Team:  Erika Hernandez MD as PCP - General  Erika Hernandez MD as PCP - Family Medicine    Chief Complaint:    Chief Complaint   Patient presents with    Diabetes     FU/ DM Type 1 /  Dexcom/ Ate @ 9:15 /basal rates: 12a 1.0, 5a 0.7 units/h         Subjective   Here for diabetes f/u  Blood sugars: high 100's  Exercise program: walking  Taking multivitamins & Omega 3    Interval History:     Patient Complaints:  on Peritoneal dialysis x 6mo  Patient Denies: hypoglycemia  History taken from: patient    Review of Systems:   Review of Systems   Eyes:  Negative for blurred vision.   Cardiovascular:  Positive for leg swelling.   Gastrointestinal:  Negative for nausea.   Endocrine: Negative for polyuria.   Neurological:  Negative for headache.   Lost  4 lb since last visit    Objective     Vital Signs     Vitals:    11/15/23 0955   BP: 110/70   Pulse: 77   SpO2: 99%         Physical Exam:     General Appearance:    Alert, cooperative, in no acute distress   Head:    Normocephalic, without obvious abnormality, atraumatic   Eyes:            Lids and lashes normal, conjunctivae and sclerae normal, no   icterus, no pallor, corneas clear, PERRLA   Throat:   No oral lesions,  oral mucosa moist. Edentulous   Neck:   No adenopathy, supple,  no thyromegaly, no carotid bruit   Lungs:     Clear     Heart:    Regular rhythm and normal rate   Chest Wall:    No abnormalities observed   Abdomen:     Normal bowel sounds, soft                 Extremities:   Toe deformities, 2+ edema               Pulses:   Pulses palpable and equal bilaterally   Skin:   Pump site clean   Neurologic:  DTR absent in ankles, absent vibratory sense in toes, unable to feel the 10g monofilament ( same as 1y ago )          Results Review:    I have reviewed the patient's new clinical results, labs & imaging.    Medication Review:   Prior to Admission medications    Medication Sig Start Date End Date  Taking? Authorizing Provider   aspirin (aspirin) 81 MG EC tablet Take 1 tablet by mouth Daily. 6/29/16  Yes Giacomo Cheema MD   Brilinta 90 MG tablet tablet Take 1 tablet by mouth twice daily. 6/12/23  Yes Rachid Sheriff MD   bumetanide (BUMEX) 2 MG tablet Take 1 tablet by mouth 2 (Two) Times a Day.   Yes Giacomo Cheema MD   ferrous sulfate 325 (65 FE) MG tablet Take 1 tablet by mouth Every Other Day.   Yes Giacomo Cheema MD   Insulin Disposable Pump (Omnipod 5 G6 Pod, Gen 5,) [facility]  11/4/23  Yes Giacomo Cheema MD   Insulin Lispro (humaLOG) 100 UNIT/ML injection See Instructions, continuous pump- max dose 50 units daily, 0 Refill(s) 11/7/23  Yes Giacomo Cheema MD   insulin patient supplied pump Inject  under the skin into the appropriate area as directed Continuous. Omnipod  Insulin Lispro  Pump is currently on  Max daily dose of 50 units   Yes Shirley Guido MD   levothyroxine (SYNTHROID, LEVOTHROID) 25 MCG tablet 1 tablet. 11/7/23  Yes Giacomo Cheema MD   metoclopramide (REGLAN) 10 MG tablet 0.5 tablets. 11/7/23  Yes Giacomo Cheema MD   metoprolol succinate XL (TOPROL-XL) 25 MG 24 hr tablet 1 tablet. 11/7/23  Yes Giacomo Cheema MD   Multiple Vitamin (DAILY-VITAMIN) tablet Take 1 tablet by mouth Daily. 10/10/17  Yes Giacomo Cheema MD   Omega-3 Fatty Acids (FISH OIL) 1000 MG capsule capsule Take 1 capsule by mouth Daily. 10/10/17  Yes Giacomo Cheema MD   omeprazole (priLOSEC) 20 MG capsule  11/11/23  Yes Giacomo Cheema MD   ondansetron (ZOFRAN) 4 MG tablet Take 1 tablet by mouth Every 6 (Six) Hours As Needed for Nausea or Vomiting. 9/3/23  Yes Maria R Meade APRN   rOPINIRole (REQUIP) 4 MG tablet Take 1 tablet by mouth Every Night.   Yes Giacomo Cheema MD   traZODone (DESYREL) 50 MG tablet Take 1 tablet by mouth Every Night.   Yes Giacomo Cheema MD         Lab Results   Component Value Date    HGBA1C 7.80 (H) 09/02/2023     HGBA1C 8.90 (H) 04/17/2023    HGBA1C 8.90 (H) 04/05/2023      Lab Results   Component Value Date    GLUCOSE 194 (H) 10/16/2023    BUN 61 (H) 10/16/2023    CREATININE 4.64 (H) 10/16/2023    EGFRIFNONA 24 (L) 02/14/2022    EGFRIFAFRI 35 (L) 07/16/2016    BCR 13.1 10/16/2023    K 4.3 10/16/2023    CO2 25.0 10/16/2023    CALCIUM 8.9 10/16/2023    ALBUMIN 3.1 (L) 10/16/2023    LABIL2 0.9 (L) 04/10/2019    AST 11 10/16/2023    ALT 9 10/16/2023    CHOL 165 04/17/2023     (H) 04/17/2023    HDL 40 11/09/2023    TRIG 103 11/09/2023     Lab Results   Component Value Date    TSH 3.270 04/16/2023    FREET4 1.24 11/09/2023    XGZX23QX 77.0 10/03/2022       Assessment & Plan     Diagnoses and all orders for this visit:    1. Type 1 diabetes mellitus with diabetic polyneuropathy (Primary) - improved  -     Hemoglobin A1c; Future    2. Mixed hyperlipidemia - stable    3. Vitamin D deficiency - will check status next visit  -     Vitamin D,25-Hydroxy    4. Hypothyroidism due to Hashimoto's thyroiditis - stable    5. Insulin pump titration    Wearing OmniPod 5 since Jul 2023.  Pt has agreed to wear the CGM device and share readings on a regular basis with our office.    See eye doctor.  Continue exercise.  Continue same pump settings, thyroid meds Omega 3 fish oil & multivitamins.  Call if blood sugars are running under 100 or over 200.        Shirley Guido MD  11/17/23  15:49 EST

## 2023-11-27 ENCOUNTER — TELEPHONE (OUTPATIENT)
Dept: CARDIOLOGY | Facility: CLINIC | Age: 63
End: 2023-11-27
Payer: COMMERCIAL

## 2023-11-27 NOTE — TELEPHONE ENCOUNTER
"    Caller: Esdras Peralta \"Cyrus\"    Relationship: Self    Best call back number: [phone] (home)      Requested Prescriptions:   Requested Prescriptions     Pending Prescriptions Disp Refills    ticagrelor (Brilinta) 90 MG tablet tablet 180 tablet 0     Sig: Take 1 tablet by mouth 2 (Two) Times a Day.        Pharmacy where request should be sent: [facility] [address] [phone] [facility] [phone]      Last office visit with prescribing clinician: 8/25/2022   Last telemedicine visit with prescribing clinician: Visit date not found   Next office visit with prescribing clinician: Visit date not found     Additional details provided by patient: PT STATES [facility] HAS TOLD HIM HE IS OUT OF REFILL, PT HAS ABOUT 30 DAYS LEFT BUT IS WANTING TO GO AHEAD AND GET THIS CALLED IN IF POSSIBLE.     Does the patient have less than a 3 day supply:  [] Yes  [x] No    Would you like a call back once the refill request has been completed: [x] Yes [] No    If the office needs to give you a call back, can they leave a voicemail: [x] Yes [] No    Trista Arreola Rep   11/27/23 09:27 EST         DELETE AFTER READING TO PATIENT: “Thank you for sharing this information with me. I will send a message to the clinical team. Please allow 48 hours for the clinical staff to follow up on this request.”   "

## 2023-11-27 NOTE — TELEPHONE ENCOUNTER
"  Caller: Esdras Peralta \"Cyrus\"    Relationship to patient: Self    Best call back number: [phone] (home)      Type of visit: 1 YR FOLLOW UP     Requested date: PREFERS WEDNESDAYS      Additional notes:PT CALLED IN TO SCHEDULE A 1 YEAR FOLLOW UP APPT WITH DR LEDESMA. LAST OFFICE VISIT WITH DR LEDESMA WAS 8.25.22. NO SPECIFIC FOLLOW UP DIRECTIONS. PLEASE ADVISE WHEN TO GET PT IN AND CALL TO SCHEDULE. PT IS ALSO NEEDING HIS BRILINTA MEDICATION REFILLED, BUT MAY NEED THIS FOLLOW UP APPT BEFORE DR WILL REFILL THAT.         "

## 2023-12-04 ENCOUNTER — OFFICE VISIT (OUTPATIENT)
Dept: SURGERY | Facility: CLINIC | Age: 63
End: 2023-12-04
Payer: COMMERCIAL

## 2023-12-04 VITALS
OXYGEN SATURATION: 99 % | BODY MASS INDEX: 27.25 KG/M2 | HEIGHT: 69 IN | DIASTOLIC BLOOD PRESSURE: 95 MMHG | WEIGHT: 184 LBS | SYSTOLIC BLOOD PRESSURE: 144 MMHG | TEMPERATURE: 97.5 F | HEART RATE: 87 BPM

## 2023-12-04 DIAGNOSIS — K42.9 UMBILICAL HERNIA WITHOUT OBSTRUCTION AND WITHOUT GANGRENE: Primary | ICD-10-CM

## 2023-12-04 PROCEDURE — 99214 OFFICE O/P EST MOD 30 MIN: CPT | Performed by: SURGERY

## 2023-12-04 RX ORDER — SEVELAMER CARBONATE 800 MG/1
TABLET, FILM COATED ORAL
COMMUNITY
Start: 2023-11-29

## 2023-12-04 RX ORDER — SODIUM CHLORIDE 9 MG/ML
100 INJECTION, SOLUTION INTRAVENOUS CONTINUOUS
OUTPATIENT
Start: 2023-12-04

## 2023-12-04 RX ORDER — SODIUM CHLORIDE 0.9 % (FLUSH) 0.9 %
3 SYRINGE (ML) INJECTION EVERY 12 HOURS SCHEDULED
OUTPATIENT
Start: 2023-12-04

## 2023-12-04 RX ORDER — SODIUM CHLORIDE 9 MG/ML
40 INJECTION, SOLUTION INTRAVENOUS AS NEEDED
OUTPATIENT
Start: 2023-12-04

## 2023-12-04 RX ORDER — SODIUM CHLORIDE 0.9 % (FLUSH) 0.9 %
3-10 SYRINGE (ML) INJECTION AS NEEDED
OUTPATIENT
Start: 2023-12-04
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
## 2023-12-05 ENCOUNTER — TELEPHONE (OUTPATIENT)
Dept: ENDOCRINOLOGY | Facility: CLINIC | Age: 63
End: 2023-12-05
Payer: COMMERCIAL

## 2023-12-12 ENCOUNTER — TELEPHONE (OUTPATIENT)
Dept: ENDOCRINOLOGY | Facility: CLINIC | Age: 63
End: 2023-12-12
Payer: COMMERCIAL

## 2023-12-12 NOTE — TELEPHONE ENCOUNTER
Faxed PO and ON to [facility] for CGM .       Pepper Clifton RD, LD, [facility]   Nutrition and Diabetes Education     Diabetes Center   [facility] Endocrinology/[place]  [address] [place] [address]
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
## 2023-12-14 ENCOUNTER — OFFICE VISIT (OUTPATIENT)
Dept: CARDIOLOGY | Facility: CLINIC | Age: 63
End: 2023-12-14
Payer: COMMERCIAL

## 2023-12-14 ENCOUNTER — CLINICAL SUPPORT NO REQUIREMENTS (OUTPATIENT)
Dept: CARDIOLOGY | Facility: CLINIC | Age: 63
End: 2023-12-14
Payer: COMMERCIAL

## 2023-12-14 VITALS
HEIGHT: 69 IN | DIASTOLIC BLOOD PRESSURE: 81 MMHG | SYSTOLIC BLOOD PRESSURE: 122 MMHG | HEART RATE: 89 BPM | WEIGHT: 179 LBS | OXYGEN SATURATION: 98 % | RESPIRATION RATE: 16 BRPM | BODY MASS INDEX: 26.51 KG/M2

## 2023-12-14 DIAGNOSIS — I42.0 CARDIOMYOPATHY, DILATED: Primary | ICD-10-CM

## 2023-12-14 DIAGNOSIS — Z95.810 ICD (IMPLANTABLE CARDIOVERTER-DEFIBRILLATOR), DUAL, IN SITU: ICD-10-CM

## 2023-12-14 RX ORDER — TRAZODONE HYDROCHLORIDE 50 MG/1
50-100 TABLET ORAL
Qty: 90 TABLET | Refills: 0 | Status: ON HOLD | OUTPATIENT
Start: 2023-12-14

## 2023-12-15 PROBLEM — Z45.02 ELECTIVE REPLACEMENT INDICATED FOR IMPLANTABLE CARDIOVERTER-DEFIBRILLATOR (ICD): Status: RESOLVED | Noted: 2022-11-30 | Resolved: 2023-12-15

## 2023-12-17 ENCOUNTER — APPOINTMENT (OUTPATIENT)
Dept: GENERAL RADIOLOGY | Facility: HOSPITAL | Age: 63
End: 2023-12-17
Payer: COMMERCIAL

## 2023-12-17 ENCOUNTER — HOSPITAL ENCOUNTER (INPATIENT)
Facility: HOSPITAL | Age: 63
LOS: 2 days | Discharge: HOME | End: 2023-12-19
Attending: EMERGENCY MEDICINE | Admitting: INTERNAL MEDICINE
Payer: COMMERCIAL

## 2023-12-17 DIAGNOSIS — J18.9 PNEUMONIA OF RIGHT LOWER LOBE DUE TO INFECTIOUS ORGANISM: ICD-10-CM

## 2023-12-17 DIAGNOSIS — A41.9 SEPSIS, DUE TO UNSPECIFIED ORGANISM, UNSPECIFIED WHETHER ACUTE ORGAN DYSFUNCTION PRESENT: Primary | ICD-10-CM

## 2023-12-17 LAB
ALBUMIN SERPL-MCNC: 3.1 G/DL (ref 3.5–5.2)
ALBUMIN/GLOB SERPL: 0.8 G/DL
ALP SERPL-CCNC: 149 U/L (ref 39–117)
ALT SERPL W P-5'-P-CCNC: 17 U/L (ref 1–41)
ANION GAP SERPL CALCULATED.3IONS-SCNC: 13 MMOL/L (ref 5–15)
ANION GAP SERPL CALCULATED.3IONS-SCNC: 16 MMOL/L (ref 5–15)
ARTERIAL PATENCY WRIST A: POSITIVE
AST SERPL-CCNC: 25 U/L (ref 1–40)
ATMOSPHERIC PRESS: ABNORMAL MM[HG]
BASE EXCESS BLDA CALC-SCNC: -0.7 MMOL/L (ref 0–3)
BDY SITE: ABNORMAL
BILIRUB SERPL-MCNC: 0.5 MG/DL (ref 0–1.2)
BUN SERPL-MCNC: 45 MG/DL (ref 8–23)
BUN SERPL-MCNC: 49 MG/DL (ref 8–23)
BUN/CREAT SERPL: 10.1 (ref 7–25)
BUN/CREAT SERPL: 8.9 (ref 7–25)
CALCIUM SPEC-SCNC: 8.3 MG/DL (ref 8.6–10.5)
CALCIUM SPEC-SCNC: 8.7 MG/DL (ref 8.6–10.5)
CHLORIDE SERPL-SCNC: 94 MMOL/L (ref 98–107)
CHLORIDE SERPL-SCNC: 94 MMOL/L (ref 98–107)
CO2 BLDA-SCNC: 22.8 MMOL/L (ref 22–29)
CO2 SERPL-SCNC: 25 MMOL/L (ref 22–29)
CO2 SERPL-SCNC: 28 MMOL/L (ref 22–29)
CREAT SERPL-MCNC: 4.85 MG/DL (ref 0.76–1.27)
CREAT SERPL-MCNC: 5.06 MG/DL (ref 0.76–1.27)
D-LACTATE SERPL-SCNC: 0.8 MMOL/L (ref 0.3–2)
DEPRECATED RDW RBC AUTO: 59.1 FL (ref 37–54)
DEPRECATED RDW RBC AUTO: 59.5 FL (ref 37–54)
EGFRCR SERPLBLD CKD-EPI 2021: 12.2 ML/MIN/1.73
EGFRCR SERPLBLD CKD-EPI 2021: 12.9 ML/MIN/1.73
EOSINOPHIL # BLD MANUAL: 0.14 10*3/MM3 (ref 0–0.4)
EOSINOPHIL NFR BLD MANUAL: 1 % (ref 0.3–6.2)
ERYTHROCYTE [DISTWIDTH] IN BLOOD BY AUTOMATED COUNT: 15.7 % (ref 12.3–15.4)
ERYTHROCYTE [DISTWIDTH] IN BLOOD BY AUTOMATED COUNT: 15.8 % (ref 12.3–15.4)
GLOBULIN UR ELPH-MCNC: 4 GM/DL
GLUCOSE BLDC GLUCOMTR-MCNC: 91 MG/DL (ref 70–105)
GLUCOSE SERPL-MCNC: 107 MG/DL (ref 65–99)
GLUCOSE SERPL-MCNC: 97 MG/DL (ref 65–99)
HCO3 BLDA-SCNC: 21.9 MMOL/L (ref 21–28)
HCT VFR BLD AUTO: 43.1 % (ref 37.5–51)
HCT VFR BLD AUTO: 43.7 % (ref 37.5–51)
HEMODILUTION: NO
HGB BLD-MCNC: 14.2 G/DL (ref 13–17.7)
HGB BLD-MCNC: 14.9 G/DL (ref 13–17.7)
INHALED O2 CONCENTRATION: 28 %
LYMPHOCYTES # BLD MANUAL: 0.54 10*3/MM3 (ref 0.7–3.1)
LYMPHOCYTES # BLD MANUAL: 0.55 10*3/MM3 (ref 0.7–3.1)
LYMPHOCYTES NFR BLD MANUAL: 6 % (ref 5–12)
LYMPHOCYTES NFR BLD MANUAL: 7 % (ref 5–12)
MAGNESIUM SERPL-MCNC: 1.5 MG/DL (ref 1.6–2.4)
MCH RBC QN AUTO: 33.8 PG (ref 26.6–33)
MCH RBC QN AUTO: 34.9 PG (ref 26.6–33)
MCHC RBC AUTO-ENTMCNC: 33 G/DL (ref 31.5–35.7)
MCHC RBC AUTO-ENTMCNC: 34.1 G/DL (ref 31.5–35.7)
MCV RBC AUTO: 102.4 FL (ref 79–97)
MCV RBC AUTO: 102.5 FL (ref 79–97)
MODALITY: ABNORMAL
MONOCYTES # BLD: 0.81 10*3/MM3 (ref 0.1–0.9)
MONOCYTES # BLD: 0.96 10*3/MM3 (ref 0.1–0.9)
NEUTROPHILS # BLD AUTO: 12.06 10*3/MM3 (ref 1.7–7)
NEUTROPHILS # BLD AUTO: 12.15 10*3/MM3 (ref 1.7–7)
NEUTROPHILS NFR BLD MANUAL: 82 % (ref 42.7–76)
NEUTROPHILS NFR BLD MANUAL: 82 % (ref 42.7–76)
NEUTS BAND NFR BLD MANUAL: 6 % (ref 0–5)
NEUTS BAND NFR BLD MANUAL: 8 % (ref 0–5)
PCO2 BLDA: 30.4 MM HG (ref 35–48)
PH BLDA: 7.47 PH UNITS (ref 7.35–7.45)
PLAT MORPH BLD: NORMAL
PLAT MORPH BLD: NORMAL
PLATELET # BLD AUTO: 195 10*3/MM3 (ref 140–450)
PLATELET # BLD AUTO: 198 10*3/MM3 (ref 140–450)
PMV BLD AUTO: 9 FL (ref 6–12)
PMV BLD AUTO: 9.1 FL (ref 6–12)
PO2 BLDA: 87 MM HG (ref 83–108)
POTASSIUM SERPL-SCNC: 3.1 MMOL/L (ref 3.5–5.2)
POTASSIUM SERPL-SCNC: 3.7 MMOL/L (ref 3.5–5.2)
PROT SERPL-MCNC: 7.1 G/DL (ref 6–8.5)
RBC # BLD AUTO: 4.2 10*6/MM3 (ref 4.14–5.8)
RBC # BLD AUTO: 4.26 10*6/MM3 (ref 4.14–5.8)
RBC MORPH BLD: NORMAL
RBC MORPH BLD: NORMAL
SAO2 % BLDCOA: 97.3 % (ref 94–98)
SCAN SLIDE: NORMAL
SCAN SLIDE: NORMAL
SODIUM SERPL-SCNC: 135 MMOL/L (ref 136–145)
SODIUM SERPL-SCNC: 135 MMOL/L (ref 136–145)
VARIANT LYMPHS NFR BLD MANUAL: 4 % (ref 19.6–45.3)
VARIANT LYMPHS NFR BLD MANUAL: 4 % (ref 19.6–45.3)
WBC MORPH BLD: NORMAL
WBC MORPH BLD: NORMAL
WBC NRBC COR # BLD AUTO: 13.5 10*3/MM3 (ref 3.4–10.8)
WBC NRBC COR # BLD AUTO: 13.7 10*3/MM3 (ref 3.4–10.8)
WHOLE BLOOD HOLD COAG: NORMAL
WHOLE BLOOD HOLD SPECIMEN: NORMAL

## 2023-12-17 PROCEDURE — 87040 BLOOD CULTURE FOR BACTERIA: CPT | Performed by: EMERGENCY MEDICINE

## 2023-12-17 PROCEDURE — 83605 ASSAY OF LACTIC ACID: CPT

## 2023-12-17 PROCEDURE — 82948 REAGENT STRIP/BLOOD GLUCOSE: CPT

## 2023-12-17 PROCEDURE — 25010000002 AZITHROMYCIN PER 500 MG: Performed by: EMERGENCY MEDICINE

## 2023-12-17 PROCEDURE — 85007 BL SMEAR W/DIFF WBC COUNT: CPT | Performed by: EMERGENCY MEDICINE

## 2023-12-17 PROCEDURE — 93005 ELECTROCARDIOGRAM TRACING: CPT | Performed by: EMERGENCY MEDICINE

## 2023-12-17 PROCEDURE — 85025 COMPLETE CBC W/AUTO DIFF WBC: CPT | Performed by: INTERNAL MEDICINE

## 2023-12-17 PROCEDURE — 36600 WITHDRAWAL OF ARTERIAL BLOOD: CPT

## 2023-12-17 PROCEDURE — 71045 X-RAY EXAM CHEST 1 VIEW: CPT

## 2023-12-17 PROCEDURE — 25810000003 SODIUM CHLORIDE 0.9 % SOLUTION: Performed by: INTERNAL MEDICINE

## 2023-12-17 PROCEDURE — 99285 EMERGENCY DEPT VISIT HI MDM: CPT

## 2023-12-17 PROCEDURE — 25810000003 SODIUM CHLORIDE 0.9 % SOLUTION 250 ML FLEX CONT: Performed by: EMERGENCY MEDICINE

## 2023-12-17 PROCEDURE — 25010000002 PROCHLORPERAZINE 10 MG/2ML SOLUTION: Performed by: NURSE PRACTITIONER

## 2023-12-17 PROCEDURE — 82803 BLOOD GASES ANY COMBINATION: CPT

## 2023-12-17 PROCEDURE — 36415 COLL VENOUS BLD VENIPUNCTURE: CPT | Performed by: INTERNAL MEDICINE

## 2023-12-17 PROCEDURE — 25010000002 ENOXAPARIN PER 10 MG: Performed by: INTERNAL MEDICINE

## 2023-12-17 PROCEDURE — 80053 COMPREHEN METABOLIC PANEL: CPT | Performed by: EMERGENCY MEDICINE

## 2023-12-17 PROCEDURE — 25010000002 CEFTRIAXONE PER 250 MG: Performed by: EMERGENCY MEDICINE

## 2023-12-17 PROCEDURE — 85007 BL SMEAR W/DIFF WBC COUNT: CPT | Performed by: INTERNAL MEDICINE

## 2023-12-17 PROCEDURE — 93005 ELECTROCARDIOGRAM TRACING: CPT

## 2023-12-17 PROCEDURE — 85025 COMPLETE CBC W/AUTO DIFF WBC: CPT | Performed by: EMERGENCY MEDICINE

## 2023-12-17 PROCEDURE — 83735 ASSAY OF MAGNESIUM: CPT | Performed by: INTERNAL MEDICINE

## 2023-12-17 PROCEDURE — 25010000002 ONDANSETRON PER 1 MG: Performed by: INTERNAL MEDICINE

## 2023-12-17 RX ORDER — ONDANSETRON 2 MG/ML
4 INJECTION INTRAMUSCULAR; INTRAVENOUS EVERY 6 HOURS PRN
Status: DISCONTINUED | OUTPATIENT
Start: 2023-12-17 | End: 2023-12-19 | Stop reason: HOSPADM

## 2023-12-17 RX ORDER — NICOTINE POLACRILEX 4 MG
15 LOZENGE BUCCAL
Status: DISCONTINUED | OUTPATIENT
Start: 2023-12-17 | End: 2023-12-19 | Stop reason: HOSPADM

## 2023-12-17 RX ORDER — BISACODYL 5 MG/1
5 TABLET, DELAYED RELEASE ORAL DAILY PRN
Status: DISCONTINUED | OUTPATIENT
Start: 2023-12-17 | End: 2023-12-19 | Stop reason: HOSPADM

## 2023-12-17 RX ORDER — PROCHLORPERAZINE 25 MG
25 SUPPOSITORY, RECTAL RECTAL EVERY 12 HOURS PRN
Status: COMPLETED | OUTPATIENT
Start: 2023-12-17 | End: 2023-12-17

## 2023-12-17 RX ORDER — INSULIN LISPRO 100 [IU]/ML
1-200 INJECTION, SOLUTION INTRAVENOUS; SUBCUTANEOUS
Status: DISCONTINUED | OUTPATIENT
Start: 2023-12-17 | End: 2023-12-18

## 2023-12-17 RX ORDER — DEXTROSE MONOHYDRATE 25 G/50ML
10-50 INJECTION, SOLUTION INTRAVENOUS
Status: DISCONTINUED | OUTPATIENT
Start: 2023-12-17 | End: 2023-12-19 | Stop reason: HOSPADM

## 2023-12-17 RX ORDER — SODIUM CHLORIDE 9 MG/ML
40 INJECTION, SOLUTION INTRAVENOUS AS NEEDED
Status: DISCONTINUED | OUTPATIENT
Start: 2023-12-17 | End: 2023-12-19 | Stop reason: HOSPADM

## 2023-12-17 RX ORDER — ENOXAPARIN SODIUM 100 MG/ML
40 INJECTION SUBCUTANEOUS ONCE
Status: COMPLETED | OUTPATIENT
Start: 2023-12-17 | End: 2023-12-17

## 2023-12-17 RX ORDER — PROCHLORPERAZINE MALEATE 5 MG/1
5 TABLET ORAL EVERY 6 HOURS PRN
Status: COMPLETED | OUTPATIENT
Start: 2023-12-17 | End: 2023-12-17

## 2023-12-17 RX ORDER — SODIUM CHLORIDE 0.9 % (FLUSH) 0.9 %
10 SYRINGE (ML) INJECTION AS NEEDED
Status: DISCONTINUED | OUTPATIENT
Start: 2023-12-17 | End: 2023-12-19 | Stop reason: HOSPADM

## 2023-12-17 RX ORDER — ASPIRIN 81 MG/1
81 TABLET ORAL DAILY
Status: DISCONTINUED | OUTPATIENT
Start: 2023-12-17 | End: 2023-12-19 | Stop reason: HOSPADM

## 2023-12-17 RX ORDER — IBUPROFEN 600 MG/1
1 TABLET ORAL
Status: DISCONTINUED | OUTPATIENT
Start: 2023-12-17 | End: 2023-12-19 | Stop reason: HOSPADM

## 2023-12-17 RX ORDER — ENOXAPARIN SODIUM 100 MG/ML
40 INJECTION SUBCUTANEOUS EVERY 24 HOURS
Status: DISCONTINUED | OUTPATIENT
Start: 2023-12-17 | End: 2023-12-17 | Stop reason: SDUPTHER

## 2023-12-17 RX ORDER — BENZONATATE 100 MG/1
100 CAPSULE ORAL 3 TIMES DAILY PRN
Status: DISCONTINUED | OUTPATIENT
Start: 2023-12-17 | End: 2023-12-19 | Stop reason: HOSPADM

## 2023-12-17 RX ORDER — AMOXICILLIN 250 MG
2 CAPSULE ORAL 2 TIMES DAILY
Status: DISCONTINUED | OUTPATIENT
Start: 2023-12-17 | End: 2023-12-19 | Stop reason: HOSPADM

## 2023-12-17 RX ORDER — SODIUM CHLORIDE 0.9 % (FLUSH) 0.9 %
10 SYRINGE (ML) INJECTION EVERY 12 HOURS SCHEDULED
Status: DISCONTINUED | OUTPATIENT
Start: 2023-12-17 | End: 2023-12-19 | Stop reason: HOSPADM

## 2023-12-17 RX ORDER — BISACODYL 10 MG
10 SUPPOSITORY, RECTAL RECTAL DAILY PRN
Status: DISCONTINUED | OUTPATIENT
Start: 2023-12-17 | End: 2023-12-19 | Stop reason: HOSPADM

## 2023-12-17 RX ORDER — SEVELAMER CARBONATE 800 MG/1
800 TABLET, FILM COATED ORAL
Status: DISCONTINUED | OUTPATIENT
Start: 2023-12-17 | End: 2023-12-19 | Stop reason: HOSPADM

## 2023-12-17 RX ORDER — PANTOPRAZOLE SODIUM 40 MG/1
40 TABLET, DELAYED RELEASE ORAL
Status: DISCONTINUED | OUTPATIENT
Start: 2023-12-18 | End: 2023-12-19 | Stop reason: HOSPADM

## 2023-12-17 RX ORDER — DIPHENOXYLATE HYDROCHLORIDE AND ATROPINE SULFATE 2.5; .025 MG/1; MG/1
1 TABLET ORAL DAILY
Status: DISCONTINUED | OUTPATIENT
Start: 2023-12-17 | End: 2023-12-19 | Stop reason: HOSPADM

## 2023-12-17 RX ORDER — ACETAMINOPHEN 325 MG/1
650 TABLET ORAL EVERY 6 HOURS PRN
Status: DISCONTINUED | OUTPATIENT
Start: 2023-12-17 | End: 2023-12-19 | Stop reason: HOSPADM

## 2023-12-17 RX ORDER — LEVOTHYROXINE SODIUM 0.03 MG/1
25 TABLET ORAL
Status: DISCONTINUED | OUTPATIENT
Start: 2023-12-18 | End: 2023-12-19 | Stop reason: HOSPADM

## 2023-12-17 RX ORDER — ENOXAPARIN SODIUM 100 MG/ML
40 INJECTION SUBCUTANEOUS EVERY 24 HOURS
Status: DISCONTINUED | OUTPATIENT
Start: 2023-12-18 | End: 2023-12-19 | Stop reason: HOSPADM

## 2023-12-17 RX ORDER — METOPROLOL SUCCINATE 25 MG/1
25 TABLET, EXTENDED RELEASE ORAL
Status: DISCONTINUED | OUTPATIENT
Start: 2023-12-17 | End: 2023-12-19 | Stop reason: HOSPADM

## 2023-12-17 RX ORDER — PROCHLORPERAZINE EDISYLATE 5 MG/ML
5 INJECTION INTRAMUSCULAR; INTRAVENOUS EVERY 6 HOURS PRN
Status: COMPLETED | OUTPATIENT
Start: 2023-12-17 | End: 2023-12-17

## 2023-12-17 RX ORDER — POLYETHYLENE GLYCOL 3350 17 G/17G
17 POWDER, FOR SOLUTION ORAL DAILY PRN
Status: DISCONTINUED | OUTPATIENT
Start: 2023-12-17 | End: 2023-12-19 | Stop reason: HOSPADM

## 2023-12-17 RX ORDER — ROPINIROLE 1 MG/1
4 TABLET, FILM COATED ORAL NIGHTLY
Status: DISCONTINUED | OUTPATIENT
Start: 2023-12-17 | End: 2023-12-19 | Stop reason: HOSPADM

## 2023-12-17 RX ORDER — INSULIN LISPRO 100 [IU]/ML
1-200 INJECTION, SOLUTION INTRAVENOUS; SUBCUTANEOUS AS NEEDED
Status: DISCONTINUED | OUTPATIENT
Start: 2023-12-17 | End: 2023-12-18

## 2023-12-17 RX ORDER — SODIUM CHLORIDE 9 MG/ML
75 INJECTION, SOLUTION INTRAVENOUS CONTINUOUS
Status: DISCONTINUED | OUTPATIENT
Start: 2023-12-17 | End: 2023-12-18

## 2023-12-17 RX ADMIN — THERA TABS 1 TABLET: TAB at 22:35

## 2023-12-17 RX ADMIN — CEFTRIAXONE 2000 MG: 2 INJECTION, POWDER, FOR SOLUTION INTRAMUSCULAR; INTRAVENOUS at 17:06

## 2023-12-17 RX ADMIN — SODIUM CHLORIDE 75 ML/HR: 9 INJECTION, SOLUTION INTRAVENOUS at 23:03

## 2023-12-17 RX ADMIN — Medication 10 ML: at 22:41

## 2023-12-17 RX ADMIN — SEVELAMER CARBONATE 800 MG: 800 TABLET, FILM COATED ORAL at 22:35

## 2023-12-17 RX ADMIN — Medication 10 ML: at 22:35

## 2023-12-17 RX ADMIN — ONDANSETRON 4 MG: 2 INJECTION INTRAMUSCULAR; INTRAVENOUS at 20:04

## 2023-12-17 RX ADMIN — METOPROLOL SUCCINATE 25 MG: 25 TABLET, EXTENDED RELEASE ORAL at 22:35

## 2023-12-17 RX ADMIN — AZITHROMYCIN MONOHYDRATE 500 MG: 500 INJECTION, POWDER, LYOPHILIZED, FOR SOLUTION INTRAVENOUS at 17:54

## 2023-12-17 RX ADMIN — TICAGRELOR 90 MG: 90 TABLET ORAL at 22:35

## 2023-12-17 RX ADMIN — ASPIRIN 81 MG: 81 TABLET, COATED ORAL at 22:35

## 2023-12-17 RX ADMIN — PROCHLORPERAZINE EDISYLATE 5 MG: 5 INJECTION INTRAMUSCULAR; INTRAVENOUS at 23:48

## 2023-12-17 RX ADMIN — DOCUSATE SODIUM 50 MG AND SENNOSIDES 8.6 MG 2 TABLET: 8.6; 5 TABLET, FILM COATED ORAL at 22:35

## 2023-12-17 RX ADMIN — ROPINIROLE HYDROCHLORIDE 4 MG: 1 TABLET, FILM COATED ORAL at 22:35

## 2023-12-17 RX ADMIN — ENOXAPARIN SODIUM 40 MG: 100 INJECTION SUBCUTANEOUS at 22:35
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
## 2023-12-17 NOTE — ED NOTES
Nursing report ED to floor  Esdras Peralta  63 y.o.  male    HPI:   Chief Complaint   Patient presents with    Shortness of Breath     Soa, chest pain, dx with RSV states he just can't breathe.         Admitting doctor:   Ha Ott MD    Admitting diagnosis:   The primary encounter diagnosis was Sepsis, due to unspecified organism, unspecified whether acute organ dysfunction present. A diagnosis of Pneumonia of right lower lobe due to infectious organism was also pertinent to this visit.    Code status:   Current Code Status       Date Active Code Status Order ID Comments User Context       12/17/2023 1752 CPR (Attempt to Resuscitate) [id number]  Ha Ott MD ED        Question Answer    Code Status (Patient has no pulse and is not breathing) CPR (Attempt to Resuscitate)    Medical Interventions (Patient has pulse or is breathing) Full Support                    Allergies:   Codeine    Isolation:  No active isolations     Fall Risk:  Fall Risk Assessment was completed, and patient is at low risk for falls.   Predictive Model Details         14 (Low) Factor Value    Calculated 12/17/2023 18:46 Age 63    Risk of Fall Model Musculoskeletal Assessment WDL     Active Peripheral IV Present     Imaging order in this encounter Present     Respiratory Rate 24     Skin Assessment WDL     Magnesium not on file     Albumin 3.1 g/dL     Number of Distinct Medication Classes administered 3     Drug Use No     Tobacco Use Quit     Calcium 8.7 mg/dL     Chloride 94 mmol/L     Creatinine 5.06 mg/dL     Rodríguez Scale not on file     Financial Class Private Insurance     Peripheral Vascular Assessment WDL     Cardiac Assessment X     Clinically Relevant Sex Not Female     Diastolic BP 83     Gastrointestinal Assessment WDL     Total Bilirubin 0.5 mg/dL     Days after Admission 0.108     ALT 17 U/L     Potassium 3.7 mmol/L         Weight:       12/17/23  1554   Weight: 81.2 kg (179 lb)       Intake and  Output    Intake/Output Summary (Last 24 hours) at 12/17/2023 1846  Last data filed at 12/17/2023 1754  Gross per 24 hour   Intake 100 ml   Output --   Net 100 ml       Diet:   Dietary Orders (From admission, onward)       Start     Ordered    12/17/23 1844  Patient is on Glucommander  (Glucommander™ SQ Insulin - Select Dosing Option)  Continuous        Question Answer Comment   Tray Note: Glucommander    Patient is on Glucommander: Yes        12/17/23 1843                     Most recent vitals:   Vitals:    12/17/23 1555 12/17/23 1701 12/17/23 1813 12/17/23 1825   BP: 127/72 115/66  129/83   Pulse: 99 80  100   Resp:       Temp: 97.7 °F (36.5 °C)      TempSrc: Oral      SpO2:  93% 97%    Weight:       Height:           Active LDAs/IV Access:   Lines, Drains & Airways       Active LDAs       Name Placement date Placement time Site Days    Peripheral IV 12/17/23 1628 Left Antecubital 12/17/23  1628  Antecubital  less than 1    Peritoneal Dialysis Catheter Left lower abdomen 04/24/23  1042  Left lower abdomen  237    Insulin Pump 10/17/23  1640  -- 61                    Skin Condition:   Skin Assessments (last day)       None             Labs (abnormal labs have a star):   Labs Reviewed   COMPREHENSIVE METABOLIC PANEL - Abnormal; Notable for the following components:       Result Value    Glucose 107 (*)     BUN 45 (*)     Creatinine 5.06 (*)     Sodium 135 (*)     Chloride 94 (*)     Albumin 3.1 (*)     Alkaline Phosphatase 149 (*)     eGFR 12.2 (*)     All other components within normal limits    Narrative:     GFR Normal >60  Chronic Kidney Disease <60  Kidney Failure <15     CBC WITH AUTO DIFFERENTIAL - Abnormal; Notable for the following components:    WBC 13.50 (*)     .4 (*)     MCH 34.9 (*)     RDW 15.7 (*)     RDW-SD 59.5 (*)     All other components within normal limits    Narrative:     The previously reported component NRBC is no longer being reported. Previous result was 0.0 /100 WBC (Reference  Range: 0.0-0.2 /100 WBC) on 12/17/2023 at 1655 EST.   BLOOD GAS, ARTERIAL - Abnormal; Notable for the following components:    pH, Arterial 7.466 (*)     pCO2, Arterial 30.4 (*)     Base Excess, Arterial -0.7 (*)     All other components within normal limits   MANUAL DIFFERENTIAL - Abnormal; Notable for the following components:    Neutrophil % 82.0 (*)     Lymphocyte % 4.0 (*)     Bands %  8.0 (*)     Neutrophils Absolute 12.15 (*)     Lymphocytes Absolute 0.54 (*)     All other components within normal limits   POC LACTATE - Normal   BLOOD CULTURE   BLOOD CULTURE   RAINBOW DRAW    Narrative:     The following orders were created for panel order [place] Draw.  Procedure                               Abnormality         Status                     ---------                               -----------         ------                     Green Top (Gel)[[id number]]                                  Final result               Lavender Top[[id number]]                                     Final result               Gold Top - SST[[id number]]                                   Final result               Light Blue Top[[id number]]                                   Final result                 Please view results for these tests on the individual orders.   BLOOD GAS, ARTERIAL   SCAN SLIDE   POC LACTATE   POCT GLUCOSE FINGERSTICK   POCT GLUCOSE FINGERSTICK   POCT GLUCOSE FINGERSTICK   POCT GLUCOSE FINGERSTICK   CBC AND DIFFERENTIAL    Narrative:     The following orders were created for panel order CBC & Differential.  Procedure                               Abnormality         Status                     ---------                               -----------         ------                     CBC Auto Differential[[id number]]        Abnormal            Final result               Scan Slide[[id number]]                                       Final result                 Please view results for these tests on the individual orders.   GREEN TOP    LAVENDER TOP   [facility]   LIGHT BLUE [facility]       LOC: Person, Place, Time, and Situation    Telemetry:  Telemetry    Cardiac Monitoring Ordered: yes    EKG:   ECG 12 Lead Chest Pain   Preliminary Result   HEART RATE= 95  bpm   RR Interval= 632  ms   LA Interval= 140  ms   P Horizontal Axis= -90  deg   P Front Axis= -77  deg   QRSD Interval= 103  ms   QT Interval= 377  ms   QTcB= 474  ms   QRS Axis= -62  deg   T Wave Axis=   deg   - ABNORMAL ECG -   Sinus or ectopic atrial rhythm   Left anterior fascicular block   Nonspecific T abnormalities, lateral leads   When compared with ECG of 01-Jun-2023 20:46:00,   Significant change in rhythm: previously sinus   Electronically Signed By:    Date and Time of Study: 2023-12-17 16:00:19          Medications Given in the ED:   Medications   sodium chloride 0.9 % flush 10 mL (has no administration in time range)   azithromycin (ZITHROMAX) 500 mg in sodium chloride 0.9 % 250 mL IVPB-VTB (500 mg Intravenous New Bag 12/17/23 1754)   insulin glargine (LANTUS, SEMGLEE) injection 1-200 Units (has no administration in time range)   insulin lispro (HUMALOG/ADMELOG) injection 1-200 Units (has no administration in time range)   insulin lispro (HUMALOG/ADMELOG) injection 1-200 Units (has no administration in time range)   dextrose (GLUTOSE) oral gel 15 g (has no administration in time range)   dextrose (D50W) (25 g/50 mL) IV injection 10-50 mL (has no administration in time range)   Glucagon (GLUCAGEN) injection 1 mg (has no administration in time range)   cefTRIAXone (ROCEPHIN) 2,000 mg in sodium chloride 0.9 % 100 mL IVPB (0 mg Intravenous Stopped 12/17/23 1754)       Imaging results:  XR Chest 1 View    Result Date: 12/17/2023  Impression: New patchy airspace disease in the right lung base compatible with multifocal pneumonia. Electronically Signed: Javon Posada MD  12/17/2023 4:41 PM EST  Workstation ID: [id number]     Social issues:   Social History     Socioeconomic History     Marital status:      Spouse name: Khushboo    Number of children: 5    Years of education: 12   Tobacco Use    Smoking status: Former     Packs/day: 1     Types: Cigarettes     Quit date:      Years since quittin.9     Passive exposure: Past    Smokeless tobacco: Never    Tobacco comments:     2019 quit   Vaping Use    Vaping Use: Never used   Substance and Sexual Activity    Alcohol use: No    Drug use: No    Sexual activity: Defer       NIH Stroke Scale:  Interval: (not recorded)  1a. Level of Consciousness: (not recorded)  1b. LOC Questions: (not recorded)  1c. LOC Commands: (not recorded)  2. Best Gaze: (not recorded)  3. Visual: (not recorded)  4. Facial Palsy: (not recorded)  5a. Motor Arm, Left: (not recorded)  5b. Motor Arm, Right: (not recorded)  6a. Motor Leg, Left: (not recorded)  6b. Motor Leg, Right: (not recorded)  7. Limb Ataxia: (not recorded)  8. Sensory: (not recorded)  9. Best Language: (not recorded)  10. Dysarthria: (not recorded)  11. Extinction and Inattention (formerly Neglect): (not recorded)    Total (NIH Stroke Scale): (not recorded)     Additional notable assessment information:     Nursing report ED to floor:  Estelita Field RN   23 18:46 EST

## 2023-12-18 LAB
ANION GAP SERPL CALCULATED.3IONS-SCNC: 15 MMOL/L (ref 5–15)
BASOPHILS # BLD AUTO: 0 10*3/MM3 (ref 0–0.2)
BASOPHILS NFR BLD AUTO: 0.3 % (ref 0–1.5)
BUN SERPL-MCNC: 50 MG/DL (ref 8–23)
BUN/CREAT SERPL: 9.4 (ref 7–25)
CALCIUM SPEC-SCNC: 8.7 MG/DL (ref 8.6–10.5)
CHLORIDE SERPL-SCNC: 94 MMOL/L (ref 98–107)
CO2 SERPL-SCNC: 25 MMOL/L (ref 22–29)
CREAT SERPL-MCNC: 5.34 MG/DL (ref 0.76–1.27)
DEPRECATED RDW RBC AUTO: 63.4 FL (ref 37–54)
EGFRCR SERPLBLD CKD-EPI 2021: 11.5 ML/MIN/1.73
EOSINOPHIL # BLD AUTO: 0 10*3/MM3 (ref 0–0.4)
EOSINOPHIL NFR BLD AUTO: 0.1 % (ref 0.3–6.2)
ERYTHROCYTE [DISTWIDTH] IN BLOOD BY AUTOMATED COUNT: 16.4 % (ref 12.3–15.4)
GLUCOSE BLDC GLUCOMTR-MCNC: 189 MG/DL (ref 70–105)
GLUCOSE BLDC GLUCOMTR-MCNC: 231 MG/DL (ref 70–105)
GLUCOSE BLDC GLUCOMTR-MCNC: 237 MG/DL (ref 70–105)
GLUCOSE BLDC GLUCOMTR-MCNC: 95 MG/DL (ref 70–105)
GLUCOSE SERPL-MCNC: 125 MG/DL (ref 65–99)
HCT VFR BLD AUTO: 43.5 % (ref 37.5–51)
HGB BLD-MCNC: 14.3 G/DL (ref 13–17.7)
LYMPHOCYTES # BLD AUTO: 0.8 10*3/MM3 (ref 0.7–3.1)
LYMPHOCYTES NFR BLD AUTO: 6.2 % (ref 19.6–45.3)
MAGNESIUM SERPL-MCNC: 1.9 MG/DL (ref 1.6–2.4)
MCH RBC QN AUTO: 34.4 PG (ref 26.6–33)
MCHC RBC AUTO-ENTMCNC: 32.9 G/DL (ref 31.5–35.7)
MCV RBC AUTO: 104.5 FL (ref 79–97)
MONOCYTES # BLD AUTO: 0.7 10*3/MM3 (ref 0.1–0.9)
MONOCYTES NFR BLD AUTO: 5.4 % (ref 5–12)
NEUTROPHILS NFR BLD AUTO: 11 10*3/MM3 (ref 1.7–7)
NEUTROPHILS NFR BLD AUTO: 88 % (ref 42.7–76)
NRBC BLD AUTO-RTO: 0.1 /100 WBC (ref 0–0.2)
PLATELET # BLD AUTO: 198 10*3/MM3 (ref 140–450)
PMV BLD AUTO: 9.7 FL (ref 6–12)
POTASSIUM SERPL-SCNC: 3.8 MMOL/L (ref 3.5–5.2)
QT INTERVAL: 377 MS
QTC INTERVAL: 474 MS
RBC # BLD AUTO: 4.16 10*6/MM3 (ref 4.14–5.8)
SODIUM SERPL-SCNC: 134 MMOL/L (ref 136–145)
WBC NRBC COR # BLD AUTO: 12.5 10*3/MM3 (ref 3.4–10.8)

## 2023-12-18 PROCEDURE — 25010000002 METHYLPREDNISOLONE PER 125 MG: Performed by: INTERNAL MEDICINE

## 2023-12-18 PROCEDURE — 82948 REAGENT STRIP/BLOOD GLUCOSE: CPT

## 2023-12-18 PROCEDURE — 80048 BASIC METABOLIC PNL TOTAL CA: CPT | Performed by: INTERNAL MEDICINE

## 2023-12-18 PROCEDURE — 83735 ASSAY OF MAGNESIUM: CPT | Performed by: INTERNAL MEDICINE

## 2023-12-18 PROCEDURE — 25810000003 SODIUM CHLORIDE 0.9 % SOLUTION 250 ML FLEX CONT: Performed by: INTERNAL MEDICINE

## 2023-12-18 PROCEDURE — 85025 COMPLETE CBC W/AUTO DIFF WBC: CPT | Performed by: INTERNAL MEDICINE

## 2023-12-18 PROCEDURE — 25010000002 CEFTRIAXONE PER 250 MG: Performed by: INTERNAL MEDICINE

## 2023-12-18 PROCEDURE — 25010000002 AZITHROMYCIN PER 500 MG: Performed by: INTERNAL MEDICINE

## 2023-12-18 PROCEDURE — 25010000002 ONDANSETRON PER 1 MG: Performed by: INTERNAL MEDICINE

## 2023-12-18 RX ORDER — POTASSIUM CHLORIDE 1.5 G/1.58G
40 POWDER, FOR SOLUTION ORAL ONCE
Qty: 2 PACKET | Refills: 0 | Status: COMPLETED | OUTPATIENT
Start: 2023-12-18 | End: 2023-12-18

## 2023-12-18 RX ORDER — METHYLPREDNISOLONE SODIUM SUCCINATE 125 MG/2ML
125 INJECTION, POWDER, LYOPHILIZED, FOR SOLUTION INTRAMUSCULAR; INTRAVENOUS EVERY 8 HOURS
Status: COMPLETED | OUTPATIENT
Start: 2023-12-18 | End: 2023-12-19

## 2023-12-18 RX ORDER — POTASSIUM CHLORIDE 1.5 G/1.58G
40 POWDER, FOR SOLUTION ORAL 2 TIMES DAILY
Status: DISCONTINUED | OUTPATIENT
Start: 2023-12-18 | End: 2023-12-18

## 2023-12-18 RX ORDER — GUAIFENESIN 600 MG/1
600 TABLET, EXTENDED RELEASE ORAL EVERY 12 HOURS SCHEDULED
Status: DISCONTINUED | OUTPATIENT
Start: 2023-12-18 | End: 2023-12-19 | Stop reason: HOSPADM

## 2023-12-18 RX ADMIN — GUAIFENESIN 600 MG: 600 TABLET, MULTILAYER, EXTENDED RELEASE ORAL at 21:21

## 2023-12-18 RX ADMIN — Medication 10 ML: at 09:17

## 2023-12-18 RX ADMIN — LEVOTHYROXINE SODIUM 25 MCG: 0.03 TABLET ORAL at 05:29

## 2023-12-18 RX ADMIN — TICAGRELOR 90 MG: 90 TABLET ORAL at 21:21

## 2023-12-18 RX ADMIN — TICAGRELOR 90 MG: 90 TABLET ORAL at 09:17

## 2023-12-18 RX ADMIN — Medication 10 ML: at 21:21

## 2023-12-18 RX ADMIN — CEFTRIAXONE 2000 MG: 2 INJECTION, POWDER, FOR SOLUTION INTRAMUSCULAR; INTRAVENOUS at 16:22

## 2023-12-18 RX ADMIN — METHYLPREDNISOLONE SODIUM SUCCINATE 125 MG: 125 INJECTION, POWDER, FOR SOLUTION INTRAMUSCULAR; INTRAVENOUS at 11:41

## 2023-12-18 RX ADMIN — SEVELAMER CARBONATE 800 MG: 800 TABLET, FILM COATED ORAL at 17:14

## 2023-12-18 RX ADMIN — THERA TABS 1 TABLET: TAB at 09:17

## 2023-12-18 RX ADMIN — ONDANSETRON 4 MG: 2 INJECTION INTRAMUSCULAR; INTRAVENOUS at 05:32

## 2023-12-18 RX ADMIN — ONDANSETRON 4 MG: 2 INJECTION INTRAMUSCULAR; INTRAVENOUS at 11:41

## 2023-12-18 RX ADMIN — SEVELAMER CARBONATE 800 MG: 800 TABLET, FILM COATED ORAL at 11:41

## 2023-12-18 RX ADMIN — ROPINIROLE HYDROCHLORIDE 4 MG: 1 TABLET, FILM COATED ORAL at 21:21

## 2023-12-18 RX ADMIN — PANTOPRAZOLE SODIUM 40 MG: 40 TABLET, DELAYED RELEASE ORAL at 05:29

## 2023-12-18 RX ADMIN — METHYLPREDNISOLONE SODIUM SUCCINATE 125 MG: 125 INJECTION, POWDER, FOR SOLUTION INTRAMUSCULAR; INTRAVENOUS at 21:21

## 2023-12-18 RX ADMIN — GUAIFENESIN 600 MG: 600 TABLET, MULTILAYER, EXTENDED RELEASE ORAL at 11:41

## 2023-12-18 RX ADMIN — AZITHROMYCIN MONOHYDRATE 500 MG: 500 INJECTION, POWDER, LYOPHILIZED, FOR SOLUTION INTRAVENOUS at 16:58

## 2023-12-18 RX ADMIN — ONDANSETRON 4 MG: 2 INJECTION INTRAMUSCULAR; INTRAVENOUS at 18:09

## 2023-12-18 RX ADMIN — ASPIRIN 81 MG: 81 TABLET, COATED ORAL at 09:17

## 2023-12-18 RX ADMIN — SEVELAMER CARBONATE 800 MG: 800 TABLET, FILM COATED ORAL at 09:17

## 2023-12-18 RX ADMIN — POTASSIUM CHLORIDE 40 MEQ: 1.5 POWDER, FOR SOLUTION ORAL at 11:41

## 2023-12-18 RX ADMIN — METOPROLOL SUCCINATE 25 MG: 25 TABLET, EXTENDED RELEASE ORAL at 09:17

## 2023-12-18 NOTE — PROGRESS NOTES
"[facility] MEDICINE SERVICE  DAILY PROGRESS NOTE      Patient Name: Esdras Peralta  Date of Admission: 12/17/2023  Today's Date: 12/18/23  Length of Stay: 1  Primary Care Physician: Erika Hernandez MD    Subjective   Patient is improved today, but still having difficulty breathing.  He continues to have a cough with difficult to expectorate phlegm.  No other complaints.  No overnight events.      Objective    Temp:  [97.5 °F (36.4 °C)-98 °F (36.7 °C)] 97.5 °F (36.4 °C)  Heart Rate:  [] 69  Resp:  [18-29] 18  BP: (104-129)/(62-83) 105/62  Physical Exam  General: NAD, AAOx3  HEENT: AT NC, EOMI  Neck: No JVD  CVS: S1/S2 present, RRR, no M/R/G  Lungs: coarse bilaterally  Abdomen: soft, NT, ND, BS+, peritoneal dialysis cath in place  Ext: no edema  Skin: no rashes, bruises or discolorations  Neuro: no focal deficits  Psych: Not agitated       Results Review:  I have reviewed the labs, radiology results, and diagnostic studies.    Laboratory Data:   Results from last 7 days   Lab Units 12/17/23 2035 12/17/23  1627   WBC 10*3/mm3 13.70* 13.50*   HEMOGLOBIN g/dL 14.2 14.9   HEMATOCRIT % 43.1 43.7   PLATELETS 10*3/mm3 195 198        Results from last 7 days   Lab Units 12/17/23 2035 12/17/23  1627   SODIUM mmol/L 135* 135*   POTASSIUM mmol/L 3.1* 3.7   CHLORIDE mmol/L 94* 94*   CO2 mmol/L 25.0 28.0   BUN mg/dL 49* 45*   CREATININE mg/dL 4.85* 5.06*   CALCIUM mg/dL 8.3* 8.7   BILIRUBIN mg/dL  --  0.5   ALK PHOS U/L  --  149*   ALT (SGPT) U/L  --  17   AST (SGOT) U/L  --  25   GLUCOSE mg/dL 97 107*       Culture Data:   No results found for: \"BLOODCX\"  No results found for: \"URINECX\"  No results found for: \"RESPCX\"  No results found for: \"WOUNDCX\"  No results found for: \"STOOLCX\"  No components found for: \"BODYFLD\"    Radiology Data:   Imaging Results (Last 24 Hours)       Procedure Component Value Units Date/Time    XR Chest 1 View [[id number]] Collected: 12/17/23 1640     Updated: 12/17/23 1643    " Narrative:      XR CHEST 1 VW    Date of Exam: 12/17/2023 4:26 PM EST    Indication: dyspnea    Comparison: 10/16/2023    Findings:  In the interval a right internal jugular dialysis catheter is been removed. There is been no interval change in external defibrillator device. Cardiomegaly is stable. Pulmonary vessels are within normal limits. There are new patchy opacities in the right   lung base consistent with multifocal pneumonia. No pleural effusion. No evidence pneumothorax.      Impression:      Impression:  New patchy airspace disease in the right lung base compatible with multifocal pneumonia.      Electronically Signed: Javon Posada MD    12/17/2023 4:41 PM EST    Workstation ID: [id number]            I have reviewed the patient's current medications.     Assessment/Plan     1) pneumonia  - seen on imaging  - cultures pending  - azithromycin, ceftriaxone     2) RSV  - contact precautions  - supportive care     3) ESRD  - on peritoneal dialysis  - nephrology consulted  - renvela  - monitor with labs     4) HTN  - home meds     5) DM  - ISS, accuchecks, diet     6) hypothyroidism  - synthroid     7) CAD  - home meds     8) RLS  - requip     9) GI/DVT ppx  - protonix/lovenox          Electronically signed by Ha Ott MD, 12/18/23, 10:34 EST.

## 2023-12-18 NOTE — CONSULTS
Diabetes Education    Patient Name:  Esdras Peralta  YOB: 1960  MRN: [id number]  Admit Date:  2023      Insulin Pump Contract completed and in chart: Yes  Insulin Pump Log at bedside:  Yes  LDA started: Yes  Patient Supplied Insulin Pump (orders initiated): Yes  Educator Consult entered: Yes    Pump Brand/Model: Omnipod 5  CGMS Brand/Model: Dexcom G6    Type of Insulin Used: Humalog    Basal Rate: (units/hour)   12 am 1.0  5 pm 0.7    Bolus Rate: (insulin:carbohydrate ratio)   12 am 1:15    Last Bolus Given: 2023 0745 5 units                                                                Insulin Sensitivity Factor/Correction Dose:   12 am 1:50     Blood Glucose Target: 12 am 110-150      Date of Last Site Change: 2023, pt due to change pod this pm at 10:27 pm     Location of Pod: left deltoid    Site Assessment: clean, dry, intact    Educator assessed:       pump supplies at bedside: yes       Insulin Expiration Date: 3/23/2026    Comments: Pt states has been having pods  sooner than they should. He states he has reached out to Omnipod rep and discussed. Pt states will contact rep again after discharge from hospital. Pt states bs in hospital different than his CGMS readings. Discussed difference between sensor sugars and bs. Pt states he did enter in bs reading to his reader for calibration. Pt with no other questions for educator.       Electronically signed by:  Pauly Cuba RN  23 11:28 EST
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
## 2023-12-18 NOTE — PLAN OF CARE
Goal Outcome Evaluation:  Plan of Care Reviewed With: patient        Progress: no change  Outcome Evaluation: A&ox4, able to voice needs. On diabetic diet, able to feed self and takes meds whole. Ad jesus with BRP, continent. Has insulin pump, contract in chart. Does own peritineal dialysis, at bedside. Pt is expected to return home when medically ready. Resting in bed with call bell within reach. C/o nausea, tx per MAR. No c/o pain at this time.

## 2023-12-18 NOTE — PLAN OF CARE
Patient is up ad jesus in room and no gait deficits noted. Still reporting of some SOA with exertion but improved since admission . Educated on chest expansion exercises and benefits of continued gait in room as able and patient verbalized understanding.No skilled rehab services indicated at this time.

## 2023-12-18 NOTE — CASE MANAGEMENT/SOCIAL WORK
Discharge Planning Assessment   [facility]     Patient Name: Esdras Peralta  MRN: [id number]  Today's Date: 12/18/2023    Admit Date: 12/17/2023    Plan: Routine home. Current home PD.   Discharge Needs Assessment       Row Name 12/18/23 1535       Living Environment    People in Home spouse;child(nubia), adult    Name(s) of People in Home Wife-Mi    Current Living Arrangements home    Potentially Unsafe Housing Conditions none    Primary Care Provided by self    Provides Primary Care For no one    Family Caregiver if Needed spouse    Family Caregiver Names Mi    Quality of Family Relationships helpful;involved;supportive    Able to Return to Prior Arrangements yes       Resource/Environmental Concerns    Resource/Environmental Concerns none    Transportation Concerns none       Transition Planning    Patient/Family Anticipates Transition to home;home with family    Patient/Family Anticipated Services at Transition none    Transportation Anticipated car, drives self;family or friend will provide       Discharge Needs Assessment    Readmission Within the Last 30 Days no previous admission in last 30 days    Current Outpatient/Agency/Support Group outpatient peritoneal dialysis    Equipment Currently Used at Home glucometer;other (see comments)  PD supplies    Concerns to be Addressed denies needs/concerns at this time;no discharge needs identified    Anticipated Changes Related to Illness none    Equipment Needed After Discharge none    Provided Post Acute Provider List? N/A    Provided Post Acute Provider Quality & Resource List? N/A                   Discharge Plan       Row Name 12/18/23 0012       Plan    Plan Routine home. Current home PD.    Patient/Family in Agreement with Plan yes    Plan Comments CM met with patient and wife Mi at bedside. Pt lives at home with wife and 19 year old daughter. Pt drives and is independent with ADL's. PCP and pharmacy confirmed. DME includes Dexcom, Omnipod,  and peritoneal dialysis supplies. Pt is not current with HHC/PT services and does not anticipate needing services at discharge. No issues affording medications. Wife to provide transportation at discharge. Provided IMM letter, obtained signature.             Expected Discharge Date and Time       Expected Discharge Date Expected Discharge Time    Dec 19, 2023            Demographic Summary       Row Name 12/18/23 1535       General Information    Admission Type inpatient    Arrived From emergency department    Required Notices Provided Important Message from Medicare    Referral Source admission list    Reason for Consult care coordination/care conference;discharge planning    Preferred Language English       Contact Information    Permission Granted to Share Info With                    Functional Status       Row Name 12/18/23 1535       Functional Status    Usual Activity Tolerance good    Current Activity Tolerance good       Functional Status, IADL    Medications independent    Meal Preparation independent    Housekeeping independent    Laundry independent    Shopping independent             Megan Naegele, RN     Office Phone: [phone]  Office Cell: [phone]
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
## 2023-12-18 NOTE — PROGRESS NOTES
Pt seen.  Pd regimen,logistics discussed.  Pt to cont on his own.  D/c iv fluids.  Full consult to follow.

## 2023-12-18 NOTE — PLAN OF CARE
Goal Outcome Evaluation:              Outcome Evaluation: Patient is alert and oriented on 2L. Complaints of SOB when exertion. up ad jesus with [facility]. Patient has insulin pump, contract in chart. Patient did own peritioneal dialysis at bedside. Patient plan to go home when d/c

## 2023-12-19 ENCOUNTER — READMISSION MANAGEMENT (OUTPATIENT)
Dept: CALL CENTER | Facility: HOSPITAL | Age: 63
End: 2023-12-19
Payer: COMMERCIAL

## 2023-12-19 VITALS
SYSTOLIC BLOOD PRESSURE: 140 MMHG | DIASTOLIC BLOOD PRESSURE: 90 MMHG | RESPIRATION RATE: 20 BRPM | OXYGEN SATURATION: 99 % | BODY MASS INDEX: 26.51 KG/M2 | HEART RATE: 98 BPM | HEIGHT: 69 IN | TEMPERATURE: 97.4 F | WEIGHT: 179 LBS

## 2023-12-19 LAB
ANION GAP SERPL CALCULATED.3IONS-SCNC: 16 MMOL/L (ref 5–15)
BASOPHILS # BLD AUTO: 0 10*3/MM3 (ref 0–0.2)
BASOPHILS NFR BLD AUTO: 0.1 % (ref 0–1.5)
BUN SERPL-MCNC: 62 MG/DL (ref 8–23)
BUN/CREAT SERPL: 11.3 (ref 7–25)
CALCIUM SPEC-SCNC: 9 MG/DL (ref 8.6–10.5)
CHLORIDE SERPL-SCNC: 89 MMOL/L (ref 98–107)
CO2 SERPL-SCNC: 24 MMOL/L (ref 22–29)
CREAT SERPL-MCNC: 5.47 MG/DL (ref 0.76–1.27)
DEPRECATED RDW RBC AUTO: 59.5 FL (ref 37–54)
EGFRCR SERPLBLD CKD-EPI 2021: 11.1 ML/MIN/1.73
EOSINOPHIL # BLD AUTO: 0 10*3/MM3 (ref 0–0.4)
EOSINOPHIL NFR BLD AUTO: 0 % (ref 0.3–6.2)
ERYTHROCYTE [DISTWIDTH] IN BLOOD BY AUTOMATED COUNT: 15.9 % (ref 12.3–15.4)
GLUCOSE BLDC GLUCOMTR-MCNC: 347 MG/DL (ref 70–105)
GLUCOSE SERPL-MCNC: 373 MG/DL (ref 65–99)
HCT VFR BLD AUTO: 38.7 % (ref 37.5–51)
HGB BLD-MCNC: 13 G/DL (ref 13–17.7)
LYMPHOCYTES # BLD AUTO: 0.3 10*3/MM3 (ref 0.7–3.1)
LYMPHOCYTES NFR BLD AUTO: 3 % (ref 19.6–45.3)
MAGNESIUM SERPL-MCNC: 2 MG/DL (ref 1.6–2.4)
MCH RBC QN AUTO: 34.7 PG (ref 26.6–33)
MCHC RBC AUTO-ENTMCNC: 33.6 G/DL (ref 31.5–35.7)
MCV RBC AUTO: 103.2 FL (ref 79–97)
MONOCYTES # BLD AUTO: 0.1 10*3/MM3 (ref 0.1–0.9)
MONOCYTES NFR BLD AUTO: 0.7 % (ref 5–12)
NEUTROPHILS NFR BLD AUTO: 8.6 10*3/MM3 (ref 1.7–7)
NEUTROPHILS NFR BLD AUTO: 96.2 % (ref 42.7–76)
NRBC BLD AUTO-RTO: 0 /100 WBC (ref 0–0.2)
PLATELET # BLD AUTO: 188 10*3/MM3 (ref 140–450)
PMV BLD AUTO: 9.8 FL (ref 6–12)
POTASSIUM SERPL-SCNC: 3.7 MMOL/L (ref 3.5–5.2)
RBC # BLD AUTO: 3.75 10*6/MM3 (ref 4.14–5.8)
SODIUM SERPL-SCNC: 129 MMOL/L (ref 136–145)
WBC NRBC COR # BLD AUTO: 9 10*3/MM3 (ref 3.4–10.8)

## 2023-12-19 PROCEDURE — 94799 UNLISTED PULMONARY SVC/PX: CPT

## 2023-12-19 PROCEDURE — 25010000002 METHYLPREDNISOLONE PER 125 MG: Performed by: INTERNAL MEDICINE

## 2023-12-19 PROCEDURE — 80048 BASIC METABOLIC PNL TOTAL CA: CPT | Performed by: INTERNAL MEDICINE

## 2023-12-19 PROCEDURE — 82948 REAGENT STRIP/BLOOD GLUCOSE: CPT

## 2023-12-19 PROCEDURE — 36415 COLL VENOUS BLD VENIPUNCTURE: CPT | Performed by: INTERNAL MEDICINE

## 2023-12-19 PROCEDURE — 94618 PULMONARY STRESS TESTING: CPT

## 2023-12-19 PROCEDURE — 85025 COMPLETE CBC W/AUTO DIFF WBC: CPT | Performed by: INTERNAL MEDICINE

## 2023-12-19 PROCEDURE — 94761 N-INVAS EAR/PLS OXIMETRY MLT: CPT

## 2023-12-19 PROCEDURE — 83735 ASSAY OF MAGNESIUM: CPT | Performed by: INTERNAL MEDICINE

## 2023-12-19 RX ORDER — LEVOFLOXACIN 750 MG/1
750 TABLET, FILM COATED ORAL DAILY
Qty: 4 TABLET | Refills: 0 | Status: SHIPPED | OUTPATIENT
Start: 2023-12-19 | End: 2023-12-23

## 2023-12-19 RX ADMIN — METOPROLOL SUCCINATE 25 MG: 25 TABLET, EXTENDED RELEASE ORAL at 08:52

## 2023-12-19 RX ADMIN — THERA TABS 1 TABLET: TAB at 08:52

## 2023-12-19 RX ADMIN — SEVELAMER CARBONATE 800 MG: 800 TABLET, FILM COATED ORAL at 09:00

## 2023-12-19 RX ADMIN — PANTOPRAZOLE SODIUM 40 MG: 40 TABLET, DELAYED RELEASE ORAL at 04:17

## 2023-12-19 RX ADMIN — METHYLPREDNISOLONE SODIUM SUCCINATE 125 MG: 125 INJECTION, POWDER, FOR SOLUTION INTRAMUSCULAR; INTRAVENOUS at 04:17

## 2023-12-19 RX ADMIN — GUAIFENESIN 600 MG: 600 TABLET, MULTILAYER, EXTENDED RELEASE ORAL at 08:52

## 2023-12-19 RX ADMIN — ASPIRIN 81 MG: 81 TABLET, COATED ORAL at 08:52

## 2023-12-19 RX ADMIN — TICAGRELOR 90 MG: 90 TABLET ORAL at 08:52

## 2023-12-19 RX ADMIN — LEVOTHYROXINE SODIUM 25 MCG: 0.03 TABLET ORAL at 04:17

## 2023-12-19 RX ADMIN — Medication 10 ML: at 08:52

## 2023-12-19 NOTE — TELEPHONE ENCOUNTER
"Caller: Esdras Peralta \"Cyrus\"    Relationship: Self    Best call back number: [phone] (home)      Requested Prescriptions:   Requested Prescriptions     Pending Prescriptions Disp Refills    Insulin Lispro (humaLOG) 100 UNIT/ML injection          Pharmacy where request should be sent:    [facility] [employer] [facility], [place] - [address]  - [phone]  - [phone] FX     Last office visit with prescribing clinician: 11/15/2023   Next office visit with prescribing clinician: 6/19/2024     Additional details provided by patient: PATEINT IS COMPLETELY OUT    Does the patient have less than a 3 day supply:  [x] Yes  [] No    Would you like a call back once the refill request has been completed: [] Yes [x] No    If the office needs to give you a call back, can they leave a voicemail: [] Yes [x] No    Trista Georges Rep   12/19/23 14:00 EST   "

## 2023-12-19 NOTE — PLAN OF CARE
Goal Outcome Evaluation:              Outcome Evaluation: Pt up ad jesus.  Pt completes own dialysis.  Will continue to monitor.

## 2023-12-19 NOTE — DISCHARGE SUMMARY
[facility] Medicine Services  Discharge Summary      Date of Admission: 12/17/2023  Date of Discharge:  12/19/2023  Primary Care Physician: Erika Hernandez MD    Presenting Problem/History of Present Illness:  Sepsis [A41.9]  Pneumonia of right lower lobe due to infectious organism [J18.9]  Sepsis, due to unspecified organism, unspecified whether acute organ dysfunction present [A41.9]       Final Discharge Diagnoses:  Active Hospital Problems    Diagnosis     **Sepsis        Consults:   Consults       Date and Time Order Name Status Description    12/18/2023  7:42 AM Inpatient Nephrology Consult      12/17/2023  6:35 PM Inpatient Nephrology Consult      12/17/2023  5:38 PM Hospitalist (on-call MD unless specified)              Procedures Performed:                 Pertinent Test Results:   Lab Results (most recent)       Procedure Component Value Units Date/Time    POC Glucose Once [[id number]]  (Abnormal) Collected: 12/19/23 0813    Specimen: Blood Updated: 12/19/23 0815     Glucose 347 mg/dL      Comment: Serial Number: [id number]:  [id number]       Magnesium [[id number]]  (Normal) Collected: 12/19/23 0259    Specimen: Blood Updated: 12/19/23 0534     Magnesium 2.0 mg/dL     Basic Metabolic Panel [[id number]]  (Abnormal) Collected: 12/19/23 0259    Specimen: Blood Updated: 12/19/23 0534     Glucose 373 mg/dL      BUN 62 mg/dL      Creatinine 5.47 mg/dL      Sodium 129 mmol/L      Potassium 3.7 mmol/L      Chloride 89 mmol/L      CO2 24.0 mmol/L      Calcium 9.0 mg/dL      BUN/Creatinine Ratio 11.3     Anion Gap 16.0 mmol/L      eGFR 11.1 mL/min/1.73      Comment: <15 Indicative of kidney failure       Narrative:      GFR Normal >60  Chronic Kidney Disease <60  Kidney Failure <15      CBC & Differential [[id number]]  (Abnormal) Collected: 12/19/23 0259    Specimen: Blood Updated: 12/19/23 0422    Narrative:      The following orders were created for panel order CBC & Differential.  Procedure                                Abnormality         Status                     ---------                               -----------         ------                     CBC Auto Differential[[id number]]        Abnormal            Final result                 Please view results for these tests on the individual orders.    CBC Auto Differential [[id number]]  (Abnormal) Collected: 12/19/23 0259    Specimen: Blood Updated: 12/19/23 0422     WBC 9.00 10*3/mm3      RBC 3.75 10*6/mm3      Hemoglobin 13.0 g/dL      Hematocrit 38.7 %      .2 fL      MCH 34.7 pg      MCHC 33.6 g/dL      RDW 15.9 %      RDW-SD 59.5 fl      MPV 9.8 fL      Platelets 188 10*3/mm3      Neutrophil % 96.2 %      Lymphocyte % 3.0 %      Monocyte % 0.7 %      Eosinophil % 0.0 %      Basophil % 0.1 %      Neutrophils, Absolute 8.60 10*3/mm3      Lymphocytes, Absolute 0.30 10*3/mm3      Monocytes, Absolute 0.10 10*3/mm3      Eosinophils, Absolute 0.00 10*3/mm3      Basophils, Absolute 0.00 10*3/mm3      nRBC 0.0 /100 WBC     POC Glucose Once [[id number]]  (Abnormal) Collected: 12/18/23 1936    Specimen: Blood Updated: 12/18/23 1937     Glucose 237 mg/dL      Comment: Serial Number: [id number]:  [id number]       Blood Culture - Blood, Arm, Right [[id number]]  (Normal) Collected: 12/17/23 1646    Specimen: Blood from Arm, Right Updated: 12/18/23 1701     Blood Culture No growth at 24 hours    Blood Culture - Blood, Arm, Left [[id number]]  (Normal) Collected: 12/17/23 1627    Specimen: Blood from Arm, Left Updated: 12/18/23 1646     Blood Culture No growth at 24 hours    Magnesium [[id number]]  (Normal) Collected: 12/18/23 1008    Specimen: Blood Updated: 12/18/23 1126     Magnesium 1.9 mg/dL     Basic Metabolic Panel [[id number]]  (Abnormal) Collected: 12/18/23 1008    Specimen: Blood Updated: 12/18/23 1111     Glucose 125 mg/dL      BUN 50 mg/dL      Creatinine 5.34 mg/dL      Sodium 134 mmol/L      Potassium 3.8 mmol/L      Chloride 94 mmol/L      CO2  25.0 mmol/L      Calcium 8.7 mg/dL      BUN/Creatinine Ratio 9.4     Anion Gap 15.0 mmol/L      eGFR 11.5 mL/min/1.73      Comment: <15 Indicative of kidney failure       Narrative:      GFR Normal >60  Chronic Kidney Disease <60  Kidney Failure <15      CBC & Differential [[id number]]  (Abnormal) Collected: 12/18/23 1008    Specimen: Blood Updated: 12/18/23 1039    Narrative:      The following orders were created for panel order CBC & Differential.  Procedure                               Abnormality         Status                     ---------                               -----------         ------                     CBC Auto Differential[[id number]]        Abnormal            Final result                 Please view results for these tests on the individual orders.    CBC Auto Differential [[id number]]  (Abnormal) Collected: 12/18/23 1008    Specimen: Blood Updated: 12/18/23 1039     WBC 12.50 10*3/mm3      RBC 4.16 10*6/mm3      Hemoglobin 14.3 g/dL      Hematocrit 43.5 %      .5 fL      MCH 34.4 pg      MCHC 32.9 g/dL      RDW 16.4 %      RDW-SD 63.4 fl      MPV 9.7 fL      Platelets 198 10*3/mm3      Neutrophil % 88.0 %      Lymphocyte % 6.2 %      Monocyte % 5.4 %      Eosinophil % 0.1 %      Basophil % 0.3 %      Neutrophils, Absolute 11.00 10*3/mm3      Lymphocytes, Absolute 0.80 10*3/mm3      Monocytes, Absolute 0.70 10*3/mm3      Eosinophils, Absolute 0.00 10*3/mm3      Basophils, Absolute 0.00 10*3/mm3      nRBC 0.1 /100 WBC     Manual Differential [[id number]]  (Abnormal) Collected: 12/17/23 2035    Specimen: Blood Updated: 12/17/23 2127     Neutrophil % 82.0 %      Lymphocyte % 4.0 %      Monocyte % 7.0 %      Eosinophil % 1.0 %      Bands %  6.0 %      Neutrophils Absolute 12.06 10*3/mm3      Lymphocytes Absolute 0.55 10*3/mm3      Monocytes Absolute 0.96 10*3/mm3      Eosinophils Absolute 0.14 10*3/mm3      RBC Morphology Normal     WBC Morphology Normal     Platelet Morphology Normal    Scan  Slide [[id number]] Collected: 12/17/23 2035    Specimen: Blood Updated: 12/17/23 2127     Scan Slide --     Comment: See Manual Differential Results       [place] Draw [[id number]] Collected: 12/17/23 1627    Specimen: Blood Updated: 12/17/23 1732    Narrative:      The following orders were created for panel order [place] Draw.  Procedure                               Abnormality         Status                     ---------                               -----------         ------                     Green Top (Gel)[[id number]]                                  Final result               Lavender Top[[id number]]                                     Final result               Gold Top - SST[[id number]]                                   Final result               Light Blue Top[[id number]]                                   Final result                 Please view results for these tests on the individual orders.    Lavender Top [[id number]] Collected: 12/17/23 1627    Specimen: Blood Updated: 12/17/23 1732     Extra Tube hold for add-on     Comment: Auto resulted       Light Blue Top [[id number]] Collected: 12/17/23 1627    Specimen: Blood Updated: 12/17/23 1732     Extra Tube Hold for add-ons.     Comment: Auto resulted       Gold Top - SST [[id number]] Collected: 12/17/23 1627    Specimen: Blood Updated: 12/17/23 1723    Scan Slide [[id number]] Collected: 12/17/23 1627    Specimen: Blood Updated: 12/17/23 1717     Scan Slide --     Comment: See Manual Differential Results       Manual Differential [[id number]]  (Abnormal) Collected: 12/17/23 1627    Specimen: Blood Updated: 12/17/23 1717     Neutrophil % 82.0 %      Lymphocyte % 4.0 %      Monocyte % 6.0 %      Bands %  8.0 %      Neutrophils Absolute 12.15 10*3/mm3      Lymphocytes Absolute 0.54 10*3/mm3      Monocytes Absolute 0.81 10*3/mm3      RBC Morphology Normal     WBC Morphology Normal     Platelet Morphology Normal    Green Top (Gel) [[id number]] Collected: 12/17/23  1627    Specimen: Blood Updated: 12/17/23 1705    Comprehensive Metabolic Panel [[id number]]  (Abnormal) Collected: 12/17/23 1627    Specimen: Blood Updated: 12/17/23 1703     Glucose 107 mg/dL      BUN 45 mg/dL      Creatinine 5.06 mg/dL      Sodium 135 mmol/L      Potassium 3.7 mmol/L      Chloride 94 mmol/L      CO2 28.0 mmol/L      Calcium 8.7 mg/dL      Total Protein 7.1 g/dL      Albumin 3.1 g/dL      ALT (SGPT) 17 U/L      AST (SGOT) 25 U/L      Alkaline Phosphatase 149 U/L      Total Bilirubin 0.5 mg/dL      Globulin 4.0 gm/dL      A/G Ratio 0.8 g/dL      BUN/Creatinine Ratio 8.9     Anion Gap 13.0 mmol/L      eGFR 12.2 mL/min/1.73      Comment: <15 Indicative of kidney failure       Narrative:      GFR Normal >60  Chronic Kidney Disease <60  Kidney Failure <15      POC Lactate [[id number]]  (Normal) Collected: 12/17/23 1654    Specimen: Blood Updated: 12/17/23 1656     Lactate 0.8 mmol/L      Comment: Serial Number: [id number]:  [id number]       Blood Gas, Arterial - [[id number]]  (Abnormal) Collected: 12/17/23 1629    Specimen: Arterial Blood Updated: 12/17/23 1632     Site Right Radial     Yahir's Test Positive     pH, Arterial 7.466 pH units      pCO2, Arterial 30.4 mm Hg      pO2, Arterial 87.0 mm Hg      HCO3, Arterial 21.9 mmol/L      Base Excess, Arterial -0.7 mmol/L      Comment: Serial Number: [id number]:  [id number]        O2 Saturation, Arterial 97.3 %      CO2 Content 22.8 mmol/L      Barometric Pressure for Blood Gas --     Comment: N/A        Modality Cannula     FIO2 28 %      Hemodilution No          Imaging Results (Most Recent)       Procedure Component Value Units Date/Time    XR Chest 1 View [[id number]] Collected: 12/17/23 1640     Updated: 12/17/23 1643    Narrative:      XR CHEST 1 VW    Date of Exam: 12/17/2023 4:26 PM EST    Indication: dyspnea    Comparison: 10/16/2023    Findings:  In the interval a right internal jugular dialysis catheter is been removed. There is been no  "interval change in external defibrillator device. Cardiomegaly is stable. Pulmonary vessels are within normal limits. There are new patchy opacities in the right   lung base consistent with multifocal pneumonia. No pleural effusion. No evidence pneumothorax.      Impression:      Impression:  New patchy airspace disease in the right lung base compatible with multifocal pneumonia.      Electronically Signed: Javon Posada MD    12/17/2023 4:41 PM EST    Workstation ID: [id number]            Chief Complaint on Day of Discharge: SOB    Hospital Course:  62 y/o male was admitted to the ED with complaints of SOB that has been worsening over the last few days.  He was diagnosed with RSV 2-3 days ago.  Since that time, he feels like his breathing has gotten worse, to the point where he gets very out of breath with minimal exertion and he feels like he can't take a full breath.  He is having subjective fevers and diaphoresis, and is having a cough productive of pink tinged sputum.  He is also having some body aches and nausea.  He denies any other complaints.       In the ED the patient had stable vitals.  His labs showed an ABG with a pH of 7.466, a pCO2 of 30.4 and a pO2 of 87, a WBC of 13.5, a Na of 135 and a BUN/Cr of 45/5.06.  His imaging showed findings consistent with multifocal pneumonia.  He was admitted for further treatment and monitoring.  He was admitted for further treatment and monitoring.    Patient showed significant improvement with a steroid burst and supportive care.  His blood cultures came back negative for growth, and it was felt that this was largely secondary to his RSV.  He was deemed stable for d/c home on 12/19.  He is to continue taking empiric abx for 4 days.  He is to f/u with his PCP in 1 week.    Condition on Discharge:  stable    Physical Exam on Discharge:  /90   Pulse 99   Temp 97.4 °F (36.3 °C) (Oral)   Resp 18   Ht 175.3 cm (69\")   Wt 81.2 kg (179 lb)   SpO2 99%   BMI 26.43 " kg/m²   Physical Exam  General: NAD, AAOx3  HEENT: AT NC, EOMI  Neck: No JVD  CVS: S1/S2 present, RRR, no M/R/G  Lungs: CTA B/L  Abdomen: soft, NT, ND, BS+, peritoneal dialysis cath in place  Ext: no edema  Skin: no rashes, bruises or discolorations  Neuro: no focal deficits  Psych: Not agitated     Discharge Disposition:  Home or Self Care    Discharge Medications:     Discharge Medications        New Medications        Instructions Start Date   levoFLOXacin 750 MG tablet  Commonly known as: Levaquin   750 mg, Oral, Daily             Continue These Medications        Instructions Start Date   aspirin 81 MG EC tablet   81 mg, Oral, Daily      benzonatate 100 MG capsule  Commonly known as: TESSALON   100 mg, Oral, 3 Times Daily PRN      bumetanide 2 MG tablet  Commonly known as: BUMEX   2 mg, Oral, 2 Times Daily      Daily-Vitamin tablet tablet  Generic drug: multivitamin   1 tablet, Oral, Daily      ferrous sulfate 325 (65 FE) MG tablet   325 mg, Oral, Every Other Day      fish oil 1000 MG capsule capsule   1 capsule, Oral, Every 24 Hours      Insulin Lispro 100 UNIT/ML injection  Commonly known as: humaLOG   See Instructions, continuous pump- max dose 50 units daily, 0 Refill(s)      insulin patient supplied pump   Subcutaneous, Continuous, Omnipod Insulin Lispro Pump is currently on Max daily dose of 50 units      levothyroxine 25 MCG tablet  Commonly known as: SYNTHROID, LEVOTHROID   25 mcg, Oral, Daily      metoclopramide 10 MG tablet  Commonly known as: REGLAN   5 mg      metoprolol succinate XL 25 MG 24 hr tablet  Commonly known as: TOPROL-XL   25 mg      Omnipod 5 G6 Pod (Gen 5) misc       rOPINIRole 4 MG tablet  Commonly known as: REQUIP   4 mg, Oral, Nightly      sevelamer 800 MG tablet  Commonly known as: RENVELA       ticagrelor 90 MG tablet tablet  Commonly known as: Brilinta   90 mg, Oral, 2 Times Daily      traZODone 50 MG tablet  Commonly known as: DESYREL    mg, Oral, Every Night at Bedtime                Discharge Diet:  rental, diabetic    Activity at Discharge:  as tolerated    Discharge Care Plan/Instructions: f/u PCP    Follow-up Appointments:   Future Appointments   Date Time Provider Department Center   1/15/2024  3:30 PM Seipel, Joseph F, MD [facility]   6/12/2024  2:45 PM Rachid Sheriff MD [facility]   6/19/2024 12:30 PM Shirley Guido MD [facility]         Time: less than 30 minutes

## 2023-12-19 NOTE — PAYOR COMM NOTE
"    DISCHARGE NOTICE -- [id number]       This patient discharged HOME 12/19/23.  Please advise if additional information is needed to finalize this request.    Thank you!      Susie Bridges  Utilization Review Coordinator  [facility]  [address] [place]  [address]  Ph: [phone]  Fx: [phone]        Esdras Murphy \"Cyrus\" (63 y.o. Male)       Date of Birth   1960    Social Security Number       Address   [address] [place] [address]    Home Phone   [phone]    MRN   [id number]       Mormonism   Synagogue    Marital Status                               Admission Date   12/17/23    Admission Type   Emergency    Admitting Provider   Ha Ott MD    Attending Provider       Department, Room/Bed   [facility] 3C MEDICAL INPATIENT, 375/1       Discharge Date   12/19/2023    Discharge Disposition   Home or Self Care    Discharge Destination                                 Attending Provider: (none)   Allergies: Codeine    Isolation: Droplet, Contact   Infection: RSV (12/19/23)   Code Status: Prior    Ht: 175.3 cm (69\")   Wt: 81.2 kg (179 lb)    Admission Cmt: None   Principal Problem: Sepsis [A41.9]                   Active Insurance as of 12/17/2023       Primary Coverage       Payor Plan Insurance Group Employer/Plan Group    [employer] 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 SHIELD PPO [id number]       Payor Plan Address Payor Plan Phone Number Payor Plan Fax Number Effective Dates    [address] [phone]  4/1/2019 - None Entered    [facility] [id number]         Subscriber Name Subscriber Birth Date Member ID       CHRISTIAN MURPHY [birth year]1967 [id number]               Secondary Coverage       Payor Plan Insurance Group Employer/Plan Group    MEDICARE MEDICARE A ONLY        Payor Plan Address Payor Plan Phone Number Payor Plan Fax Number Effective Dates    [address] [phone]  4/1/2019 - None Entered    [facility] [id number]         " Subscriber Name Subscriber Birth Date Member ID       LIZA MURPHY 1960 [id number]                     Emergency Contacts        (Rel.) Home Phone Work Phone Mobile Phone    krystin murphy (Spouse) [phone] -- [phone]                 Discharge Summary        Ha Ott MD at 12/19/23 [facility]                     [facility]  Discharge Summary      Date of Admission: 12/17/2023  Date of Discharge:  12/19/2023  Primary Care Physician: Erika Hernandez MD    Presenting Problem/History of Present Illness:  Sepsis [A41.9]  Pneumonia of right lower lobe due to infectious organism [J18.9]  Sepsis, due to unspecified organism, unspecified whether acute organ dysfunction present [A41.9]       Final Discharge Diagnoses:  Active Hospital Problems    Diagnosis     **Sepsis        Consults:   Consults       Date and Time Order Name Status Description    12/18/2023  7:42 AM Inpatient Nephrology Consult      12/17/2023  6:35 PM Inpatient Nephrology Consult      12/17/2023  5:38 PM Hospitalist (on-call MD unless specified)              Procedures Performed:                 Pertinent Test Results:   Lab Results (most recent)       Procedure Component Value Units Date/Time    POC Glucose Once [[id number]]  (Abnormal) Collected: 12/19/23 0813    Specimen: Blood Updated: 12/19/23 0815     Glucose 347 mg/dL      Comment: Serial Number: [id number]:  [id number]       Magnesium [[id number]]  (Normal) Collected: 12/19/23 0259    Specimen: Blood Updated: 12/19/23 0534     Magnesium 2.0 mg/dL     Basic Metabolic Panel [[id number]]  (Abnormal) Collected: 12/19/23 0259    Specimen: Blood Updated: 12/19/23 0534     Glucose 373 mg/dL      BUN 62 mg/dL      Creatinine 5.47 mg/dL      Sodium 129 mmol/L      Potassium 3.7 mmol/L      Chloride 89 mmol/L      CO2 24.0 mmol/L      Calcium 9.0 mg/dL      BUN/Creatinine Ratio 11.3     Anion Gap 16.0 mmol/L      eGFR 11.1 mL/min/1.73       Comment: <15 Indicative of kidney failure       Narrative:      GFR Normal >60  Chronic Kidney Disease <60  Kidney Failure <15      CBC & Differential [[id number]]  (Abnormal) Collected: 12/19/23 0259    Specimen: Blood Updated: 12/19/23 0422    Narrative:      The following orders were created for panel order CBC & Differential.  Procedure                               Abnormality         Status                     ---------                               -----------         ------                     CBC Auto Differential[[id number]]        Abnormal            Final result                 Please view results for these tests on the individual orders.    CBC Auto Differential [[id number]]  (Abnormal) Collected: 12/19/23 0259    Specimen: Blood Updated: 12/19/23 0422     WBC 9.00 10*3/mm3      RBC 3.75 10*6/mm3      Hemoglobin 13.0 g/dL      Hematocrit 38.7 %      .2 fL      MCH 34.7 pg      MCHC 33.6 g/dL      RDW 15.9 %      RDW-SD 59.5 fl      MPV 9.8 fL      Platelets 188 10*3/mm3      Neutrophil % 96.2 %      Lymphocyte % 3.0 %      Monocyte % 0.7 %      Eosinophil % 0.0 %      Basophil % 0.1 %      Neutrophils, Absolute 8.60 10*3/mm3      Lymphocytes, Absolute 0.30 10*3/mm3      Monocytes, Absolute 0.10 10*3/mm3      Eosinophils, Absolute 0.00 10*3/mm3      Basophils, Absolute 0.00 10*3/mm3      nRBC 0.0 /100 WBC     POC Glucose Once [[id number]]  (Abnormal) Collected: 12/18/23 1936    Specimen: Blood Updated: 12/18/23 1937     Glucose 237 mg/dL      Comment: Serial Number: [id number]:  [id number]       Blood Culture - Blood, Arm, Right [[id number]]  (Normal) Collected: 12/17/23 1646    Specimen: Blood from Arm, Right Updated: 12/18/23 1701     Blood Culture No growth at 24 hours    Blood Culture - Blood, Arm, Left [[id number]]  (Normal) Collected: 12/17/23 1627    Specimen: Blood from Arm, Left Updated: 12/18/23 1646     Blood Culture No growth at 24 hours    Magnesium [[id number]]  (Normal) Collected:  12/18/23 1008    Specimen: Blood Updated: 12/18/23 1126     Magnesium 1.9 mg/dL     Basic Metabolic Panel [[id number]]  (Abnormal) Collected: 12/18/23 1008    Specimen: Blood Updated: 12/18/23 1111     Glucose 125 mg/dL      BUN 50 mg/dL      Creatinine 5.34 mg/dL      Sodium 134 mmol/L      Potassium 3.8 mmol/L      Chloride 94 mmol/L      CO2 25.0 mmol/L      Calcium 8.7 mg/dL      BUN/Creatinine Ratio 9.4     Anion Gap 15.0 mmol/L      eGFR 11.5 mL/min/1.73      Comment: <15 Indicative of kidney failure       Narrative:      GFR Normal >60  Chronic Kidney Disease <60  Kidney Failure <15      CBC & Differential [[id number]]  (Abnormal) Collected: 12/18/23 1008    Specimen: Blood Updated: 12/18/23 1039    Narrative:      The following orders were created for panel order CBC & Differential.  Procedure                               Abnormality         Status                     ---------                               -----------         ------                     CBC Auto Differential[[id number]]        Abnormal            Final result                 Please view results for these tests on the individual orders.    CBC Auto Differential [[id number]]  (Abnormal) Collected: 12/18/23 1008    Specimen: Blood Updated: 12/18/23 1039     WBC 12.50 10*3/mm3      RBC 4.16 10*6/mm3      Hemoglobin 14.3 g/dL      Hematocrit 43.5 %      .5 fL      MCH 34.4 pg      MCHC 32.9 g/dL      RDW 16.4 %      RDW-SD 63.4 fl      MPV 9.7 fL      Platelets 198 10*3/mm3      Neutrophil % 88.0 %      Lymphocyte % 6.2 %      Monocyte % 5.4 %      Eosinophil % 0.1 %      Basophil % 0.3 %      Neutrophils, Absolute 11.00 10*3/mm3      Lymphocytes, Absolute 0.80 10*3/mm3      Monocytes, Absolute 0.70 10*3/mm3      Eosinophils, Absolute 0.00 10*3/mm3      Basophils, Absolute 0.00 10*3/mm3      nRBC 0.1 /100 WBC     Manual Differential [[id number]]  (Abnormal) Collected: 12/17/23 2035    Specimen: Blood Updated: 12/17/23 2127     Neutrophil %  82.0 %      Lymphocyte % 4.0 %      Monocyte % 7.0 %      Eosinophil % 1.0 %      Bands %  6.0 %      Neutrophils Absolute 12.06 10*3/mm3      Lymphocytes Absolute 0.55 10*3/mm3      Monocytes Absolute 0.96 10*3/mm3      Eosinophils Absolute 0.14 10*3/mm3      RBC Morphology Normal     WBC Morphology Normal     Platelet Morphology Normal    Scan Slide [[id number]] Collected: 12/17/23 2035    Specimen: Blood Updated: 12/17/23 2127     Scan Slide --     Comment: See Manual Differential Results       [place] Draw [[id number]] Collected: 12/17/23 1627    Specimen: Blood Updated: 12/17/23 1732    Narrative:      The following orders were created for panel order [place] Draw.  Procedure                               Abnormality         Status                     ---------                               -----------         ------                     Green Top (Gel)[[id number]]                                  Final result               Lavender Top[[id number]]                                     Final result               Gold Top - SST[[id number]]                                   Final result               Light Blue Top[[id number]]                                   Final result                 Please view results for these tests on the individual orders.    Lavender Top [[id number]] Collected: 12/17/23 1627    Specimen: Blood Updated: 12/17/23 1732     Extra Tube hold for add-on     Comment: Auto resulted       Light Blue Top [[id number]] Collected: 12/17/23 1627    Specimen: Blood Updated: 12/17/23 1732     Extra Tube Hold for add-ons.     Comment: Auto resulted       Gold Top - SST [[id number]] Collected: 12/17/23 1627    Specimen: Blood Updated: 12/17/23 1723    Scan Slide [[id number]] Collected: 12/17/23 1627    Specimen: Blood Updated: 12/17/23 1717     Scan Slide --     Comment: See Manual Differential Results       Manual Differential [[id number]]  (Abnormal) Collected: 12/17/23 1627    Specimen: Blood Updated: 12/17/23 1717      Neutrophil % 82.0 %      Lymphocyte % 4.0 %      Monocyte % 6.0 %      Bands %  8.0 %      Neutrophils Absolute 12.15 10*3/mm3      Lymphocytes Absolute 0.54 10*3/mm3      Monocytes Absolute 0.81 10*3/mm3      RBC Morphology Normal     WBC Morphology Normal     Platelet Morphology Normal    Green Top (Gel) [[id number]] Collected: 12/17/23 1627    Specimen: Blood Updated: 12/17/23 1705    Comprehensive Metabolic Panel [[id number]]  (Abnormal) Collected: 12/17/23 1627    Specimen: Blood Updated: 12/17/23 1703     Glucose 107 mg/dL      BUN 45 mg/dL      Creatinine 5.06 mg/dL      Sodium 135 mmol/L      Potassium 3.7 mmol/L      Chloride 94 mmol/L      CO2 28.0 mmol/L      Calcium 8.7 mg/dL      Total Protein 7.1 g/dL      Albumin 3.1 g/dL      ALT (SGPT) 17 U/L      AST (SGOT) 25 U/L      Alkaline Phosphatase 149 U/L      Total Bilirubin 0.5 mg/dL      Globulin 4.0 gm/dL      A/G Ratio 0.8 g/dL      BUN/Creatinine Ratio 8.9     Anion Gap 13.0 mmol/L      eGFR 12.2 mL/min/1.73      Comment: <15 Indicative of kidney failure       Narrative:      GFR Normal >60  Chronic Kidney Disease <60  Kidney Failure <15      POC Lactate [[id number]]  (Normal) Collected: 12/17/23 1654    Specimen: Blood Updated: 12/17/23 1656     Lactate 0.8 mmol/L      Comment: Serial Number: [id number]:  [id number]       Blood Gas, Arterial - [[id number]]  (Abnormal) Collected: 12/17/23 1629    Specimen: Arterial Blood Updated: 12/17/23 1632     Site Right Radial     Yahir's Test Positive     pH, Arterial 7.466 pH units      pCO2, Arterial 30.4 mm Hg      pO2, Arterial 87.0 mm Hg      HCO3, Arterial 21.9 mmol/L      Base Excess, Arterial -0.7 mmol/L      Comment: Serial Number: [id number]:  [id number]        O2 Saturation, Arterial 97.3 %      CO2 Content 22.8 mmol/L      Barometric Pressure for Blood Gas --     Comment: N/A        Modality Cannula     FIO2 28 %      Hemodilution No          Imaging Results (Most Recent)       Procedure  Component Value Units Date/Time    XR Chest 1 View [[id number]] Collected: 12/17/23 1640     Updated: 12/17/23 1643    Narrative:      XR CHEST 1 VW    Date of Exam: 12/17/2023 4:26 PM EST    Indication: dyspnea    Comparison: 10/16/2023    Findings:  In the interval a right internal jugular dialysis catheter is been removed. There is been no interval change in external defibrillator device. Cardiomegaly is stable. Pulmonary vessels are within normal limits. There are new patchy opacities in the right   lung base consistent with multifocal pneumonia. No pleural effusion. No evidence pneumothorax.      Impression:      Impression:  New patchy airspace disease in the right lung base compatible with multifocal pneumonia.      Electronically Signed: Javon Posada MD    12/17/2023 4:41 PM EST    Workstation ID: [id number]            Chief Complaint on Day of Discharge: SOB    Hospital Course:  64 y/o male was admitted to the ED with complaints of SOB that has been worsening over the last few days.  He was diagnosed with RSV 2-3 days ago.  Since that time, he feels like his breathing has gotten worse, to the point where he gets very out of breath with minimal exertion and he feels like he can't take a full breath.  He is having subjective fevers and diaphoresis, and is having a cough productive of pink tinged sputum.  He is also having some body aches and nausea.  He denies any other complaints.       In the ED the patient had stable vitals.  His labs showed an ABG with a pH of 7.466, a pCO2 of 30.4 and a pO2 of 87, a WBC of 13.5, a Na of 135 and a BUN/Cr of 45/5.06.  His imaging showed findings consistent with multifocal pneumonia.  He was admitted for further treatment and monitoring.  He was admitted for further treatment and monitoring.    Patient showed significant improvement with a steroid burst and supportive care.  His blood cultures came back negative for growth, and it was felt that this was largely secondary to  "his RSV.  He was deemed stable for d/c home on 12/19.  He is to continue taking empiric abx for 4 days.  He is to f/u with his PCP in 1 week.    Condition on Discharge:  stable    Physical Exam on Discharge:  /90   Pulse 99   Temp 97.4 °F (36.3 °C) (Oral)   Resp 18   Ht 175.3 cm (69\")   Wt 81.2 kg (179 lb)   SpO2 99%   BMI 26.43 kg/m²   Physical Exam  General: NAD, AAOx3  HEENT: AT NC, EOMI  Neck: No JVD  CVS: S1/S2 present, RRR, no M/R/G  Lungs: CTA B/L  Abdomen: soft, NT, ND, BS+, peritoneal dialysis cath in place  Ext: no edema  Skin: no rashes, bruises or discolorations  Neuro: no focal deficits  Psych: Not agitated     Discharge Disposition:  Home or Self Care    Discharge Medications:     Discharge Medications        New Medications        Instructions Start Date   levoFLOXacin 750 MG tablet  Commonly known as: Levaquin   750 mg, Oral, Daily             Continue These Medications        Instructions Start Date   aspirin 81 MG EC tablet   81 mg, Oral, Daily      benzonatate 100 MG capsule  Commonly known as: TESSALON   100 mg, Oral, 3 Times Daily PRN      bumetanide 2 MG tablet  Commonly known as: BUMEX   2 mg, Oral, 2 Times Daily      Daily-Vitamin tablet tablet  Generic drug: multivitamin   1 tablet, Oral, Daily      ferrous sulfate 325 (65 FE) MG tablet   325 mg, Oral, Every Other Day      fish oil 1000 MG capsule capsule   1 capsule, Oral, Every 24 Hours      Insulin Lispro 100 UNIT/ML injection  Commonly known as: humaLOG   See Instructions, continuous pump- max dose 50 units daily, 0 Refill(s)      insulin patient supplied pump   Subcutaneous, Continuous, Omnipod Insulin Lispro Pump is currently on Max daily dose of 50 units      levothyroxine 25 MCG tablet  Commonly known as: SYNTHROID, LEVOTHROID   25 mcg, Oral, Daily      metoclopramide 10 MG tablet  Commonly known as: REGLAN   5 mg      metoprolol succinate XL 25 MG 24 hr tablet  Commonly known as: TOPROL-XL   25 mg      Omnipod 5 G6 Pod " (Gen 5) misc       rOPINIRole 4 MG tablet  Commonly known as: REQUIP   4 mg, Oral, Nightly      sevelamer 800 MG tablet  Commonly known as: RENVELA       ticagrelor 90 MG tablet tablet  Commonly known as: Brilinta   90 mg, Oral, 2 Times Daily      traZODone 50 MG tablet  Commonly known as: DESYREL    mg, Oral, Every Night at Bedtime               Discharge Diet:  rental, diabetic    Activity at Discharge:  as tolerated    Discharge Care Plan/Instructions: f/u PCP    Follow-up Appointments:   Future Appointments   Date Time Provider Department Center   1/15/2024  3:30 PM Seipel, Joseph F, MD [facility]   6/12/2024  2:45 PM Rachid Sheriff MD [facility]   6/19/2024 12:30 PM Shirley Guido MD [facility] END [facility]         Time: less than 30 minutes                Electronically signed by Ha Ott MD at 12/19/23 1017

## 2023-12-19 NOTE — PROGRESS NOTES
Exercise Oximetry    Patient Name:Esdras Peralta   MRN: [id number]   Date: 12/19/23             ROOM AIR BASELINE   SpO2% 99   Heart Rate 98   Blood Pressure      EXERCISE ON ROOM AIR SpO2% EXERCISE ON O2 @  LPM SpO2%   1 MINUTE 99 1 MINUTE    2 MINUTES 99 2 MINUTES    3 MINUTES 97 3 MINUTES    4 MINUTES 98 4 MINUTES    5 MINUTES 99 5 MINUTES    6 MINUTES 99 6 MINUTES               Distance Walked  6 minutes Distance Walked   Dyspnea (Woo Scale)   Dyspnea (Woo Scale)   Fatigue (Woo Scale)   Fatigue (Woo Scale)   SpO2% Post Exercise  98 SpO2% Post Exercise   HR Post Exercise  117 HR Post Exercise   Time to Recovery  0 Time to Recovery     Comments: Patient walked 6 minutes in samuels without pausing or stopping.

## 2023-12-19 NOTE — CASE MANAGEMENT/SOCIAL WORK
Case Management Discharge Note      Final Note: Routine home.    Selected Continued Care - Discharged on 12/19/2023 Admission date: 12/17/2023 - Discharge disposition: Home or Self Care       Transportation Services  Private: Car    Final Discharge Disposition Code: 01 - home or self-care

## 2023-12-20 ENCOUNTER — TELEPHONE (OUTPATIENT)
Dept: ENDOCRINOLOGY | Facility: CLINIC | Age: 63
End: 2023-12-20

## 2023-12-20 DIAGNOSIS — E10.65 UNCONTROLLED TYPE 1 DIABETES MELLITUS WITH HYPERGLYCEMIA: Primary | ICD-10-CM

## 2023-12-20 DIAGNOSIS — E10.49 TYPE 1 DIABETES MELLITUS WITH OTHER DIABETIC NEUROLOGICAL COMPLICATION: ICD-10-CM

## 2023-12-20 DIAGNOSIS — E10.42 TYPE 1 DIABETES MELLITUS WITH DIABETIC POLYNEUROPATHY: ICD-10-CM

## 2023-12-20 RX ORDER — INSULIN LISPRO 100 [IU]/ML
INJECTION, SOLUTION INTRAVENOUS; SUBCUTANEOUS
Qty: 60 ML | Refills: 3 | Status: SHIPPED | OUTPATIENT
Start: 2023-12-20

## 2023-12-20 NOTE — TELEPHONE ENCOUNTER
"    Caller: Esdras Peralta \"Cyrus\"    Relationship: Self    Best call back number: [phone]    Requested Prescriptions:   LISIPR     Pharmacy where request should be sent:  [facility] - [phone]    Last office visit with prescribing clinician: 11/15/2023   Last telemedicine visit with prescribing clinician: Visit date not found   Next office visit with prescribing clinician: 6/19/2024     Additional details provided by patient: PT IS ABOUT TO MISS THE CUT OFF FOR THE MEDICATION W [facility], WILL BE OUT BEFORE THE WEEKEND BUT WILL NOT BE ABLE TO GET MEDICATION. PLEASE SEND IN REFILL ASAP.     Does the patient have less than a 3 day supply:  [x] Yes  [] No    Would you like a call back once the refill request has been completed: [x] Yes [] No    If the office needs to give you a call back, can they leave a voicemail: [x] Yes [] No    Trista Isaac Rep   12/20/23 12:04 EST       "

## 2023-12-20 NOTE — OUTREACH NOTE
Prep Survey      Flowsheet Row Responses   [facility] discharged from? [facility]   Is LACE score < 7 ? No   Eligibility Readm Mgmt   Discharge diagnosis Sepsis   Does the patient have one of the following disease processes/diagnoses(primary or secondary)? Sepsis   Does the patient have Home health ordered? No   Is there a DME ordered? No   Prep survey completed? Yes            JOSEPH JONES - Registered Nurse

## 2023-12-22 ENCOUNTER — READMISSION MANAGEMENT (OUTPATIENT)
Dept: CALL CENTER | Facility: HOSPITAL | Age: 63
End: 2023-12-22
Payer: COMMERCIAL

## 2023-12-22 LAB
BACTERIA SPEC AEROBE CULT: NORMAL
BACTERIA SPEC AEROBE CULT: NORMAL

## 2023-12-22 NOTE — OUTREACH NOTE
Sepsis Week 1 Survey      Flowsheet Row Responses   [facility] patient discharged from? [facility]   Does the patient have one of the following disease processes/diagnoses(primary or secondary)? Sepsis   Week 1 attempt successful? Yes   Call start time 1609   Call end time 1611   Discharge diagnosis Sepsis   Meds reviewed with patient/caregiver? Yes   Is the patient having any side effects they believe may be caused by any medication additions or changes? No   Does the patient have all medications related to this admission filled (includes all antibiotics, inhalers, nebulizers,steroids,etc.) Yes   Is the patient taking all medications as directed (includes completed medication regime)? Yes   Does the patient have a primary care provider?  Yes   Comments regarding PCP Patient states he has his follow up appointments scheduled   Has home health visited the patient within 72 hours of discharge? N/A   Psychosocial issues? No   Did the patient receive a copy of their discharge instructions? Yes   Nursing interventions Reviewed instructions with patient   What is the patient's perception of their health status since discharge? Improving   Nursing interventions Nurse provided patient education   Is the patient/caregiver able to teach back TIME? T emperature - higher or lower than normal, I nfection - may have signs and symptoms of an infection, M ental Decline - confused, sleepy, difficult to arouse, E xtremely Ill - severe pain, discomfort, shortness of breath   Nursing interventions Nurse provided reassurance to patient   Is patient/caregiver able to teach back steps to recovery at home? Set small, achievable goals for return to baseline health, Rest and regain strength, Make a list of questions for PCP appoinment   Is the patient/caregiver able to teach back signs and symptoms of worsening condition: Fever, Hyperthermia, Rapid heart rate (>90), Shortness of breath/rapid respiratory rate, Edema, Altered mental  status(confusion/coma)   If the patient is a current smoker, are they able to teach back resources for cessation? Not a smoker   Is the patient/caregiver able to teach back the hierarchy of who to call/visit for symptoms/problems? PCP, Specialist, Home health nurse, Urgent Care, ED, 911 Yes   Week 1 call completed? Yes   Call end time 1611            Crista Albarran Licensed Nurse

## 2023-12-27 ENCOUNTER — TELEPHONE (OUTPATIENT)
Dept: CARDIOLOGY | Facility: CLINIC | Age: 63
End: 2023-12-27

## 2023-12-27 RX ORDER — INSULIN LISPRO 100 [IU]/ML
INJECTION, SOLUTION INTRAVENOUS; SUBCUTANEOUS
OUTPATIENT
Start: 2023-12-27

## 2023-12-27 NOTE — TELEPHONE ENCOUNTER
"  Caller: Esdras Peralta \"Cyrus\"    Relationship: Self    Best call back number: [phone]    What is the best time to reach you: ANY    Who are you requesting to speak with (clinical staff, provider,  specific staff member): CLINICAL    Do you know the name of the person who called: PT    What was the call regarding: PT HAS SOME QUESTIONS AND CONCERNS ABOUT [employer] AND WOULD LIKE A CALL BACK. THANK YOU    Is it okay if the provider responds through [employer]: NO          "

## 2024-01-01 ENCOUNTER — APPOINTMENT (OUTPATIENT)
Dept: GENERAL RADIOLOGY | Facility: HOSPITAL | Age: 64
DRG: 393 | End: 2024-01-01
Payer: COMMERCIAL

## 2024-01-01 ENCOUNTER — APPOINTMENT (OUTPATIENT)
Dept: CT IMAGING | Facility: HOSPITAL | Age: 64
DRG: 393 | End: 2024-01-01
Payer: COMMERCIAL

## 2024-01-01 ENCOUNTER — APPOINTMENT (OUTPATIENT)
Dept: CARDIOLOGY | Facility: HOSPITAL | Age: 64
DRG: 393 | End: 2024-01-01
Payer: COMMERCIAL

## 2024-01-01 ENCOUNTER — APPOINTMENT (OUTPATIENT)
Dept: GENERAL RADIOLOGY | Facility: HOSPITAL | Age: 64
End: 2024-01-01
Payer: COMMERCIAL

## 2024-01-01 ENCOUNTER — READMISSION MANAGEMENT (OUTPATIENT)
Dept: CALL CENTER | Facility: HOSPITAL | Age: 64
End: 2024-01-01
Payer: COMMERCIAL

## 2024-01-01 ENCOUNTER — NURSE TRIAGE (OUTPATIENT)
Dept: CALL CENTER | Facility: HOSPITAL | Age: 64
End: 2024-01-01
Payer: COMMERCIAL

## 2024-01-01 ENCOUNTER — HOSPITAL ENCOUNTER (INPATIENT)
Facility: HOSPITAL | Age: 64
LOS: 3 days | DRG: 393 | End: 2024-04-06
Attending: EMERGENCY MEDICINE | Admitting: INTERNAL MEDICINE
Payer: COMMERCIAL

## 2024-01-01 ENCOUNTER — HOSPITAL ENCOUNTER (OUTPATIENT)
Facility: HOSPITAL | Age: 64
Setting detail: OBSERVATION
Discharge: HOME | End: 2024-04-02
Attending: EMERGENCY MEDICINE | Admitting: INTERNAL MEDICINE
Payer: COMMERCIAL

## 2024-01-01 ENCOUNTER — APPOINTMENT (OUTPATIENT)
Dept: CT IMAGING | Facility: HOSPITAL | Age: 64
End: 2024-01-01
Payer: COMMERCIAL

## 2024-01-01 ENCOUNTER — APPOINTMENT (OUTPATIENT)
Dept: INTERVENTIONAL RADIOLOGY/VASCULAR | Facility: HOSPITAL | Age: 64
DRG: 393 | End: 2024-01-01
Payer: COMMERCIAL

## 2024-01-01 ENCOUNTER — APPOINTMENT (OUTPATIENT)
Dept: ULTRASOUND IMAGING | Facility: HOSPITAL | Age: 64
DRG: 393 | End: 2024-01-01
Payer: COMMERCIAL

## 2024-01-01 ENCOUNTER — APPOINTMENT (OUTPATIENT)
Dept: NEPHROLOGY | Facility: HOSPITAL | Age: 64
DRG: 393 | End: 2024-01-01
Payer: COMMERCIAL

## 2024-01-01 VITALS
RESPIRATION RATE: 20 BRPM | HEART RATE: 78 BPM | BODY MASS INDEX: 27.05 KG/M2 | SYSTOLIC BLOOD PRESSURE: 111 MMHG | DIASTOLIC BLOOD PRESSURE: 81 MMHG | WEIGHT: 182.6 LBS | OXYGEN SATURATION: 99 % | TEMPERATURE: 98 F | HEIGHT: 69 IN

## 2024-01-01 VITALS
HEART RATE: 60 BPM | WEIGHT: 179.9 LBS | RESPIRATION RATE: 24 BRPM | HEIGHT: 69 IN | TEMPERATURE: 96.9 F | SYSTOLIC BLOOD PRESSURE: 70 MMHG | DIASTOLIC BLOOD PRESSURE: 35 MMHG | OXYGEN SATURATION: 67 % | BODY MASS INDEX: 26.64 KG/M2

## 2024-01-01 DIAGNOSIS — R06.00 DYSPNEA, UNSPECIFIED TYPE: Primary | ICD-10-CM

## 2024-01-01 DIAGNOSIS — K66.8 INTRA-ABDOMINAL FREE AIR OF UNKNOWN ETIOLOGY: Primary | ICD-10-CM

## 2024-01-01 DIAGNOSIS — R06.00 DYSPNEA, UNSPECIFIED TYPE: ICD-10-CM

## 2024-01-01 LAB
ADV 40+41 DNA STL QL NAA+NON-PROBE: NOT DETECTED
ALBUMIN SERPL-MCNC: 3.1 G/DL (ref 3.5–5.2)
ALBUMIN SERPL-MCNC: 3.2 G/DL (ref 3.5–5.2)
ALBUMIN SERPL-MCNC: 3.2 G/DL (ref 3.5–5.2)
ALBUMIN SERPL-MCNC: 3.4 G/DL (ref 3.5–5.2)
ALBUMIN SERPL-MCNC: 3.7 G/DL (ref 3.5–5.2)
ALBUMIN SERPL-MCNC: 3.7 G/DL (ref 3.5–5.2)
ALBUMIN SERPL-MCNC: 3.8 G/DL (ref 3.5–5.2)
ALBUMIN/GLOB SERPL: 1.1 G/DL
ALBUMIN/GLOB SERPL: 1.2 G/DL
ALBUMIN/GLOB SERPL: 1.2 G/DL
ALBUMIN/GLOB SERPL: 1.3 G/DL
ALBUMIN/GLOB SERPL: 1.6 G/DL
ALBUMIN/GLOB SERPL: 1.9 G/DL
ALBUMIN/GLOB SERPL: 1.9 G/DL
ALP SERPL-CCNC: 102 U/L (ref 39–117)
ALP SERPL-CCNC: 105 U/L (ref 39–117)
ALP SERPL-CCNC: 110 U/L (ref 39–117)
ALP SERPL-CCNC: 114 U/L (ref 39–117)
ALP SERPL-CCNC: 145 U/L (ref 39–117)
ALP SERPL-CCNC: 93 U/L (ref 39–117)
ALP SERPL-CCNC: 98 U/L (ref 39–117)
ALT SERPL W P-5'-P-CCNC: 1152 U/L (ref 1–41)
ALT SERPL W P-5'-P-CCNC: 15 U/L (ref 1–41)
ALT SERPL W P-5'-P-CCNC: 1644 U/L (ref 1–41)
ALT SERPL W P-5'-P-CCNC: 17 U/L (ref 1–41)
ALT SERPL W P-5'-P-CCNC: 1767 U/L (ref 1–41)
ALT SERPL W P-5'-P-CCNC: 2087 U/L (ref 1–41)
ALT SERPL W P-5'-P-CCNC: 24 U/L (ref 1–41)
AMMONIA BLD-SCNC: 23 UMOL/L (ref 16–60)
AMMONIA BLD-SCNC: 81 UMOL/L (ref 16–60)
ANION GAP SERPL CALCULATED.3IONS-SCNC: 12 MMOL/L (ref 5–15)
ANION GAP SERPL CALCULATED.3IONS-SCNC: 13 MMOL/L (ref 5–15)
ANION GAP SERPL CALCULATED.3IONS-SCNC: 14 MMOL/L (ref 5–15)
ANION GAP SERPL CALCULATED.3IONS-SCNC: 14 MMOL/L (ref 5–15)
ANION GAP SERPL CALCULATED.3IONS-SCNC: 18 MMOL/L (ref 5–15)
ANION GAP SERPL CALCULATED.3IONS-SCNC: 20 MMOL/L (ref 5–15)
ANION GAP SERPL CALCULATED.3IONS-SCNC: 24 MMOL/L (ref 5–15)
ANION GAP SERPL CALCULATED.3IONS-SCNC: 27 MMOL/L (ref 5–15)
ANION GAP SERPL CALCULATED.3IONS-SCNC: 31 MMOL/L (ref 5–15)
ANISOCYTOSIS BLD QL: ABNORMAL
APTT PPP: 30.6 SECONDS (ref 61–76.5)
APTT PPP: 48.4 SECONDS (ref 24–31)
ARTERIAL PATENCY WRIST A: POSITIVE
AST SERPL-CCNC: 1784 U/L (ref 1–40)
AST SERPL-CCNC: 1923 U/L (ref 1–40)
AST SERPL-CCNC: 2026 U/L (ref 1–40)
AST SERPL-CCNC: 24 U/L (ref 1–40)
AST SERPL-CCNC: 24 U/L (ref 1–40)
AST SERPL-CCNC: 2583 U/L (ref 1–40)
AST SERPL-CCNC: 50 U/L (ref 1–40)
ASTRO TYP 1-8 RNA STL QL NAA+NON-PROBE: NOT DETECTED
ATMOSPHERIC PRESS: ABNORMAL MM[HG]
ATMOSPHERIC PRESS: ABNORMAL MM[HG]
B PARAPERT DNA SPEC QL NAA+PROBE: NOT DETECTED
B PERT DNA SPEC QL NAA+PROBE: NOT DETECTED
BASE EXCESS BLDA CALC-SCNC: -9.3 MMOL/L (ref 0–3)
BASE EXCESS BLDV CALC-SCNC: -7 MMOL/L (ref -2–2)
BASOPHILS # BLD AUTO: 0.01 10*3/MM3 (ref 0–0.2)
BASOPHILS # BLD AUTO: 0.01 10*3/MM3 (ref 0–0.2)
BASOPHILS # BLD AUTO: 0.02 10*3/MM3 (ref 0–0.2)
BASOPHILS # BLD AUTO: 0.05 10*3/MM3 (ref 0–0.2)
BASOPHILS NFR BLD AUTO: 0.1 % (ref 0–1.5)
BASOPHILS NFR BLD AUTO: 0.2 % (ref 0–1.5)
BASOPHILS NFR BLD AUTO: 0.4 % (ref 0–1.5)
BASOPHILS NFR BLD AUTO: 0.9 % (ref 0–1.5)
BDY SITE: ABNORMAL
BDY SITE: ABNORMAL
BILIRUB CONJ SERPL-MCNC: 0.9 MG/DL (ref 0–0.3)
BILIRUB SERPL-MCNC: 0.5 MG/DL (ref 0–1.2)
BILIRUB SERPL-MCNC: 0.6 MG/DL (ref 0–1.2)
BILIRUB SERPL-MCNC: 0.8 MG/DL (ref 0–1.2)
BILIRUB SERPL-MCNC: 1.1 MG/DL (ref 0–1.2)
BILIRUB SERPL-MCNC: 1.4 MG/DL (ref 0–1.2)
BILIRUB SERPL-MCNC: 1.7 MG/DL (ref 0–1.2)
BILIRUB SERPL-MCNC: 2.6 MG/DL (ref 0–1.2)
BUN SERPL-MCNC: 38 MG/DL (ref 8–23)
BUN SERPL-MCNC: 39 MG/DL (ref 8–23)
BUN SERPL-MCNC: 39 MG/DL (ref 8–23)
BUN SERPL-MCNC: 40 MG/DL (ref 8–23)
BUN SERPL-MCNC: 45 MG/DL (ref 8–23)
BUN SERPL-MCNC: 46 MG/DL (ref 8–23)
BUN SERPL-MCNC: 50 MG/DL (ref 8–23)
BUN SERPL-MCNC: 63 MG/DL (ref 8–23)
BUN SERPL-MCNC: 64 MG/DL (ref 8–23)
BUN/CREAT SERPL: 10 (ref 7–25)
BUN/CREAT SERPL: 10.8 (ref 7–25)
BUN/CREAT SERPL: 8.1 (ref 7–25)
BUN/CREAT SERPL: 8.2 (ref 7–25)
BUN/CREAT SERPL: 8.2 (ref 7–25)
BUN/CREAT SERPL: 8.6 (ref 7–25)
BUN/CREAT SERPL: 8.7 (ref 7–25)
BUN/CREAT SERPL: 8.7 (ref 7–25)
BUN/CREAT SERPL: 9.6 (ref 7–25)
C CAYETANENSIS DNA STL QL NAA+NON-PROBE: NOT DETECTED
C COLI+JEJ+UPSA DNA STL QL NAA+NON-PROBE: NOT DETECTED
C DIFF GDH + TOXINS A+B STL QL IA.RAPID: NEGATIVE
C DIFF GDH + TOXINS A+B STL QL IA.RAPID: NEGATIVE
C PNEUM DNA NPH QL NAA+NON-PROBE: NOT DETECTED
CA-I BLDA-SCNC: 0.87 MMOL/L (ref 1.15–1.33)
CA-I SERPL ISE-MCNC: 0.85 MMOL/L (ref 1.2–1.3)
CA-I SERPL ISE-MCNC: 0.91 MMOL/L (ref 1.2–1.3)
CALCIUM SPEC-SCNC: 8.2 MG/DL (ref 8.6–10.5)
CALCIUM SPEC-SCNC: 8.2 MG/DL (ref 8.6–10.5)
CALCIUM SPEC-SCNC: 8.5 MG/DL (ref 8.6–10.5)
CALCIUM SPEC-SCNC: 8.9 MG/DL (ref 8.6–10.5)
CALCIUM SPEC-SCNC: 9.2 MG/DL (ref 8.6–10.5)
CALCIUM SPEC-SCNC: 9.2 MG/DL (ref 8.6–10.5)
CALCIUM SPEC-SCNC: 9.3 MG/DL (ref 8.6–10.5)
CALCIUM SPEC-SCNC: 9.4 MG/DL (ref 8.6–10.5)
CALCIUM SPEC-SCNC: 9.7 MG/DL (ref 8.6–10.5)
CHLORIDE SERPL-SCNC: 86 MMOL/L (ref 98–107)
CHLORIDE SERPL-SCNC: 87 MMOL/L (ref 98–107)
CHLORIDE SERPL-SCNC: 89 MMOL/L (ref 98–107)
CHLORIDE SERPL-SCNC: 91 MMOL/L (ref 98–107)
CHLORIDE SERPL-SCNC: 92 MMOL/L (ref 98–107)
CHLORIDE SERPL-SCNC: 96 MMOL/L (ref 98–107)
CHLORIDE SERPL-SCNC: 97 MMOL/L (ref 98–107)
CHOLEST SERPL-MCNC: 104 MG/DL (ref 0–200)
CO2 BLDA-SCNC: 18.5 MMOL/L (ref 22–29)
CO2 SERPL-SCNC: 18 MMOL/L (ref 22–29)
CO2 SERPL-SCNC: 18 MMOL/L (ref 22–29)
CO2 SERPL-SCNC: 20 MMOL/L (ref 22–29)
CO2 SERPL-SCNC: 21 MMOL/L (ref 22–29)
CO2 SERPL-SCNC: 23 MMOL/L (ref 22–29)
CO2 SERPL-SCNC: 24 MMOL/L (ref 22–29)
CO2 SERPL-SCNC: 26 MMOL/L (ref 22–29)
CO2 SERPL-SCNC: 26 MMOL/L (ref 22–29)
CO2 SERPL-SCNC: 27 MMOL/L (ref 22–29)
CREAT BLDA-MCNC: 6.84 MG/DL (ref 0.6–1.3)
CREAT SERPL-MCNC: 4.5 MG/DL (ref 0.76–1.27)
CREAT SERPL-MCNC: 4.51 MG/DL (ref 0.76–1.27)
CREAT SERPL-MCNC: 4.62 MG/DL (ref 0.76–1.27)
CREAT SERPL-MCNC: 4.94 MG/DL (ref 0.76–1.27)
CREAT SERPL-MCNC: 5.19 MG/DL (ref 0.76–1.27)
CREAT SERPL-MCNC: 5.21 MG/DL (ref 0.76–1.27)
CREAT SERPL-MCNC: 5.58 MG/DL (ref 0.76–1.27)
CREAT SERPL-MCNC: 5.95 MG/DL (ref 0.76–1.27)
CREAT SERPL-MCNC: 6.28 MG/DL (ref 0.76–1.27)
CRYPTOSP DNA STL QL NAA+NON-PROBE: NOT DETECTED
D-LACTATE SERPL-SCNC: 12 MMOL/L (ref 0.5–2)
D-LACTATE SERPL-SCNC: 12.7 MMOL/L (ref 0.2–2)
D-LACTATE SERPL-SCNC: 13.8 MMOL/L (ref 0.5–2)
D-LACTATE SERPL-SCNC: 6.2 MMOL/L (ref 0.5–2)
D-LACTATE SERPL-SCNC: 6.7 MMOL/L (ref 0.5–2)
D-LACTATE SERPL-SCNC: 7.7 MMOL/L (ref 0.2–2)
D-LACTATE SERPL-SCNC: 8.1 MMOL/L (ref 0.5–2)
D-LACTATE SERPL-SCNC: 9.2 MMOL/L (ref 0.5–2)
D-LACTATE SERPL-SCNC: 9.6 MMOL/L (ref 0.5–2)
DEPRECATED RDW RBC AUTO: 56 FL (ref 37–54)
DEPRECATED RDW RBC AUTO: 56.6 FL (ref 37–54)
DEPRECATED RDW RBC AUTO: 56.8 FL (ref 37–54)
DEPRECATED RDW RBC AUTO: 57.2 FL (ref 37–54)
DEPRECATED RDW RBC AUTO: 57.5 FL (ref 37–54)
DEPRECATED RDW RBC AUTO: 58.2 FL (ref 37–54)
DEPRECATED RDW RBC AUTO: 58.8 FL (ref 37–54)
E HISTOLYT DNA STL QL NAA+NON-PROBE: NOT DETECTED
EAEC PAA PLAS AGGR+AATA ST NAA+NON-PRB: NOT DETECTED
EBV NA IGG SER IA-ACNC: >600 U/ML (ref 0–17.9)
EBV VCA IGG SER IA-ACNC: >600 U/ML (ref 0–17.9)
EBV VCA IGM SER IA-ACNC: <36 U/ML (ref 0–35.9)
EC STX1+STX2 GENES STL QL NAA+NON-PROBE: NOT DETECTED
EGFRCR SERPLBLD CKD-EPI 2021: 10.1 ML/MIN/1.73
EGFRCR SERPLBLD CKD-EPI 2021: 10.9 ML/MIN/1.73
EGFRCR SERPLBLD CKD-EPI 2021: 11.8 ML/MIN/1.73
EGFRCR SERPLBLD CKD-EPI 2021: 11.9 ML/MIN/1.73
EGFRCR SERPLBLD CKD-EPI 2021: 12.6 ML/MIN/1.73
EGFRCR SERPLBLD CKD-EPI 2021: 13.6 ML/MIN/1.73
EGFRCR SERPLBLD CKD-EPI 2021: 14 ML/MIN/1.73
EGFRCR SERPLBLD CKD-EPI 2021: 14.1 ML/MIN/1.73
EGFRCR SERPLBLD CKD-EPI 2021: 8.5 ML/MIN/1.73
EGFRCR SERPLBLD CKD-EPI 2021: 9.4 ML/MIN/1.73
EOSINOPHIL # BLD AUTO: 0 10*3/MM3 (ref 0–0.4)
EOSINOPHIL # BLD AUTO: 0 10*3/MM3 (ref 0–0.4)
EOSINOPHIL # BLD AUTO: 0.01 10*3/MM3 (ref 0–0.4)
EOSINOPHIL # BLD AUTO: 0.01 10*3/MM3 (ref 0–0.4)
EOSINOPHIL # BLD AUTO: 0.03 10*3/MM3 (ref 0–0.4)
EOSINOPHIL # BLD AUTO: 0.13 10*3/MM3 (ref 0–0.4)
EOSINOPHIL # BLD MANUAL: 0.14 10*3/MM3 (ref 0–0.4)
EOSINOPHIL NFR BLD AUTO: 0 % (ref 0.3–6.2)
EOSINOPHIL NFR BLD AUTO: 0 % (ref 0.3–6.2)
EOSINOPHIL NFR BLD AUTO: 0.1 % (ref 0.3–6.2)
EOSINOPHIL NFR BLD AUTO: 0.1 % (ref 0.3–6.2)
EOSINOPHIL NFR BLD AUTO: 0.6 % (ref 0.3–6.2)
EOSINOPHIL NFR BLD AUTO: 2.4 % (ref 0.3–6.2)
EOSINOPHIL NFR BLD MANUAL: 1 % (ref 0.3–6.2)
EPEC EAE GENE STL QL NAA+NON-PROBE: NOT DETECTED
ERYTHROCYTE [DISTWIDTH] IN BLOOD BY AUTOMATED COUNT: 15.6 % (ref 12.3–15.4)
ERYTHROCYTE [DISTWIDTH] IN BLOOD BY AUTOMATED COUNT: 15.6 % (ref 12.3–15.4)
ERYTHROCYTE [DISTWIDTH] IN BLOOD BY AUTOMATED COUNT: 15.7 % (ref 12.3–15.4)
ERYTHROCYTE [DISTWIDTH] IN BLOOD BY AUTOMATED COUNT: 15.9 % (ref 12.3–15.4)
ERYTHROCYTE [DISTWIDTH] IN BLOOD BY AUTOMATED COUNT: 16 % (ref 12.3–15.4)
ERYTHROCYTE [DISTWIDTH] IN BLOOD BY AUTOMATED COUNT: 16.4 % (ref 12.3–15.4)
ERYTHROCYTE [DISTWIDTH] IN BLOOD BY AUTOMATED COUNT: 17.5 % (ref 12.3–15.4)
ETEC LTA+ST1A+ST1B TOX ST NAA+NON-PROBE: NOT DETECTED
FIBRINOGEN PPP-MCNC: 106 MG/DL (ref 210–450)
FLUAV SUBTYP SPEC NAA+PROBE: NOT DETECTED
FLUBV RNA ISLT QL NAA+PROBE: NOT DETECTED
G LAMBLIA DNA STL QL NAA+NON-PROBE: NOT DETECTED
GEN 5 2HR TROPONIN T REFLEX: 80 NG/L
GLOBULIN UR ELPH-MCNC: 2 GM/DL
GLOBULIN UR ELPH-MCNC: 2 GM/DL
GLOBULIN UR ELPH-MCNC: 2.3 GM/DL
GLOBULIN UR ELPH-MCNC: 2.5 GM/DL
GLOBULIN UR ELPH-MCNC: 2.7 GM/DL
GLOBULIN UR ELPH-MCNC: 2.9 GM/DL
GLOBULIN UR ELPH-MCNC: 2.9 GM/DL
GLUCOSE BLDC GLUCOMTR-MCNC: 101 MG/DL (ref 70–105)
GLUCOSE BLDC GLUCOMTR-MCNC: 102 MG/DL (ref 70–105)
GLUCOSE BLDC GLUCOMTR-MCNC: 106 MG/DL (ref 70–105)
GLUCOSE BLDC GLUCOMTR-MCNC: 107 MG/DL (ref 74–100)
GLUCOSE BLDC GLUCOMTR-MCNC: 107 MG/DL (ref 74–100)
GLUCOSE BLDC GLUCOMTR-MCNC: 111 MG/DL (ref 70–105)
GLUCOSE BLDC GLUCOMTR-MCNC: 116 MG/DL (ref 70–105)
GLUCOSE BLDC GLUCOMTR-MCNC: 116 MG/DL (ref 70–105)
GLUCOSE BLDC GLUCOMTR-MCNC: 117 MG/DL (ref 70–105)
GLUCOSE BLDC GLUCOMTR-MCNC: 120 MG/DL (ref 70–105)
GLUCOSE BLDC GLUCOMTR-MCNC: 122 MG/DL (ref 70–105)
GLUCOSE BLDC GLUCOMTR-MCNC: 130 MG/DL (ref 70–105)
GLUCOSE BLDC GLUCOMTR-MCNC: 143 MG/DL (ref 70–105)
GLUCOSE BLDC GLUCOMTR-MCNC: 146 MG/DL (ref 70–105)
GLUCOSE BLDC GLUCOMTR-MCNC: 146 MG/DL (ref 70–105)
GLUCOSE BLDC GLUCOMTR-MCNC: 157 MG/DL (ref 70–105)
GLUCOSE BLDC GLUCOMTR-MCNC: 165 MG/DL (ref 70–105)
GLUCOSE BLDC GLUCOMTR-MCNC: 166 MG/DL (ref 70–105)
GLUCOSE BLDC GLUCOMTR-MCNC: 167 MG/DL (ref 70–105)
GLUCOSE BLDC GLUCOMTR-MCNC: 170 MG/DL (ref 70–105)
GLUCOSE BLDC GLUCOMTR-MCNC: 171 MG/DL (ref 70–105)
GLUCOSE BLDC GLUCOMTR-MCNC: 190 MG/DL (ref 70–105)
GLUCOSE BLDC GLUCOMTR-MCNC: 197 MG/DL (ref 70–105)
GLUCOSE BLDC GLUCOMTR-MCNC: 211 MG/DL (ref 74–100)
GLUCOSE BLDC GLUCOMTR-MCNC: 43 MG/DL (ref 70–105)
GLUCOSE BLDC GLUCOMTR-MCNC: 54 MG/DL (ref 70–105)
GLUCOSE BLDC GLUCOMTR-MCNC: 67 MG/DL (ref 70–105)
GLUCOSE BLDC GLUCOMTR-MCNC: 70 MG/DL (ref 70–105)
GLUCOSE BLDC GLUCOMTR-MCNC: 73 MG/DL (ref 70–105)
GLUCOSE BLDC GLUCOMTR-MCNC: 73 MG/DL (ref 70–105)
GLUCOSE BLDC GLUCOMTR-MCNC: 78 MG/DL (ref 70–105)
GLUCOSE BLDC GLUCOMTR-MCNC: 86 MG/DL (ref 70–105)
GLUCOSE BLDC GLUCOMTR-MCNC: 88 MG/DL (ref 70–105)
GLUCOSE BLDC GLUCOMTR-MCNC: 89 MG/DL (ref 70–105)
GLUCOSE BLDC GLUCOMTR-MCNC: 93 MG/DL (ref 70–105)
GLUCOSE BLDC GLUCOMTR-MCNC: 98 MG/DL (ref 70–105)
GLUCOSE BLDC GLUCOMTR-MCNC: 99 MG/DL (ref 70–105)
GLUCOSE SERPL-MCNC: 108 MG/DL (ref 65–99)
GLUCOSE SERPL-MCNC: 126 MG/DL (ref 65–99)
GLUCOSE SERPL-MCNC: 140 MG/DL (ref 65–99)
GLUCOSE SERPL-MCNC: 142 MG/DL (ref 65–99)
GLUCOSE SERPL-MCNC: 150 MG/DL (ref 65–99)
GLUCOSE SERPL-MCNC: 189 MG/DL (ref 65–99)
GLUCOSE SERPL-MCNC: 256 MG/DL (ref 65–99)
GLUCOSE SERPL-MCNC: 305 MG/DL (ref 65–99)
GLUCOSE SERPL-MCNC: 83 MG/DL (ref 65–99)
HADV DNA SPEC NAA+PROBE: NOT DETECTED
HAV IGM SERPL QL IA: NORMAL
HBA1C MFR BLD: 7.4 % (ref 4.8–5.6)
HBV CORE IGM SERPL QL IA: NORMAL
HBV SURFACE AG SERPL QL IA: NORMAL
HBV SURFACE AG SERPL QL IA: NORMAL
HCO3 BLDA-SCNC: 14.1 MMOL/L (ref 21–28)
HCO3 BLDV-SCNC: 17.5 MMOL/L (ref 22–26)
HCOV 229E RNA SPEC QL NAA+PROBE: NOT DETECTED
HCOV HKU1 RNA SPEC QL NAA+PROBE: NOT DETECTED
HCOV NL63 RNA SPEC QL NAA+PROBE: NOT DETECTED
HCOV OC43 RNA SPEC QL NAA+PROBE: NOT DETECTED
HCT VFR BLD AUTO: 37.3 % (ref 37.5–51)
HCT VFR BLD AUTO: 39 % (ref 37.5–51)
HCT VFR BLD AUTO: 39.7 % (ref 37.5–51)
HCT VFR BLD AUTO: 40.1 % (ref 37.5–51)
HCT VFR BLD AUTO: 40.2 % (ref 37.5–51)
HCT VFR BLD AUTO: 40.2 % (ref 37.5–51)
HCT VFR BLD AUTO: 42.7 % (ref 37.5–51)
HCT VFR BLDA CALC: 42 % (ref 38–51)
HCV AB SER DONR QL: NORMAL
HDLC SERPL-MCNC: 33 MG/DL (ref 40–60)
HEMODILUTION: NO
HGB BLD-MCNC: 12.2 G/DL (ref 13–17.7)
HGB BLD-MCNC: 12.4 G/DL (ref 13–17.7)
HGB BLD-MCNC: 13 G/DL (ref 13–17.7)
HGB BLD-MCNC: 13.1 G/DL (ref 13–17.7)
HGB BLD-MCNC: 14 G/DL (ref 13–17.7)
HGB BLDA-MCNC: 14.2 G/DL (ref 12–17)
HMPV RNA NPH QL NAA+NON-PROBE: NOT DETECTED
HOLD SPECIMEN: NORMAL
HPIV1 RNA ISLT QL NAA+PROBE: NOT DETECTED
HPIV2 RNA SPEC QL NAA+PROBE: NOT DETECTED
HPIV3 RNA NPH QL NAA+PROBE: NOT DETECTED
HPIV4 P GENE NPH QL NAA+PROBE: NOT DETECTED
IMM GRANULOCYTES # BLD AUTO: 0.02 10*3/MM3 (ref 0–0.05)
IMM GRANULOCYTES # BLD AUTO: 0.03 10*3/MM3 (ref 0–0.05)
IMM GRANULOCYTES # BLD AUTO: 0.05 10*3/MM3 (ref 0–0.05)
IMM GRANULOCYTES # BLD AUTO: 0.06 10*3/MM3 (ref 0–0.05)
IMM GRANULOCYTES # BLD AUTO: 0.07 10*3/MM3 (ref 0–0.05)
IMM GRANULOCYTES # BLD AUTO: 0.07 10*3/MM3 (ref 0–0.05)
IMM GRANULOCYTES NFR BLD AUTO: 0.4 % (ref 0–0.5)
IMM GRANULOCYTES NFR BLD AUTO: 0.5 % (ref 0–0.5)
IMM GRANULOCYTES NFR BLD AUTO: 0.6 % (ref 0–0.5)
IMM GRANULOCYTES NFR BLD AUTO: 0.6 % (ref 0–0.5)
INHALED O2 CONCENTRATION: 21 %
INHALED O2 CONCENTRATION: 32 %
INR PPP: 1.38 (ref 0.93–1.1)
INR PPP: 5.89 (ref 0.93–1.1)
IRON 24H UR-MRATE: 164 MCG/DL (ref 59–158)
IRON SATN MFR SERPL: 92 % (ref 20–50)
LARGE PLATELETS: ABNORMAL
LDLC SERPL CALC-MCNC: 43 MG/DL (ref 0–100)
LDLC/HDLC SERPL: 1.16 {RATIO}
LIPASE SERPL-CCNC: 10 U/L (ref 13–60)
LYMPHOCYTES # BLD AUTO: 0.45 10*3/MM3 (ref 0.7–3.1)
LYMPHOCYTES # BLD AUTO: 0.57 10*3/MM3 (ref 0.7–3.1)
LYMPHOCYTES # BLD AUTO: 0.63 10*3/MM3 (ref 0.7–3.1)
LYMPHOCYTES # BLD AUTO: 0.75 10*3/MM3 (ref 0.7–3.1)
LYMPHOCYTES # BLD AUTO: 0.76 10*3/MM3 (ref 0.7–3.1)
LYMPHOCYTES # BLD AUTO: 0.93 10*3/MM3 (ref 0.7–3.1)
LYMPHOCYTES # BLD MANUAL: 0.71 10*3/MM3 (ref 0.7–3.1)
LYMPHOCYTES NFR BLD AUTO: 11.3 % (ref 19.6–45.3)
LYMPHOCYTES NFR BLD AUTO: 13.7 % (ref 19.6–45.3)
LYMPHOCYTES NFR BLD AUTO: 3.8 % (ref 19.6–45.3)
LYMPHOCYTES NFR BLD AUTO: 6.4 % (ref 19.6–45.3)
LYMPHOCYTES NFR BLD AUTO: 6.5 % (ref 19.6–45.3)
LYMPHOCYTES NFR BLD AUTO: 7.5 % (ref 19.6–45.3)
LYMPHOCYTES NFR BLD MANUAL: 5 % (ref 5–12)
M PNEUMO IGG SER IA-ACNC: NOT DETECTED
MAGNESIUM SERPL-MCNC: 1.9 MG/DL (ref 1.6–2.4)
MAGNESIUM SERPL-MCNC: 2.2 MG/DL (ref 1.6–2.4)
MAGNESIUM SERPL-MCNC: 2.2 MG/DL (ref 1.6–2.4)
MAGNESIUM SERPL-MCNC: 2.3 MG/DL (ref 1.6–2.4)
MCH RBC QN AUTO: 33.6 PG (ref 26.6–33)
MCH RBC QN AUTO: 33.7 PG (ref 26.6–33)
MCH RBC QN AUTO: 33.8 PG (ref 26.6–33)
MCH RBC QN AUTO: 33.9 PG (ref 26.6–33)
MCH RBC QN AUTO: 33.9 PG (ref 26.6–33)
MCH RBC QN AUTO: 34.2 PG (ref 26.6–33)
MCH RBC QN AUTO: 34.2 PG (ref 26.6–33)
MCHC RBC AUTO-ENTMCNC: 31.8 G/DL (ref 31.5–35.7)
MCHC RBC AUTO-ENTMCNC: 32.6 G/DL (ref 31.5–35.7)
MCHC RBC AUTO-ENTMCNC: 32.6 G/DL (ref 31.5–35.7)
MCHC RBC AUTO-ENTMCNC: 32.7 G/DL (ref 31.5–35.7)
MCHC RBC AUTO-ENTMCNC: 32.8 G/DL (ref 31.5–35.7)
MCV RBC AUTO: 103 FL (ref 79–97)
MCV RBC AUTO: 103.1 FL (ref 79–97)
MCV RBC AUTO: 103.4 FL (ref 79–97)
MCV RBC AUTO: 103.9 FL (ref 79–97)
MCV RBC AUTO: 104.4 FL (ref 79–97)
MCV RBC AUTO: 104.7 FL (ref 79–97)
MCV RBC AUTO: 106.3 FL (ref 79–97)
MODALITY: ABNORMAL
MODALITY: ABNORMAL
MONOCYTES # BLD AUTO: 0.35 10*3/MM3 (ref 0.1–0.9)
MONOCYTES # BLD AUTO: 0.49 10*3/MM3 (ref 0.1–0.9)
MONOCYTES # BLD AUTO: 0.72 10*3/MM3 (ref 0.1–0.9)
MONOCYTES # BLD AUTO: 0.8 10*3/MM3 (ref 0.1–0.9)
MONOCYTES # BLD AUTO: 0.84 10*3/MM3 (ref 0.1–0.9)
MONOCYTES # BLD AUTO: 1.18 10*3/MM3 (ref 0.1–0.9)
MONOCYTES # BLD: 0.71 10*3/MM3 (ref 0.1–0.9)
MONOCYTES NFR BLD AUTO: 6.2 % (ref 5–12)
MONOCYTES NFR BLD AUTO: 6.9 % (ref 5–12)
MONOCYTES NFR BLD AUTO: 7 % (ref 5–12)
MONOCYTES NFR BLD AUTO: 8.2 % (ref 5–12)
MONOCYTES NFR BLD AUTO: 8.9 % (ref 5–12)
MONOCYTES NFR BLD AUTO: 9.5 % (ref 5–12)
NEUTROPHILS # BLD AUTO: 12.61 10*3/MM3 (ref 1.7–7)
NEUTROPHILS NFR BLD AUTO: 10.05 10*3/MM3 (ref 1.7–7)
NEUTROPHILS NFR BLD AUTO: 10.61 10*3/MM3 (ref 1.7–7)
NEUTROPHILS NFR BLD AUTO: 12.23 10*3/MM3 (ref 1.7–7)
NEUTROPHILS NFR BLD AUTO: 4.06 10*3/MM3 (ref 1.7–7)
NEUTROPHILS NFR BLD AUTO: 4.07 10*3/MM3 (ref 1.7–7)
NEUTROPHILS NFR BLD AUTO: 6.87 10*3/MM3 (ref 1.7–7)
NEUTROPHILS NFR BLD AUTO: 73.6 % (ref 42.7–76)
NEUTROPHILS NFR BLD AUTO: 80.4 % (ref 42.7–76)
NEUTROPHILS NFR BLD AUTO: 82.1 % (ref 42.7–76)
NEUTROPHILS NFR BLD AUTO: 84.8 % (ref 42.7–76)
NEUTROPHILS NFR BLD AUTO: 86.5 % (ref 42.7–76)
NEUTROPHILS NFR BLD AUTO: 88.6 % (ref 42.7–76)
NEUTROPHILS NFR BLD MANUAL: 85 % (ref 42.7–76)
NEUTS BAND NFR BLD MANUAL: 4 % (ref 0–5)
NEUTS VAC BLD QL SMEAR: ABNORMAL
NOROVIRUS GI+II RNA STL QL NAA+NON-PROBE: NOT DETECTED
NRBC BLD AUTO-RTO: 0 /100 WBC (ref 0–0.2)
NRBC BLD AUTO-RTO: 0.4 /100 WBC (ref 0–0.2)
NRBC BLD AUTO-RTO: 0.7 /100 WBC (ref 0–0.2)
NRBC BLD AUTO-RTO: 0.8 /100 WBC (ref 0–0.2)
NRBC BLD AUTO-RTO: 1.4 /100 WBC (ref 0–0.2)
NRBC BLD AUTO-RTO: 2 /100 WBC (ref 0–0.2)
NRBC SPEC MANUAL: 7 /100 WBC (ref 0–0.2)
NT-PROBNP SERPL-MCNC: ABNORMAL PG/ML (ref 0–900)
P SHIGELLOIDES DNA STL QL NAA+NON-PROBE: NOT DETECTED
PCO2 BLDA: 24.7 MM HG (ref 35–48)
PCO2 BLDV: 31.6 MM HG (ref 42–51)
PH BLDA: 7.37 PH UNITS (ref 7.35–7.45)
PH BLDV: 7.35 PH UNITS (ref 7.32–7.43)
PHOSPHATE SERPL-MCNC: 8.7 MG/DL (ref 2.5–4.5)
PHOSPHATE SERPL-MCNC: 8.9 MG/DL (ref 2.5–4.5)
PHOSPHATE SERPL-MCNC: 9.4 MG/DL (ref 2.5–4.5)
PLATELET # BLD AUTO: 120 10*3/MM3 (ref 140–450)
PLATELET # BLD AUTO: 121 10*3/MM3 (ref 140–450)
PLATELET # BLD AUTO: 123 10*3/MM3 (ref 140–450)
PLATELET # BLD AUTO: 124 10*3/MM3 (ref 140–450)
PLATELET # BLD AUTO: 137 10*3/MM3 (ref 140–450)
PLATELET # BLD AUTO: 139 10*3/MM3 (ref 140–450)
PLATELET # BLD AUTO: 81 10*3/MM3 (ref 140–450)
PMV BLD AUTO: 11.3 FL (ref 6–12)
PMV BLD AUTO: 11.3 FL (ref 6–12)
PMV BLD AUTO: 11.6 FL (ref 6–12)
PMV BLD AUTO: 11.8 FL (ref 6–12)
PMV BLD AUTO: 12.5 FL (ref 6–12)
PMV BLD AUTO: 12.6 FL (ref 6–12)
PMV BLD AUTO: 12.8 FL (ref 6–12)
PO2 BLDA: 105.6 MM HG (ref 83–108)
PO2 BLDV: 40.9 MM HG (ref 40–42)
POTASSIUM BLDA-SCNC: 6.3 MMOL/L (ref 3.5–4.5)
POTASSIUM SERPL-SCNC: 3.7 MMOL/L (ref 3.5–5.2)
POTASSIUM SERPL-SCNC: 3.8 MMOL/L (ref 3.5–5.2)
POTASSIUM SERPL-SCNC: 4.1 MMOL/L (ref 3.5–5.2)
POTASSIUM SERPL-SCNC: 4.5 MMOL/L (ref 3.5–5.2)
POTASSIUM SERPL-SCNC: 4.6 MMOL/L (ref 3.5–5.2)
POTASSIUM SERPL-SCNC: 4.8 MMOL/L (ref 3.5–5.2)
POTASSIUM SERPL-SCNC: 5.1 MMOL/L (ref 3.5–5.2)
POTASSIUM SERPL-SCNC: 5.1 MMOL/L (ref 3.5–5.2)
POTASSIUM SERPL-SCNC: 6.6 MMOL/L (ref 3.5–5.2)
PROT SERPL-MCNC: 5.7 G/DL (ref 6–8.5)
PROT SERPL-MCNC: 5.7 G/DL (ref 6–8.5)
PROT SERPL-MCNC: 5.8 G/DL (ref 6–8.5)
PROT SERPL-MCNC: 5.9 G/DL (ref 6–8.5)
PROT SERPL-MCNC: 6 G/DL (ref 6–8.5)
PROT SERPL-MCNC: 6 G/DL (ref 6–8.5)
PROT SERPL-MCNC: 6.3 G/DL (ref 6–8.5)
PROTHROMBIN TIME: 14.7 SECONDS (ref 9.6–11.7)
PROTHROMBIN TIME: 56.3 SECONDS (ref 9.6–11.7)
QT INTERVAL: 353 MS
QT INTERVAL: 594 MS
QTC INTERVAL: 464 MS
QTC INTERVAL: 694 MS
RBC # BLD AUTO: 3.62 10*6/MM3 (ref 4.14–5.8)
RBC # BLD AUTO: 3.67 10*6/MM3 (ref 4.14–5.8)
RBC # BLD AUTO: 3.83 10*6/MM3 (ref 4.14–5.8)
RBC # BLD AUTO: 3.84 10*6/MM3 (ref 4.14–5.8)
RBC # BLD AUTO: 3.87 10*6/MM3 (ref 4.14–5.8)
RBC # BLD AUTO: 3.9 10*6/MM3 (ref 4.14–5.8)
RBC # BLD AUTO: 4.09 10*6/MM3 (ref 4.14–5.8)
RHINOVIRUS RNA SPEC NAA+PROBE: NOT DETECTED
RSV RNA NPH QL NAA+NON-PROBE: NOT DETECTED
RVA RNA STL QL NAA+NON-PROBE: NOT DETECTED
S ENT+BONG DNA STL QL NAA+NON-PROBE: NOT DETECTED
SAO2 % BLDCOA: 98 % (ref 94–98)
SAO2 % BLDCOV: 74.5 % (ref 45–75)
SAPO I+II+IV+V RNA STL QL NAA+NON-PROBE: NOT DETECTED
SARS-COV-2 RNA NPH QL NAA+NON-PROBE: NOT DETECTED
SCAN SLIDE: NORMAL
SERVICE CMNT-IMP: ABNORMAL
SHIGELLA SP+EIEC IPAH ST NAA+NON-PROBE: NOT DETECTED
SMALL PLATELETS BLD QL SMEAR: ABNORMAL
SODIUM BLD-SCNC: 123 MMOL/L (ref 138–146)
SODIUM SERPL-SCNC: 127 MMOL/L (ref 136–145)
SODIUM SERPL-SCNC: 128 MMOL/L (ref 136–145)
SODIUM SERPL-SCNC: 130 MMOL/L (ref 136–145)
SODIUM SERPL-SCNC: 131 MMOL/L (ref 136–145)
SODIUM SERPL-SCNC: 131 MMOL/L (ref 136–145)
SODIUM SERPL-SCNC: 132 MMOL/L (ref 136–145)
SODIUM SERPL-SCNC: 135 MMOL/L (ref 136–145)
SODIUM SERPL-SCNC: 135 MMOL/L (ref 136–145)
SODIUM SERPL-SCNC: 137 MMOL/L (ref 136–145)
T4 FREE SERPL-MCNC: 1.38 NG/DL (ref 0.93–1.7)
TIBC SERPL-MCNC: 177 MCG/DL (ref 298–536)
TRANSFERRIN SERPL-MCNC: 119 MG/DL (ref 200–360)
TRIGL SERPL-MCNC: 163 MG/DL (ref 0–150)
TROPONIN T DELTA: -5 NG/L
TROPONIN T SERPL HS-MCNC: 80 NG/L
TROPONIN T SERPL HS-MCNC: 83 NG/L
TROPONIN T SERPL HS-MCNC: 85 NG/L
TSH SERPL DL<=0.05 MIU/L-ACNC: 4.88 UIU/ML (ref 0.27–4.2)
TSH SERPL DL<=0.05 MIU/L-ACNC: 4.98 UIU/ML (ref 0.27–4.2)
V CHOL+PARA+VUL DNA STL QL NAA+NON-PROBE: NOT DETECTED
V CHOLERAE DNA STL QL NAA+NON-PROBE: NOT DETECTED
VARIANT LYMPHS NFR BLD MANUAL: 5 % (ref 19.6–45.3)
VLDLC SERPL-MCNC: 28 MG/DL (ref 5–40)
WBC NRBC COR # BLD AUTO: 11.61 10*3/MM3 (ref 3.4–10.8)
WBC NRBC COR # BLD AUTO: 11.97 10*3/MM3 (ref 3.4–10.8)
WBC NRBC COR # BLD AUTO: 14.17 10*3/MM3 (ref 3.4–10.8)
WBC NRBC COR # BLD AUTO: 14.43 10*3/MM3 (ref 3.4–10.8)
WBC NRBC COR # BLD AUTO: 5.05 10*3/MM3 (ref 3.4–10.8)
WBC NRBC COR # BLD AUTO: 5.53 10*3/MM3 (ref 3.4–10.8)
WBC NRBC COR # BLD AUTO: 8.38 10*3/MM3 (ref 3.4–10.8)
WHOLE BLOOD HOLD COAG: NORMAL
Y ENTEROCOL DNA STL QL NAA+NON-PROBE: NOT DETECTED

## 2024-01-01 PROCEDURE — 87340 HEPATITIS B SURFACE AG IA: CPT | Performed by: INTERNAL MEDICINE

## 2024-01-01 PROCEDURE — C1752 CATH,HEMODIALYSIS,SHORT-TERM: HCPCS

## 2024-01-01 PROCEDURE — 74240 X-RAY XM UPR GI TRC 1CNTRST: CPT

## 2024-01-01 PROCEDURE — 74176 CT ABD & PELVIS W/O CONTRAST: CPT

## 2024-01-01 PROCEDURE — 85610 PROTHROMBIN TIME: CPT | Performed by: EMERGENCY MEDICINE

## 2024-01-01 PROCEDURE — 87324 CLOSTRIDIUM AG IA: CPT | Performed by: NURSE PRACTITIONER

## 2024-01-01 PROCEDURE — 25010000002 HEPARIN LOCK FLUSH PER 10 UNITS: Performed by: INTERNAL MEDICINE

## 2024-01-01 PROCEDURE — 25010000002 CALCIUM GLUCONATE PER 10 ML: Performed by: NURSE PRACTITIONER

## 2024-01-01 PROCEDURE — 84439 ASSAY OF FREE THYROXINE: CPT | Performed by: STUDENT IN AN ORGANIZED HEALTH CARE EDUCATION/TRAINING PROGRAM

## 2024-01-01 PROCEDURE — 99233 SBSQ HOSP IP/OBS HIGH 50: CPT | Performed by: STUDENT IN AN ORGANIZED HEALTH CARE EDUCATION/TRAINING PROGRAM

## 2024-01-01 PROCEDURE — 82248 BILIRUBIN DIRECT: CPT | Performed by: INTERNAL MEDICINE

## 2024-01-01 PROCEDURE — 86664 EPSTEIN-BARR NUCLEAR ANTIGEN: CPT | Performed by: INTERNAL MEDICINE

## 2024-01-01 PROCEDURE — 82948 REAGENT STRIP/BLOOD GLUCOSE: CPT | Performed by: INTERNAL MEDICINE

## 2024-01-01 PROCEDURE — G0378 HOSPITAL OBSERVATION PER HR: HCPCS

## 2024-01-01 PROCEDURE — 85730 THROMBOPLASTIN TIME PARTIAL: CPT | Performed by: INTERNAL MEDICINE

## 2024-01-01 PROCEDURE — 63710000001 INSULIN REGULAR HUMAN PER 5 UNITS: Performed by: NURSE PRACTITIONER

## 2024-01-01 PROCEDURE — 85025 COMPLETE CBC W/AUTO DIFF WBC: CPT | Performed by: STUDENT IN AN ORGANIZED HEALTH CARE EDUCATION/TRAINING PROGRAM

## 2024-01-01 PROCEDURE — 82948 REAGENT STRIP/BLOOD GLUCOSE: CPT

## 2024-01-01 PROCEDURE — 85610 PROTHROMBIN TIME: CPT | Performed by: INTERNAL MEDICINE

## 2024-01-01 PROCEDURE — 86665 EPSTEIN-BARR CAPSID VCA: CPT | Performed by: INTERNAL MEDICINE

## 2024-01-01 PROCEDURE — 83605 ASSAY OF LACTIC ACID: CPT | Performed by: INTERNAL MEDICINE

## 2024-01-01 PROCEDURE — 25010000002 METRONIDAZOLE 500 MG/100ML SOLUTION: Performed by: INTERNAL MEDICINE

## 2024-01-01 PROCEDURE — 82330 ASSAY OF CALCIUM: CPT | Performed by: INTERNAL MEDICINE

## 2024-01-01 PROCEDURE — 80074 ACUTE HEPATITIS PANEL: CPT | Performed by: INTERNAL MEDICINE

## 2024-01-01 PROCEDURE — 25010000002 ALBUMIN HUMAN 25% PER 50 ML: Performed by: INTERNAL MEDICINE

## 2024-01-01 PROCEDURE — 71045 X-RAY EXAM CHEST 1 VIEW: CPT

## 2024-01-01 PROCEDURE — 84466 ASSAY OF TRANSFERRIN: CPT | Performed by: STUDENT IN AN ORGANIZED HEALTH CARE EDUCATION/TRAINING PROGRAM

## 2024-01-01 PROCEDURE — 80053 COMPREHEN METABOLIC PANEL: CPT | Performed by: NURSE PRACTITIONER

## 2024-01-01 PROCEDURE — 25010000002 CEFTRIAXONE PER 250 MG: Performed by: INTERNAL MEDICINE

## 2024-01-01 PROCEDURE — 83690 ASSAY OF LIPASE: CPT | Performed by: NURSE PRACTITIONER

## 2024-01-01 PROCEDURE — 93005 ELECTROCARDIOGRAM TRACING: CPT | Performed by: EMERGENCY MEDICINE

## 2024-01-01 PROCEDURE — 80053 COMPREHEN METABOLIC PANEL: CPT | Performed by: STUDENT IN AN ORGANIZED HEALTH CARE EDUCATION/TRAINING PROGRAM

## 2024-01-01 PROCEDURE — 83605 ASSAY OF LACTIC ACID: CPT | Performed by: NURSE PRACTITIONER

## 2024-01-01 PROCEDURE — 82948 REAGENT STRIP/BLOOD GLUCOSE: CPT | Performed by: NURSE PRACTITIONER

## 2024-01-01 PROCEDURE — 80048 BASIC METABOLIC PNL TOTAL CA: CPT | Performed by: NURSE PRACTITIONER

## 2024-01-01 PROCEDURE — 99285 EMERGENCY DEPT VISIT HI MDM: CPT

## 2024-01-01 PROCEDURE — 82330 ASSAY OF CALCIUM: CPT

## 2024-01-01 PROCEDURE — 83735 ASSAY OF MAGNESIUM: CPT | Performed by: EMERGENCY MEDICINE

## 2024-01-01 PROCEDURE — 80061 LIPID PANEL: CPT | Performed by: STUDENT IN AN ORGANIZED HEALTH CARE EDUCATION/TRAINING PROGRAM

## 2024-01-01 PROCEDURE — 82330 ASSAY OF CALCIUM: CPT | Performed by: NURSE PRACTITIONER

## 2024-01-01 PROCEDURE — 25010000002 ONDANSETRON PER 1 MG: Performed by: STUDENT IN AN ORGANIZED HEALTH CARE EDUCATION/TRAINING PROGRAM

## 2024-01-01 PROCEDURE — 36415 COLL VENOUS BLD VENIPUNCTURE: CPT

## 2024-01-01 PROCEDURE — 80050 GENERAL HEALTH PANEL: CPT | Performed by: NURSE PRACTITIONER

## 2024-01-01 PROCEDURE — 25010000002 ONDANSETRON PER 1 MG: Performed by: INTERNAL MEDICINE

## 2024-01-01 PROCEDURE — 85018 HEMOGLOBIN: CPT

## 2024-01-01 PROCEDURE — 0202U NFCT DS 22 TRGT SARS-COV-2: CPT | Performed by: NURSE PRACTITIONER

## 2024-01-01 PROCEDURE — 25010000002 HEPARIN (PORCINE) PER 1000 UNITS: Performed by: RADIOLOGY

## 2024-01-01 PROCEDURE — 25810000003 DEXTROSE 5% IN LACTATED RINGERS PER 1000 ML: Performed by: INTERNAL MEDICINE

## 2024-01-01 PROCEDURE — 87507 IADNA-DNA/RNA PROBE TQ 12-25: CPT | Performed by: INTERNAL MEDICINE

## 2024-01-01 PROCEDURE — 85025 COMPLETE CBC W/AUTO DIFF WBC: CPT | Performed by: NURSE PRACTITIONER

## 2024-01-01 PROCEDURE — 83605 ASSAY OF LACTIC ACID: CPT

## 2024-01-01 PROCEDURE — 99233 SBSQ HOSP IP/OBS HIGH 50: CPT | Performed by: SURGERY

## 2024-01-01 PROCEDURE — 94799 UNLISTED PULMONARY SVC/PX: CPT

## 2024-01-01 PROCEDURE — P9047 ALBUMIN (HUMAN), 25%, 50ML: HCPCS | Performed by: INTERNAL MEDICINE

## 2024-01-01 PROCEDURE — 84100 ASSAY OF PHOSPHORUS: CPT | Performed by: STUDENT IN AN ORGANIZED HEALTH CARE EDUCATION/TRAINING PROGRAM

## 2024-01-01 PROCEDURE — 71250 CT THORAX DX C-: CPT

## 2024-01-01 PROCEDURE — 25010000002 FUROSEMIDE PER 20 MG: Performed by: NURSE PRACTITIONER

## 2024-01-01 PROCEDURE — 82140 ASSAY OF AMMONIA: CPT | Performed by: INTERNAL MEDICINE

## 2024-01-01 PROCEDURE — 84100 ASSAY OF PHOSPHORUS: CPT | Performed by: NURSE PRACTITIONER

## 2024-01-01 PROCEDURE — 25010000002 LIDOCAINE 1 % SOLUTION: Performed by: RADIOLOGY

## 2024-01-01 PROCEDURE — 25810000003 SODIUM CHLORIDE 0.9 % SOLUTION: Performed by: NURSE PRACTITIONER

## 2024-01-01 PROCEDURE — 74018 RADEX ABDOMEN 1 VIEW: CPT

## 2024-01-01 PROCEDURE — 25010000002 ALBUMIN HUMAN 25% PER 50 ML: Performed by: NURSE PRACTITIONER

## 2024-01-01 PROCEDURE — 85007 BL SMEAR W/DIFF WBC COUNT: CPT | Performed by: STUDENT IN AN ORGANIZED HEALTH CARE EDUCATION/TRAINING PROGRAM

## 2024-01-01 PROCEDURE — 80051 ELECTROLYTE PANEL: CPT

## 2024-01-01 PROCEDURE — 25010000002 HYDROCORTISONE SOD SUC (PF) 100 MG RECONSTITUTED SOLUTION: Performed by: INTERNAL MEDICINE

## 2024-01-01 PROCEDURE — 84484 ASSAY OF TROPONIN QUANT: CPT | Performed by: NURSE PRACTITIONER

## 2024-01-01 PROCEDURE — 84484 ASSAY OF TROPONIN QUANT: CPT | Performed by: EMERGENCY MEDICINE

## 2024-01-01 PROCEDURE — 36556 INSERT NON-TUNNEL CV CATH: CPT

## 2024-01-01 PROCEDURE — 25010000002 HYDROMORPHONE 1 MG/ML SOLUTION: Performed by: INTERNAL MEDICINE

## 2024-01-01 PROCEDURE — 76705 ECHO EXAM OF ABDOMEN: CPT

## 2024-01-01 PROCEDURE — 82803 BLOOD GASES ANY COMBINATION: CPT | Performed by: NURSE PRACTITIONER

## 2024-01-01 PROCEDURE — 83880 ASSAY OF NATRIURETIC PEPTIDE: CPT | Performed by: NURSE PRACTITIONER

## 2024-01-01 PROCEDURE — 87040 BLOOD CULTURE FOR BACTERIA: CPT | Performed by: INTERNAL MEDICINE

## 2024-01-01 PROCEDURE — 93005 ELECTROCARDIOGRAM TRACING: CPT

## 2024-01-01 PROCEDURE — 99214 OFFICE O/P EST MOD 30 MIN: CPT | Performed by: INTERNAL MEDICINE

## 2024-01-01 PROCEDURE — 36600 WITHDRAWAL OF ARTERIAL BLOOD: CPT | Performed by: NURSE PRACTITIONER

## 2024-01-01 PROCEDURE — 25510000001 IOPAMIDOL PER 1 ML: Performed by: SURGERY

## 2024-01-01 PROCEDURE — 82140 ASSAY OF AMMONIA: CPT | Performed by: NURSE PRACTITIONER

## 2024-01-01 PROCEDURE — 82565 ASSAY OF CREATININE: CPT

## 2024-01-01 PROCEDURE — 25010000002 LORAZEPAM PER 2 MG: Performed by: INTERNAL MEDICINE

## 2024-01-01 PROCEDURE — C1894 INTRO/SHEATH, NON-LASER: HCPCS

## 2024-01-01 PROCEDURE — 83036 HEMOGLOBIN GLYCOSYLATED A1C: CPT | Performed by: STUDENT IN AN ORGANIZED HEALTH CARE EDUCATION/TRAINING PROGRAM

## 2024-01-01 PROCEDURE — 99222 1ST HOSP IP/OBS MODERATE 55: CPT | Performed by: INTERNAL MEDICINE

## 2024-01-01 PROCEDURE — 82948 REAGENT STRIP/BLOOD GLUCOSE: CPT | Performed by: STUDENT IN AN ORGANIZED HEALTH CARE EDUCATION/TRAINING PROGRAM

## 2024-01-01 PROCEDURE — 25010000002 METOCLOPRAMIDE PER 10 MG: Performed by: EMERGENCY MEDICINE

## 2024-01-01 PROCEDURE — 83735 ASSAY OF MAGNESIUM: CPT | Performed by: NURSE PRACTITIONER

## 2024-01-01 PROCEDURE — P9047 ALBUMIN (HUMAN), 25%, 50ML: HCPCS | Performed by: NURSE PRACTITIONER

## 2024-01-01 PROCEDURE — 83735 ASSAY OF MAGNESIUM: CPT | Performed by: STUDENT IN AN ORGANIZED HEALTH CARE EDUCATION/TRAINING PROGRAM

## 2024-01-01 PROCEDURE — 25810000003 LACTATED RINGERS SOLUTION: Performed by: INTERNAL MEDICINE

## 2024-01-01 PROCEDURE — 82803 BLOOD GASES ANY COMBINATION: CPT

## 2024-01-01 PROCEDURE — 96374 THER/PROPH/DIAG INJ IV PUSH: CPT

## 2024-01-01 PROCEDURE — 99223 1ST HOSP IP/OBS HIGH 75: CPT | Performed by: SURGERY

## 2024-01-01 PROCEDURE — 97162 PT EVAL MOD COMPLEX 30 MIN: CPT

## 2024-01-01 PROCEDURE — 85384 FIBRINOGEN ACTIVITY: CPT | Performed by: INTERNAL MEDICINE

## 2024-01-01 PROCEDURE — 85025 COMPLETE CBC W/AUTO DIFF WBC: CPT | Performed by: EMERGENCY MEDICINE

## 2024-01-01 PROCEDURE — 77001 FLUOROGUIDE FOR VEIN DEVICE: CPT

## 2024-01-01 PROCEDURE — 87449 NOS EACH ORGANISM AG IA: CPT | Performed by: NURSE PRACTITIONER

## 2024-01-01 PROCEDURE — 5A1D70Z PERFORMANCE OF URINARY FILTRATION, INTERMITTENT, LESS THAN 6 HOURS PER DAY: ICD-10-PCS | Performed by: INTERNAL MEDICINE

## 2024-01-01 PROCEDURE — 80053 COMPREHEN METABOLIC PANEL: CPT | Performed by: EMERGENCY MEDICINE

## 2024-01-01 PROCEDURE — 76937 US GUIDE VASCULAR ACCESS: CPT

## 2024-01-01 PROCEDURE — 85730 THROMBOPLASTIN TIME PARTIAL: CPT | Performed by: EMERGENCY MEDICINE

## 2024-01-01 PROCEDURE — 83540 ASSAY OF IRON: CPT | Performed by: STUDENT IN AN ORGANIZED HEALTH CARE EDUCATION/TRAINING PROGRAM

## 2024-01-01 RX ORDER — ALBUMIN (HUMAN) 12.5 G/50ML
12.5 SOLUTION INTRAVENOUS AS NEEDED
Status: DISCONTINUED | OUTPATIENT
Start: 2024-01-01 | End: 2024-01-01

## 2024-01-01 RX ORDER — LORAZEPAM 2 MG/ML
1 INJECTION INTRAMUSCULAR
Status: DISCONTINUED | OUTPATIENT
Start: 2024-01-01 | End: 2024-01-01 | Stop reason: HOSPADM

## 2024-01-01 RX ORDER — METOPROLOL SUCCINATE 50 MG/1
50 TABLET, EXTENDED RELEASE ORAL DAILY
Status: DISCONTINUED | OUTPATIENT
Start: 2024-01-01 | End: 2024-01-01 | Stop reason: HOSPADM

## 2024-01-01 RX ORDER — LORAZEPAM 0.5 MG/1
0.5 TABLET ORAL
Status: DISCONTINUED | OUTPATIENT
Start: 2024-01-01 | End: 2024-01-01 | Stop reason: HOSPADM

## 2024-01-01 RX ORDER — DEXTROSE, SODIUM CHLORIDE, SODIUM LACTATE, POTASSIUM CHLORIDE, AND CALCIUM CHLORIDE 5; .6; .31; .03; .02 G/100ML; G/100ML; G/100ML; G/100ML; G/100ML
75 INJECTION, SOLUTION INTRAVENOUS CONTINUOUS
Status: DISCONTINUED | OUTPATIENT
Start: 2024-01-01 | End: 2024-01-01

## 2024-01-01 RX ORDER — PANTOPRAZOLE SODIUM 40 MG/1
40 TABLET, DELAYED RELEASE ORAL
Status: DISCONTINUED | OUTPATIENT
Start: 2024-01-01 | End: 2024-01-01

## 2024-01-01 RX ORDER — ONDANSETRON 2 MG/ML
4 INJECTION INTRAMUSCULAR; INTRAVENOUS EVERY 6 HOURS PRN
Status: DISCONTINUED | OUTPATIENT
Start: 2024-01-01 | End: 2024-01-01 | Stop reason: HOSPADM

## 2024-01-01 RX ORDER — ATORVASTATIN CALCIUM 20 MG/1
20 TABLET, FILM COATED ORAL DAILY
Status: DISCONTINUED | OUTPATIENT
Start: 2024-01-01 | End: 2024-01-01

## 2024-01-01 RX ORDER — SCOLOPAMINE TRANSDERMAL SYSTEM 1 MG/1
1 PATCH, EXTENDED RELEASE TRANSDERMAL
Status: DISCONTINUED | OUTPATIENT
Start: 2024-01-01 | End: 2024-01-01 | Stop reason: HOSPADM

## 2024-01-01 RX ORDER — GLYCOPYRROLATE 0.2 MG/ML
0.2 INJECTION INTRAMUSCULAR; INTRAVENOUS
Status: DISCONTINUED | OUTPATIENT
Start: 2024-01-01 | End: 2024-01-01 | Stop reason: HOSPADM

## 2024-01-01 RX ORDER — ROPINIROLE 1 MG/1
2 TABLET, FILM COATED ORAL NIGHTLY
Status: DISCONTINUED | OUTPATIENT
Start: 2024-01-01 | End: 2024-01-01 | Stop reason: HOSPADM

## 2024-01-01 RX ORDER — CINACALCET 30 MG/1
30 TABLET, FILM COATED ORAL DAILY
Status: DISCONTINUED | OUTPATIENT
Start: 2024-01-01 | End: 2024-01-01

## 2024-01-01 RX ORDER — BISACODYL 5 MG/1
5 TABLET, DELAYED RELEASE ORAL DAILY PRN
Status: DISCONTINUED | OUTPATIENT
Start: 2024-01-01 | End: 2024-01-01

## 2024-01-01 RX ORDER — FUROSEMIDE 10 MG/ML
40 INJECTION INTRAMUSCULAR; INTRAVENOUS ONCE
Status: COMPLETED | OUTPATIENT
Start: 2024-01-01 | End: 2024-01-01

## 2024-01-01 RX ORDER — ALBUTEROL SULFATE 90 UG/1
2 AEROSOL, METERED RESPIRATORY (INHALATION) EVERY 4 HOURS PRN
Status: DISCONTINUED | OUTPATIENT
Start: 2024-01-01 | End: 2024-01-01 | Stop reason: HOSPADM

## 2024-01-01 RX ORDER — MIDODRINE HYDROCHLORIDE 2.5 MG/1
2.5 TABLET ORAL
Status: DISCONTINUED | OUTPATIENT
Start: 2024-01-01 | End: 2024-01-01 | Stop reason: HOSPADM

## 2024-01-01 RX ORDER — LORAZEPAM 2 MG/ML
2 INJECTION INTRAMUSCULAR
Status: DISCONTINUED | OUTPATIENT
Start: 2024-01-01 | End: 2024-01-01 | Stop reason: HOSPADM

## 2024-01-01 RX ORDER — POTASSIUM CHLORIDE 20 MEQ/1
20 TABLET, EXTENDED RELEASE ORAL DAILY
COMMUNITY

## 2024-01-01 RX ORDER — SODIUM CHLORIDE 0.9 % (FLUSH) 0.9 %
10 SYRINGE (ML) INJECTION EVERY 12 HOURS SCHEDULED
Status: DISCONTINUED | OUTPATIENT
Start: 2024-01-01 | End: 2024-01-01

## 2024-01-01 RX ORDER — LORAZEPAM 2 MG/ML
0.5 INJECTION INTRAMUSCULAR
Status: DISCONTINUED | OUTPATIENT
Start: 2024-01-01 | End: 2024-01-01 | Stop reason: HOSPADM

## 2024-01-01 RX ORDER — SODIUM CHLORIDE 9 MG/ML
30 INJECTION, SOLUTION INTRAVENOUS CONTINUOUS
Status: DISCONTINUED | OUTPATIENT
Start: 2024-01-01 | End: 2024-01-01

## 2024-01-01 RX ORDER — DEXTROSE MONOHYDRATE 25 G/50ML
50 INJECTION, SOLUTION INTRAVENOUS ONCE
Status: DISCONTINUED | OUTPATIENT
Start: 2024-01-01 | End: 2024-01-01

## 2024-01-01 RX ORDER — SEVELAMER CARBONATE 800 MG/1
800 TABLET, FILM COATED ORAL
Status: DISCONTINUED | OUTPATIENT
Start: 2024-01-01 | End: 2024-01-01

## 2024-01-01 RX ORDER — ALBUMIN (HUMAN) 12.5 G/50ML
12.5 SOLUTION INTRAVENOUS AS NEEDED
Status: DISPENSED | OUTPATIENT
Start: 2024-01-01 | End: 2024-01-01

## 2024-01-01 RX ORDER — OMEPRAZOLE 20 MG/1
20 CAPSULE, DELAYED RELEASE ORAL DAILY
COMMUNITY

## 2024-01-01 RX ORDER — TRAZODONE HYDROCHLORIDE 100 MG/1
100 TABLET ORAL NIGHTLY
Status: DISCONTINUED | OUTPATIENT
Start: 2024-01-01 | End: 2024-01-01 | Stop reason: HOSPADM

## 2024-01-01 RX ORDER — ALBUTEROL SULFATE 90 UG/1
2 AEROSOL, METERED RESPIRATORY (INHALATION) EVERY 4 HOURS PRN
Status: DISCONTINUED | OUTPATIENT
Start: 2024-01-01 | End: 2024-01-01

## 2024-01-01 RX ORDER — METRONIDAZOLE 500 MG/100ML
500 INJECTION, SOLUTION INTRAVENOUS EVERY 8 HOURS SCHEDULED
Qty: 1500 ML | Refills: 0 | Status: DISCONTINUED | OUTPATIENT
Start: 2024-01-01 | End: 2024-01-01

## 2024-01-01 RX ORDER — SEVELAMER CARBONATE 800 MG/1
800 TABLET, FILM COATED ORAL
Status: DISCONTINUED | OUTPATIENT
Start: 2024-01-01 | End: 2024-01-01 | Stop reason: HOSPADM

## 2024-01-01 RX ORDER — ONDANSETRON 2 MG/ML
4 INJECTION INTRAMUSCULAR; INTRAVENOUS EVERY 6 HOURS PRN
Status: DISCONTINUED | OUTPATIENT
Start: 2024-01-01 | End: 2024-01-01

## 2024-01-01 RX ORDER — ACETAMINOPHEN 160 MG/5ML
650 SOLUTION ORAL EVERY 4 HOURS PRN
Status: DISCONTINUED | OUTPATIENT
Start: 2024-01-01 | End: 2024-01-01 | Stop reason: HOSPADM

## 2024-01-01 RX ORDER — DIPHENOXYLATE HYDROCHLORIDE AND ATROPINE SULFATE 2.5; .025 MG/1; MG/1
1 TABLET ORAL 4 TIMES DAILY PRN
Status: DISCONTINUED | OUTPATIENT
Start: 2024-01-01 | End: 2024-01-01

## 2024-01-01 RX ORDER — MIDODRINE HYDROCHLORIDE 5 MG/1
10 TABLET ORAL
Status: DISCONTINUED | OUTPATIENT
Start: 2024-01-01 | End: 2024-01-01

## 2024-01-01 RX ORDER — INSULIN LISPRO 100 [IU]/ML
3-14 INJECTION, SOLUTION INTRAVENOUS; SUBCUTANEOUS EVERY 6 HOURS SCHEDULED
Status: DISCONTINUED | OUTPATIENT
Start: 2024-01-01 | End: 2024-01-01

## 2024-01-01 RX ORDER — METOCLOPRAMIDE HYDROCHLORIDE 5 MG/ML
10 INJECTION INTRAMUSCULAR; INTRAVENOUS ONCE
Status: COMPLETED | OUTPATIENT
Start: 2024-01-01 | End: 2024-01-01

## 2024-01-01 RX ORDER — NICOTINE POLACRILEX 4 MG
15 LOZENGE BUCCAL
Status: DISCONTINUED | OUTPATIENT
Start: 2024-01-01 | End: 2024-01-01 | Stop reason: SDUPTHER

## 2024-01-01 RX ORDER — BUMETANIDE 1 MG/1
2 TABLET ORAL
Status: DISCONTINUED | OUTPATIENT
Start: 2024-01-01 | End: 2024-01-01

## 2024-01-01 RX ORDER — INSULIN LISPRO 100 [IU]/ML
2-7 INJECTION, SOLUTION INTRAVENOUS; SUBCUTANEOUS
Status: DISCONTINUED | OUTPATIENT
Start: 2024-01-01 | End: 2024-01-01

## 2024-01-01 RX ORDER — DIPHENOXYLATE HYDROCHLORIDE AND ATROPINE SULFATE 2.5; .025 MG/1; MG/1
1 TABLET ORAL DAILY
Status: DISCONTINUED | OUTPATIENT
Start: 2024-01-01 | End: 2024-01-01

## 2024-01-01 RX ORDER — LEVOTHYROXINE SODIUM 0.03 MG/1
25 TABLET ORAL
Status: DISCONTINUED | OUTPATIENT
Start: 2024-01-01 | End: 2024-01-01 | Stop reason: HOSPADM

## 2024-01-01 RX ORDER — INSULIN LISPRO 100 [IU]/ML
1-6 INJECTION, SOLUTION INTRAVENOUS; SUBCUTANEOUS
Status: DISCONTINUED | OUTPATIENT
Start: 2024-01-01 | End: 2024-01-01

## 2024-01-01 RX ORDER — IPRATROPIUM BROMIDE AND ALBUTEROL SULFATE 2.5; .5 MG/3ML; MG/3ML
3 SOLUTION RESPIRATORY (INHALATION) EVERY 4 HOURS PRN
Status: DISCONTINUED | OUTPATIENT
Start: 2024-01-01 | End: 2024-01-01

## 2024-01-01 RX ORDER — NICOTINE POLACRILEX 4 MG
15 LOZENGE BUCCAL
Status: DISCONTINUED | OUTPATIENT
Start: 2024-01-01 | End: 2024-01-01

## 2024-01-01 RX ORDER — PANTOPRAZOLE SODIUM 40 MG/10ML
40 INJECTION, POWDER, LYOPHILIZED, FOR SOLUTION INTRAVENOUS
Status: DISCONTINUED | OUTPATIENT
Start: 2024-01-01 | End: 2024-01-01

## 2024-01-01 RX ORDER — CINACALCET 30 MG/1
30 TABLET, FILM COATED ORAL DAILY
COMMUNITY

## 2024-01-01 RX ORDER — BISACODYL 10 MG
10 SUPPOSITORY, RECTAL RECTAL DAILY PRN
Status: DISCONTINUED | OUTPATIENT
Start: 2024-01-01 | End: 2024-01-01

## 2024-01-01 RX ORDER — DIPHENOXYLATE HYDROCHLORIDE AND ATROPINE SULFATE 2.5; .025 MG/1; MG/1
1 TABLET ORAL DAILY
Status: DISCONTINUED | OUTPATIENT
Start: 2024-01-01 | End: 2024-01-01 | Stop reason: HOSPADM

## 2024-01-01 RX ORDER — DEXTROSE MONOHYDRATE 25 G/50ML
25 INJECTION, SOLUTION INTRAVENOUS
Status: DISCONTINUED | OUTPATIENT
Start: 2024-01-01 | End: 2024-01-01

## 2024-01-01 RX ORDER — MIDODRINE HYDROCHLORIDE 5 MG/1
10 TABLET ORAL DAILY PRN
Status: DISCONTINUED | OUTPATIENT
Start: 2024-01-01 | End: 2024-01-01

## 2024-01-01 RX ORDER — BUMETANIDE 1 MG/1
2 TABLET ORAL
Status: DISCONTINUED | OUTPATIENT
Start: 2024-01-01 | End: 2024-01-01 | Stop reason: HOSPADM

## 2024-01-01 RX ORDER — ALBUTEROL SULFATE 2.5 MG/3ML
2.5 SOLUTION RESPIRATORY (INHALATION) ONCE
Status: COMPLETED | OUTPATIENT
Start: 2024-01-01 | End: 2024-01-01

## 2024-01-01 RX ORDER — VALSARTAN 80 MG/1
40 TABLET ORAL
Status: DISCONTINUED | OUTPATIENT
Start: 2024-01-01 | End: 2024-01-01 | Stop reason: HOSPADM

## 2024-01-01 RX ORDER — LIDOCAINE HYDROCHLORIDE 10 MG/ML
INJECTION, SOLUTION INFILTRATION; PERINEURAL AS NEEDED
Status: COMPLETED | OUTPATIENT
Start: 2024-01-01 | End: 2024-01-01

## 2024-01-01 RX ORDER — METOPROLOL SUCCINATE 50 MG/1
50 TABLET, EXTENDED RELEASE ORAL DAILY
Status: DISCONTINUED | OUTPATIENT
Start: 2024-01-01 | End: 2024-01-01

## 2024-01-01 RX ORDER — NOREPINEPHRINE BITARTRATE 0.03 MG/ML
.02-.5 INJECTION, SOLUTION INTRAVENOUS
Status: DISCONTINUED | OUTPATIENT
Start: 2024-01-01 | End: 2024-01-01

## 2024-01-01 RX ORDER — DEXTROSE MONOHYDRATE 25 G/50ML
50 INJECTION, SOLUTION INTRAVENOUS
Status: DISCONTINUED | OUTPATIENT
Start: 2024-01-01 | End: 2024-01-01

## 2024-01-01 RX ORDER — SEVELAMER HYDROCHLORIDE 800 MG/1
800 TABLET, FILM COATED ORAL
COMMUNITY

## 2024-01-01 RX ORDER — ISOSORBIDE MONONITRATE 30 MG/1
30 TABLET, EXTENDED RELEASE ORAL DAILY
COMMUNITY

## 2024-01-01 RX ORDER — METOLAZONE 2.5 MG/1
2.5 TABLET ORAL DAILY
COMMUNITY

## 2024-01-01 RX ORDER — ROPINIROLE 1 MG/1
2 TABLET, FILM COATED ORAL NIGHTLY
Status: DISCONTINUED | OUTPATIENT
Start: 2024-01-01 | End: 2024-01-01

## 2024-01-01 RX ORDER — LORAZEPAM 1 MG/1
2 TABLET ORAL
Status: DISCONTINUED | OUTPATIENT
Start: 2024-01-01 | End: 2024-01-01 | Stop reason: HOSPADM

## 2024-01-01 RX ORDER — IBUPROFEN 600 MG/1
1 TABLET ORAL
Status: DISCONTINUED | OUTPATIENT
Start: 2024-01-01 | End: 2024-01-01 | Stop reason: HOSPADM

## 2024-01-01 RX ORDER — HEPARIN SODIUM 1000 [USP'U]/ML
INJECTION, SOLUTION INTRAVENOUS; SUBCUTANEOUS
Status: DISPENSED
Start: 2024-01-01 | End: 2024-01-01

## 2024-01-01 RX ORDER — POLYETHYLENE GLYCOL 3350 17 G/17G
17 POWDER, FOR SOLUTION ORAL DAILY PRN
Status: DISCONTINUED | OUTPATIENT
Start: 2024-01-01 | End: 2024-01-01

## 2024-01-01 RX ORDER — IBUPROFEN 600 MG/1
1 TABLET ORAL
Status: DISCONTINUED | OUTPATIENT
Start: 2024-01-01 | End: 2024-01-01

## 2024-01-01 RX ORDER — LIDOCAINE HYDROCHLORIDE 10 MG/ML
10 INJECTION, SOLUTION INFILTRATION; PERINEURAL ONCE
Status: DISCONTINUED | OUTPATIENT
Start: 2024-01-01 | End: 2024-01-01

## 2024-01-01 RX ORDER — GLYCOPYRROLATE 0.2 MG/ML
0.4 INJECTION INTRAMUSCULAR; INTRAVENOUS
Status: DISCONTINUED | OUTPATIENT
Start: 2024-01-01 | End: 2024-01-01 | Stop reason: HOSPADM

## 2024-01-01 RX ORDER — ACETAMINOPHEN 325 MG/1
650 TABLET ORAL EVERY 4 HOURS PRN
Status: DISCONTINUED | OUTPATIENT
Start: 2024-01-01 | End: 2024-01-01 | Stop reason: HOSPADM

## 2024-01-01 RX ORDER — ISOSORBIDE MONONITRATE 30 MG/1
30 TABLET, EXTENDED RELEASE ORAL DAILY
Status: DISCONTINUED | OUTPATIENT
Start: 2024-01-01 | End: 2024-01-01

## 2024-01-01 RX ORDER — ACETAMINOPHEN 650 MG/1
650 SUPPOSITORY RECTAL EVERY 4 HOURS PRN
Status: DISCONTINUED | OUTPATIENT
Start: 2024-01-01 | End: 2024-01-01

## 2024-01-01 RX ORDER — IPRATROPIUM BROMIDE AND ALBUTEROL SULFATE 2.5; .5 MG/3ML; MG/3ML
3 SOLUTION RESPIRATORY (INHALATION) EVERY 4 HOURS PRN
Status: DISCONTINUED | OUTPATIENT
Start: 2024-01-01 | End: 2024-01-01 | Stop reason: HOSPADM

## 2024-01-01 RX ORDER — METOLAZONE 2.5 MG/1
2.5 TABLET ORAL DAILY
Status: DISCONTINUED | OUTPATIENT
Start: 2024-01-01 | End: 2024-01-01

## 2024-01-01 RX ORDER — HEPARIN SODIUM 1000 [USP'U]/ML
INJECTION, SOLUTION INTRAVENOUS; SUBCUTANEOUS AS NEEDED
Status: COMPLETED | OUTPATIENT
Start: 2024-01-01 | End: 2024-01-01

## 2024-01-01 RX ORDER — ONDANSETRON 4 MG/1
4 TABLET, ORALLY DISINTEGRATING ORAL EVERY 6 HOURS PRN
Status: DISCONTINUED | OUTPATIENT
Start: 2024-01-01 | End: 2024-01-01 | Stop reason: HOSPADM

## 2024-01-01 RX ORDER — TRAZODONE HYDROCHLORIDE 100 MG/1
100 TABLET ORAL NIGHTLY
Status: DISCONTINUED | OUTPATIENT
Start: 2024-01-01 | End: 2024-01-01

## 2024-01-01 RX ORDER — PANTOPRAZOLE SODIUM 40 MG/1
40 TABLET, DELAYED RELEASE ORAL
Status: DISCONTINUED | OUTPATIENT
Start: 2024-01-01 | End: 2024-01-01 | Stop reason: HOSPADM

## 2024-01-01 RX ORDER — CINACALCET 30 MG/1
30 TABLET, FILM COATED ORAL DAILY
Status: DISCONTINUED | OUTPATIENT
Start: 2024-01-01 | End: 2024-01-01 | Stop reason: HOSPADM

## 2024-01-01 RX ORDER — LACTULOSE 10 G/15ML
30 SOLUTION ORAL 2 TIMES DAILY
Status: COMPLETED | OUTPATIENT
Start: 2024-01-01 | End: 2024-01-01

## 2024-01-01 RX ORDER — DIPHENOXYLATE HYDROCHLORIDE AND ATROPINE SULFATE 2.5; .025 MG/1; MG/1
1 TABLET ORAL
Status: DISCONTINUED | OUTPATIENT
Start: 2024-01-01 | End: 2024-01-01 | Stop reason: HOSPADM

## 2024-01-01 RX ORDER — HEPARIN SODIUM (PORCINE) LOCK FLUSH IV SOLN 100 UNIT/ML 100 UNIT/ML
3000 SOLUTION INTRAVENOUS
Status: COMPLETED | OUTPATIENT
Start: 2024-01-01 | End: 2024-01-01

## 2024-01-01 RX ORDER — NICOTINE POLACRILEX 4 MG
15 LOZENGE BUCCAL
Status: DISCONTINUED | OUTPATIENT
Start: 2024-01-01 | End: 2024-01-01 | Stop reason: HOSPADM

## 2024-01-01 RX ORDER — SODIUM CHLORIDE 9 MG/ML
40 INJECTION, SOLUTION INTRAVENOUS AS NEEDED
Status: DISCONTINUED | OUTPATIENT
Start: 2024-01-01 | End: 2024-01-01

## 2024-01-01 RX ORDER — LORAZEPAM 2 MG/ML
1 CONCENTRATE ORAL
Status: DISCONTINUED | OUTPATIENT
Start: 2024-01-01 | End: 2024-01-01 | Stop reason: HOSPADM

## 2024-01-01 RX ORDER — DEXTROSE MONOHYDRATE 25 G/50ML
25 INJECTION, SOLUTION INTRAVENOUS
Status: DISCONTINUED | OUTPATIENT
Start: 2024-01-01 | End: 2024-01-01 | Stop reason: SDUPTHER

## 2024-01-01 RX ORDER — BUMETANIDE 1 MG/1
2 TABLET ORAL DAILY
Status: DISCONTINUED | OUTPATIENT
Start: 2024-01-01 | End: 2024-01-01

## 2024-01-01 RX ORDER — METOLAZONE 5 MG/1
2.5 TABLET ORAL DAILY
Status: DISCONTINUED | OUTPATIENT
Start: 2024-01-01 | End: 2024-01-01

## 2024-01-01 RX ORDER — SODIUM CHLORIDE 0.9 % (FLUSH) 0.9 %
10 SYRINGE (ML) INJECTION AS NEEDED
Status: DISCONTINUED | OUTPATIENT
Start: 2024-01-01 | End: 2024-01-01

## 2024-01-01 RX ORDER — ACETAMINOPHEN 650 MG/1
650 SUPPOSITORY RECTAL EVERY 4 HOURS PRN
Status: DISCONTINUED | OUTPATIENT
Start: 2024-01-01 | End: 2024-01-01 | Stop reason: HOSPADM

## 2024-01-01 RX ORDER — NITROGLYCERIN 0.4 MG/1
0.4 TABLET SUBLINGUAL
Status: DISCONTINUED | OUTPATIENT
Start: 2024-01-01 | End: 2024-01-01

## 2024-01-01 RX ORDER — LORAZEPAM 1 MG/1
1 TABLET ORAL
Status: DISCONTINUED | OUTPATIENT
Start: 2024-01-01 | End: 2024-01-01 | Stop reason: HOSPADM

## 2024-01-01 RX ORDER — AMITRIPTYLINE HYDROCHLORIDE 25 MG/1
25 TABLET, FILM COATED ORAL NIGHTLY
Status: DISCONTINUED | OUTPATIENT
Start: 2024-01-01 | End: 2024-01-01

## 2024-01-01 RX ORDER — LORAZEPAM 2 MG/ML
2 CONCENTRATE ORAL
Status: DISCONTINUED | OUTPATIENT
Start: 2024-01-01 | End: 2024-01-01 | Stop reason: HOSPADM

## 2024-01-01 RX ORDER — ATORVASTATIN CALCIUM 20 MG/1
20 TABLET, FILM COATED ORAL DAILY
Status: DISCONTINUED | OUTPATIENT
Start: 2024-01-01 | End: 2024-01-01 | Stop reason: HOSPADM

## 2024-01-01 RX ORDER — DEXTROSE MONOHYDRATE 25 G/50ML
25 INJECTION, SOLUTION INTRAVENOUS
Status: DISCONTINUED | OUTPATIENT
Start: 2024-01-01 | End: 2024-01-01 | Stop reason: HOSPADM

## 2024-01-01 RX ORDER — LORAZEPAM 2 MG/ML
0.5 CONCENTRATE ORAL
Status: DISCONTINUED | OUTPATIENT
Start: 2024-01-01 | End: 2024-01-01 | Stop reason: HOSPADM

## 2024-01-01 RX ORDER — METOCLOPRAMIDE 5 MG/1
5 TABLET ORAL 4 TIMES DAILY
Status: DISCONTINUED | OUTPATIENT
Start: 2024-01-01 | End: 2024-01-01 | Stop reason: HOSPADM

## 2024-01-01 RX ORDER — AMOXICILLIN 250 MG
2 CAPSULE ORAL 2 TIMES DAILY
Status: DISCONTINUED | OUTPATIENT
Start: 2024-01-01 | End: 2024-01-01

## 2024-01-01 RX ORDER — ISOSORBIDE MONONITRATE 30 MG/1
30 TABLET, EXTENDED RELEASE ORAL DAILY
Status: DISCONTINUED | OUTPATIENT
Start: 2024-01-01 | End: 2024-01-01 | Stop reason: HOSPADM

## 2024-01-01 RX ORDER — ASPIRIN 81 MG/1
81 TABLET ORAL DAILY
Status: DISCONTINUED | OUTPATIENT
Start: 2024-01-01 | End: 2024-01-01 | Stop reason: HOSPADM

## 2024-01-01 RX ORDER — BUMETANIDE 0.25 MG/ML
2 INJECTION INTRAMUSCULAR; INTRAVENOUS DAILY
Status: DISCONTINUED | OUTPATIENT
Start: 2024-01-01 | End: 2024-01-01

## 2024-01-01 RX ORDER — ACETAMINOPHEN 325 MG/1
650 TABLET ORAL EVERY 4 HOURS PRN
Status: DISCONTINUED | OUTPATIENT
Start: 2024-01-01 | End: 2024-01-01

## 2024-01-01 RX ORDER — LEVOTHYROXINE SODIUM 0.03 MG/1
25 TABLET ORAL
Status: DISCONTINUED | OUTPATIENT
Start: 2024-01-01 | End: 2024-01-01

## 2024-01-01 RX ORDER — METOCLOPRAMIDE 5 MG/1
5 TABLET ORAL 4 TIMES DAILY
Status: DISCONTINUED | OUTPATIENT
Start: 2024-01-01 | End: 2024-01-01

## 2024-01-01 RX ORDER — SODIUM CHLORIDE, SODIUM LACTATE, CALCIUM CHLORIDE, MAGNESIUM CHLORIDE AND DEXTROSE 2.5; 538; 448; 18.3; 5.08 G/100ML; MG/100ML; MG/100ML; MG/100ML; MG/100ML
2000 INJECTION, SOLUTION INTRAPERITONEAL
Status: DISCONTINUED | OUTPATIENT
Start: 2024-01-01 | End: 2024-01-01 | Stop reason: HOSPADM

## 2024-01-01 RX ORDER — MIDODRINE HYDROCHLORIDE 5 MG/1
5 TABLET ORAL ONCE
Status: COMPLETED | OUTPATIENT
Start: 2024-01-01 | End: 2024-01-01

## 2024-01-01 RX ORDER — SIMVASTATIN 40 MG
40 TABLET ORAL NIGHTLY
COMMUNITY

## 2024-01-01 RX ORDER — ALBUMIN (HUMAN) 12.5 G/50ML
25 SOLUTION INTRAVENOUS ONCE
Status: COMPLETED | OUTPATIENT
Start: 2024-01-01 | End: 2024-01-01

## 2024-01-01 RX ORDER — SODIUM CHLORIDE 0.9 % (FLUSH) 0.9 %
10 SYRINGE (ML) INJECTION AS NEEDED
Status: DISCONTINUED | OUTPATIENT
Start: 2024-01-01 | End: 2024-01-01 | Stop reason: HOSPADM

## 2024-01-01 RX ORDER — CARBOXYMETHYLCELLULOSE SODIUM 10 MG/ML
1 GEL OPHTHALMIC
Status: DISCONTINUED | OUTPATIENT
Start: 2024-01-01 | End: 2024-01-01 | Stop reason: HOSPADM

## 2024-01-01 RX ORDER — DEXTROSE MONOHYDRATE 100 MG/ML
50 INJECTION, SOLUTION INTRAVENOUS CONTINUOUS
Status: DISCONTINUED | OUTPATIENT
Start: 2024-01-01 | End: 2024-01-01

## 2024-01-01 RX ORDER — ROPINIROLE 1 MG/1
1 TABLET, FILM COATED ORAL ONCE
Status: COMPLETED | OUTPATIENT
Start: 2024-01-01 | End: 2024-01-01

## 2024-01-01 RX ORDER — LIDOCAINE HYDROCHLORIDE 10 MG/ML
INJECTION, SOLUTION EPIDURAL; INFILTRATION; INTRACAUDAL; PERINEURAL
Status: ACTIVE
Start: 2024-01-01 | End: 2024-01-01

## 2024-01-01 RX ORDER — DICYCLOMINE HYDROCHLORIDE 10 MG/1
20 CAPSULE ORAL 4 TIMES DAILY
Status: DISCONTINUED | OUTPATIENT
Start: 2024-01-01 | End: 2024-01-01

## 2024-01-01 RX ORDER — ONDANSETRON 4 MG/1
4 TABLET, ORALLY DISINTEGRATING ORAL EVERY 6 HOURS PRN
Status: DISCONTINUED | OUTPATIENT
Start: 2024-01-01 | End: 2024-01-01

## 2024-01-01 RX ADMIN — MIDODRINE HYDROCHLORIDE 10 MG: 5 TABLET ORAL at 21:16

## 2024-01-01 RX ADMIN — THERA TABS 1 TABLET: TAB at 08:28

## 2024-01-01 RX ADMIN — TRAZODONE HYDROCHLORIDE 100 MG: 100 TABLET ORAL at 00:28

## 2024-01-01 RX ADMIN — MIDODRINE HYDROCHLORIDE 10 MG: 5 TABLET ORAL at 18:13

## 2024-01-01 RX ADMIN — METRONIDAZOLE 500 MG: 500 INJECTION, SOLUTION INTRAVENOUS at 05:34

## 2024-01-01 RX ADMIN — ISOSORBIDE MONONITRATE 30 MG: 30 TABLET, EXTENDED RELEASE ORAL at 08:28

## 2024-01-01 RX ADMIN — CINACALCET HYDROCHLORIDE 30 MG: 30 TABLET, FILM COATED ORAL at 08:28

## 2024-01-01 RX ADMIN — LEVOTHYROXINE SODIUM 25 MCG: 0.05 TABLET ORAL at 05:05

## 2024-01-01 RX ADMIN — DIPHENOXYLATE HYDROCHLORIDE AND ATROPINE SULFATE 1 TABLET: 2.5; .025 TABLET ORAL at 18:58

## 2024-01-01 RX ADMIN — METOCLOPRAMIDE 5 MG: 10 TABLET ORAL at 17:01

## 2024-01-01 RX ADMIN — PANTOPRAZOLE SODIUM 40 MG: 40 INJECTION, POWDER, FOR SOLUTION INTRAVENOUS at 08:28

## 2024-01-01 RX ADMIN — LEVOTHYROXINE SODIUM 25 MCG: 0.05 TABLET ORAL at 05:58

## 2024-01-01 RX ADMIN — ATORVASTATIN CALCIUM 20 MG: 20 TABLET, FILM COATED ORAL at 08:12

## 2024-01-01 RX ADMIN — LORAZEPAM 0.5 MG: 2 INJECTION INTRAMUSCULAR; INTRAVENOUS at 15:03

## 2024-01-01 RX ADMIN — IOPAMIDOL 150 ML: 755 INJECTION, SOLUTION INTRAVENOUS at 10:48

## 2024-01-01 RX ADMIN — ROPINIROLE HYDROCHLORIDE 2 MG: 1 TABLET, FILM COATED ORAL at 20:17

## 2024-01-01 RX ADMIN — PANTOPRAZOLE SODIUM 40 MG: 40 INJECTION, POWDER, FOR SOLUTION INTRAVENOUS at 09:52

## 2024-01-01 RX ADMIN — LACTULOSE 30 G: 20 SOLUTION ORAL at 09:52

## 2024-01-01 RX ADMIN — PANTOPRAZOLE SODIUM 40 MG: 40 INJECTION, POWDER, FOR SOLUTION INTRAVENOUS at 08:49

## 2024-01-01 RX ADMIN — ASPIRIN 81 MG: 81 TABLET, COATED ORAL at 08:11

## 2024-01-01 RX ADMIN — IPRATROPIUM BROMIDE AND ALBUTEROL SULFATE 3 ML: .5; 3 SOLUTION RESPIRATORY (INHALATION) at 10:59

## 2024-01-01 RX ADMIN — SODIUM CHLORIDE 250 ML: 9 INJECTION, SOLUTION INTRAVENOUS at 00:18

## 2024-01-01 RX ADMIN — BUMETANIDE 2 MG: 1 TABLET ORAL at 09:00

## 2024-01-01 RX ADMIN — LACTULOSE 30 G: 20 SOLUTION ORAL at 20:57

## 2024-01-01 RX ADMIN — ISOSORBIDE MONONITRATE 30 MG: 30 TABLET, EXTENDED RELEASE ORAL at 09:01

## 2024-01-01 RX ADMIN — METOCLOPRAMIDE 5 MG: 10 TABLET ORAL at 08:28

## 2024-01-01 RX ADMIN — HYDROCORTISONE SODIUM SUCCINATE 50 MG: 100 INJECTION, POWDER, FOR SOLUTION INTRAMUSCULAR; INTRAVENOUS at 18:15

## 2024-01-01 RX ADMIN — HYDROCORTISONE SODIUM SUCCINATE 50 MG: 100 INJECTION, POWDER, FOR SOLUTION INTRAMUSCULAR; INTRAVENOUS at 05:34

## 2024-01-01 RX ADMIN — TICAGRELOR 90 MG: 90 TABLET ORAL at 00:28

## 2024-01-01 RX ADMIN — ROPINIROLE HYDROCHLORIDE 1 MG: 1 TABLET, FILM COATED ORAL at 21:01

## 2024-01-01 RX ADMIN — METRONIDAZOLE 500 MG: 500 INJECTION, SOLUTION INTRAVENOUS at 13:34

## 2024-01-01 RX ADMIN — DEXTROSE MONOHYDRATE 25 G: 25 INJECTION, SOLUTION INTRAVENOUS at 14:55

## 2024-01-01 RX ADMIN — IPRATROPIUM BROMIDE AND ALBUTEROL SULFATE 3 ML: .5; 3 SOLUTION RESPIRATORY (INHALATION) at 21:37

## 2024-01-01 RX ADMIN — SODIUM BICARBONATE 150 MEQ: 84 INJECTION INTRAVENOUS at 23:01

## 2024-01-01 RX ADMIN — ALBUMIN (HUMAN) 12.5 G: 0.25 INJECTION, SOLUTION INTRAVENOUS at 10:55

## 2024-01-01 RX ADMIN — ALBUMIN (HUMAN) 12.5 G: 0.25 INJECTION, SOLUTION INTRAVENOUS at 10:24

## 2024-01-01 RX ADMIN — LORAZEPAM 0.5 MG: 2 INJECTION INTRAMUSCULAR; INTRAVENOUS at 10:28

## 2024-01-01 RX ADMIN — ALBUMIN (HUMAN) 12.5 G: 0.25 INJECTION, SOLUTION INTRAVENOUS at 11:42

## 2024-01-01 RX ADMIN — SODIUM CHLORIDE 30 ML/HR: 9 INJECTION, SOLUTION INTRAVENOUS at 04:14

## 2024-01-01 RX ADMIN — DICYCLOMINE HYDROCHLORIDE 20 MG: 10 CAPSULE ORAL at 18:13

## 2024-01-01 RX ADMIN — SODIUM BICARBONATE 100 MEQ: 84 INJECTION INTRAVENOUS at 02:39

## 2024-01-01 RX ADMIN — METOPROLOL SUCCINATE 50 MG: 50 TABLET, EXTENDED RELEASE ORAL at 08:11

## 2024-01-01 RX ADMIN — PANTOPRAZOLE SODIUM 40 MG: 40 TABLET, DELAYED RELEASE ORAL at 05:52

## 2024-01-01 RX ADMIN — LEVOTHYROXINE SODIUM 25 MCG: 0.05 TABLET ORAL at 05:52

## 2024-01-01 RX ADMIN — ONDANSETRON 4 MG: 2 INJECTION INTRAMUSCULAR; INTRAVENOUS at 15:32

## 2024-01-01 RX ADMIN — SODIUM CHLORIDE, SODIUM LACTATE, CALCIUM CHLORIDE, MAGNESIUM CHLORIDE AND DEXTROSE 2000 ML: 2.5; 538; 448; 18.3; 5.08 INJECTION, SOLUTION INTRAPERITONEAL at 13:02

## 2024-01-01 RX ADMIN — CINACALCET HYDROCHLORIDE 30 MG: 30 TABLET, FILM COATED ORAL at 09:00

## 2024-01-01 RX ADMIN — NOREPINEPHRINE BITARTRATE 0.18 MCG/KG/MIN: 0.03 INJECTION, SOLUTION INTRAVENOUS at 09:07

## 2024-01-01 RX ADMIN — BUMETANIDE 2 MG: 1 TABLET ORAL at 08:12

## 2024-01-01 RX ADMIN — NOREPINEPHRINE BITARTRATE 0.2 MCG/KG/MIN: 0.03 INJECTION, SOLUTION INTRAVENOUS at 00:44

## 2024-01-01 RX ADMIN — NOREPINEPHRINE BITARTRATE 0.04 MCG/KG/MIN: 0.03 INJECTION, SOLUTION INTRAVENOUS at 02:56

## 2024-01-01 RX ADMIN — CALCIUM GLUCONATE 3000 MG: 98 INJECTION, SOLUTION INTRAVENOUS at 02:03

## 2024-01-01 RX ADMIN — SODIUM CHLORIDE, SODIUM LACTATE, POTASSIUM CHLORIDE, CALCIUM CHLORIDE AND DEXTROSE MONOHYDRATE 75 ML/HR: 5; 600; 310; 30; 20 INJECTION, SOLUTION INTRAVENOUS at 13:35

## 2024-01-01 RX ADMIN — VALSARTAN 40 MG: 80 TABLET, FILM COATED ORAL at 17:55

## 2024-01-01 RX ADMIN — DEXTROSE MONOHYDRATE 50 ML/HR: 100 INJECTION, SOLUTION INTRAVENOUS at 15:27

## 2024-01-01 RX ADMIN — METOCLOPRAMIDE 10 MG: 5 INJECTION, SOLUTION INTRAMUSCULAR; INTRAVENOUS at 23:46

## 2024-01-01 RX ADMIN — PANTOPRAZOLE SODIUM 40 MG: 40 TABLET, DELAYED RELEASE ORAL at 05:58

## 2024-01-01 RX ADMIN — SEVELAMER CARBONATE 800 MG: 800 TABLET, FILM COATED ORAL at 08:28

## 2024-01-01 RX ADMIN — LIDOCAINE HYDROCHLORIDE 3 ML: 10 INJECTION, SOLUTION INFILTRATION; PERINEURAL at 13:11

## 2024-01-01 RX ADMIN — THERA TABS 1 TABLET: TAB at 08:12

## 2024-01-01 RX ADMIN — SODIUM BICARBONATE 50 MEQ: 84 INJECTION INTRAVENOUS at 09:56

## 2024-01-01 RX ADMIN — DEXTROSE MONOHYDRATE 25 G: 25 INJECTION, SOLUTION INTRAVENOUS at 12:20

## 2024-01-01 RX ADMIN — HEPARIN SODIUM 2400 UNITS: 1000 INJECTION, SOLUTION INTRAVENOUS; SUBCUTANEOUS at 13:42

## 2024-01-01 RX ADMIN — NOREPINEPHRINE BITARTRATE 0.1 MCG/KG/MIN: 0.03 INJECTION, SOLUTION INTRAVENOUS at 16:05

## 2024-01-01 RX ADMIN — BUMETANIDE 2 MG: 1 TABLET ORAL at 08:28

## 2024-01-01 RX ADMIN — INSULIN HUMAN 10 UNITS: 100 INJECTION, SOLUTION PARENTERAL at 02:39

## 2024-01-01 RX ADMIN — HYDROMORPHONE HYDROCHLORIDE 2 MG: 1 INJECTION, SOLUTION INTRAMUSCULAR; INTRAVENOUS; SUBCUTANEOUS at 15:03

## 2024-01-01 RX ADMIN — THERA TABS 1 TABLET: TAB at 09:01

## 2024-01-01 RX ADMIN — IPRATROPIUM BROMIDE AND ALBUTEROL SULFATE 3 ML: .5; 3 SOLUTION RESPIRATORY (INHALATION) at 05:25

## 2024-01-01 RX ADMIN — METOCLOPRAMIDE 5 MG: 10 TABLET ORAL at 08:18

## 2024-01-01 RX ADMIN — SEVELAMER CARBONATE 800 MG: 800 TABLET, FILM COATED ORAL at 08:11

## 2024-01-01 RX ADMIN — SEVELAMER CARBONATE 800 MG: 800 TABLET, FILM COATED ORAL at 17:11

## 2024-01-01 RX ADMIN — SEVELAMER CARBONATE 800 MG: 800 TABLET, FILM COATED ORAL at 20:18

## 2024-01-01 RX ADMIN — FUROSEMIDE 40 MG: 10 INJECTION, SOLUTION INTRAMUSCULAR; INTRAVENOUS at 21:46

## 2024-01-01 RX ADMIN — ALBUTEROL SULFATE 2.5 MG: 2.5 SOLUTION RESPIRATORY (INHALATION) at 23:35

## 2024-01-01 RX ADMIN — ROPINIROLE HYDROCHLORIDE 2 MG: 1 TABLET, FILM COATED ORAL at 20:41

## 2024-01-01 RX ADMIN — ALBUMIN HUMAN 25 G: 0.25 SOLUTION INTRAVENOUS at 22:45

## 2024-01-01 RX ADMIN — SODIUM CHLORIDE, POTASSIUM CHLORIDE, SODIUM LACTATE AND CALCIUM CHLORIDE 1000 ML: 600; 310; 30; 20 INJECTION, SOLUTION INTRAVENOUS at 11:09

## 2024-01-01 RX ADMIN — DICYCLOMINE HYDROCHLORIDE 20 MG: 10 CAPSULE ORAL at 20:57

## 2024-01-01 RX ADMIN — SODIUM BICARBONATE 50 MEQ: 84 INJECTION INTRAVENOUS at 01:31

## 2024-01-01 RX ADMIN — METRONIDAZOLE 500 MG: 500 INJECTION, SOLUTION INTRAVENOUS at 21:10

## 2024-01-01 RX ADMIN — AMITRIPTYLINE HYDROCHLORIDE 25 MG: 25 TABLET, FILM COATED ORAL at 20:41

## 2024-01-01 RX ADMIN — METOCLOPRAMIDE 5 MG: 10 TABLET ORAL at 09:01

## 2024-01-01 RX ADMIN — METOCLOPRAMIDE 5 MG: 10 TABLET ORAL at 20:18

## 2024-01-01 RX ADMIN — METOCLOPRAMIDE 5 MG: 10 TABLET ORAL at 00:28

## 2024-01-01 RX ADMIN — METOPROLOL SUCCINATE 50 MG: 50 TABLET, EXTENDED RELEASE ORAL at 08:28

## 2024-01-01 RX ADMIN — HYDROCORTISONE SODIUM SUCCINATE 50 MG: 100 INJECTION, POWDER, FOR SOLUTION INTRAMUSCULAR; INTRAVENOUS at 11:29

## 2024-01-01 RX ADMIN — Medication 10 ML: at 08:49

## 2024-01-01 RX ADMIN — PANTOPRAZOLE SODIUM 40 MG: 40 INJECTION, POWDER, FOR SOLUTION INTRAVENOUS at 17:11

## 2024-01-01 RX ADMIN — ALBUMIN (HUMAN) 12.5 G: 0.25 INJECTION, SOLUTION INTRAVENOUS at 16:39

## 2024-01-01 RX ADMIN — METOLAZONE 2.5 MG: 2.5 TABLET ORAL at 09:00

## 2024-01-01 RX ADMIN — AMITRIPTYLINE HYDROCHLORIDE 25 MG: 25 TABLET, FILM COATED ORAL at 20:18

## 2024-01-01 RX ADMIN — SODIUM CHLORIDE, SODIUM LACTATE, CALCIUM CHLORIDE, MAGNESIUM CHLORIDE AND DEXTROSE 2000 ML: 2.5; 538; 448; 18.3; 5.08 INJECTION, SOLUTION INTRAPERITONEAL at 17:04

## 2024-01-01 RX ADMIN — DEXTROSE MONOHYDRATE 25 G: 25 INJECTION, SOLUTION INTRAVENOUS at 02:41

## 2024-01-01 RX ADMIN — CEFTRIAXONE 1000 MG: 1 INJECTION, POWDER, FOR SOLUTION INTRAMUSCULAR; INTRAVENOUS at 14:54

## 2024-01-01 RX ADMIN — Medication 3000 UNITS: at 19:50

## 2024-01-01 RX ADMIN — METOLAZONE 2.5 MG: 2.5 TABLET ORAL at 08:28

## 2024-01-01 RX ADMIN — SODIUM BICARBONATE 50 MEQ: 84 INJECTION INTRAVENOUS at 18:15

## 2024-01-01 RX ADMIN — TICAGRELOR 90 MG: 90 TABLET ORAL at 08:12

## 2024-01-01 RX ADMIN — ISOSORBIDE MONONITRATE 30 MG: 30 TABLET, EXTENDED RELEASE ORAL at 08:12

## 2024-01-01 RX ADMIN — ONDANSETRON 4 MG: 2 INJECTION INTRAMUSCULAR; INTRAVENOUS at 07:43

## 2024-01-01 RX ADMIN — SEVELAMER CARBONATE 800 MG: 800 TABLET, FILM COATED ORAL at 13:08

## 2024-01-01 RX ADMIN — BUMETANIDE 2 MG: 1 TABLET ORAL at 17:55

## 2024-01-01 RX ADMIN — HYDROMORPHONE HYDROCHLORIDE 2 MG: 1 INJECTION, SOLUTION INTRAMUSCULAR; INTRAVENOUS; SUBCUTANEOUS at 10:30

## 2024-01-01 RX ADMIN — MIDODRINE HYDROCHLORIDE 2.5 MG: 2.5 TABLET ORAL at 17:55

## 2024-01-01 RX ADMIN — Medication 10 ML: at 21:38

## 2024-01-01 RX ADMIN — MIDODRINE HYDROCHLORIDE 5 MG: 5 TABLET ORAL at 22:41

## 2024-01-01 RX ADMIN — HYDROCORTISONE SODIUM SUCCINATE 50 MG: 100 INJECTION, POWDER, FOR SOLUTION INTRAMUSCULAR; INTRAVENOUS at 23:42

## 2024-01-01 RX ADMIN — METOCLOPRAMIDE 5 MG: 10 TABLET ORAL at 13:08

## 2024-01-01 RX ADMIN — BUMETANIDE 2 MG: 1 TABLET ORAL at 17:11

## 2024-01-01 RX ADMIN — PANTOPRAZOLE SODIUM 40 MG: 40 INJECTION, POWDER, FOR SOLUTION INTRAVENOUS at 18:22

## 2024-01-01 RX ADMIN — METOPROLOL SUCCINATE 50 MG: 50 TABLET, EXTENDED RELEASE ORAL at 09:01

## 2024-01-01 RX ADMIN — HYDROMORPHONE HYDROCHLORIDE 0.5 MG: 1 INJECTION, SOLUTION INTRAMUSCULAR; INTRAVENOUS; SUBCUTANEOUS at 11:41

## 2024-01-01 RX ADMIN — PANTOPRAZOLE SODIUM 40 MG: 40 INJECTION, POWDER, FOR SOLUTION INTRAVENOUS at 20:20

## 2024-01-01 RX ADMIN — CINACALCET HYDROCHLORIDE 30 MG: 30 TABLET, FILM COATED ORAL at 08:13

## 2024-01-01 RX ADMIN — SEVELAMER CARBONATE 800 MG: 800 TABLET, FILM COATED ORAL at 17:01

## 2024-01-01 RX ADMIN — ATORVASTATIN CALCIUM 20 MG: 20 TABLET, FILM COATED ORAL at 09:01

## 2024-01-01 RX ADMIN — MIDODRINE HYDROCHLORIDE 10 MG: 5 TABLET ORAL at 11:20

## 2024-01-01 RX ADMIN — ROPINIROLE HYDROCHLORIDE 2 MG: 1 TABLET, FILM COATED ORAL at 00:28

## 2024-01-01 RX ADMIN — SEVELAMER CARBONATE 800 MG: 800 TABLET, FILM COATED ORAL at 09:00

## 2024-01-11 NOTE — PROGRESS NOTES
Chief Complaint  Sleep Apnea    Jolie Peralta presents to [facility] NEUROLOGY  History of Present Illness  Insomnia patient takes trazodone 50 mg 2 tabs hs, he states he doesn't think medication really help,  Patient did not have 02 trend study done. Tucker franks made several attempts to schedule patient. Not on pap    Savage Sleepiness Scale:  Sitting and reading 3 WatchingTV 3  Sitting, inactive, in a public place 2  As a passenger in a car for 1 hour w/o a break  3  Lying down to rest in the afternoon  2  Sitting and talking to someone  2  Sitting quietly after a lunch  2  In a car, while stopped for traffic or a light  2  Total 19  ==============7/2023==============================  Patient states he is not able to sleep.  Sometimes he states he is only able to sleep for about 1 hour.  He states that he has went so long with out sleeping he started hallucinating.     Was doing well until his defibrillator was replaced dec 28th. Due to recall  About two weeks latter his kidneys shut down two weeks latter  Started dialysis in about April of this year.      Diabetes onset age 26     Jolie Peralta presents to [facility] NEUROLOGY  History of Present Illness     The patient c/o daytime sleepiness issues:  excessive daytime sleepiness, chronic fatigue, difficulty driving due to sleepiness.  The patient complains of snoring loud in all sleeping positions.     The patient complains of Leg symptoms: discomfort on a creepy crawly feeling in legs when resting or relaxing and this may partially or completely improved by stretching or moving.   The legs are doing better now on ropinirole, doing ok , occ bothers him during dialysis      His wife reports he snores, and has trouble staying asleep      The patient complains of problems with insomnia:  difficulty falling asleep and staying asleep.  Sleep schedule: Bedtime:11pm , gets out of bed at  "12pm, sleep latency: couple minutes, Gets about 2 hours of sleep.     =====================================================    Review of Systems   Constitutional:  Positive for fatigue.   HENT:  Positive for congestion, mouth sores, nosebleeds and postnasal drip.    Respiratory:  Positive for cough and shortness of breath.    Neurological:  Positive for headaches.   All other systems reviewed and are negative.        Objective   Vital Signs:   /83   Pulse 106   Resp 16   Ht 175.3 cm (69\")   Wt 81.2 kg (179 lb)   BMI 26.43 kg/m²     Physical Exam  Vitals reviewed.   Constitutional:       Appearance: Normal appearance.   HENT:      Nose: Nose normal.   Eyes:      Pupils: Pupils are equal, round, and reactive to light.   Cardiovascular:      Rate and Rhythm: Normal rate.      Pulses: Normal pulses.   Pulmonary:      Effort: Pulmonary effort is normal. No respiratory distress.   Neurological:      Mental Status: He is alert and oriented to person, place, and time.        Result Review :                 Assessment and Plan    Diagnoses and all orders for this visit:    1. JOSUE (obstructive sleep apnea) (Primary)      Possible josue contributing to rls and difficulty maintaining sleep  Will order HST, pt may need in lab test but defer for now due to doing peritoneal dialysis at home      Follow Up   Return for Follow Up visit 30 to 90 days after obtaining PAP.    Patient was given instructions and counseling regarding his condition or for health maintenance advice. Please see specific information pulled into the AVS if appropriate.       This document has been electronically signed by Joseph Seipel, MD on January 15, 2024 16:00 EST  "

## 2024-01-15 ENCOUNTER — OFFICE VISIT (OUTPATIENT)
Dept: NEUROLOGY | Facility: CLINIC | Age: 64
End: 2024-01-15
Payer: COMMERCIAL

## 2024-01-15 VITALS
HEART RATE: 106 BPM | WEIGHT: 179 LBS | DIASTOLIC BLOOD PRESSURE: 83 MMHG | HEIGHT: 69 IN | SYSTOLIC BLOOD PRESSURE: 122 MMHG | RESPIRATION RATE: 16 BRPM | BODY MASS INDEX: 26.51 KG/M2

## 2024-01-15 DIAGNOSIS — G25.81 RESTLESS LEGS SYNDROME (RLS): ICD-10-CM

## 2024-01-15 DIAGNOSIS — G47.33 OSA (OBSTRUCTIVE SLEEP APNEA): Primary | ICD-10-CM

## 2024-01-15 PROBLEM — G62.9 PERIPHERAL NEUROPATHY: Status: ACTIVE | Noted: 2024-01-15

## 2024-01-15 PROCEDURE — 99214 OFFICE O/P EST MOD 30 MIN: CPT | Performed by: PSYCHIATRY & NEUROLOGY

## 2024-01-15 RX ORDER — OMEPRAZOLE 20 MG/1
CAPSULE, DELAYED RELEASE ORAL
COMMUNITY
Start: 2024-01-08

## 2024-01-15 RX ORDER — METOCLOPRAMIDE 10 MG/1
TABLET ORAL
COMMUNITY
Start: 2023-12-29

## 2024-02-02 ENCOUNTER — TELEPHONE (OUTPATIENT)
Dept: ENDOCRINOLOGY | Facility: CLINIC | Age: 64
End: 2024-02-02
Payer: COMMERCIAL

## 2024-02-02 NOTE — TELEPHONE ENCOUNTER
Received Rx renewal for CGM supplies from [employer]. Placed in providers folder and will fax once signed.

## 2024-02-22 DIAGNOSIS — E10.42 TYPE 1 DIABETES MELLITUS WITH DIABETIC POLYNEUROPATHY: Primary | ICD-10-CM

## 2024-02-22 RX ORDER — TICAGRELOR 90 MG/1
90 TABLET ORAL 2 TIMES DAILY
Qty: 180 TABLET | Refills: 0 | Status: SHIPPED | OUTPATIENT
Start: 2024-02-22

## 2024-02-22 RX ORDER — INSULIN PMP CART,AUT,G6/7,CNTR
1 EACH SUBCUTANEOUS
Qty: 30 EACH | Refills: 3 | Status: SHIPPED | OUTPATIENT
Start: 2024-02-22

## 2024-03-04 RX ORDER — TRAZODONE HYDROCHLORIDE 50 MG/1
50-100 TABLET ORAL
Qty: 90 TABLET | Refills: 0 | Status: SHIPPED | OUTPATIENT
Start: 2024-03-04

## 2024-03-12 ENCOUNTER — PATIENT ROUNDING (BHMG ONLY) (OUTPATIENT)
Dept: URGENT CARE | Facility: CLINIC | Age: 64
End: 2024-03-12
Payer: COMMERCIAL

## 2024-03-12 NOTE — ED NOTES
Thank you for letting us care for you in your recent visit to our urgent care center. We would love to hear about your experience with us. Was this the first time you have visited our location?    We’re always looking for ways to make our patients’ experiences even better. Do you have any recommendations on ways we may improve?     I appreciate you taking the time to respond. Please be on the lookout for a survey about your recent visit from [employer] via text or email. We would greatly appreciate if you could fill that out and turn it back in. We want your voice to be heard and we value your feedback.   Thank you for choosing [facility] for your healthcare needs.

## 2024-03-21 ENCOUNTER — TELEPHONE (OUTPATIENT)
Dept: NEUROLOGY | Facility: CLINIC | Age: 64
End: 2024-03-21
Payer: COMMERCIAL

## 2024-03-21 NOTE — TELEPHONE ENCOUNTER
Provider: SEIPEL, JOSEPH, MD    Caller: DOMINIC    Relationship to Patient: SELF    Phone Number: [phone]    Reason for Call: CALLING TO LET PROVIDER KNOW THAT HE HAS NOT HEARD ANYTHING REGARDING THE SLEEP STUDY.        PLEASE CALL & ADVISE

## 2024-04-01 PROBLEM — R06.02 SHORTNESS OF BREATH: Status: ACTIVE | Noted: 2024-01-01

## 2024-04-01 PROBLEM — R79.89 ELEVATED TROPONIN: Status: ACTIVE | Noted: 2024-01-01
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
## 2024-04-01 NOTE — Clinical Note
Level of Care: Telemetry [5]   Admitting Physician: ELENO PRICE [[id number]]   Attending Physician: ELENO PRICE [[id number]]

## 2024-04-02 NOTE — PLAN OF CARE
Goal Outcome Evaluation:      Patient currently resting abed, no distress noted. Patient awake most of the night, stated he just couldn't sleep. Patient frequently moving around in room. Patient currently with O2 at 2L via nasal canula.

## 2024-04-02 NOTE — CONSULTS
Diabetes Education    Patient Name:  Esdras Peralta  YOB: 1960  MRN: [id number]  Admit Date:  4/1/2024    Pt seen due to wearing insulin pump in hospital. Pt wears the Omnipod 5 insulin pump and Dexcom G6 continuous glucose sensor. Pump settings are as follows:    Basal rate:  12 am 1.0  5 pm 0.7    Bolus:  12 am 1:15    Last Bolus given: 4/2/2024 2.5 units    Insulin Sensitivity Factor/Correction Dose:  12 am 1:50    Blood Glucose Target:  12 am 110-150    Insulin On Board:  4 hours    Date of Last Pod change: 4/1/2024    Location of Pod: Right upper outer arm    Comments: Educator discussed insulin pump contract needing to signed and insulin pump log placed at bedside. Pt states he is going to be discharging today. While educator in room, nurse brought in discharge instructions. Contact not completed since pt being discharged.       Electronically signed by:  Pauly Cuba RN  04/02/24 18:45 EDT

## 2024-04-02 NOTE — DISCHARGE SUMMARY
"[facility] Medicine Services  Discharge Summary    Date of Service: 24   Patient Name: Esdras Peralta  : 1960  MRN: [id number]    Date of Admission: 2024  Discharge Diagnosis: Dyspnea  Date of Discharge:  24   Primary Care Physician: Erika Hernandez MD      Presenting Problem:   Shortness of breath [R06.02]  Dyspnea, unspecified type [R06.00]    Active and Resolved Hospital Problems:  Active Hospital Problems    Diagnosis POA    **Shortness of breath [R06.02] Yes    Elevated troponin [R79.89] Yes    Hypothyroidism due to Hashimoto's thyroiditis [E03.8, E06.3] Yes    Acute on chronic systolic CHF (congestive heart failure) [I50.23] Yes    Iron deficiency anemia [D50.9] Yes    Stage 4 chronic kidney disease [N18.4] Yes    Mixed hyperlipidemia [E78.2] Yes    ICD (implantable cardioverter-defibrillator), dual, in situ [Z95.810] Yes    Type 1 diabetes mellitus with diabetic polyneuropathy [E10.42] Yes    Coronary artery disease involving native coronary artery of native heart without angina pectoris [I25.10] Yes      Resolved Hospital Problems   No resolved problems to display.         Hospital Course     HPI:  Per the H&P written by Mariposa Smith, dated 2024:  \"Esdras Peralta is a 63 y.o. male with an extensive CMH of end-stage renal disease on home peritoneal dialysis, ischemic cardiomyopathy systolic heart failure with ejection fraction 20% per echo dated 2023, coronary artery disease, diabetes mellitus type 1, GERD, hyperlipidemia, hypertension, hypothyroidism,  who presented to [facility] on 2024 with complaints of shortness of air.  He reports a nonproductive cough.  He denies any chest pain, congestion or fever.  He has no bilateral lower extremity edema.  He reports he completed dialysis at home last night and this morning.  Respiratory panel today is negative, labs show a troponin of 83, proBNP 58,292, sodium 135 chloride 97 BUN 38 " "creatinine 4.62, which appears to be close to his baseline.  eGFR 13.6 glucose 150, WBC not elevated, hemoglobin 13.0 platelets 137, chest x-ray per radiology showed no acute cardiopulmonary disease.  EKG showed sinus tachycardia 103 some PVCs nonspecific T wave changes.  CT chest without contrast subsequently ordered which showed per radiology \"trace bilateral pleural fluid atheromatous disease of the coronary vessels cardiology consult recommended\".  He was given 40 mg IV Lasix in the ED and both nephrology and cardiology have been consulted.  He is stable on room air. \"    Hospital Course:  Patient was evaluated by Cardiology who deemed eitology of SOA was likely due to volume overload, patient received PD x 2 during admission, with minimal change. Patient evaluated by nephrology, whom cleared patient for discharge.         DISCHARGE Follow Up Recommendations for labs and diagnostics: na      Reasons For Change In Medications and Indications for New Medications:      Day of Discharge     Vital Signs:  Temp:  [97.4 °F (36.3 °C)-98 °F (36.7 °C)] 98 °F (36.7 °C)  Heart Rate:  [] 78  Resp:  [19-20] 20  BP: (111-135)/(76-93) 111/81  Flow (L/min):  [2-3] 3    Physical Exam:  Physical Exam   PHYSICAL EXAM  Constitutional:  Well developed, well nourished, no acute distress, non-toxic appearance   Eyes:  PERRL, conjunctiva normal, EOMI   HENT:  Atraumatic, external ears normal, nose normal, oropharynx moist, no pharyngeal exudates. Neck-normal range of motion, no tenderness, supple, trachea midline  Respiratory:  Diminished, but CTAB, non-labored respirations without accessory muscle use  Cardiovascular:  Normal rate, normal rhythm, no murmurs, no gallops, no rubs   GI:  Soft, nondistended, normal bowel sounds, nontender, no organomegaly, no mass, no rebound, no guarding   :  No costovertebral angle tenderness   Musculoskeletal:  No edema, no tenderness, no deformities  Integument:  Well hydrated, no rash "   Lymphatic:  No lymphadenopathy noted   Neurologic:  Alert & oriented x 3, CN 2-12 normal, normal motor function, normal sensory function, no focal deficits noted   Psychiatric:  Speech and behavior appropriate      Pertinent  and/or Most Recent Results     LAB RESULTS:      Lab 04/01/24 1917   WBC 5.53   HEMOGLOBIN 13.0   HEMATOCRIT 39.7   PLATELETS 137*   NEUTROS ABS 4.07   IMMATURE GRANS (ABS) 0.03   LYMPHS ABS 0.76   MONOS ABS 0.49   EOS ABS 0.13   .4*         Lab 04/02/24  1343 04/01/24 2217 04/01/24 1917   SODIUM 131* 135* 135*   POTASSIUM 4.1 3.8 3.7   CHLORIDE 92* 96* 97*   CO2 26.0 27.0 24.0   ANION GAP 13.0 12.0 14.0   BUN 45* 40* 38*   CREATININE 5.19* 4.94* 4.62*   EGFR 11.9* 12.6* 13.6*   GLUCOSE 305* 83 150*   CALCIUM 9.3 9.7 9.2   TSH  --  4.880*  --          Lab 04/02/24  1343 04/01/24 1917   TOTAL PROTEIN 5.9* 6.0   ALBUMIN 3.2* 3.1*   GLOBULIN 2.7 2.9   ALT (SGPT) 17 15   AST (SGOT) 24 24   BILIRUBIN 0.6 0.5   ALK PHOS 98 93         Lab 04/01/24 2217 04/01/24 1917   PROBNP  --  58,292.0*   HSTROP T 80* 83*                 Brief Urine Lab Results  (Last result in the past 365 days)        Color   Clarity   Blood   Leuk Est   Nitrite   Protein   CREAT   Urine HCG        10/16/23 2349 Yellow   Clear   Trace   Negative   Negative   >=300 mg/dL (3+)                 Microbiology Results (last 10 days)       Procedure Component Value - Date/Time    Respiratory Panel PCR w/COVID-19(SARS-CoV-2) HAI/POP/CLARA/PAD/COR/ANNY In-House, NP Swab in UTM/VTM, 2 HR TAT - Swab, Nasopharynx [[id number]]  (Normal) Collected: 04/01/24 1919    Lab Status: Final result Specimen: Swab from Nasopharynx Updated: 04/01/24 2027     ADENOVIRUS, PCR Not Detected     Coronavirus 229E Not Detected     Coronavirus HKU1 Not Detected     Coronavirus NL63 Not Detected     Coronavirus OC43 Not Detected     COVID19 Not Detected     Human Metapneumovirus Not Detected     Human Rhinovirus/Enterovirus Not Detected     Influenza A  PCR Not Detected     Influenza B PCR Not Detected     Parainfluenza Virus 1 Not Detected     Parainfluenza Virus 2 Not Detected     Parainfluenza Virus 3 Not Detected     Parainfluenza Virus 4 Not Detected     RSV, PCR Not Detected     Bordetella pertussis pcr Not Detected     Bordetella parapertussis PCR Not Detected     Chlamydophila pneumoniae PCR Not Detected     Mycoplasma pneumo by PCR Not Detected    Narrative:      In the setting of a positive respiratory panel with a viral infection PLUS a negative procalcitonin without other underlying concern for bacterial infection, consider observing off antibiotics or discontinuation of antibiotics and continue supportive care. If the respiratory panel is positive for atypical bacterial infection (Bordetella pertussis, Chlamydophila pneumoniae, or Mycoplasma pneumoniae), consider antibiotic de-escalation to target atypical bacterial infection.            CT Chest Without Contrast Diagnostic    Result Date: 4/1/2024  Impression: Trace bilateral pleural fluid. Atheromatous disease of the coronary vessels. Cardiology consult recommended. Electronically Signed: Cyrus Stoner MD  4/1/2024 10:51 PM EDT  Workstation ID: [id number]    XR Chest 1 View    Result Date: 4/1/2024  Impression: Impression: No acute cardiopulmonary disease. Electronically Signed: Cyrus Stoner MD  4/1/2024 7:51 PM EDT  Workstation ID: [id number]     Results for orders placed during the hospital encounter of 06/09/21    Doppler Arterial Multi Level Lower Extremity - Bilateral CAR    Interpretation Summary  · Right Conclusion: The right LEONCIO is normal. Normal digital pressures.  · Left Conclusion: The left LEONCIO is moderately reduced. Waveforms are consistent with femoral disease and popliteal disease. Moderate digital ischemia.      Results for orders placed during the hospital encounter of 06/09/21    Doppler Arterial Multi Level Lower Extremity - Bilateral CAR    Interpretation Summary  · Right Conclusion:  The right LEONCIO is normal. Normal digital pressures.  · Left Conclusion: The left LEONCIO is moderately reduced. Waveforms are consistent with femoral disease and popliteal disease. Moderate digital ischemia.      Results for orders placed during the hospital encounter of 04/16/23    Adult Transthoracic Echo Complete W/ Cont if Necessary Per Protocol    Interpretation Summary    Left ventricular systolic function is severely decreased. Left ventricular ejection fraction appears to be less than 20%.    The left ventricular cavity is moderate to severely dilated.    The right ventricular cavity is moderately dilated.    Estimated right ventricular systolic pressure from tricuspid regurgitation is markedly elevated (>55 mmHg). Calculated right ventricular systolic pressure from tricuspid regurgitation is 58 mmHg.      Labs Pending at Discharge:      Procedures Performed           Consults:   Consults       Date and Time Order Name Status Description    4/1/2024  9:13 PM Inpatient Cardiology Consult Completed     4/1/2024  9:13 PM Inpatient Nephrology Consult Completed               Discharge Details        Discharge Medications        Continue These Medications        Instructions Start Date   albuterol sulfate  (90 Base) MCG/ACT inhaler  Commonly known as: PROVENTIL HFA;VENTOLIN HFA;PROAIR HFA   2 puffs, Inhalation, Every 4 Hours PRN      aspirin 81 MG EC tablet   81 mg, Oral, Daily      Brilinta 90 MG tablet tablet  Generic drug: ticagrelor   90 mg, Oral, 2 Times Daily      bumetanide 2 MG tablet  Commonly known as: BUMEX   2 mg, Oral, 2 Times Daily      Daily-Vitamin tablet tablet  Generic drug: multivitamin   1 tablet, Oral, Daily      fish oil 1000 MG capsule capsule   1 capsule, Oral, Every 24 Hours      Insulin Lispro 100 UNIT/ML injection  Commonly known as: humaLOG   Use with insulin pump- max dose 60 units daily. DX E10.42      insulin patient supplied pump   Subcutaneous, Continuous, Omnipod Insulin Lispro  Pump is currently on Max daily dose of 60 units      isosorbide mononitrate 30 MG 24 hr tablet  Commonly known as: IMDUR   30 mg, Oral, Daily      levothyroxine 25 MCG tablet  Commonly known as: SYNTHROID, LEVOTHROID   25 mcg, Oral, Daily      metoclopramide 10 MG tablet  Commonly known as: REGLAN   Take 1 tablet by mouth 4 (Four) Times a Day.      metOLazone 2.5 MG tablet  Commonly known as: ZAROXOLYN   2.5 mg, Oral, Daily      metoprolol succinate XL 50 MG 24 hr tablet  Commonly known as: TOPROL-XL   50 mg, Oral, Daily      omeprazole 20 MG capsule  Commonly known as: priLOSEC   20 mg, Oral, Daily      Omnipod 5 G6 Pods (Gen 5) misc   1 each, Does not apply, Every 3 Days, DX: E10.42      potassium chloride 20 MEQ CR tablet  Commonly known as: KLOR-CON M20   20 mEq, Oral, Daily      rOPINIRole 2 MG tablet  Commonly known as: REQUIP   2 mg, Oral, Nightly      Sensipar 30 MG tablet  Generic drug: cinacalcet   30 mg, Oral, Daily      sevelamer 800 MG tablet  Commonly known as: RENAGEL   800 mg, Oral, 3 Times Daily With Meals      simvastatin 40 MG tablet  Commonly known as: ZOCOR   40 mg, Oral, Nightly      traZODone 50 MG tablet  Commonly known as: DESYREL    mg, Oral, Every Night at Bedtime               Allergies   Allergen Reactions    Codeine Hives         Discharge Disposition: home  Home or Self Care    Diet:  Hospital:  Diet Order   Procedures    Diet: Cardiac, Diabetic; Healthy Heart (2-3 Na+); Consistent Carbohydrate; Fluid Consistency: Thin (IDDSI 0)         Discharge Activity:         CODE STATUS:  Code Status and Medical Interventions:   Ordered at: 04/01/24 2119     Code Status (Patient has no pulse and is not breathing):    CPR (Attempt to Resuscitate)     Medical Interventions (Patient has pulse or is breathing):    Full Support         Future Appointments   Date Time Provider Department Center   6/12/2024  2:45 PM Rachid Sheriff MD [facility]   6/19/2024 12:30 PM Shirley Guido MD  [facility]           Time spent on Discharge including face to face service:  >30 minutes    Signature: Electronically signed by CATERINA Rodriguez, 04/02/24, 18:27 EDT.  [facility]
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
## 2024-04-02 NOTE — CASE MANAGEMENT/SOCIAL WORK
Discharge Planning Assessment   [facility]     Patient Name: Esdras Peralta  MRN: [id number]  Today's Date: 4/2/2024    Admit Date: 4/1/2024    Plan: Home. Outpatient PD nightly   Discharge Needs Assessment       Row Name 04/02/24 1236       Living Environment    People in Home spouse    Name(s) of People in Home Mi    Current Living Arrangements home    Potentially Unsafe Housing Conditions none    In the past 12 months has the electric, gas, oil, or water company threatened to shut off services in your home? No    Primary Care Provided by self    Provides Primary Care For no one    Family Caregiver if Needed spouse    Family Caregiver Names Mi    Quality of Family Relationships supportive;helpful    Able to Return to Prior Arrangements yes       Resource/Environmental Concerns    Resource/Environmental Concerns none    Transportation Concerns none       Transportation Needs    In the past 12 months, has lack of transportation kept you from medical appointments or from getting medications? no    In the past 12 months, has lack of transportation kept you from meetings, work, or from getting things needed for daily living? No       Food Insecurity    Within the past 12 months, you worried that your food would run out before you got the money to buy more. Never true    Within the past 12 months, the food you bought just didn't last and you didn't have money to get more. Never true       Transition Planning    Patient/Family Anticipates Transition to home with family    Patient/Family Anticipated Services at Transition none    Transportation Anticipated family or friend will provide       Discharge Needs Assessment    Current Outpatient/Agency/Support Group outpatient peritoneal dialysis    Equipment Currently Used at Home none    Concerns to be Addressed denies needs/concerns at this time    Anticipated Changes Related to Illness none    Equipment Needed After Discharge none    Outpatient/Agency/Support  Group Needs outpatient peritoneal dialysis    Current Discharge Risk chronically ill                   Discharge Plan       Row Name 04/02/24 1238       Plan    Plan Home. Outpatient PD nightly    Patient/Family in Agreement with Plan yes    Plan Comments Pt reports he lives with spouse and is IADLs. He does home peritoneal dialysis nightly for 8 hours. Agency is through [employer] He confirms pcp and pharmacy and denies difficulty obtaining food/medication/transportation. He intends to return home at dc and denies dc needs. Spouse at bedside confirms she will provide transportation at dc. DC Barriers: Cardiologist consult                  Continued Care and Services - Admitted Since 4/1/2024    No active coordination exists for this encounter.          Demographic Summary       Row Name 04/02/24 1236       General Information    Admission Type observation    Arrived From home    Referral Source admission list    Reason for Consult discharge planning    Preferred Language English       Contact Information    Permission Granted to Share Info With ;family/designee                   Functional Status       Row Name 04/02/24 1236       Functional Status    Usual Activity Tolerance good    Current Activity Tolerance good       Functional Status, IADL    Medications independent    Meal Preparation independent    Housekeeping independent    Laundry independent    Shopping independent                Mandy Unger RN, [facility]  Office: [phone]  Fax: [phone]  [email or web address]      I met with patient in room wearing PPE: mask and glasses     Maintained distance greater than six feet and spent </=15 minutes in the room      Mandy Unger RN

## 2024-04-02 NOTE — PROGRESS NOTES
[facility] MEDICINE SERVICE  DAILY PROGRESS NOTE    NAME: Esdras Peralta  : 1960  MRN: [id number]      LOS: 0 days     PROVIDER OF SERVICE: CATERINA Rodriguez    Chief Complaint: Shortness of breath    Subjective:     Interval History:  History taken from: patient chart  Patient is anxious, continues with cough and shortness of breath.  However denies pain at this time.  Review of Systems:   Please see above, review of systems otherwise negative     Objective:     Vital Signs  Temp:  [97.4 °F (36.3 °C)-97.8 °F (36.6 °C)] 97.4 °F (36.3 °C)  Heart Rate:  [] 80  Resp:  [18-20] 19  BP: (118-136)/(76-93) 118/76  Flow (L/min):  [2] 2   Body mass index is 27.62 kg/m².    Physical Exam  Physical Exam  Vitals reviewed.   Constitutional:       Appearance: Normal appearance.   HENT:      Head: Normocephalic and atraumatic.      Right Ear: External ear normal.      Left Ear: External ear normal.      Nose: Nose normal.      Mouth/Throat:      Mouth: Mucous membranes are moist.   Eyes:      Extraocular Movements: Extraocular movements intact.   Cardiovascular:      Rate and Rhythm: Normal rate and regular rhythm.      Pulses: Normal pulses.      Heart sounds: Normal heart sounds.   Pulmonary:      Effort: Pulmonary effort is normal.      Breath sounds: Normal breath sounds.   Abdominal:      Palpations: Abdomen is soft.   Genitourinary:     Comments: deferred  Musculoskeletal:         General: Normal range of motion.      Cervical back: Normal range of motion and neck supple.   Skin:     General: Skin is warm and dry.   Neurological:      General: No focal deficit present.      Mental Status: He is alert and oriented to person, place, and time.   Psychiatric:         Mood and Affect: Mood normal.         Behavior: Behavior normal.         Thought Content: Thought content normal.         Judgment: Judgment normal.     Scheduled Meds   aspirin, 81 mg, Oral, Daily  atorvastatin, 20 mg, Oral,  Daily  bumetanide, 2 mg, Oral, Daily  cinacalcet, 30 mg, Oral, Daily  isosorbide mononitrate, 30 mg, Oral, Daily  levothyroxine, 25 mcg, Oral, Q AM  metoclopramide, 5 mg, Oral, 4x Daily  metoprolol succinate XL, 50 mg, Oral, Daily  multivitamin, 1 tablet, Oral, Daily  pantoprazole, 40 mg, Oral, Q AM  rOPINIRole, 2 mg, Oral, Nightly  sevelamer, 800 mg, Oral, TID With Meals  ticagrelor, 90 mg, Oral, BID  traZODone, 100 mg, Oral, Nightly  UltraBag/Dianeal/2.5% Dextrose low-gualberto (goldsmith #[id number]), 2,000 mL, Intraperitoneal, 4 Exchanges Daily - Dwell Overnight       PRN Meds     albuterol sulfate HFA    dextrose    dextrose    glucagon (human recombinant)    [COMPLETED] Insert Peripheral IV **AND** sodium chloride   Infusions  insulin,           Diagnostic Data    Results from last 7 days   Lab Units 04/01/24 2217 04/01/24  1917   WBC 10*3/mm3  --  5.53   HEMOGLOBIN g/dL  --  13.0   HEMATOCRIT %  --  39.7   PLATELETS 10*3/mm3  --  137*   GLUCOSE mg/dL 83 150*   CREATININE mg/dL 4.94* 4.62*   BUN mg/dL 40* 38*   SODIUM mmol/L 135* 135*   POTASSIUM mmol/L 3.8 3.7   AST (SGOT) U/L  --  24   ALT (SGPT) U/L  --  15   ALK PHOS U/L  --  93   BILIRUBIN mg/dL  --  0.5   ANION GAP mmol/L 12.0 14.0       CT Chest Without Contrast Diagnostic    Result Date: 4/1/2024  Trace bilateral pleural fluid. Atheromatous disease of the coronary vessels. Cardiology consult recommended. Electronically Signed: Cyrus Stoner MD  4/1/2024 10:51 PM EDT  Workstation ID: [id number]    XR Chest 1 View    Result Date: 4/1/2024  Impression: No acute cardiopulmonary disease. Electronically Signed: Cyrus Stoner MD  4/1/2024 7:51 PM EDT  Workstation ID: [id number]       I reviewed the patient's new clinical results.  I reviewed the patient's new imaging results and agree with the interpretation.    Assessment/Plan:     Active and Resolved Problems  Active Hospital Problems    Diagnosis  POA    **Shortness of breath [R06.02]  Yes    Elevated troponin [R79.89]  Yes     Hypothyroidism due to Hashimoto's thyroiditis [E03.8, E06.3]  Yes    Acute on chronic systolic CHF (congestive heart failure) [I50.23]  Yes    Iron deficiency anemia [D50.9]  Yes    Stage 4 chronic kidney disease [N18.4]  Yes    Mixed hyperlipidemia [E78.2]  Yes    ICD (implantable cardioverter-defibrillator), dual, in situ [Z95.810]  Yes    Type 1 diabetes mellitus with diabetic polyneuropathy [E10.42]  Yes    Coronary artery disease involving native coronary artery of native heart without angina pectoris [I25.10]  Yes      Resolved Hospital Problems   No resolved problems to display.       Shortness of breath  - Likely secondary to systolic heart failure  - IV Lasix given x 1  - Hold Bumex  - Albuterol every 4 as needed shortness of breath  - Cardiology consulted    CHF  - proBNP 58,000  - Most recent on 4/18/2023 echo showed 20% EF  - Troponin 83, symptomatic  - Consider cardiorenal syndrome  - Continue cardiac monitoring  - AM labs  - Cardiology consulted  - ICD dual, in place, placed 2019.  Last checked in office 12, 2023    SAURABH on CKD  - Does home peritoneal dialysis  - Last PD 4/1/2021  - BUN on admission 40  -Most recent creatinine 4.94.  - Monitor labs  - Nephrology is consulted, okay for patient to discharge from the standpoint to resume PD.    DM1  -Controlled  - On home insulin pump  - Carb healthy diet  - Accu-Cheks before meals and at bedtime    Hypothyroidism  - Continue levothyroxine    Hyperlipidemia  - Continue home regimen    Patient has been evaluated by nephrology from a renal standpoint plan is for patient to continue with PD while here, however concern is lack of resources.  If unable to obtain the necessary resources for PD, patient will have to have a temporary dialysis catheter placed into HD.  If patient is evaluated and cleared by cardiology, patient is stable enough to go home, and continue PD at home.    DVT prophylaxis:  Mechanical DVT prophylaxis orders are present.         Code  status is   Code Status and Medical Interventions:   Ordered at: 04/01/24 2119     Code Status (Patient has no pulse and is not breathing):    CPR (Attempt to Resuscitate)     Medical Interventions (Patient has pulse or is breathing):    Full Support       Plan for disposition: Pending clinical process, and cardiology evaluation.    Time: 30 minutes    Signature: Electronically signed by CATERINA Rodriguez, 04/02/24, 12:42 EDT.  [facility] Hospitalist Team

## 2024-04-02 NOTE — CONSULTS
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 deficiency anemia    Acute on chronic systolic CHF (congestive heart failure)    Hypothyroidism due to Hashimoto's thyroiditis    Elevated troponin      Patient is seen and examined and findings are verified.  All data is reviewed by me personally.  Assessment and plan formulated by APC was done after discussion with attending.  I spent more than 50% of time in taking care of the patient.    MDM:    1.  Shortness of breath:    Etiology of his shortness of breath is multifactorial.  I suspect patient may be slightly volume overload but he does not have significant leg edema.  JVD is very minimally elevated.  I would recommend to increase Bumex to 2 mg twice daily.  However I believe that his shortness of breath is most likely due to peritoneal dialysis.  He is retaining a little lot of fluid within the peritoneal cavity and which is causing his diaphragm to postop causing him short of breath.  I would leave the decision to nephrology team.    2.  Ischemic cardiomyopathy:    I would start valsartan 40 mg daily.  I would also add midodrine for low blood pressure    3.  CAD/coronary artery stenting:    Patient currently is not having any symptom of  chest pain.  I would consider ischemic evaluation in future patient may need cardiac catheterization    4.  Chronic systolic congestive heart failure:    I would increase Bumex to 2 mg twice daily.    5.  Renal failure:    Patient has end-stage renal disease and is on peritoneal dialysis.    6.  Chronic hypotension:    I would start midodrine 2.5 mg every 8.    Electronically signed by Rachid Sheriff MD, 04/02/24, 5:32 PM EDT.      Rachid Sheriff MD  04/02/24  17:32 EDT
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
## 2024-04-02 NOTE — PLAN OF CARE
Problem: Cardiac Output Decreased (Heart Failure)  Goal: Optimal Cardiac Output  4/2/2024 1727 by Homer Magdaleno, RN  Outcome: Ongoing, Progressing  4/2/2024 1727 by Homer Magdaleno, KIP  Outcome: Ongoing, Progressing  Intervention: Optimize Cardiac Output  Recent Flowsheet Documentation  Taken 4/2/2024 1430 by Homer Magdaleno, RN  Environmental Support:   comfort object encouraged   distractions minimized     Goal Outcome Evaluation:  Plan of Care Reviewed With: patient        Progress: improving

## 2024-04-02 NOTE — Clinical Note
Level of Care: Med/Surg [1]   Diagnosis: Nausea and vomiting [124078]   Admitting Physician: ELENO PRICE [[id number]]   Attending Physician: ELENO PRICE [[id number]]   Bed Request Comments: cardiac monitor

## 2024-04-02 NOTE — PLAN OF CARE
Goal Outcome Evaluation:  Plan of Care Reviewed With: patient        Progress: improving     Patient is for discharge tonight

## 2024-04-03 ENCOUNTER — INPATIENT HOSPITAL (AMBULATORY)
Dept: URBAN - METROPOLITAN AREA HOSPITAL 84 | Facility: HOSPITAL | Age: 64
End: 2024-04-03
Payer: COMMERCIAL

## 2024-04-03 DIAGNOSIS — K82.9 DISEASE OF GALLBLADDER, UNSPECIFIED: ICD-10-CM

## 2024-04-03 DIAGNOSIS — R74.01 ELEVATION OF LEVELS OF LIVER TRANSAMINASE LEVELS: ICD-10-CM

## 2024-04-03 DIAGNOSIS — R93.3 ABNORMAL FINDINGS ON DIAGNOSTIC IMAGING OF OTHER PARTS OF DI: ICD-10-CM

## 2024-04-03 DIAGNOSIS — R93.2 ABNORMAL FINDINGS ON DIAGNOSTIC IMAGING OF LIVER AND BILIARY: ICD-10-CM

## 2024-04-03 DIAGNOSIS — E11.22 TYPE 2 DIABETES MELLITUS WITH DIABETIC CHRONIC KIDNEY DISEAS: ICD-10-CM

## 2024-04-03 DIAGNOSIS — R79.89 OTHER SPECIFIED ABNORMAL FINDINGS OF BLOOD CHEMISTRY: ICD-10-CM

## 2024-04-03 DIAGNOSIS — Z99.2 DEPENDENCE ON RENAL DIALYSIS: ICD-10-CM

## 2024-04-03 DIAGNOSIS — R10.10 UPPER ABDOMINAL PAIN, UNSPECIFIED: ICD-10-CM

## 2024-04-03 DIAGNOSIS — R11.2 NAUSEA WITH VOMITING, UNSPECIFIED: ICD-10-CM

## 2024-04-03 DIAGNOSIS — D69.6 THROMBOCYTOPENIA, UNSPECIFIED: ICD-10-CM

## 2024-04-03 DIAGNOSIS — N18.6 END STAGE RENAL DISEASE: ICD-10-CM

## 2024-04-03 PROBLEM — R93.5 ABNORMAL CT OF THE ABDOMEN: Status: ACTIVE | Noted: 2024-01-01

## 2024-04-03 PROBLEM — E87.1 HYPONATREMIA: Status: ACTIVE | Noted: 2024-01-01

## 2024-04-03 PROCEDURE — 99223 1ST HOSP IP/OBS HIGH 75: CPT | Performed by: NURSE PRACTITIONER
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
## 2024-04-03 NOTE — CASE MANAGEMENT/SOCIAL WORK
Case Management Discharge Note      Final Note: Routine home         Selected Continued Care - Discharged on 4/2/2024 Admission date: 4/1/2024 - Discharge disposition: Home or Self Care             Transportation Services  Private: Car    Final Discharge Disposition Code: 01 - home or self-care

## 2024-04-03 NOTE — CONSULTS
"Diabetes Education  Assessment/Teaching    Patient Name:  Esdras Peralta  YOB: 1960  MRN: [id number]  Admit Date:  4/2/2024      Assessment Date:  4/3/2024  Flowsheet Row Most Recent Value   General Information     Height 175.3 cm (69\")   Height Method Stated   Weight 84.7 kg (186 lb 11.7 oz)   Weight Method Bed scale   Pregnancy Assessment    Diabetes History    Education Preferences    Nutrition Information    Assessment Topics    DM Goals                   Other Comments:  MD consult for insulin pump.  Insulin Pump Contract completed and in chart: Yes  Insulin Pump Log at bedside:  Yes  LDA started: Yes  Patient Supplied Insulin Pump (orders initiated): Yes  Educator Consult entered: Yes    Pump Brand/Model: Omnipod 5  CGMS Brand/Model: Dexcom G6    Type of Insulin Used: Lispro    Basal Rate: (units/hour) 12am - 1 U/H                                                   5pm - 0.7 U/H    Bolus Rate: (insulin:carbohydrate ratio) 1:15                                                                   Insulin Sensitivity Factor/Correction Dose:    1:50    Blood Glucose Target: 110    Active Insulin: 4 hours    Date of Last Site Change: 4/1/2024    Location of Catheter: left upper arm     Site Assessment: clean, dry, intact    Educator assessed:       pump supplies at bedside: Yes       Insulin Expiration Date: 11/26/2025    Patient currently in dialysis. Patient has patient in suspend mood stating he will drop too low if he keeps basal rate during dialysis. Explained to patient that staff would still need to check his blood sugar per hospital policy. Patient has no further questions or concerns related to diabetes at this time.              Electronically signed by:  Mae Sidhu RN  04/03/24 18:48 EDT    "

## 2024-04-03 NOTE — OUTREACH NOTE
Prep Survey      Flowsheet Row Responses   [facility] facility patient discharged from? [facility]   Is LACE score < 7 ? No   Eligibility Readm Mgmt   Discharge diagnosis shortness of breath   Does the patient have one of the following disease processes/diagnoses(primary or secondary)? Other   Does the patient have Home health ordered? No   Is there a DME ordered? No   Prep survey completed? Yes            NELSON OLIVAS - Registered Nurse

## 2024-04-03 NOTE — TELEPHONE ENCOUNTER
"Reason for Disposition   SEVERE difficulty breathing (e.g., struggling for each breath, speaks in single words)    Answer Assessment - Initial Assessment Questions  1. RESPIRATORY STATUS: \"Describe your breathing?\" (e.g., wheezing, shortness of breath, unable to speak, severe coughing)       Struggling to breathe, cannot lie down  2. ONSET: \"When did this breathing problem begin?\"       Days ago, worse tonight  3. PATTERN \"Does the difficult breathing come and go, or has it been constant since it started?\"       constant  4. SEVERITY: \"How bad is your breathing?\" (e.g., mild, moderate, severe)     - MILD: No SOB at rest, mild SOB with walking, speaks normally in sentences, can lie down, no retractions, pulse < 100.     - MODERATE: SOB at rest, SOB with minimal exertion and prefers to sit, cannot lie down flat, speaks in phrases, mild retractions, audible wheezing, pulse 100-120.     - SEVERE: Very SOB at rest, speaks in single words, struggling to breathe, sitting hunched forward, retractions, pulse > 120       severe  5. RECURRENT SYMPTOM: \"Have you had difficulty breathing before?\" If Yes, ask: \"When was the last time?\" and \"What happened that time?\"       Discharged from the hospital today for the same reason  6. CARDIAC HISTORY: \"Do you have any history of heart disease?\" (e.g., heart attack, angina, bypass surgery, angioplasty)       Yes and he is a dialysis patient  7. LUNG HISTORY: \"Do you have any history of lung disease?\"  (e.g., pulmonary embolus, asthma, emphysema)      yes  8. CAUSE: \"What do you think is causing the breathing problem?\"       Unknown, possibly dialysis complications  9. OTHER SYMPTOMS: \"Do you have any other symptoms? (e.g., dizziness, runny nose, cough, chest pain, fever)      Legs aching  10. O2 SATURATION MONITOR:  \"Do you use an oxygen saturation monitor (pulse oximeter) at home?\" If Yes, ask: \"What is your reading (oxygen level) today?\" \"What is your usual oxygen saturation " "reading?\" (e.g., 95%)        unknown  11. PREGNANCY: \"Is there any chance you are pregnant?\" \"When was your last menstrual period?\"        na  12. TRAVEL: \"Have you traveled out of the country in the last month?\" (e.g., travel history, exposures)        na    Protocols used: Breathing Difficulty-ADULT-AH    "

## 2024-04-03 NOTE — ED NOTES
Pt sitting on side of cot, coughing and retching, minimal fluid noted in emesis bag. Cool cloth given.
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
## 2024-04-03 NOTE — PLAN OF CARE
"Goal Outcome Evaluation:  Plan of Care Reviewed With: patient           Outcome Evaluation: Pt is a 63 y.o. male with a PMH of end-stage renal disease on home peritoneal dialysis, ischemic cardiomyopathy systolic heart failure with ejection fraction 20%, CAD, DM type 1 on insulin pump, HLD, HTN, hypothyroidism, anxiety who presented to [facility] on 4/2/2024 with complaints of nausea vomiting diarrhea and increased shortness of breath. He was just d/c from this facility 4/1/24 at 7:30 PM and returned to ED 3 hours later. He was admitted for SOA and non-productive cough. Per cardiology note, etiology of shortness of breath was considered multifactorial may be slightly volume overload but was felt it was likely due to peritoneal dialysis and deferred to nephrology team. Nephrology ocleared him. CT abdomen pelvis showed \"a small amount of free air present within the upper abdomen as compared to the previous CT from 4/1/2024 likely related to perforated viscus likely from the stomach given the predominantly upper abdominal location of air. PLOF is indep with ADLs, mobility w/o AD, driving, and work. Pt lives in [facility] [address] [facility], with wife and adult daughter. Pt currently having 8/10 generalized pain, aches and fatigue. Pt requires SBA for transfers and CGA w/o AD for ambulation. Pt had to take one rest break during walk due to fatigue and decreased activity tolerance. Pt states he is feeling short of air with SPO2 at 100%. Pt is hypotensive, BP 94/60 at rest, 99/57 in sitting, 88/52 in standing, and 92/40 after activity. No lightheadedness or dizziness reported. Due to pt presenting below baseline, recommending  PT upon d/c. PT will follow.      Anticipated Discharge Disposition (PT): home with home health                        "

## 2024-04-03 NOTE — PLAN OF CARE
Problem: Adult Inpatient Plan of Care  Goal: Plan of Care Review  Outcome: Ongoing, Progressing  Flowsheets (Taken 4/3/2024 1043)  Progress: no change  Plan of Care Reviewed With: patient  Goal: Patient-Specific Goal (Individualized)  Outcome: Ongoing, Progressing  Goal: Absence of Hospital-Acquired Illness or Injury  Outcome: Ongoing, Progressing  Intervention: Identify and Manage Fall Risk  Recent Flowsheet Documentation  Taken 4/3/2024 0815 by [place], Jessica A, LPN  Safety Promotion/Fall Prevention:   activity supervised   assistive device/personal items within reach   clutter free environment maintained   fall prevention program maintained   nonskid shoes/slippers when out of bed   room organization consistent   safety round/check completed  Intervention: Prevent and Manage VTE (Venous Thromboembolism) Risk  Recent Flowsheet Documentation  Taken 4/3/2024 0815 by Jessica Triana LPN  VTE Prevention/Management:   bilateral   sequential compression devices off  Intervention: Prevent Infection  Recent Flowsheet Documentation  Taken 4/3/2024 0815 by eJssica Triana LPN  Infection Prevention: hand hygiene promoted  Goal: Optimal Comfort and Wellbeing  Outcome: Ongoing, Progressing  Intervention: Provide Person-Centered Care  Recent Flowsheet Documentation  Taken 4/3/2024 0815 by Jessica Triana LPN  Trust Relationship/Rapport:   care explained   thoughts/feelings acknowledged  Goal: Readiness for Transition of Care  Outcome: Ongoing, Progressing     Problem: Pain Acute  Goal: Acceptable Pain Control and Functional Ability  Outcome: Ongoing, Progressing  Intervention: Prevent or Manage Pain  Recent Flowsheet Documentation  Taken 4/3/2024 0815 by Jessica Triana LPN  Medication Review/Management: medications reviewed  Intervention: Optimize Psychosocial Wellbeing  Recent Flowsheet Documentation  Taken 4/3/2024 0815 by Jessica Triana LPN  Diversional Activities: smartphone     Problem: Nausea and  Vomiting  Goal: Fluid and Electrolyte Balance  Outcome: Ongoing, Progressing     Problem: Adjustment to Illness (Chronic Kidney Disease)  Goal: Optimal Coping with Chronic Illness  Outcome: Ongoing, Progressing  Intervention: Support Psychosocial Response  Recent Flowsheet Documentation  Taken 4/3/2024 0815 by Jessica Triana LPN  Family/Support System Care: self-care encouraged     Problem: Electrolyte Imbalance (Chronic Kidney Disease)  Goal: Electrolyte Balance  Outcome: Ongoing, Progressing     Problem: Fluid Volume Excess (Chronic Kidney Disease)  Goal: Fluid Balance  Outcome: Ongoing, Progressing     Problem: Functional Decline (Chronic Kidney Disease)  Goal: Optimal Functional Ability  Outcome: Ongoing, Progressing     Problem: Hematologic Alteration (Chronic Kidney Disease)  Goal: Absence of Anemia Signs and Symptoms  Outcome: Ongoing, Progressing     Problem: Oral Intake Inadequate (Chronic Kidney Disease)  Goal: Optimal Oral Intake  Outcome: Ongoing, Progressing     Problem: Pain (Chronic Kidney Disease)  Goal: Acceptable Pain Control  Outcome: Ongoing, Progressing     Problem: Renal Function Impairment (Chronic Kidney Disease)  Goal: Minimize Renal Failure Effects  Outcome: Ongoing, Progressing  Intervention: Protect and Monitor Dialysis Access Site  Recent Flowsheet Documentation  Taken 4/3/2024 0815 by Jessica Triana LPN  Safety Precautions: emergency equipment at bedside  Intervention: Monitor and Support Renal Function  Recent Flowsheet Documentation  Taken 4/3/2024 0815 by Jessica Triana LPN  Medication Review/Management: medications reviewed   Goal Outcome Evaluation:  Plan of Care Reviewed With: patient        Progress: no change
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
## 2024-04-03 NOTE — PROGRESS NOTES
"NEPHROLOGY PROGRESS NOTE------[facility]    [facility]  [phone]  Maylin Dumont MD      Patient Care Team:  Erika Hernandez MD as PCP - General  Erika Hernandez MD as PCP - Family Medicine  Jena Cabrera MD as Surgeon (General Surgery)  Prateek Dumont MD as Consulting Physician (Nephrology)      Provider:  Maylin Dumont MD  Patient Name: Esdras Peralta  :  1960    SUBJECTIVE:    F/U ESRD    D/C and came back because of abdominal pain. ?perf viscus. Awaiting surgery eval.     Medication:  atorvastatin, 20 mg, Oral, Daily  bumetanide, 2 mg, Oral, BID  cinacalcet, 30 mg, Oral, Daily  isosorbide mononitrate, 30 mg, Oral, Daily  levothyroxine, 25 mcg, Oral, Q AM  metoclopramide, 5 mg, Oral, 4x Daily  metOLazone, 2.5 mg, Oral, Daily  metoprolol succinate XL, 50 mg, Oral, Daily  multivitamin, 1 tablet, Oral, Daily  pantoprazole, 40 mg, Oral, Q AM  rOPINIRole, 2 mg, Oral, Nightly  sevelamer, 800 mg, Oral, TID With Meals  [Held by provider] traZODone, 100 mg, Oral, Nightly      sodium chloride, 30 mL/hr, Last Rate: 30 mL/hr (244)        OBJECTIVE    Vital Sign Min/Max for last 24 hours  Temp  Min: 97.4 °F (36.3 °C)  Max: 98.3 °F (36.8 °C)   BP  Min: 93/66  Max: 134/90   Pulse  Min: 75  Max: 106   Resp  Min: 16  Max: 20   SpO2  Min: 93 %  Max: 100 %   No data recorded   Weight  Min: 82 kg (180 lb 12.4 oz)  Max: 84.7 kg (186 lb 11.7 oz)     Flowsheet Rows      Flowsheet Row First Filed Value   Admission Height 175.3 cm (69\") Documented at 2024   Admission Weight 82 kg (180 lb 12.4 oz) Documented at 2024 2216            No intake/output data recorded.  I/O last 3 completed shifts:  In: 1000 [I.V.:1000]  Out: -     Physical Exam:  General Appearance: alert, appears stated age and cooperative  Head: normocephalic, without obvious abnormality and atraumatic  Eyes: conjunctivae and sclerae normal and no " "icterus  Neck: supple and no JVD  Lungs: clear to auscultation and respirations regular  Heart: regular rhythm & normal rate and normal S1, S2 +MILLICENT  Chest Wall: no abnormalities observed  Abdomen: normal bowel sounds and +EPIGASTRIC PAIN ON PALPATION. +PD CATHETER C/D/I  Extremities: moves extremities well, no edema, no cyanosis  Skin: no bleeding, bruising or rash  Neurologic: Alert, and oriented. No focal deficits    Labs:    WBC WBC   Date Value Ref Range Status   04/03/2024 8.38 3.40 - 10.80 10*3/mm3 Final   04/02/2024 5.05 3.40 - 10.80 10*3/mm3 Final   04/01/2024 5.53 3.40 - 10.80 10*3/mm3 Final      HGB Hemoglobin   Date Value Ref Range Status   04/03/2024 13.1 13.0 - 17.7 g/dL Final   04/02/2024 13.1 13.0 - 17.7 g/dL Final   04/01/2024 13.0 13.0 - 17.7 g/dL Final      HCT Hematocrit   Date Value Ref Range Status   04/03/2024 40.2 37.5 - 51.0 % Final   04/02/2024 40.2 37.5 - 51.0 % Final   04/01/2024 39.7 37.5 - 51.0 % Final      Platelets No results found for: \"LABPLAT\"   MCV MCV   Date Value Ref Range Status   04/03/2024 103.1 (H) 79.0 - 97.0 fL Final   04/02/2024 103.9 (H) 79.0 - 97.0 fL Final   04/01/2024 103.4 (H) 79.0 - 97.0 fL Final          Sodium Sodium   Date Value Ref Range Status   04/03/2024 132 (L) 136 - 145 mmol/L Final   04/02/2024 127 (L) 136 - 145 mmol/L Final   04/02/2024 131 (L) 136 - 145 mmol/L Final   04/01/2024 135 (L) 136 - 145 mmol/L Final   04/01/2024 135 (L) 136 - 145 mmol/L Final      Potassium Potassium   Date Value Ref Range Status   04/03/2024 5.1 3.5 - 5.2 mmol/L Final   04/02/2024 4.5 3.5 - 5.2 mmol/L Final   04/02/2024 4.1 3.5 - 5.2 mmol/L Final   04/01/2024 3.8 3.5 - 5.2 mmol/L Final     Comment:     Slight hemolysis detected by analyzer. Result may be falsely elevated.   04/01/2024 3.7 3.5 - 5.2 mmol/L Final      Chloride Chloride   Date Value Ref Range Status   04/03/2024 91 (L) 98 - 107 mmol/L Final   04/02/2024 87 (L) 98 - 107 mmol/L Final   04/02/2024 92 (L) 98 - 107 " "mmol/L Final   04/01/2024 96 (L) 98 - 107 mmol/L Final   04/01/2024 97 (L) 98 - 107 mmol/L Final      CO2 CO2   Date Value Ref Range Status   04/03/2024 23.0 22.0 - 29.0 mmol/L Final   04/02/2024 26.0 22.0 - 29.0 mmol/L Final   04/02/2024 26.0 22.0 - 29.0 mmol/L Final   04/01/2024 27.0 22.0 - 29.0 mmol/L Final   04/01/2024 24.0 22.0 - 29.0 mmol/L Final      BUN BUN   Date Value Ref Range Status   04/03/2024 50 (H) 8 - 23 mg/dL Final   04/02/2024 46 (H) 8 - 23 mg/dL Final   04/02/2024 45 (H) 8 - 23 mg/dL Final   04/01/2024 40 (H) 8 - 23 mg/dL Final   04/01/2024 38 (H) 8 - 23 mg/dL Final      Creatinine Creatinine   Date Value Ref Range Status   04/03/2024 5.21 (H) 0.76 - 1.27 mg/dL Final   04/02/2024 5.58 (H) 0.76 - 1.27 mg/dL Final   04/02/2024 5.19 (H) 0.76 - 1.27 mg/dL Final   04/01/2024 4.94 (H) 0.76 - 1.27 mg/dL Final   04/01/2024 4.62 (H) 0.76 - 1.27 mg/dL Final      Calcium Calcium   Date Value Ref Range Status   04/03/2024 9.4 8.6 - 10.5 mg/dL Final   04/02/2024 9.2 8.6 - 10.5 mg/dL Final   04/02/2024 9.3 8.6 - 10.5 mg/dL Final   04/01/2024 9.7 8.6 - 10.5 mg/dL Final   04/01/2024 9.2 8.6 - 10.5 mg/dL Final      PO4 No components found for: \"PO4\"   Albumin Albumin   Date Value Ref Range Status   04/02/2024 3.4 (L) 3.5 - 5.2 g/dL Final   04/02/2024 3.2 (L) 3.5 - 5.2 g/dL Final   04/01/2024 3.1 (L) 3.5 - 5.2 g/dL Final      Magnesium Magnesium   Date Value Ref Range Status   04/02/2024 1.9 1.6 - 2.4 mg/dL Final      Uric Acid No components found for: \"URIC ACID\"     Imaging Results (Last 72 Hours)       Procedure Component Value Units Date/Time    CT Abdomen Pelvis Without Contrast [[id number]] Collected: 04/03/24 0015     Updated: 04/03/24 0022    Narrative:      CT ABDOMEN PELVIS WO CONTRAST    Date of Exam: 4/2/2024 11:59 PM EDT    Indication: possible free air.    Comparison: 7/6/2023, 4/2/2024, 4/1/2024.    Technique: Axial CT images were obtained of the abdomen and pelvis without the administration of " contrast. Sagittal and coronal reconstructions were performed.  Automated exposure control and iterative reconstruction methods were used.      Findings:  Lung Bases:     The heart appears mildly enlarged. No focal consolidation identified. Trace left-sided pleural effusion present..    Liver:  Liver is normal in size and CT density. No focal lesions. A small amount of perihepatic ascites is present.    Biliary/Gallbladder:    The gallbladder is normal in caliber with no definite stones identified. A small amount of free fluid is seen surrounding the gallbladder with probable wall thickening. No definite stones identified. Thickening. The biliary tree is nondilated.    Spleen:  Spleen is normal in size and CT density.    Pancreas:    Pancreas is normal. There is no evidence of pancreatic mass or peripancreatic fluid.    Kidneys:    Kidneys are normal in size. There are no stones or hydronephrosis.    Adrenals:    Adrenal glands are unremarkable.    Retroperitoneal/Lymph Nodes/Vasculature:    No retroperitoneal adenopathy is identified.    Gastrointestinal/Mesentery:    A small amount of free air is present within the anterior upper abdomen. This is new as compared to the previous study from yesterday.. Drain versus dialysis catheter is seen extending within the right hemipelvis from the anterior left hemiabdomen.   Stimulator device seen within the subcutaneous tissues of the left hemiabdomen. The bowel loops are non-dilated without wall thickening or mass. The appendix appears within normal limits. No evidence of obstruction. No free air. No mesenteric fluid   collections identified. No significant stool burden identified. No evidence of hernia. A small amount of free fluid is present within the pelvis. No definite focal collection identified. Limited evaluation due to lack of intravenous contrast..    Bladder:    The bladder is normal.    Genital:     Unremarkable          Bony Structures:     Visualized bony  structures are consistent with the patient's age.        Impression:      Impression:    1. A small amount of free air present within the upper abdomen, new as compared to the previous CT from 4/1/2023 likely related to perforated viscus, likely from the stomach given the predominantly upper abdominal location of air. Surgical consultation   is recommended. No evidence of obstruction.  2. Gallbladder wall thickening with a small amount of pericholecystic fluid. Correlate with LFTs. No definite stones or biliary ductal dilatation though acute cholecystitis cannot be excluded. Reactive change related to small amount of free fluid and   ascites present throughout the abdomen and pelvis is also in the differential.  3. Probable peritoneal dialysis catheter present with a small amount of free fluid with the tip within the right hemipelvis. No definite focal collection identified.  4.. Ancillary findings described above.            Electronically Signed: Toyin Gant MD    4/3/2024 12:20 AM EDT    Workstation ID: [id number]    XR Chest 1 View [[id number]] Collected: 04/02/24 2332     Updated: 04/02/24 2336    Narrative:      XR CHEST 1 VW    Date of Exam: 4/2/2024 11:19 PM EDT    Indication: soa    Comparison: 4/1/2024.    Findings:  There are no airspace consolidations. No pleural fluid. No pneumothorax. The pulmonary vasculature appears within normal limits. The heart appears mildly enlarged, stable as compared to the previous study.. No acute osseous abnormality identified. There   appears to be a new small amount of free air under the right hemidiaphragm.      Impression:      Impression:  No acute cardiopulmonary process. Suspected new free air under the right hemidiaphragm. CT evaluation recommended.      Electronically Signed: Toyin Gant MD    4/2/2024 11:34 PM EDT    Workstation ID: [id number]            Results for orders placed during the hospital encounter of 04/02/24    XR Chest 1 View    Narrative  XR CHEST 1  VW    Date of Exam: 4/2/2024 11:19 PM EDT    Indication: soa    Comparison: 4/1/2024.    Findings:  There are no airspace consolidations. No pleural fluid. No pneumothorax. The pulmonary vasculature appears within normal limits. The heart appears mildly enlarged, stable as compared to the previous study.. No acute osseous abnormality identified. There  appears to be a new small amount of free air under the right hemidiaphragm.    Impression  Impression:  No acute cardiopulmonary process. Suspected new free air under the right hemidiaphragm. CT evaluation recommended.      Electronically Signed: Toyin Gant MD  4/2/2024 11:34 PM EDT  Workstation ID: [id number]      Results for orders placed during the hospital encounter of 04/01/24    XR Chest 1 View    Narrative  XR CHEST 1 VW    Date of Exam: 4/1/2024 7:25 PM EDT    Indication: dyspna    Comparison: 12/17/2023    Findings: No focal consolidation. No pneumothorax or pleural effusion. Cardiac size is normal. The visualized clavicles appear intact. No displaced rib fractures. The visualized upper abdomen is normal.    Impression  Impression: No acute cardiopulmonary disease.      Electronically Signed: Cyrus Stoner MD  4/1/2024 7:51 PM EDT  Workstation ID: [id number]      Results for orders placed during the hospital encounter of 12/17/23    XR Chest 1 View    Narrative  XR CHEST 1 VW    Date of Exam: 12/17/2023 4:26 PM EST    Indication: dyspnea    Comparison: 10/16/2023    Findings:  In the interval a right internal jugular dialysis catheter is been removed. There is been no interval change in external defibrillator device. Cardiomegaly is stable. Pulmonary vessels are within normal limits. There are new patchy opacities in the right  lung base consistent with multifocal pneumonia. No pleural effusion. No evidence pneumothorax.    Impression  Impression:  New patchy airspace disease in the right lung base compatible with multifocal pneumonia.      Electronically  Signed: Javon Posada MD  12/17/2023 4:41 PM EST  Workstation ID: [id number]      Results for orders placed during the hospital encounter of 06/09/21    Doppler Arterial Multi Level Lower Extremity - Bilateral CAR    Interpretation Summary  · Right Conclusion: The right LEONCIO is normal. Normal digital pressures.  · Left Conclusion: The left LEONCIO is moderately reduced. Waveforms are consistent with femoral disease and popliteal disease. Moderate digital ischemia.        ASSESSMENT / PLAN      Nausea and vomiting    ICD (implantable cardioverter-defibrillator), dual, in situ    Coronary artery disease involving native coronary artery of native heart without angina pectoris    Type 1 diabetes mellitus with diabetic polyneuropathy    Mixed hyperlipidemia    Chronic HFrEF (heart failure with reduced ejection fraction)    Stage 4 chronic kidney disease    ESRD (end stage renal disease)    Iron deficiency anemia    Hypothyroidism due to Hashimoto's thyroiditis    Shortness of breath    Hyponatremia    Abnormal CT of the abdomen    ESRD------On PD at home. Will have IR place a temporary dialysis catheter and do HD today since unable to do PD.       2. HTN WITH ESRD------BP ok. Continue home meds     3. VOLUME EXCESS/CHF-----Gently increase UF with HD     4. ANEMIA OF ESRD------H/H above threshold for EPO/iron     5. SECONDARY HYPERPARATHYROIDISM-------Follow Phos     6. DMI-------BS ok. Glucometers, SSI     7. HYPOALBUMINEMIA-------Protein supplements     8. RF ASSOCIATED HYPERPHOSPHATEMIA------Renvela as binder and follow Phos     9. HYPERLIPIDEMIA-------Statin     10. HYPOTHYROIDISM------On Synthroid. Check TSH     11. CAD WITH ELEVATED TROPONIN-----per Cardiology    12. ?PERFORATED VISCUS-----per General Surgery. Hold PD       Maylin Dumont MD  [facility]  [phone]  04/03/24  07:36 EDT

## 2024-04-03 NOTE — PROGRESS NOTES
Patient was seen and examined at bedside while still in the ED, he was admitted earlier today after he presented with abdominal pain.  He was found to have free air under the diaphragm and was seen by general surgery who recommended likely in the settings of peritoneal dialysis catheter.  No surgical intervention recommended at this time.  He was also seen by gastroenterology who recommended starting patient on a clear liquid diet and also get seen Gastrografin upper GI study to rule out perforation.    He otherwise remains hemodynamically stable.  Full progress note to follow tomorrow  Dr. Diana Watters

## 2024-04-04 ENCOUNTER — INPATIENT HOSPITAL (AMBULATORY)
Dept: URBAN - METROPOLITAN AREA HOSPITAL 84 | Facility: HOSPITAL | Age: 64
End: 2024-04-04
Payer: COMMERCIAL

## 2024-04-04 DIAGNOSIS — E11.22 TYPE 2 DIABETES MELLITUS WITH DIABETIC CHRONIC KIDNEY DISEAS: ICD-10-CM

## 2024-04-04 DIAGNOSIS — R79.89 OTHER SPECIFIED ABNORMAL FINDINGS OF BLOOD CHEMISTRY: ICD-10-CM

## 2024-04-04 DIAGNOSIS — R93.2 ABNORMAL FINDINGS ON DIAGNOSTIC IMAGING OF LIVER AND BILIARY: ICD-10-CM

## 2024-04-04 DIAGNOSIS — R93.3 ABNORMAL FINDINGS ON DIAGNOSTIC IMAGING OF OTHER PARTS OF DI: ICD-10-CM

## 2024-04-04 DIAGNOSIS — R10.10 UPPER ABDOMINAL PAIN, UNSPECIFIED: ICD-10-CM

## 2024-04-04 DIAGNOSIS — Z99.2 DEPENDENCE ON RENAL DIALYSIS: ICD-10-CM

## 2024-04-04 DIAGNOSIS — R11.2 NAUSEA WITH VOMITING, UNSPECIFIED: ICD-10-CM

## 2024-04-04 DIAGNOSIS — K82.9 DISEASE OF GALLBLADDER, UNSPECIFIED: ICD-10-CM

## 2024-04-04 DIAGNOSIS — N18.6 END STAGE RENAL DISEASE: ICD-10-CM

## 2024-04-04 DIAGNOSIS — D69.6 THROMBOCYTOPENIA, UNSPECIFIED: ICD-10-CM

## 2024-04-04 DIAGNOSIS — R74.01 ELEVATION OF LEVELS OF LIVER TRANSAMINASE LEVELS: ICD-10-CM

## 2024-04-04 PROCEDURE — 99232 SBSQ HOSP IP/OBS MODERATE 35: CPT | Performed by: NURSE PRACTITIONER

## 2024-04-04 NOTE — PROGRESS NOTES
General Surgery Progress Note    Name: Esdras Peralta ADMIT: 2024   : 1960  PCP: Erika Hernandez MD    MRN: [id number] LOS: 1 days   AGE/SEX: 63 y.o. male  ROOM: [address] [place] [address]    Chief Complaint   Patient presents with    Vomiting     Subjective     63 y.o. male admitted with general feeling of unwellness some mild nausea and vomiting.  Found to have free air on CT scan with a nontender abdomen.     Started dialysis yesterday.  After dialysis he had some hypotension which he was given volume 4.  Today he says that his abdominal pain is not any worse than yesterday and is not really complaining of any abdominal pain at all.  Upper GI has been done but not read yet.    Objective     Scheduled Medications:   amitriptyline, 25 mg, Oral, Nightly  [Held by provider] atorvastatin, 20 mg, Oral, Daily  bumetanide, 2 mg, Oral, BID  cinacalcet, 30 mg, Oral, Daily  isosorbide mononitrate, 30 mg, Oral, Daily  levothyroxine, 25 mcg, Oral, Q AM  metoclopramide, 5 mg, Oral, 4x Daily  metOLazone, 2.5 mg, Oral, Daily  metoprolol succinate XL, 50 mg, Oral, Daily  multivitamin, 1 tablet, Oral, Daily  pantoprazole, 40 mg, Intravenous, BID AC  rOPINIRole, 2 mg, Oral, Nightly  sevelamer, 800 mg, Oral, TID With Meals  [Held by provider] traZODone, 100 mg, Oral, Nightly        Active Infusions:  insulin, , Last Rate: Stopped (24)  sodium chloride, 30 mL/hr, Last Rate: 30 mL/hr (24)        As Needed Medications:    albumin human    albuterol sulfate HFA    dextrose    dextrose    glucagon (human recombinant)    HYDROmorphone    ipratropium-albuterol    ondansetron    Vital Signs  Vital Signs Patient Vitals for the past 24 hrs:   BP Temp Temp src Pulse Resp SpO2   24 0356 93/57 97.4 °F (36.3 °C) Oral 74 14 94 %   24 2324 92/59 97.4 °F (36.3 °C) Oral 75 16 96 %   24 2241 (!) 72/46 -- -- -- -- --   24 -- 97.8 °F (36.6 °C) Oral 81 17 --   24 (!) 81/46 -- -- 81 --  100 %   04/03/24 1950 114/46 97.6 °F (36.4 °C) Oral 79 16 100 %   04/03/24 1930 101/55 -- -- 79 16 99 %   04/03/24 1900 105/59 -- -- 78 16 100 %   04/03/24 1830 107/62 -- -- 77 16 100 %   04/03/24 1800 94/54 -- -- 75 14 100 %   04/03/24 1730 96/54 -- -- 74 12 100 %   04/03/24 1701 99/48 -- -- 83 12 99 %   04/03/24 1700 106/78 -- -- -- -- --   04/03/24 1645 99/51 -- -- 77 13 98 %   04/03/24 1630 90/51 97.7 °F (36.5 °C) Oral 80 14 98 %   04/03/24 1615 90/42 -- -- 78 16 98 %   04/03/24 1600 95/47 -- -- 78 16 100 %   04/03/24 1321 -- -- -- 80 15 95 %   04/03/24 1313 -- -- -- 82 16 96 %   04/03/24 1305 -- -- -- 79 15 95 %   04/03/24 1259 -- -- -- 86 13 --       Physical Exam:  Physical Exam  Constitutional:       Comments: Drowsy   Eyes:      General: No scleral icterus.     Conjunctiva/sclera: Conjunctivae normal.   Cardiovascular:      Rate and Rhythm: Normal rate.   Pulmonary:      Effort: Pulmonary effort is normal. No respiratory distress.   Abdominal:      General: There is no distension.      Palpations: Abdomen is soft.      Tenderness: There is no abdominal tenderness.   Skin:     General: Skin is warm and dry.   Neurological:      General: No focal deficit present.      Mental Status: He is alert. Mental status is at baseline.         Results Review:     CBC    Results from last 7 days   Lab Units 04/04/24  0300 04/03/24  0446 04/02/24  2254 04/01/24  1917   WBC 10*3/mm3 11.61* 8.38 5.05 5.53   HEMOGLOBIN g/dL 12.2* 13.1 13.1 13.0   PLATELETS 10*3/mm3 124* 120* 139* 137*     CMP   Results from last 7 days   Lab Units 04/04/24  0300 04/03/24  0446 04/02/24  2254 04/02/24  1343 04/01/24  2217 04/01/24  1917   SODIUM mmol/L 130* 132* 127* 131* 135* 135*   POTASSIUM mmol/L 4.6 5.1 4.5 4.1 3.8 3.7   CHLORIDE mmol/L 89* 91* 87* 92* 96* 97*   CO2 mmol/L 21.0* 23.0 26.0 26.0 27.0 24.0   BUN mg/dL 39* 50* 46* 45* 40* 38*   CREATININE mg/dL 4.50* 5.21* 5.58* 5.19* 4.94* 4.62*   GLUCOSE mg/dL 189* 126* 140* 305* 83 150*    ALBUMIN g/dL 3.2*  --  3.4* 3.2*  --  3.1*   BILIRUBIN mg/dL 1.1  --  0.8 0.6  --  0.5   ALK PHOS U/L 102  --  105 98  --  93   AST (SGOT) U/L 1,784*  --  50* 24  --  24   ALT (SGPT) U/L 1,152*  --  24 17  --  15   LIPASE U/L 10*  --   --   --   --   --        I reviewed the patient's new clinical results.    Assessment & Plan       Nausea and vomiting    ICD (implantable cardioverter-defibrillator), dual, in situ    Coronary artery disease involving native coronary artery of native heart without angina pectoris    Type 1 diabetes mellitus with diabetic polyneuropathy    Mixed hyperlipidemia    Chronic HFrEF (heart failure with reduced ejection fraction)    Stage 4 chronic kidney disease    ESRD (end stage renal disease)    Iron deficiency anemia    Hypothyroidism due to Hashimoto's thyroiditis    Shortness of breath    Hyponatremia    Abnormal CT of the abdomen      63 y.o. male with general feeling of unwellness nausea    Right quadrant ultrasound reviewed some thickening of the gallbladder likely related to underlying heart disease it was similar in appearance to 2018 no gallstones    I have ordered a upper GI  which has been done but not read I cannot see the pictures yet.  This is normal okay with starting a diet.    Abdominal exam remains reassuring nontender on exam.    I discussed his care moving forward with nephrology.  I am okay as long as the upper GI is okay to start doing the peritoneal dialysis.  They are going to use lower volumes as they think that the higher volumes were contributing some to his abdominal symptoms.        This note was created using Dragon Voice Recognition software.    Keo Pavon MD  04/04/24  11:18 EDT

## 2024-04-04 NOTE — PROGRESS NOTES
[facility] MEDICINE SERVICE  DAILY PROGRESS NOTE    NAME: Esdras Peralta  : 1960  MRN: [id number]      LOS: 1 day     PROVIDER OF SERVICE: CATERINA Hoff    Chief Complaint: Nausea and vomiting    Subjective:     Interval History:  History taken from: patient  Patient Complaints: None at this time  Patient Denies: SOB, CP, dizziness, headache, abdominal pain, nausea, vomiting    Review of Systems:   Review of Systems   Respiratory:  Negative for shortness of breath.    Cardiovascular:  Negative for chest pain.   Gastrointestinal:  Negative for abdominal pain, nausea and vomiting.   Neurological:  Negative for dizziness and headaches.       Objective:     Vital Signs  Temp:  [97.4 °F (36.3 °C)-97.8 °F (36.6 °C)] 97.4 °F (36.3 °C)  Heart Rate:  [73-86] 74  Resp:  [12-18] 14  BP: ()/(42-78) 93/57  Flow (L/min):  [2] 2   Body mass index is 27.58 kg/m².    Physical Exam  Physical Exam  Constitutional:       Appearance: Normal appearance.   HENT:      Head: Normocephalic and atraumatic.      Nose: Nose normal.      Mouth/Throat:      Mouth: Mucous membranes are moist.      Pharynx: Oropharynx is clear.   Eyes:      Extraocular Movements: Extraocular movements intact.      Conjunctiva/sclera: Conjunctivae normal.      Pupils: Pupils are equal, round, and reactive to light.   Cardiovascular:      Rate and Rhythm: Normal rate and regular rhythm.      Pulses: Normal pulses.   Pulmonary:      Effort: Pulmonary effort is normal.   Abdominal:      General: Abdomen is flat.      Palpations: Abdomen is soft.      Tenderness: There is abdominal tenderness.   Musculoskeletal:         General: Normal range of motion.      Cervical back: Normal range of motion and neck supple.   Skin:     General: Skin is warm and dry.   Neurological:      General: No focal deficit present.      Mental Status: He is alert and oriented to person, place, and time. Mental status is at baseline.   Psychiatric:          Mood and Affect: Mood normal.         Behavior: Behavior normal.         Thought Content: Thought content normal.         Judgment: Judgment normal.         Scheduled Meds   amitriptyline, 25 mg, Oral, Nightly  [Held by provider] atorvastatin, 20 mg, Oral, Daily  bumetanide, 2 mg, Oral, BID  cinacalcet, 30 mg, Oral, Daily  isosorbide mononitrate, 30 mg, Oral, Daily  levothyroxine, 25 mcg, Oral, Q AM  metoclopramide, 5 mg, Oral, 4x Daily  metOLazone, 2.5 mg, Oral, Daily  metoprolol succinate XL, 50 mg, Oral, Daily  multivitamin, 1 tablet, Oral, Daily  pantoprazole, 40 mg, Intravenous, BID AC  rOPINIRole, 2 mg, Oral, Nightly  sevelamer, 800 mg, Oral, TID With Meals  [Held by provider] traZODone, 100 mg, Oral, Nightly       PRN Meds     albumin human    albuterol sulfate HFA    dextrose    dextrose    glucagon (human recombinant)    HYDROmorphone    ipratropium-albuterol    ondansetron   Infusions  insulin, , Last Rate: Stopped (04/03/24 2047)  sodium chloride, 30 mL/hr, Last Rate: 30 mL/hr (04/03/24 0414)          Diagnostic Data    Results from last 7 days   Lab Units 04/04/24  0300   WBC 10*3/mm3 11.61*   HEMOGLOBIN g/dL 12.2*   HEMATOCRIT % 37.3*   PLATELETS 10*3/mm3 124*   GLUCOSE mg/dL 189*   CREATININE mg/dL 4.50*   BUN mg/dL 39*   SODIUM mmol/L 130*   POTASSIUM mmol/L 4.6   AST (SGOT) U/L 1,784*   ALT (SGPT) U/L 1,152*   ALK PHOS U/L 102   BILIRUBIN mg/dL 1.1   ANION GAP mmol/L 20.0*       US Abdomen Limited    Result Date: 4/4/2024  Impression: Mild hepatomegaly No sonographic abnormality in the gallbladder Mild amount of free fluid surrounding the liver Electronically Signed: José Antonio Grayson MD  4/4/2024 7:22 AM EDT  Workstation ID: [id number]    IR NON-[facility]AN TEMP DIALYSIS ACC    Result Date: 4/3/2024  Impression: 1. Ultrasound was utilized to gain vascular access into the patient's patent right internal jugular vein. 2. Technically successful fluoroscopic guided insertion of a 12 [place] triple lumen nontunneled  temporary hemodialysis central venous catheter. Electronically Signed: Jeremiah Magallon MD  4/3/2024 1:35 PM EDT  Workstation ID: [id number]    CT Abdomen Pelvis Without Contrast    Result Date: 4/3/2024  Impression: 1. A small amount of free air present within the upper abdomen, new as compared to the previous CT from 4/1/2023 likely related to perforated viscus, likely from the stomach given the predominantly upper abdominal location of air. Surgical consultation is recommended. No evidence of obstruction. 2. Gallbladder wall thickening with a small amount of pericholecystic fluid. Correlate with LFTs. No definite stones or biliary ductal dilatation though acute cholecystitis cannot be excluded. Reactive change related to small amount of free fluid and ascites present throughout the abdomen and pelvis is also in the differential. 3. Probable peritoneal dialysis catheter present with a small amount of free fluid with the tip within the right hemipelvis. No definite focal collection identified. 4.. Ancillary findings described above. Electronically Signed: Toyin Gant MD  4/3/2024 12:20 AM EDT  Workstation ID: [id number]    XR Chest 1 View    Result Date: 4/2/2024  Impression: No acute cardiopulmonary process. Suspected new free air under the right hemidiaphragm. CT evaluation recommended. Electronically Signed: Toyin Gant MD  4/2/2024 11:34 PM EDT  Workstation ID: [id number]       I reviewed the patient's new clinical results.    Assessment/Plan:     Active and Resolved Problems  Active Hospital Problems    Diagnosis  POA    **Nausea and vomiting [R11.2]  Yes    Hyponatremia [E87.1]  Yes    Abnormal CT of the abdomen [R93.5]  Yes    Shortness of breath [R06.02]  Yes    Hypothyroidism due to Hashimoto's thyroiditis [E03.8, E06.3]  Yes    Iron deficiency anemia [D50.9]  Yes    ESRD (end stage renal disease) [N18.6]  Yes    Stage 4 chronic kidney disease [N18.4]  Yes    Chronic HFrEF (heart failure with reduced ejection  fraction) [I50.22]  Yes    Mixed hyperlipidemia [E78.2]  Yes    ICD (implantable cardioverter-defibrillator), dual, in situ [Z95.810]  Yes    Type 1 diabetes mellitus with diabetic polyneuropathy [E10.42]  Yes    Coronary artery disease involving native coronary artery of native heart without angina pectoris [I25.10]  Yes      Resolved Hospital Problems   No resolved problems to display.       Abnormal CT suggesting small amount of free air within the upper abdomen  Gallbladder wall thickening with small amount of pericholecystic fluid on CT  Upper abdominal pain  Nausea/vomiting  GI consulted and following  General surgery consulted and continues to follow  Patient denies abdominal pain, nausea or vomiting  General surgery ordered upper GI study which was done today  GI has ordered clear liquid diet starting after upper GI study  If patient begins to develop abdominal pain, nausea or vomiting while on clear liquid diet will make strict n.p.o.  Will have PT/OT evaluate patient  Patient had prolonged QT in ED, no Zofran given    Elevated ALT/AST  ALT 1152, AST 1784  Total bilirubin and alk phos normal  Likely related to shock liver due to hypotension  Will continue to monitor    End-stage renal disease on peritoneal dialysis  Nephrology consulted and following  Patient had a temporary hemodialysis catheter placed and will do hemodialysis while inpatient  Patient takes Renvela and Cinacalcet at home    Elevated troponin on arrival  CHF/AICD  CAD  Follows with cardiology  Denies chest pain, elevated troponin likely secondary to ischemic demand and renal disease  Continue Bumex, Imdur and metoprolol  Aspirin and Brilinta held at this time    Type 1 diabetes mellitus  Patient struggles from diabetic neuropathy  On insulin pump, diabetic educator consulted  SSI as needed  Accu-Cheks ACHS while on diet  If n.p.o., Accu-Cheks every 6 hours    Hypothyroidism due to Hashimoto thyroiditis  Continue home  levothyroxine        DVT prophylaxis:  Mechanical DVT prophylaxis orders are present.         Code status is   Code Status and Medical Interventions:   Ordered at: 04/03/24 0118     Code Status (Patient has no pulse and is not breathing):    CPR (Attempt to Resuscitate)     Medical Interventions (Patient has pulse or is breathing):    Full Support       Plan for disposition: Pending course in 1 to 2 days    Time: 30 minutes    Signature: Electronically signed by CATERINA Hfof, 04/04/24, 10:26 EDT.  [facility] Hospitalist Team       Addendum:    I saw and examined the patient in addition to the BRYANNA.  I agree with assessment and plan with the following addendum:    General Appearance:  Alert, cooperative, no distress, appears stated age  Head:  Normocephalic, without obvious abnormality, atraumatic  Eyes:  PERRL, conjunctiva/corneas clear, EOM's intact, fundi benign, both eyes  Ears:  Normal TM's and external ear canals, both ears  Nose: Nares normal, septum midline, mucosa normal, no drainage or sinus tenderness  Throat: Lips, mucosa, and tongue normal; teeth and gums normal  Neck: Supple, symmetrical, trachea midline, no adenopathy, thyroid: not enlarged, symmetric, no tenderness/mass/nodules, no carotid bruit or JVD  Lungs:   Clear to auscultation bilaterally, respirations unlabored  Heart: Regular rate and rhythm, S1, S2 normal, no murmur, rub or gallop  Abdomen:  Soft, non-tender, bowel sounds active all four quadrants,  no masses, no organomegaly  Extremities: Extremities normal, atraumatic, no cyanosis or edema  Pulses: 2+ and symmetric  Skin: Skin color, texture, turgor normal, no rashes or lesions  Neurologic: Normal      Patient does complain of some upper abdominal pain but otherwise is tolerating a liquid diet well.  He denies any nausea or vomiting today.  Denies any diarrhea.  I will check a GI panel to assess for possible viral illness.  He does have significant but LFTs today compared to  yesterday, which could be due to shock liver as the patient was hypotensive for a prolonged period of time yesterday.  However I will also check a hepatitis panel.  Appreciate GI input.  Awaiting Gastrografin study results.      MD Ava PrettyThe [facility] Hospitalist Team  04/04/24  11:55 EDT

## 2024-04-04 NOTE — CONSULTS
Diabetes Education    Patient Name:  Esdras Peralta  YOB: 1960  MRN: [id number]  Admit Date:  4/2/2024    Consult received due to pt wearing insulin pump. Staff nurse states pt has not documented info on log sheet. Educator discussed with staff nurse that info would be reviewed with pt about documenting bs and boluses given on log sheet. Educator reviewed chart and Insulin Pump Contract not in physical chart. Educator asked nurse if contract out of chart. Nurse does not have contract out of chart. New contract completed and signed by patient.     Insulin Pump Contract completed and in chart: Yes  Insulin Pump Log at bedside: Yes  LDA started: Completed on 4/3/2024  Patient Supplied Insulin Pump (orders initiated): Completed on 4/3/2024  Educator Consult entered: Yes    Pump Brand/Model: Omnipod 5  CGMS Brand/Model: Dexcom G6    Type of Insulin Used: Lispro    Basal Rate: (units/hour)   12 am 1.0  5 pm 0.7    Bolus Rate: (insulin:carbohydrate ratio)   12 am 1:15    Last Bolus Given: 4/3/2024 9:06 pm 1.0 unit                                                                 Insulin Sensitivity Factor/Correction Dose:   12 am 1:50     Blood Glucose Target: 12 am 110-150    Active Insulin: 4 hours    Date of Last Site Change: 4/1/2024    Location of Catheter: Left upper outer arm    Site Assessment: Clean, dry, intact    Educator assessed:       pump supplies at bedside: Yes       Insulin Expiration Date: 11/25/2026    Comments: Pt continues wearing pump and CGMS. Pt to change insulin pod today. He states he understands he needs to change it today. Discussed with pt importance of filling out insulin pump log when he boluses with pump. Pt states he has not been eating but will fill out if he gives himself a bolus. Pt with no further education needs at present.       Electronically signed by:  Pauly Cuba RN  04/04/24 13:18 EDT

## 2024-04-04 NOTE — PLAN OF CARE
Goal Outcome Evaluation:  Plan of Care Reviewed With: patient        Progress: no change  Outcome Evaluation: Alert and oriented, advanced to clear liquids, Upper GI done. Call light in reach

## 2024-04-04 NOTE — PLAN OF CARE
Goal Outcome Evaluation:  Plan of Care Reviewed With: patient        Progress: no change  Outcome Evaluation: Pt with no complaints of pain this shift. Is a stand by assist to the bathroom because of pt feeling weak. NS running at 30mL/hr. Currently on RA. BP low but is normal per pt. NPO, sips with meds. VSS and call light within reach. Plan ongoing.

## 2024-04-04 NOTE — PROGRESS NOTES
"NEPHROLOGY PROGRESS NOTE------[facility]    [facility]  [phone]  Maylin Dumont MD      Patient Care Team:  Erika Hernandez MD as PCP - General  Erika Hernandez MD as PCP - Family Medicine  Jena aCbrera MD as Surgeon (General Surgery)  Prateek Dumont MD as Consulting Physician (Nephrology)      Provider:  Maylin Dumont MD  Patient Name: Esdras Peralta  :  1960    SUBJECTIVE:    F/U ESRD    S/P HD yesterday. Feeling and breathing better. No angina. Abdominal pain better    Medication:  amitriptyline, 25 mg, Oral, Nightly  atorvastatin, 20 mg, Oral, Daily  bumetanide, 2 mg, Oral, BID  cinacalcet, 30 mg, Oral, Daily  isosorbide mononitrate, 30 mg, Oral, Daily  levothyroxine, 25 mcg, Oral, Q AM  metoclopramide, 5 mg, Oral, 4x Daily  metOLazone, 2.5 mg, Oral, Daily  metoprolol succinate XL, 50 mg, Oral, Daily  multivitamin, 1 tablet, Oral, Daily  pantoprazole, 40 mg, Intravenous, BID AC  rOPINIRole, 2 mg, Oral, Nightly  sevelamer, 800 mg, Oral, TID With Meals  [Held by provider] traZODone, 100 mg, Oral, Nightly      insulin, , Last Rate: Stopped (24)  sodium chloride, 30 mL/hr, Last Rate: 30 mL/hr (24)        OBJECTIVE    Vital Sign Min/Max for last 24 hours  Temp  Min: 97.4 °F (36.3 °C)  Max: 97.9 °F (36.6 °C)   BP  Min: 72/46  Max: 121/77   Pulse  Min: 73  Max: 86   Resp  Min: 12  Max: 19   SpO2  Min: 94 %  Max: 100 %   No data recorded   No data recorded     Flowsheet Rows      Flowsheet Row First Filed Value   Admission Height 175.3 cm (69\") Documented at 2024   Admission Weight 82 kg (180 lb 12.4 oz) Documented at 2024            No intake/output data recorded.  I/O last 3 completed shifts:  In: 1000 [I.V.:1000]  Out: 1600     Physical Exam:  General Appearance: alert, appears stated age and cooperative  Head: normocephalic, without obvious abnormality and " "atraumatic  Eyes: conjunctivae and sclerae normal and no icterus  Neck: supple and no JVD  Lungs: clear to auscultation and respirations regular  Heart: regular rhythm & normal rate and normal S1, S2 +MILLICENT  Chest Wall: no abnormalities observed  Abdomen: normal bowel sounds and +EPIGASTRIC PAIN ON PALPATION. +PD CATHETER C/D/I  Extremities: moves extremities well, no edema, no cyanosis  Skin: no bleeding, bruising or rash  Neurologic: Alert, and oriented. No focal deficits    Labs:    WBC WBC   Date Value Ref Range Status   04/04/2024 11.61 (H) 3.40 - 10.80 10*3/mm3 Final   04/03/2024 8.38 3.40 - 10.80 10*3/mm3 Final   04/02/2024 5.05 3.40 - 10.80 10*3/mm3 Final   04/01/2024 5.53 3.40 - 10.80 10*3/mm3 Final      HGB Hemoglobin   Date Value Ref Range Status   04/04/2024 12.2 (L) 13.0 - 17.7 g/dL Final   04/03/2024 13.1 13.0 - 17.7 g/dL Final   04/02/2024 13.1 13.0 - 17.7 g/dL Final   04/01/2024 13.0 13.0 - 17.7 g/dL Final      HCT Hematocrit   Date Value Ref Range Status   04/04/2024 37.3 (L) 37.5 - 51.0 % Final   04/03/2024 40.2 37.5 - 51.0 % Final   04/02/2024 40.2 37.5 - 51.0 % Final   04/01/2024 39.7 37.5 - 51.0 % Final      Platelets No results found for: \"LABPLAT\"   MCV MCV   Date Value Ref Range Status   04/04/2024 103.0 (H) 79.0 - 97.0 fL Final   04/03/2024 103.1 (H) 79.0 - 97.0 fL Final   04/02/2024 103.9 (H) 79.0 - 97.0 fL Final   04/01/2024 103.4 (H) 79.0 - 97.0 fL Final          Sodium Sodium   Date Value Ref Range Status   04/04/2024 130 (L) 136 - 145 mmol/L Final   04/03/2024 132 (L) 136 - 145 mmol/L Final   04/02/2024 127 (L) 136 - 145 mmol/L Final   04/02/2024 131 (L) 136 - 145 mmol/L Final   04/01/2024 135 (L) 136 - 145 mmol/L Final   04/01/2024 135 (L) 136 - 145 mmol/L Final      Potassium Potassium   Date Value Ref Range Status   04/04/2024 4.6 3.5 - 5.2 mmol/L Final   04/03/2024 5.1 3.5 - 5.2 mmol/L Final   04/02/2024 4.5 3.5 - 5.2 mmol/L Final   04/02/2024 4.1 3.5 - 5.2 mmol/L Final   04/01/2024 " "3.8 3.5 - 5.2 mmol/L Final     Comment:     Slight hemolysis detected by analyzer. Result may be falsely elevated.   04/01/2024 3.7 3.5 - 5.2 mmol/L Final      Chloride Chloride   Date Value Ref Range Status   04/04/2024 89 (L) 98 - 107 mmol/L Final   04/03/2024 91 (L) 98 - 107 mmol/L Final   04/02/2024 87 (L) 98 - 107 mmol/L Final   04/02/2024 92 (L) 98 - 107 mmol/L Final   04/01/2024 96 (L) 98 - 107 mmol/L Final   04/01/2024 97 (L) 98 - 107 mmol/L Final      CO2 CO2   Date Value Ref Range Status   04/04/2024 21.0 (L) 22.0 - 29.0 mmol/L Final   04/03/2024 23.0 22.0 - 29.0 mmol/L Final   04/02/2024 26.0 22.0 - 29.0 mmol/L Final   04/02/2024 26.0 22.0 - 29.0 mmol/L Final   04/01/2024 27.0 22.0 - 29.0 mmol/L Final   04/01/2024 24.0 22.0 - 29.0 mmol/L Final      BUN BUN   Date Value Ref Range Status   04/04/2024 39 (H) 8 - 23 mg/dL Final   04/03/2024 50 (H) 8 - 23 mg/dL Final   04/02/2024 46 (H) 8 - 23 mg/dL Final   04/02/2024 45 (H) 8 - 23 mg/dL Final   04/01/2024 40 (H) 8 - 23 mg/dL Final   04/01/2024 38 (H) 8 - 23 mg/dL Final      Creatinine Creatinine   Date Value Ref Range Status   04/04/2024 4.50 (H) 0.76 - 1.27 mg/dL Final   04/03/2024 5.21 (H) 0.76 - 1.27 mg/dL Final   04/02/2024 5.58 (H) 0.76 - 1.27 mg/dL Final   04/02/2024 5.19 (H) 0.76 - 1.27 mg/dL Final   04/01/2024 4.94 (H) 0.76 - 1.27 mg/dL Final   04/01/2024 4.62 (H) 0.76 - 1.27 mg/dL Final      Calcium Calcium   Date Value Ref Range Status   04/04/2024 8.5 (L) 8.6 - 10.5 mg/dL Final   04/03/2024 9.4 8.6 - 10.5 mg/dL Final   04/02/2024 9.2 8.6 - 10.5 mg/dL Final   04/02/2024 9.3 8.6 - 10.5 mg/dL Final   04/01/2024 9.7 8.6 - 10.5 mg/dL Final   04/01/2024 9.2 8.6 - 10.5 mg/dL Final      PO4 No components found for: \"PO4\"   Albumin Albumin   Date Value Ref Range Status   04/04/2024 3.2 (L) 3.5 - 5.2 g/dL Final   04/02/2024 3.4 (L) 3.5 - 5.2 g/dL Final   04/02/2024 3.2 (L) 3.5 - 5.2 g/dL Final   04/01/2024 3.1 (L) 3.5 - 5.2 g/dL Final      Magnesium " "Magnesium   Date Value Ref Range Status   04/02/2024 1.9 1.6 - 2.4 mg/dL Final      Uric Acid No components found for: \"URIC ACID\"     Imaging Results (Last 72 Hours)       Procedure Component Value Units Date/Time    US Abdomen Limited [[id number]] Collected: 04/04/24 0717     Updated: 04/04/24 0724    Narrative:      US ABDOMEN LIMITED    Date of Exam: 4/4/2024 5:23 AM EDT    Indication: gallbladder thickening.    Technique: Grayscale and color Doppler ultrasound evaluation of the right upper quadrant was performed.    Findings:  The liver shows homogeneous echotexture and is enlarged measuring 17.1 cm. The visualized portal vein is patent with hepatopetal flow. The visualized hepatic vein is patent there is mild amount of free fluid surrounding the liver. The gallbladder shows   normal sonolucency. There is no pericholecystic fluid. The gallbladder wall measures up to 3 mm. Negative Beth sign.    The common bile duct measures 4.5 mm. Pancreas not evaluated with gas shadowing limiting assessment. The right kidney measures 11 cm with no hydronephrosis or nephrolithiasis.      Impression:      Impression:  Mild hepatomegaly    No sonographic abnormality in the gallbladder    Mild amount of free fluid surrounding the liver        Electronically Signed: José Antonio Grayson MD    4/4/2024 7:22 AM EDT    Workstation ID: [id number]    IR NON-JOAN TEMP DIALYSIS ACC [[id number]] Collected: 04/03/24 1329     Updated: 04/03/24 1337    Narrative:      IR NON-TUNNELED TEMP DIALYSIS ACCESS    Date of Exam: 4/3/2024 12:43 PM EDT    Indication: Chronic renal failure.    Comparison: None available.    Technique:  A detailed explanation of the procedure including risks, benefits, and alternatives was provided. The patient's allergies and medications were reviewed. A procedure timeout was performed.      Fluoroscopic Time: 3 minutes and 11 seconds    Findings:  The patient was placed on the fluoroscopic table in the supine position. " Ultrasound scanning confirmed patency of the right internal jugular vein. The right neck was prepped and draped utilizing a maximum sterile barrier technique. Utilizing ultrasound   guidance and local lidocaine anesthetic, a skin incision was made and a micropuncture needle system was utilized to access the right internal jugular vein. After placement of a guidewire and dilatation of the access site, a 12 Mongolian triple lumen   nontunneled temporary hemodialysis central venous catheter was advanced with the tip terminating in the proximal right atrium. All 3 lumens were flushed per hospital protocol. The dialysis catheter was sutured in place with 2-0 Prolene suture and a   dressing was placed at the skin entry site. The patient tolerated the procedure well without immediate complications.      Impression:      Impression:    1. Ultrasound was utilized to gain vascular access into the patient's patent right internal jugular vein.  2. Technically successful fluoroscopic guided insertion of a 12 Mongolian triple lumen nontunneled temporary hemodialysis central venous catheter.      Electronically Signed: Jeremiah Magallon MD    4/3/2024 1:35 PM EDT    Workstation ID: [id number]    CT Abdomen Pelvis Without Contrast [[id number]] Collected: 04/03/24 0015     Updated: 04/03/24 0022    Narrative:      CT ABDOMEN PELVIS WO CONTRAST    Date of Exam: 4/2/2024 11:59 PM EDT    Indication: possible free air.    Comparison: 7/6/2023, 4/2/2024, 4/1/2024.    Technique: Axial CT images were obtained of the abdomen and pelvis without the administration of contrast. Sagittal and coronal reconstructions were performed.  Automated exposure control and iterative reconstruction methods were used.      Findings:  Lung Bases:     The heart appears mildly enlarged. No focal consolidation identified. Trace left-sided pleural effusion present..    Liver:  Liver is normal in size and CT density. No focal lesions. A small amount of perihepatic ascites is  present.    Biliary/Gallbladder:    The gallbladder is normal in caliber with no definite stones identified. A small amount of free fluid is seen surrounding the gallbladder with probable wall thickening. No definite stones identified. Thickening. The biliary tree is nondilated.    Spleen:  Spleen is normal in size and CT density.    Pancreas:    Pancreas is normal. There is no evidence of pancreatic mass or peripancreatic fluid.    Kidneys:    Kidneys are normal in size. There are no stones or hydronephrosis.    Adrenals:    Adrenal glands are unremarkable.    Retroperitoneal/Lymph Nodes/Vasculature:    No retroperitoneal adenopathy is identified.    Gastrointestinal/Mesentery:    A small amount of free air is present within the anterior upper abdomen. This is new as compared to the previous study from yesterday.. Drain versus dialysis catheter is seen extending within the right hemipelvis from the anterior left hemiabdomen.   Stimulator device seen within the subcutaneous tissues of the left hemiabdomen. The bowel loops are non-dilated without wall thickening or mass. The appendix appears within normal limits. No evidence of obstruction. No free air. No mesenteric fluid   collections identified. No significant stool burden identified. No evidence of hernia. A small amount of free fluid is present within the pelvis. No definite focal collection identified. Limited evaluation due to lack of intravenous contrast..    Bladder:    The bladder is normal.    Genital:     Unremarkable          Bony Structures:     Visualized bony structures are consistent with the patient's age.        Impression:      Impression:    1. A small amount of free air present within the upper abdomen, new as compared to the previous CT from 4/1/2023 likely related to perforated viscus, likely from the stomach given the predominantly upper abdominal location of air. Surgical consultation   is recommended. No evidence of obstruction.  2.  Gallbladder wall thickening with a small amount of pericholecystic fluid. Correlate with LFTs. No definite stones or biliary ductal dilatation though acute cholecystitis cannot be excluded. Reactive change related to small amount of free fluid and   ascites present throughout the abdomen and pelvis is also in the differential.  3. Probable peritoneal dialysis catheter present with a small amount of free fluid with the tip within the right hemipelvis. No definite focal collection identified.  4.. Ancillary findings described above.            Electronically Signed: Toyin Gant MD    4/3/2024 12:20 AM EDT    Workstation ID: [id number]    XR Chest 1 View [[id number]] Collected: 04/02/24 2332     Updated: 04/02/24 2336    Narrative:      XR CHEST 1 VW    Date of Exam: 4/2/2024 11:19 PM EDT    Indication: soa    Comparison: 4/1/2024.    Findings:  There are no airspace consolidations. No pleural fluid. No pneumothorax. The pulmonary vasculature appears within normal limits. The heart appears mildly enlarged, stable as compared to the previous study.. No acute osseous abnormality identified. There   appears to be a new small amount of free air under the right hemidiaphragm.      Impression:      Impression:  No acute cardiopulmonary process. Suspected new free air under the right hemidiaphragm. CT evaluation recommended.      Electronically Signed: Toyin Gant MD    4/2/2024 11:34 PM EDT    Workstation ID: [id number]            Results for orders placed during the hospital encounter of 04/02/24    XR Chest 1 View    Narrative  XR CHEST 1 VW    Date of Exam: 4/2/2024 11:19 PM EDT    Indication: soa    Comparison: 4/1/2024.    Findings:  There are no airspace consolidations. No pleural fluid. No pneumothorax. The pulmonary vasculature appears within normal limits. The heart appears mildly enlarged, stable as compared to the previous study.. No acute osseous abnormality identified. There  appears to be a new small amount of  free air under the right hemidiaphragm.    Impression  Impression:  No acute cardiopulmonary process. Suspected new free air under the right hemidiaphragm. CT evaluation recommended.      Electronically Signed: Toyin Gant MD  4/2/2024 11:34 PM EDT  Workstation ID: [id number]      Results for orders placed during the hospital encounter of 04/01/24    XR Chest 1 View    Narrative  XR CHEST 1 VW    Date of Exam: 4/1/2024 7:25 PM EDT    Indication: dyspna    Comparison: 12/17/2023    Findings: No focal consolidation. No pneumothorax or pleural effusion. Cardiac size is normal. The visualized clavicles appear intact. No displaced rib fractures. The visualized upper abdomen is normal.    Impression  Impression: No acute cardiopulmonary disease.      Electronically Signed: Cyrus Stoner MD  4/1/2024 7:51 PM EDT  Workstation ID: [id number]      Results for orders placed during the hospital encounter of 12/17/23    XR Chest 1 View    Narrative  XR CHEST 1 VW    Date of Exam: 12/17/2023 4:26 PM EST    Indication: dyspnea    Comparison: 10/16/2023    Findings:  In the interval a right internal jugular dialysis catheter is been removed. There is been no interval change in external defibrillator device. Cardiomegaly is stable. Pulmonary vessels are within normal limits. There are new patchy opacities in the right  lung base consistent with multifocal pneumonia. No pleural effusion. No evidence pneumothorax.    Impression  Impression:  New patchy airspace disease in the right lung base compatible with multifocal pneumonia.      Electronically Signed: Javon Posada MD  12/17/2023 4:41 PM EST  Workstation ID: [id number]      Results for orders placed during the hospital encounter of 06/09/21    Doppler Arterial Multi Level Lower Extremity - Bilateral CAR    Interpretation Summary  · Right Conclusion: The right LEONCIO is normal. Normal digital pressures.  · Left Conclusion: The left LEONCIO is moderately reduced. Waveforms are consistent with  femoral disease and popliteal disease. Moderate digital ischemia.        ASSESSMENT / PLAN      Nausea and vomiting    ICD (implantable cardioverter-defibrillator), dual, in situ    Coronary artery disease involving native coronary artery of native heart without angina pectoris    Type 1 diabetes mellitus with diabetic polyneuropathy    Mixed hyperlipidemia    Chronic HFrEF (heart failure with reduced ejection fraction)    Stage 4 chronic kidney disease    ESRD (end stage renal disease)    Iron deficiency anemia    Hypothyroidism due to Hashimoto's thyroiditis    Shortness of breath    Hyponatremia    Abnormal CT of the abdomen    ESRD------On PD at home. S/P HD yesterday. Per General Surgery no surgical issues so will resume PD with lower fill volumes as an outpatient. While here will do HD tomorrow     2. HTN WITH ESRD------BP ok. Continue home meds     3. VOLUME EXCESS/CHF-----Gently increase UF with HD     4. ANEMIA OF ESRD------H/H above threshold for EPO/iron     5. SECONDARY HYPERPARATHYROIDISM-------Follow Phos     6. DMI-------BS ok. Glucometers, SSI     7. HYPOALBUMINEMIA-------Protein supplements     8. RF ASSOCIATED HYPERPHOSPHATEMIA------Renvela as binder and follow Phos     9. HYPERLIPIDEMIA-------Statin     10. HYPOTHYROIDISM------On Synthroid.       11. CAD WITH ELEVATED TROPONIN-----per Cardiology       Maylin Dumont MD  [facility]  [phone]  04/04/24  07:41 EDT

## 2024-04-04 NOTE — OUTREACH NOTE
Medical Week 1 Survey      Flowsheet Row Responses   [facility] facility patient discharged from? [facility]   Does the patient have one of the following disease processes/diagnoses(primary or secondary)? Other   Week 1 attempt successful? No   Unsuccessful attempts Attempt 3   Revoke Readmitted            JOSEPH JONES - Registered Nurse

## 2024-04-04 NOTE — CASE MANAGEMENT/SOCIAL WORK
Discharge Planning Assessment  JAVED Machado     Patient Name: Esdras Peralta  MRN: [id number]  Today's Date: 4/4/2024    Admit Date: 4/2/2024    Plan: Plan to dc home with spouse and [facility] PT, pending acceptance.  Current PD at home nightly - [facility].   Discharge Needs Assessment       Row Name 04/04/24 1331       Living Environment    People in Home spouse;child(nubia), adult    Name(s) of People in Home Mi - spouse, and 19 yr daughter    Current Living Arrangements home    Potentially Unsafe Housing Conditions none    In the past 12 months has the electric, gas, oil, or water company threatened to shut off services in your home? No    Primary Care Provided by self    Provides Primary Care For no one    Family Caregiver if Needed spouse    Family Caregiver Names Mi - spouse    Quality of Family Relationships helpful;involved;supportive    Able to Return to Prior Arrangements yes       Resource/Environmental Concerns    Resource/Environmental Concerns none    Transportation Concerns none       Transportation Needs    In the past 12 months, has lack of transportation kept you from medical appointments or from getting medications? no    In the past 12 months, has lack of transportation kept you from meetings, work, or from getting things needed for daily living? No       Food Insecurity    Within the past 12 months, you worried that your food would run out before you got the money to buy more. Never true    Within the past 12 months, the food you bought just didn't last and you didn't have money to get more. Never true       Transition Planning    Patient/Family Anticipates Transition to home with family    Patient/Family Anticipated Services at Transition none    Transportation Anticipated car, drives self;family or friend will provide       Discharge Needs Assessment    Readmission Within the Last 30 Days no previous admission in last 30 days    Current Outpatient/Agency/Support Group outpatient  peritoneal dialysis    Equipment Currently Used at Home glucometer    Concerns to be Addressed discharge planning    Anticipated Changes Related to Illness none    Equipment Needed After Discharge none    Outpatient/Agency/Support Group Needs outpatient peritoneal dialysis                   Discharge Plan       Row Name 04/04/24 133       Plan    Plan Plan to dc home with spouse and [facility] PT, pending acceptance.  Current PD at home nightly - [facility].    Plan Comments **Patient was discharged 4/2/245 and came back to ER 3 hrs later to be re-admitted**  Patient lives at home with wife Mi and 19 yr old daughter.  Family will transport at discharge. Patient performs IADLs. PCP and pharmacy confirmed. Declines M2B.  Denies financial assistance needs for medication and/or food. Current Peritoneal Dialysis ([facility]) at home nightly 8 hrs.  Current insulin pump/Dexcom. Agreeable to [facility] PT, referral in basket, liaison notified, pending acceptance. DC Barriers:  elev LFT's - (ALT 1152, AST 1784), temp HD cath, clear liq diet, GI/Nephro/Surgery/Cardio following.             Continued Care and Services - Admitted Since 4/2/2024       Home Medical Care       Service Provider Request Status Selected Services Address Phone Fax Patient Preferred     [facility] Pending - [facility] Sent N[facility]A [address] [place] [address] [phone] --                  Expected Discharge Date and Time       Expected Discharge Date Expected Discharge Time    Apr 5, 2024            Demographic Summary       Row Name 04/04/24 1330       General Information    Admission Type inpatient    Arrived From emergency department    Required Notices Provided Important Message from Medicare    Referral Source admission list    Reason for Consult discharge planning    Preferred Language English                   Functional Status       Row Name 04/04/24 1331       Functional Status    Usual Activity Tolerance good    Current  Activity Tolerance moderate       Functional Status, IADL    Medications independent    Meal Preparation independent    Housekeeping independent    Laundry independent    Shopping independent       Mental Status    General Appearance WDL WDL       Mental Status Summary    Recent Changes in Mental Status/Cognitive Functioning no changes                  Patient Forms       Row Name 04/04/24 1330       Patient Forms    Important Message from Medicare (IMM) --  IMM given by reg 4/3                      HOLLIE Ramirez RN  SIPS/ICU   O: [phone]  C: [phone]  [email or web address]

## 2024-04-04 NOTE — DISCHARGE PLACEMENT REQUEST
"Esdras Murphy \"Cyrus\" (63 y.o. Male)       Date of Birth   1960    Social Security Number       Address   [address] [place] [facility]    Home Phone   [phone]    MRN   [id number]       Mosque   Zoroastrianism    Marital Status                               Admission Date   4/2/24    Admission Type   Emergency    Admitting Provider       Attending Provider   Froy Mayer MD    Department, Room/Bed   [facility] SURGICAL INPATIENT, 4128/1       Discharge Date       Discharge Disposition       Discharge Destination                                 Attending Provider: Froy Mayer MD    Allergies: Codeine    Isolation: None   Infection: None   Code Status: CPR    Ht: 175.3 cm (69\")   Wt: 84.7 kg (186 lb 11.7 oz)    Admission Cmt: None   Principal Problem: Nausea and vomiting [R11.2]                   Active Insurance as of 4/2/2024       Primary Coverage       Payor Plan Insurance Group Employer/Plan Group    [employer]HEM BLUE CROSS ANTHEM BLUE CROSS BLUE SHIELD PPO [id number]       Payor Plan Address Payor Plan Phone Number Payor Plan Fax Number Effective Dates    [address] [phone]  4/1/2019 - None Entered    [facility] [id number]         Subscriber Name Subscriber Birth Date Member ID       CHRISTIAN MURPHY 12/15/1967 [id number]               Secondary Coverage       Payor Plan Insurance Group Employer/Plan Group    MEDICARE MEDICARE A ONLY        Payor Plan Address Payor Plan Phone Number Payor Plan Fax Number Effective Dates    [address] [phone]  4/1/2019 - None Entered    [facility] [id number]         Subscriber Name Subscriber Birth Date Member ID       ESDRAS MURPHY 1960 [id number]                     Emergency Contacts        (Rel.) Home Phone Work Phone Mobile Phone    christian murphy (Spouse) [phone] -- [phone]                "

## 2024-04-04 NOTE — PROGRESS NOTES
LOS: 1 day   Patient Care Team:  Erika Hernandez MD as PCP - General  Erika Hernandez MD as PCP - Family Medicine  Jena Cabrera MD as Surgeon (General Surgery)  Prateek Dumont MD as Consulting Physician (Nephrology)      Subjective     Subjective: Patient reports feeling increasingly weak overnight and did have a drop in blood pressure at that time.  However he reports feeling better this morning and has no complaints.  Denies abdominal pain or nausea/vomiting.  No fevers or chills.  Is passing gas but no bowel movements.  Has remained NPO.      ROS:   Review of Systems   Constitutional:  Negative for chills and fever.   Respiratory:  Negative for cough and shortness of breath.    Cardiovascular:  Negative for chest pain.   Gastrointestinal:  Negative for abdominal pain, blood in stool, nausea and vomiting.   Neurological:  Positive for weakness. Negative for dizziness.   Psychiatric/Behavioral:  Negative for agitation and confusion.         Medication Review:   Scheduled Meds:amitriptyline, 25 mg, Oral, Nightly  [Held by provider] atorvastatin, 20 mg, Oral, Daily  bumetanide, 2 mg, Oral, BID  cinacalcet, 30 mg, Oral, Daily  isosorbide mononitrate, 30 mg, Oral, Daily  levothyroxine, 25 mcg, Oral, Q AM  metoclopramide, 5 mg, Oral, 4x Daily  metOLazone, 2.5 mg, Oral, Daily  metoprolol succinate XL, 50 mg, Oral, Daily  multivitamin, 1 tablet, Oral, Daily  pantoprazole, 40 mg, Intravenous, BID AC  rOPINIRole, 2 mg, Oral, Nightly  sevelamer, 800 mg, Oral, TID With Meals  [Held by provider] traZODone, 100 mg, Oral, Nightly      Continuous Infusions:insulin, , Last Rate: Stopped (04/03/24 2047)  sodium chloride, 30 mL/hr, Last Rate: 30 mL/hr (04/03/24 0414)      PRN Meds:.  albumin human    albuterol sulfate HFA    dextrose    dextrose    glucagon (human recombinant)    HYDROmorphone    ipratropium-albuterol    ondansetron      Objective     Vital Signs  Temp:  [97.4 °F (36.3 °C)-97.8 °F (36.6 °C)] 97.4 °F  (36.3 °C)  Heart Rate:  [73-86] 74  Resp:  [12-18] 14  BP: ()/(42-78) 93/57    Physical Exam:    General Appearance:    Awake and alert, in no acute distress   Head:    Normocephalic, without obvious abnormality   Eyes:          Conjunctivae normal, anicteric sclera   Ears:    Hearing intact   Throat:   No oral lesions, no thrush, oral mucosa moist       Lungs:     Respirations regular, even and unlabored       Abdomen:     Soft, non-tender, no rebound or guarding, non-distended, no hepatosplenomegaly, peritoneal dialysis catheter in place without bleeding or drainage   Rectal:     Deferred   Extremities:   No edema, no cyanosis, no redness   Skin:   No bleeding, bruising or rash, no jaundice   Neurologic:   Sensation intact        Results Review:    CBC  Results from last 7 days   Lab Units 04/04/24 0300 04/03/24 0446 04/02/24 2254 04/01/24  1917   RBC 10*6/mm3 3.62* 3.90* 3.87* 3.84*   WBC 10*3/mm3 11.61* 8.38 5.05 5.53   HEMOGLOBIN g/dL 12.2* 13.1 13.1 13.0   PLATELETS 10*3/mm3 124* 120* 139* 137*       CMP  Results from last 7 days   Lab Units 04/04/24  0300 04/03/24 0446 04/02/24 2254 04/02/24  1343 04/01/24  2217 04/01/24  1917   SODIUM mmol/L 130* 132* 127* 131* 135* 135*   POTASSIUM mmol/L 4.6 5.1 4.5 4.1 3.8 3.7   CHLORIDE mmol/L 89* 91* 87* 92* 96* 97*   CO2 mmol/L 21.0* 23.0 26.0 26.0 27.0 24.0   BUN mg/dL 39* 50* 46* 45* 40* 38*   CREATININE mg/dL 4.50* 5.21* 5.58* 5.19* 4.94* 4.62*   GLUCOSE mg/dL 189* 126* 140* 305* 83 150*   ALBUMIN g/dL 3.2*  --  3.4* 3.2*  --  3.1*   BILIRUBIN mg/dL 1.1  --  0.8 0.6  --  0.5   ALK PHOS U/L 102  --  105 98  --  93   AST (SGOT) U/L 1,784*  --  50* 24  --  24   ALT (SGPT) U/L 1,152*  --  24 17  --  15       Amylase and Lipase  Results from last 7 days   Lab Units 04/04/24  0300   LIPASE U/L 10*       CRP         Imaging Results (Last 24 Hours)       Procedure Component Value Units Date/Time    US Abdomen Limited [[id number]] Collected: 04/04/24 0772      Updated: 04/04/24 0724    Narrative:      US ABDOMEN LIMITED    Date of Exam: 4/4/2024 5:23 AM EDT    Indication: gallbladder thickening.    Technique: Grayscale and color Doppler ultrasound evaluation of the right upper quadrant was performed.    Findings:  The liver shows homogeneous echotexture and is enlarged measuring 17.1 cm. The visualized portal vein is patent with hepatopetal flow. The visualized hepatic vein is patent there is mild amount of free fluid surrounding the liver. The gallbladder shows   normal sonolucency. There is no pericholecystic fluid. The gallbladder wall measures up to 3 mm. Negative Beth sign.    The common bile duct measures 4.5 mm. Pancreas not evaluated with gas shadowing limiting assessment. The right kidney measures 11 cm with no hydronephrosis or nephrolithiasis.      Impression:      Impression:  Mild hepatomegaly    No sonographic abnormality in the gallbladder    Mild amount of free fluid surrounding the liver        Electronically Signed: José Antonio Grayson MD    4/4/2024 7:22 AM EDT    Workstation ID: [id number]    IR NON-JOAN TEMP DIALYSIS ACC [[id number]] Collected: 04/03/24 1329     Updated: 04/03/24 1337    Narrative:      IR NON-TUNNELED TEMP DIALYSIS ACCESS    Date of Exam: 4/3/2024 12:43 PM EDT    Indication: Chronic renal failure.    Comparison: None available.    Technique:  A detailed explanation of the procedure including risks, benefits, and alternatives was provided. The patient's allergies and medications were reviewed. A procedure timeout was performed.      Fluoroscopic Time: 3 minutes and 11 seconds    Findings:  The patient was placed on the fluoroscopic table in the supine position. Ultrasound scanning confirmed patency of the right internal jugular vein. The right neck was prepped and draped utilizing a maximum sterile barrier technique. Utilizing ultrasound   guidance and local lidocaine anesthetic, a skin incision was made and a micropuncture needle system  was utilized to access the right internal jugular vein. After placement of a guidewire and dilatation of the access site, a 12 Kenyan triple lumen   nontunneled temporary hemodialysis central venous catheter was advanced with the tip terminating in the proximal right atrium. All 3 lumens were flushed per hospital protocol. The dialysis catheter was sutured in place with 2-0 Prolene suture and a   dressing was placed at the skin entry site. The patient tolerated the procedure well without immediate complications.      Impression:      Impression:    1. Ultrasound was utilized to gain vascular access into the patient's patent right internal jugular vein.  2. Technically successful fluoroscopic guided insertion of a 12 Kenyan triple lumen nontunneled temporary hemodialysis central venous catheter.      Electronically Signed: Jeremiah Magallon MD    4/3/2024 1:35 PM EDT    Workstation ID: [id number]              Assessment & Plan   63-year-old male on peritoneal dialysis presented 4/2/2024 with abdominal pain and vomiting.  CT suggests small amount of free air.     Abnormal CT suggesting small amount of free air within the upper abdomen  Gallbladder wall thickening with small amount of pericholecystic fluid on CT  Upper abdominal pain  Nausea/vomiting  Elevated troponin  Thrombocytopenia  Elevated AST  End-stage renal disease on peritoneal dialysis  CHF/AICD  CAD  COPD/shortness of air  DMI  Restless legs       Plan:  Patient reports having some weakness overnight.  Did have drop in his blood pressure to 72/46 but now improved.  His liver enzymes elevated with AST 1784 and ALT 1152.  Total bilirubin and alk phos normal.  Suspect this is related to shock liver from hypotension.  Will monitor liver enzymes.  Right upper quadrant ultrasound was performed and showed mild fluid around the liver but otherwise unremarkable.  WBC did increase to 11.61.  Hemoglobin 12.2.  Patient denies any other complaints today and he is feeling well.   No abdominal pain.  He has been n.p.o. and is hungry.  General surgery has ordered upper GI study.  He can start clear liquid diet after this.  Continue twice daily PPI and supportive care.    Paeg Hernandez, CATERINA  04/04/24  10:19 EDT

## 2024-04-05 ENCOUNTER — INPATIENT HOSPITAL (AMBULATORY)
Dept: URBAN - METROPOLITAN AREA HOSPITAL 84 | Facility: HOSPITAL | Age: 64
End: 2024-04-05
Payer: COMMERCIAL

## 2024-04-05 DIAGNOSIS — R11.2 NAUSEA WITH VOMITING, UNSPECIFIED: ICD-10-CM

## 2024-04-05 PROBLEM — I50.21 ACUTE SYSTOLIC HEART FAILURE: Status: RESOLVED | Noted: 2023-01-01 | Resolved: 2024-01-01

## 2024-04-05 PROBLEM — Z95.810 ICD (IMPLANTABLE CARDIOVERTER-DEFIBRILLATOR), DUAL, IN SITU: Chronic | Status: ACTIVE | Noted: 2019-06-28

## 2024-04-05 PROBLEM — N18.9 CHRONIC KIDNEY DISEASE, UNSPECIFIED CKD STAGE: Status: RESOLVED | Noted: 2023-01-01 | Resolved: 2024-01-01

## 2024-04-05 PROBLEM — R06.00 DYSPNEA, UNSPECIFIED TYPE: Status: RESOLVED | Noted: 2023-01-01 | Resolved: 2024-01-01

## 2024-04-05 PROBLEM — R79.89 ELEVATED TROPONIN: Status: RESOLVED | Noted: 2024-01-01 | Resolved: 2024-01-01

## 2024-04-05 PROBLEM — J44.9 COPD (CHRONIC OBSTRUCTIVE PULMONARY DISEASE): Chronic | Status: ACTIVE | Noted: 2024-01-01

## 2024-04-05 PROBLEM — E03.8 HYPOTHYROIDISM DUE TO HASHIMOTO'S THYROIDITIS: Chronic | Status: ACTIVE | Noted: 2023-01-01

## 2024-04-05 PROBLEM — K92.0 HEMATEMESIS: Status: RESOLVED | Noted: 2023-01-01 | Resolved: 2024-01-01

## 2024-04-05 PROBLEM — E78.2 MIXED HYPERLIPIDEMIA: Chronic | Status: ACTIVE | Noted: 2020-03-09

## 2024-04-05 PROBLEM — N18.6 ESRD (END STAGE RENAL DISEASE): Chronic | Status: ACTIVE | Noted: 2023-01-01

## 2024-04-05 PROBLEM — N18.4 STAGE 4 CHRONIC KIDNEY DISEASE: Status: RESOLVED | Noted: 2023-01-01 | Resolved: 2024-01-01

## 2024-04-05 PROBLEM — A41.9 SEPSIS: Status: RESOLVED | Noted: 2023-01-01 | Resolved: 2024-01-01

## 2024-04-05 PROBLEM — D50.9 IRON DEFICIENCY ANEMIA: Chronic | Status: ACTIVE | Noted: 2023-01-01

## 2024-04-05 PROBLEM — Z86.74 HISTORY OF CARDIAC ARREST: Chronic | Status: ACTIVE | Noted: 2024-01-01

## 2024-04-05 PROBLEM — G62.9 PERIPHERAL NEUROPATHY: Chronic | Status: ACTIVE | Noted: 2024-01-01

## 2024-04-05 PROBLEM — I50.23 ACUTE ON CHRONIC SYSTOLIC CHF (CONGESTIVE HEART FAILURE): Status: RESOLVED | Noted: 2023-01-01 | Resolved: 2024-01-01

## 2024-04-05 PROBLEM — I25.2 HISTORY OF MI (MYOCARDIAL INFARCTION): Chronic | Status: ACTIVE | Noted: 2024-01-01

## 2024-04-05 PROBLEM — I50.22 CHRONIC SYSTOLIC HEART FAILURE: Status: RESOLVED | Noted: 2023-01-01 | Resolved: 2024-01-01

## 2024-04-05 PROBLEM — K92.2 GI BLEEDING: Status: RESOLVED | Noted: 2023-01-01 | Resolved: 2024-01-01

## 2024-04-05 PROBLEM — E10.42 TYPE 1 DIABETES MELLITUS WITH DIABETIC POLYNEUROPATHY: Chronic | Status: ACTIVE | Noted: 2017-01-03

## 2024-04-05 PROBLEM — I50.22 CHRONIC HFREF (HEART FAILURE WITH REDUCED EJECTION FRACTION): Chronic | Status: ACTIVE | Noted: 2022-12-28

## 2024-04-05 PROBLEM — Z46.81 INSULIN PUMP TITRATION: Status: RESOLVED | Noted: 2017-06-01 | Resolved: 2024-01-01

## 2024-04-05 PROBLEM — A41.9 SEPSIS WITHOUT ACUTE ORGAN DYSFUNCTION: Status: RESOLVED | Noted: 2023-01-01 | Resolved: 2024-01-01

## 2024-04-05 PROBLEM — E06.3 HYPOTHYROIDISM DUE TO HASHIMOTO'S THYROIDITIS: Chronic | Status: ACTIVE | Noted: 2023-01-01

## 2024-04-05 PROBLEM — R53.1 WEAKNESS: Chronic | Status: RESOLVED | Noted: 2023-01-01 | Resolved: 2024-01-01

## 2024-04-05 PROBLEM — T82.898A PROBLEM WITH DIALYSIS ACCESS, INITIAL ENCOUNTER: Status: RESOLVED | Noted: 2023-01-01 | Resolved: 2024-01-01

## 2024-04-05 PROBLEM — I42.0 CARDIOMYOPATHY, DILATED: Chronic | Status: ACTIVE | Noted: 2019-06-28

## 2024-04-05 PROBLEM — N18.32 STAGE 3B CHRONIC KIDNEY DISEASE: Status: RESOLVED | Noted: 2020-04-15 | Resolved: 2024-01-01

## 2024-04-05 PROBLEM — N18.9 ANEMIA IN CHRONIC KIDNEY DISEASE: Chronic | Status: ACTIVE | Noted: 2023-01-01

## 2024-04-05 PROBLEM — D63.1 ANEMIA IN CHRONIC KIDNEY DISEASE: Chronic | Status: ACTIVE | Noted: 2023-01-01

## 2024-04-05 PROBLEM — E55.9 VITAMIN D DEFICIENCY: Chronic | Status: ACTIVE | Noted: 2021-04-21

## 2024-04-05 PROCEDURE — 99232 SBSQ HOSP IP/OBS MODERATE 35: CPT | Performed by: NURSE PRACTITIONER

## 2024-04-05 NOTE — CODE DOCUMENTATION
Rapid response called for hypotension. Patient arousable to touch, but difficult to keep awake. ABG obtained. Hyperkalemia noted and treated with D50, insulin, and 2 amps bicarb. BP improved briefly, before patient once again became hypotensive. Labs obtained. Levophed started. Patient transported to [facility].

## 2024-04-05 NOTE — PROGRESS NOTES
General Surgery Progress Note    Name: Esdras Peralta ADMIT: 2024   : 1960  PCP: Erika Hernandez MD    MRN: [id number] LOS: 2 days   AGE/SEX: 63 y.o. male  ROOM: /   [facility]    Chief Complaint   Patient presents with    Vomiting     Subjective     63 y.o. male admitted with general feeling of unwellness some mild nausea and vomiting.  Found to have free air on CT scan with a nontender abdomen.     Events of last 24 hours noted.  Has had persistent hypotension.  Read about the midodrine coming through with loose stool not being absorbed.  Still denies abdominal pain.  He is gone into liver failure with a transaminitis to .  Bilirubin is creeping up and his ammonia level is 81.    The upper GI yesterday was negative for upper gastrointestinal perforation normality.    Objective     Scheduled Medications:   [Held by provider] amitriptyline, 25 mg, Oral, Nightly  [Held by provider] atorvastatin, 20 mg, Oral, Daily  [Held by provider] cinacalcet, 30 mg, Oral, Daily  dextrose, 50 mL, Intravenous, Once  heparin (porcine), , ,   insulin lispro, 3-14 Units, Subcutaneous, Q6H  [Held by provider] isosorbide mononitrate, 30 mg, Oral, Daily  lactulose, 30 g, Oral, BID  [Held by provider] levothyroxine, 25 mcg, Oral, Q AM  lidocaine, 10 mL, Infiltration, Once  lidocaine PF 1%, , ,   [Held by provider] metoclopramide, 5 mg, Oral, 4x Daily  [Held by provider] metOLazone, 2.5 mg, Oral, Daily  [Held by provider] metoprolol succinate XL, 50 mg, Oral, Daily  midodrine, 10 mg, Oral, TID AC  [Held by provider] multivitamin, 1 tablet, Oral, Daily  pantoprazole, 40 mg, Intravenous, BID AC  [Held by provider] rOPINIRole, 2 mg, Oral, Nightly  [Held by provider] sevelamer, 800 mg, Oral, TID With Meals  sodium bicarbonate, 50 mEq, Intravenous, Q8H  sodium chloride, 10 mL, Intravenous, Q12H  [Held by provider] traZODone, 100 mg, Oral, Nightly        Active Infusions:  [Held by provider] insulin, , Last Rate: Stopped  (04/03/24 2047)  norepinephrine, 0.02-0.5 mcg/kg/min, Last Rate: 0.1 mcg/kg/min (04/05/24 0550)        As Needed Medications:    albuterol sulfate HFA    Calcium Replacement - Follow Nurse / BPA Driven Protocol    dextrose    dextrose    glucagon (human recombinant)    heparin (porcine)    ipratropium-albuterol    lidocaine PF 1%    Magnesium Low Dose Replacement - Follow Nurse / BPA Driven Protocol    nitroglycerin    ondansetron ODT **OR** ondansetron    Phosphorus Replacement - Follow Nurse / BPA Driven Protocol    Potassium Replacement - Follow Nurse / BPA Driven Protocol    sodium chloride    sodium chloride    Vital Signs  Vital Signs Patient Vitals for the past 24 hrs:   BP Temp Temp src Pulse Resp SpO2 Weight   04/05/24 0808 -- 98.7 °F (37.1 °C) Oral -- 28 98 % --   04/05/24 0630 103/58 -- -- 70 -- 97 % --   04/05/24 0615 97/49 -- -- 70 -- 96 % --   04/05/24 0600 (!) 84/55 -- -- 72 -- 92 % --   04/05/24 0545 (!) 77/48 -- -- 72 -- 94 % --   04/05/24 0530 91/55 -- -- 72 -- 96 % --   04/05/24 0515 95/64 -- -- 74 -- 92 % --   04/05/24 0500 97/67 -- -- 72 -- 94 % --   04/05/24 0445 109/70 -- -- 73 -- 96 % --   04/05/24 0430 105/68 -- -- 74 -- 95 % --   04/05/24 0415 104/64 -- -- 71 -- 95 % --   04/05/24 0400 101/67 -- -- 73 -- 93 % --   04/05/24 0345 95/64 97.1 °F (36.2 °C) Axillary 74 -- 96 % --   04/05/24 0330 93/70 -- -- 75 -- -- --   04/05/24 0308 (!) 79/46 -- -- 67 17 -- --   04/05/24 0306 -- -- -- -- -- -- 80.2 kg (176 lb 12.9 oz)   04/05/24 0252 (!) 99/27 -- -- 66 -- -- --   04/05/24 0245 (!) 94/38 -- -- 66 -- -- --   04/05/24 0228 (!) 77/46 -- -- -- -- -- --   04/05/24 0205 (!) 81/50 -- -- 68 -- -- --   04/05/24 0200 -- 97.4 °F (36.3 °C) Rectal -- -- -- --   04/05/24 0159 (!) 70/54 -- -- -- -- -- --   04/05/24 0105 (!) 68/50 -- -- -- -- -- --   04/05/24 0101 (!) 72/50 -- -- -- -- -- --   04/04/24 2337 (!) 76/52 -- -- -- -- -- --   04/04/24 2219 (!) 72/58 -- -- -- -- -- --   04/04/24 2200 (!) 74/54 -- --  -- -- -- --   04/04/24 2140 -- -- -- 67 16 92 % --   04/04/24 2137 -- -- -- 68 16 93 % --   04/04/24 2014 (!) 88/61 96.6 °F (35.9 °C) Axillary 66 16 98 % --       Physical Exam:  Physical Exam  Constitutional:       Comments: Drowsy   Eyes:      General: No scleral icterus.     Conjunctiva/sclera: Conjunctivae normal.   Cardiovascular:      Rate and Rhythm: Normal rate.   Pulmonary:      Effort: Pulmonary effort is normal. No respiratory distress.   Abdominal:      General: There is no distension.      Palpations: Abdomen is soft.      Tenderness: There is no abdominal tenderness.      Comments: Very soft abdominal exam no voluntary or involuntary guarding no rebound, there are no peritoneal signs.   Skin:     General: Skin is warm and dry.   Neurological:      General: No focal deficit present.      Mental Status: He is alert. Mental status is at baseline.         Results Review:     CBC    Results from last 7 days   Lab Units 04/05/24  0450 04/05/24  0215 04/04/24  2357 04/04/24  0300 04/03/24 0446 04/02/24 2254 04/01/24 1917   WBC 10*3/mm3 14.43*  --  11.97* 11.61* 8.38 5.05 5.53   HEMOGLOBIN g/dL 13.1  --  14.0 12.2* 13.1 13.1 13.0   HEMOGLOBIN, POC g/dL  --  14.2  --   --   --   --   --    PLATELETS 10*3/mm3 123*  --  121* 124* 120* 139* 137*     CMP   Results from last 7 days   Lab Units 04/05/24  0450 04/05/24  0219 04/05/24  0215 04/05/24  0201 04/04/24  0300 04/03/24  0446 04/02/24  2254 04/02/24  1343 04/01/24 2217 04/01/24 1917   SODIUM mmol/L 131*  --   --  128* 130* 132* 127* 131* 135* 135*   POTASSIUM mmol/L 5.1  --   --  6.6* 4.6 5.1 4.5 4.1 3.8 3.7   CHLORIDE mmol/L 86*  --   --  86* 89* 91* 87* 92* 96* 97*   CO2 mmol/L 18.0*  --   --  18.0* 21.0* 23.0 26.0 26.0 27.0 24.0   BUN mg/dL 64*  --   --  63* 39* 50* 46* 45* 40* 38*   CREATININE mg/dL 5.95*  --  6.84* 6.28* 4.50* 5.21* 5.58* 5.19* 4.94* 4.62*   GLUCOSE mg/dL 142*  --   --  108* 189* 126* 140* 305* 83 150*   ALBUMIN g/dL 3.7  --   --   3.7 3.2*  --  3.4* 3.2*  --  3.1*   BILIRUBIN mg/dL 1.7*  --   --  1.4* 1.1  --  0.8 0.6  --  0.5   ALK PHOS U/L 114  --   --  110 102  --  105 98  --  93   AST (SGOT) U/L 1,923*  --   --  2,026* 1,784*  --  50* 24  --  24   ALT (SGPT) U/L 1,644*  --   --  1,767* 1,152*  --  24 17  --  15   LIPASE U/L  --   --   --   --  10*  --   --   --   --   --    AMMONIA umol/L  --  81*  --   --   --   --   --   --   --   --        I reviewed the patient's new clinical results.  Patient has been discussed with the nephrology and gastroenterology  Assessment & Plan       Nausea and vomiting    Cardiomyopathy, dilated    ICD (implantable cardioverter-defibrillator), dual, in situ    Coronary artery disease involving native coronary artery of native heart without angina pectoris    Type 1 diabetes mellitus with diabetic polyneuropathy    Mixed hyperlipidemia    Vitamin D deficiency    Chronic HFrEF (heart failure with reduced ejection fraction)    ESRD (end stage renal disease)    Anemia in chronic kidney disease    Iron deficiency anemia    Hypothyroidism due to Hashimoto's thyroiditis    Peripheral neuropathy    Shortness of breath    Hyponatremia    Abnormal CT of the abdomen    COPD (chronic obstructive pulmonary disease)    GERD (gastroesophageal reflux disease)    History of stroke    History of MI (myocardial infarction)    History of cardiac arrest      63 y.o. male with general feeling of unwellness nausea    Right quadrant ultrasound reviewed some thickening of the gallbladder likely related to underlying heart disease it was similar in appearance to 2018 no gallstones    Difficult situation however I still strongly believe that the free air seen on the CT scan a couple of days ago is not a perforation and is a result of his peritoneal dialysis.  He continues to have a nontender abdominal exam.  The upper GI was also negative.    Since he is clinically deteriorating which I believe is a result of his medical  comorbidities including his heart failure contributing to his hypoperfusion and liver failure, I counseled that his wife about considering diagnostic laparoscopy or exploratory laparotomy once again.    Is a very difficult situation as if the underlying issue is hypoperfusion secondary to his cardiac output anesthesia for an operation could cause this to be worse as would pneumoperitoneum for diagnostic laparoscopy.  He also has been on Brilinta is a [employer] and so any bleeding that could occur during a exploratory surgery to completely rule out intra-abdominal pathology could cause hemorrhage.  In situations like this it is often easier to take somebody to surgery than to continue to follow with nonoperatively however I strongly feel that that is still the right thing to do when weighing all the risks.    I have talked to the on-call provider for the weekend and explained the situation and the decision making.  If any new information becomes available that would change the decision and push us towards the operating room he certainly would be willing to And offer this.        This note was created using Dragon Voice Recognition software.    Keo Pavon MD  04/05/24  09:44 EDT

## 2024-04-05 NOTE — PROGRESS NOTES
" LOS: 2 days   Patient Care Team:  Erika Hernandez MD as PCP - General  Erika Hernandez MD as PCP - Family Medicine  Jena Cabrera MD as Surgeon (General Surgery)  Prateek Dumont MD as Consulting Physician (Nephrology)      Subjective   \" I need to get up and have a bowel movement\"    INTERVAL HISTORY:   Patient was transferred from surgical inpatient unit yesterday to [facility] due to hypotension.  Continues to try and get out of bed to have a bowel movement.  Rectal exam done at bedside and rectal vault is empty.  No sign of impaction or hemorrhoids.  Patient has great bowel sounds.  Labs: Sodium 131, potassium 5.1, creatinine 5.95 (6.84), total bilirubin 1.7 (1.4), ALT 1644 (1767), AST 1923 (2026), alk phos 114.  Serum iron 164, iron saturation 92%.  WBCs 14.43, hemoglobin 13.1, platelets 123.      ROS:   Review of Systems   Constitutional:  Negative for chills and fever.   Respiratory:  Negative for cough and shortness of breath.    Cardiovascular:  Negative for chest pain.   Gastrointestinal:  Negative for abdominal pain, blood in stool, nausea and vomiting.   Neurological:  Positive for weakness. Negative for dizziness.   Psychiatric/Behavioral:  Negative for agitation and confusion.         Medication Review:   Scheduled Meds:[Held by provider] amitriptyline, 25 mg, Oral, Nightly  [Held by provider] atorvastatin, 20 mg, Oral, Daily  cefTRIAXone, 1,000 mg, Intravenous, Q24H  [Held by provider] cinacalcet, 30 mg, Oral, Daily  dextrose, 50 mL, Intravenous, Once  heparin (porcine), , ,   insulin lispro, 3-14 Units, Subcutaneous, Q6H  [Held by provider] isosorbide mononitrate, 30 mg, Oral, Daily  lactulose, 30 g, Oral, BID  [Held by provider] levothyroxine, 25 mcg, Oral, Q AM  lidocaine, 10 mL, Infiltration, Once  lidocaine PF 1%, , ,   [Held by provider] metoclopramide, 5 mg, Oral, 4x Daily  [Held by provider] metOLazone, 2.5 mg, Oral, Daily  [Held by provider] metoprolol succinate XL, 50 mg, Oral, " Daily  metroNIDAZOLE, 500 mg, Intravenous, Q8H  midodrine, 10 mg, Oral, TID AC  [Held by provider] multivitamin, 1 tablet, Oral, Daily  pantoprazole, 40 mg, Intravenous, BID AC  [Held by provider] rOPINIRole, 2 mg, Oral, Nightly  [Held by provider] sevelamer, 800 mg, Oral, TID With Meals  sodium bicarbonate, 50 mEq, Intravenous, Q8H  sodium chloride, 10 mL, Intravenous, Q12H  [Held by provider] traZODone, 100 mg, Oral, Nightly      Continuous Infusions:[Held by provider] insulin, , Last Rate: Stopped (04/03/24 2047)  norepinephrine, 0.02-0.5 mcg/kg/min, Last Rate: 0.1 mcg/kg/min (04/05/24 1017)      PRN Meds:.  albumin human    albuterol sulfate HFA    Calcium Replacement - Follow Nurse / BPA Driven Protocol    dextrose    dextrose    glucagon (human recombinant)    heparin (porcine)    ipratropium-albuterol    lidocaine PF 1%    Magnesium Low Dose Replacement - Follow Nurse / BPA Driven Protocol    nitroglycerin    ondansetron ODT **OR** ondansetron    Phosphorus Replacement - Follow Nurse / BPA Driven Protocol    Potassium Replacement - Follow Nurse / BPA Driven Protocol    sodium chloride    sodium chloride      Objective resting in the hospital bed restless.  Family at bedside.  NAD.  Nurse at bedside.    Vital Signs  Temp:  [96.6 °F (35.9 °C)-98.7 °F (37.1 °C)] 98.7 °F (37.1 °C)  Heart Rate:  [66-75] 70  Resp:  [16-28] 28  BP: ()/() 80/53    Physical Exam:    General Appearance:    Awake and alert, in no acute distress   Head:    Normocephalic, without obvious abnormality   Eyes:          Conjunctivae normal, anicteric sclera   Ears:    Hearing intact   Throat:   No oral lesions, no thrush, oral mucosa moist       Lungs:     Respirations regular, even and unlabored       Abdomen:     Soft, non-tender, no rebound or guarding, non-distended, no hepatosplenomegaly, peritoneal dialysis catheter in place without bleeding or drainage   Rectal:      Small amount of brown stool noted with digital rectal  exam.  Vault was empty.  No hemorrhoids.  Chaperone for Dr. Herrera.   Extremities:   No edema, no cyanosis, no redness   Skin:   No bleeding, bruising or rash, no jaundice   Neurologic:   Sensation intact        Results Review:    CBC  Results from last 7 days   Lab Units 04/05/24  0450 04/05/24  0215 04/04/24  2357 04/04/24  0300 04/03/24  0446 04/02/24  2254 04/01/24 1917   RBC 10*6/mm3 3.83*  --  4.09* 3.62* 3.90* 3.87* 3.84*   WBC 10*3/mm3 14.43*  --  11.97* 11.61* 8.38 5.05 5.53   HEMOGLOBIN g/dL 13.1  --  14.0 12.2* 13.1 13.1 13.0   HEMOGLOBIN, POC g/dL  --  14.2  --   --   --   --   --    PLATELETS 10*3/mm3 123*  --  121* 124* 120* 139* 137*       CMP  Results from last 7 days   Lab Units 04/05/24  0450 04/05/24  0219 04/05/24  0215 04/05/24  0201 04/04/24  0300 04/03/24  0446 04/02/24  2254 04/02/24  1343 04/01/24  2217 04/01/24  1917   SODIUM mmol/L 131*  --   --  128* 130* 132* 127* 131* 135* 135*   POTASSIUM mmol/L 5.1  --   --  6.6* 4.6 5.1 4.5 4.1 3.8 3.7   CHLORIDE mmol/L 86*  --   --  86* 89* 91* 87* 92* 96* 97*   CO2 mmol/L 18.0*  --   --  18.0* 21.0* 23.0 26.0 26.0 27.0 24.0   BUN mg/dL 64*  --   --  63* 39* 50* 46* 45* 40* 38*   CREATININE mg/dL 5.95*  --  6.84* 6.28* 4.50* 5.21* 5.58* 5.19* 4.94* 4.62*   GLUCOSE mg/dL 142*  --   --  108* 189* 126* 140* 305* 83 150*   ALBUMIN g/dL 3.7  --   --  3.7 3.2*  --  3.4* 3.2*  --  3.1*   BILIRUBIN mg/dL 1.7*  --   --  1.4* 1.1  --  0.8 0.6  --  0.5   ALK PHOS U/L 114  --   --  110 102  --  105 98  --  93   AST (SGOT) U/L 1,923*  --   --  2,026* 1,784*  --  50* 24  --  24   ALT (SGPT) U/L 1,644*  --   --  1,767* 1,152*  --  24 17  --  15   AMMONIA umol/L  --  81*  --   --   --   --   --   --   --   --        Amylase and Lipase  Results from last 7 days   Lab Units 04/04/24  0300   LIPASE U/L 10*       CRP         Imaging Results (Last 24 Hours)       Procedure Component Value Units Date/Time    XR Chest 1 View [[id number]] Collected: 04/05/24 0943      Updated: 04/05/24 0950    Narrative:      XR CHEST 1 VW    Date of Exam: 4/5/2024 9:30 AM EDT    Indication: right ij line respositioned    Comparison: Chest x-ray from earlier the same day    Findings:  Right IJ catheter remains medially angulated. Placement within the azygos vein cannot be excluded. This appears similar since the prior study. Recommend repositioning or better delineation of catheter position with CT.    Cardiac pacemaker is present. Cardiac silhouette is enlarged. No consolidation or mass is seen. No pleural effusion or pneumothorax is seen. No acute osseous lesion is seen      Impression:      Impression:  1.Right IJ catheter remains medially angulated. Placement within the azygos vein cannot be excluded. This appears similar since the prior study. Recommend repositioning or better delineation of catheter position with CT.  2.Additional findings as detailed above.  3.    Electronically Signed: Adama Cortes MD    4/5/2024 9:47 AM EDT    Workstation ID: [id number]    XR Chest 1 View [[id number]] Collected: 04/05/24 0411     Updated: 04/05/24 0415    Narrative:      XR CHEST 1 VW    Date of Exam: 4/5/2024 4:01 AM EDT    Indication: SOA    Comparison:4/2/2024.    Findings:  There are no airspace consolidations. No pleural fluid. No pneumothorax. The pulmonary vasculature appears within normal limits. The heart appears enlarged, stable as compared to the previous study. Stable pacer device. There is a new right internal   jugular CVC catheter present. It slightly medial course present.. No acute osseous abnormality identified.      Impression:      Impression:  No acute cardiopulmonary process identified. There is a right internal jugular CVC catheter with a slightly medial course distally with the tip possibly within the azygos vein. Clinical correlation is recommended. Repositioning may be warranted.      Electronically Signed: Toyin Gant MD    4/5/2024 4:13 AM EDT    Workstation ID: [id number]     FL Upper GI Water Soluble [[id number]] Collected: 04/04/24 1717     Updated: 04/04/24 1722    Narrative:      DATE OF EXAM:  4/4/2024 10:43 AM     PROCEDURE:  FL UPPER GI WATER SOLUBLE-     INDICATIONS:  Upper abdominal pain, abnormal CT of the abdomen and pelvis  demonstrating free air, history of a peritoneal dialysis catheter.     COMPARISON:  CT of the abdomen and pelvis performed on 4/2/2024.     TECHNIQUE:    radiograph of the abdomen was obtained.  A single contrast  radiographic exam using water soluble contrast was performed imaging  through the esophagus and upper gastrointestinal tract.     Fluoroscopic Time:   2 minutes and 1 second     Number of Images:  10     FINDINGS:  Primary  KUB shows no contrast material which would preclude the  patient from undergoing an upper GI series. There is a peritoneal  dialysis catheter coiled within the pelvis. The patient has a  defibrillator in place. There are underlying degenerative changes and  scattered vascular calcifications.     Esophageal motility is within range of normal. There is no esophageal  ulcer, stricture, or mass. There was no spontaneous gastroesophageal  reflux seen. Single contrast imaging of the stomach appeared  unremarkable. The duodenal bulb and remaining duodenal C-loop appear  normal. There is no extraluminal contrast to indicate a perforated  esophagus, stomach, or duodenum.       Impression:      No evidence of a perforated esophagus, stomach, or duodenum.        Electronically Signed By-Jeremiah Magallon MD On:4/4/2024 5:20 PM                 Assessment & Plan   63-year-old male on peritoneal dialysis presented 4/2/2024 with abdominal pain and vomiting.  CT suggests small amount of free air.     Abnormal CT suggesting small amount of free air within the upper abdomen  Gallbladder wall thickening with small amount of pericholecystic fluid on CT  Upper abdominal pain  Nausea/vomiting  Elevated troponin  Thrombocytopenia  Elevated  AST  End-stage renal disease on peritoneal dialysis  CHF/AICD  CAD  COPD/shortness of air  DMI  Restless legs       Plan:  Patient was transferred from surgical inpatient unit to [facility] yesterday due to hypotension.  Blood pressure is good now.  Complains of rectal tenesmus and feeling as if he needs to have a bowel movement.  Rectal exam was done and vault was empty.  Asked Anabel, RN to do bladder scan as significant other at bedside states he has been voiding and has not recently.  Okay for diet from GI standpoint.  2 doses of lactulose for tenesmus sensation.  Patient remains on Rocephin and Flagyl  Continue midodrine, Protonix      Unique Box, APRN  04/05/24  10:32 EDT

## 2024-04-05 NOTE — PLAN OF CARE
Nurse called for low blood pressure and also reports pt with episode of watery diarrhea that an intact pill was found. It appeared to possibly be the midodrine that was ordered earlier this evening.    Pt symptomatic with Nausea, feeling short of breath and very weak    In addition to the midodrine had given 25 of Albumin with no improvement. As pt appears dry, will given a small bolus of 250mL of NS    Have ordered repeat labs and will monitor if need of electrolyte replacement. Have notified ICU NP of hypotension and will be available if need to transfer to ICU     Monitor closely     Rapid response called for increased lethargy  BP still low  Will check ammonia level  Pt provide calcium replacement  Elevated lactic   Leukocytosis  Will transfer to ICU

## 2024-04-05 NOTE — CASE MANAGEMENT/SOCIAL WORK
Continued Stay Note   [facility]     Patient Name: Esdras Peralta  MRN: [id number]  Today's Date: 4/5/2024    Admit Date: 4/2/2024    Plan: DC Plan: Home with spouse and [facility] PT, pending acceptance. Current PD at home nightly - [employer].   Discharge Plan       Row Name 04/05/24 1605       Plan    Plan DC Plan: Home with spouse and [facility] PT, pending acceptance. Current PD at home nightly - [employer].    Provided Post Acute Provider List? N/A    Provided Post Acute Provider Quality & Resource List? N/A    Plan Comments CM spoke with intensivist, nursing, and NP to obtain clinical upates in morning rounds. Patient to get HD today. GI and General Surgery Consults placed. Free air in abdomen. Endocrinology consulted for glucose management. CM will continue to follow for any further needs and adjust discharge plan accordingly. DC Barriers: Cardiac monitoring, HD cath, Levophed gtt, Bicarb IV push, Pending echocardiogram, monitor labs, IV abx, pending cultures, and pending consults.               Expected Discharge Date and Time       Expected Discharge Date Expected Discharge Time    Apr 8, 2024           Phone communication or documentation only- no physical contact with patient or family.      Alison Mckinney RN     Office Phone: [phone]  Office Cell:     [phone]
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
## 2024-04-05 NOTE — PROGRESS NOTES
NEPHROLOGY PROGRESS NOTE------[facility]    [facility]  [phone]  Maylin Dumont MD      Patient Care Team:  Erika Hernandez MD as PCP - General  Erika Hernandez MD as PCP - Family Medicine  Jena Cabrera MD as Surgeon (General Surgery)  Prateek Dumont MD as Consulting Physician (Nephrology)      Provider:  Maylin Dumont MD  Patient Name: Esdras Peralta  :  1960    SUBJECTIVE:    F/U ESRD     S/P transfer to ICU post Rapid response last night. Some abdominal pain.     Medication:  [Held by provider] amitriptyline, 25 mg, Oral, Nightly  [Held by provider] atorvastatin, 20 mg, Oral, Daily  bumetanide, 2 mg, Oral, Daily   Or  bumetanide, 2 mg, Intravenous, Daily  [Held by provider] cinacalcet, 30 mg, Oral, Daily  dextrose, 50 mL, Intravenous, Once  insulin lispro, 3-14 Units, Subcutaneous, Q6H  [Held by provider] isosorbide mononitrate, 30 mg, Oral, Daily  lactulose, 30 g, Oral, BID  [Held by provider] levothyroxine, 25 mcg, Oral, Q AM  [Held by provider] metoclopramide, 5 mg, Oral, 4x Daily  [Held by provider] metOLazone, 2.5 mg, Oral, Daily  [Held by provider] metoprolol succinate XL, 50 mg, Oral, Daily  [Held by provider] multivitamin, 1 tablet, Oral, Daily  pantoprazole, 40 mg, Intravenous, BID AC  [Held by provider] rOPINIRole, 2 mg, Oral, Nightly  senna-docusate sodium, 2 tablet, Oral, BID  [Held by provider] sevelamer, 800 mg, Oral, TID With Meals  sodium chloride, 10 mL, Intravenous, Q12H  [Held by provider] traZODone, 100 mg, Oral, Nightly      [Held by provider] insulin, , Last Rate: Stopped (24)  norepinephrine, 0.02-0.5 mcg/kg/min, Last Rate: 0.1 mcg/kg/min (24 0523)        OBJECTIVE    Vital Sign Min/Max for last 24 hours  Temp  Min: 96.6 °F (35.9 °C)  Max: 97.4 °F (36.3 °C)   BP  Min: 68/50  Max: 109/70   Pulse  Min: 66  Max: 75   Resp  Min: 16  Max: 17   SpO2  Min: 92 %  Max: 98 %   No  "data recorded   Weight  Min: 80.2 kg (176 lb 12.9 oz)  Max: 80.2 kg (176 lb 12.9 oz)     Flowsheet Rows      Flowsheet Row First Filed Value   Admission Height 175.3 cm (69\") Documented at 04/02/2024 2216   Admission Weight 82 kg (180 lb 12.4 oz) Documented at 04/02/2024 2216            No intake/output data recorded.  I/O last 3 completed shifts:  In: -   Out: 1600     Physical Exam:  General Appearance: alert, appears stated age and cooperative  Head: normocephalic, without obvious abnormality and atraumatic  Eyes: conjunctivae and sclerae normal and no icterus  Neck: supple and no JVD  Lungs: clear to auscultation and respirations regular  Heart: regular rhythm & normal rate and normal S1, S2 +MILLICENT  Chest Wall: no abnormalities observed  Abdomen: normal bowel sounds and +EPIGASTRIC PAIN ON PALPATION. +PD CATHETER C/D/I  Extremities: moves extremities well, no edema, no cyanosis  Skin: no bleeding, bruising or rash  Neurologic: Alert, and oriented. No focal deficits    Labs:    WBC WBC   Date Value Ref Range Status   04/05/2024 14.43 (H) 3.40 - 10.80 10*3/mm3 Final   04/04/2024 11.97 (H) 3.40 - 10.80 10*3/mm3 Final   04/04/2024 11.61 (H) 3.40 - 10.80 10*3/mm3 Final   04/03/2024 8.38 3.40 - 10.80 10*3/mm3 Final   04/02/2024 5.05 3.40 - 10.80 10*3/mm3 Final      HGB Hemoglobin   Date Value Ref Range Status   04/05/2024 13.1 13.0 - 17.7 g/dL Final   04/05/2024 14.2 12.0 - 17.0 g/dL Final   04/04/2024 14.0 13.0 - 17.7 g/dL Final   04/04/2024 12.2 (L) 13.0 - 17.7 g/dL Final   04/03/2024 13.1 13.0 - 17.7 g/dL Final   04/02/2024 13.1 13.0 - 17.7 g/dL Final      HCT Hematocrit   Date Value Ref Range Status   04/05/2024 40.1 37.5 - 51.0 % Final   04/05/2024 42 38 - 51 % Final   04/04/2024 42.7 37.5 - 51.0 % Final   04/04/2024 37.3 (L) 37.5 - 51.0 % Final   04/03/2024 40.2 37.5 - 51.0 % Final   04/02/2024 40.2 37.5 - 51.0 % Final      Platelets No results found for: \"LABPLAT\"   MCV MCV   Date Value Ref Range Status "   04/05/2024 104.7 (H) 79.0 - 97.0 fL Final   04/04/2024 104.4 (H) 79.0 - 97.0 fL Final   04/04/2024 103.0 (H) 79.0 - 97.0 fL Final   04/03/2024 103.1 (H) 79.0 - 97.0 fL Final   04/02/2024 103.9 (H) 79.0 - 97.0 fL Final          Sodium Sodium   Date Value Ref Range Status   04/05/2024 131 (L) 136 - 145 mmol/L Final   04/05/2024 128 (L) 136 - 145 mmol/L Final   04/04/2024 130 (L) 136 - 145 mmol/L Final   04/03/2024 132 (L) 136 - 145 mmol/L Final   04/02/2024 127 (L) 136 - 145 mmol/L Final   04/02/2024 131 (L) 136 - 145 mmol/L Final      Potassium Potassium   Date Value Ref Range Status   04/05/2024 5.1 3.5 - 5.2 mmol/L Final   04/05/2024 6.6 (C) 3.5 - 5.2 mmol/L Final     Comment:     Slight hemolysis detected by analyzer. Result may be falsely elevated.   04/04/2024 4.6 3.5 - 5.2 mmol/L Final   04/03/2024 5.1 3.5 - 5.2 mmol/L Final   04/02/2024 4.5 3.5 - 5.2 mmol/L Final   04/02/2024 4.1 3.5 - 5.2 mmol/L Final      Chloride Chloride   Date Value Ref Range Status   04/05/2024 86 (L) 98 - 107 mmol/L Final   04/05/2024 86 (L) 98 - 107 mmol/L Final   04/04/2024 89 (L) 98 - 107 mmol/L Final   04/03/2024 91 (L) 98 - 107 mmol/L Final   04/02/2024 87 (L) 98 - 107 mmol/L Final   04/02/2024 92 (L) 98 - 107 mmol/L Final      CO2 CO2   Date Value Ref Range Status   04/05/2024 18.0 (L) 22.0 - 29.0 mmol/L Final   04/05/2024 18.0 (L) 22.0 - 29.0 mmol/L Final   04/04/2024 21.0 (L) 22.0 - 29.0 mmol/L Final   04/03/2024 23.0 22.0 - 29.0 mmol/L Final   04/02/2024 26.0 22.0 - 29.0 mmol/L Final   04/02/2024 26.0 22.0 - 29.0 mmol/L Final      BUN BUN   Date Value Ref Range Status   04/05/2024 64 (H) 8 - 23 mg/dL Final   04/05/2024 63 (H) 8 - 23 mg/dL Final   04/04/2024 39 (H) 8 - 23 mg/dL Final   04/03/2024 50 (H) 8 - 23 mg/dL Final   04/02/2024 46 (H) 8 - 23 mg/dL Final   04/02/2024 45 (H) 8 - 23 mg/dL Final      Creatinine Creatinine   Date Value Ref Range Status   04/05/2024 5.95 (H) 0.76 - 1.27 mg/dL Final   04/05/2024 6.84 (H) 0.60  "- 1.30 mg/dL Final     Comment:     Serial Number: [id number]:  [id number]   04/05/2024 6.28 (H) 0.76 - 1.27 mg/dL Final   04/04/2024 4.50 (H) 0.76 - 1.27 mg/dL Final   04/03/2024 5.21 (H) 0.76 - 1.27 mg/dL Final   04/02/2024 5.58 (H) 0.76 - 1.27 mg/dL Final   04/02/2024 5.19 (H) 0.76 - 1.27 mg/dL Final      Calcium Calcium   Date Value Ref Range Status   04/05/2024 8.9 8.6 - 10.5 mg/dL Final   04/05/2024 8.2 (L) 8.6 - 10.5 mg/dL Final   04/04/2024 8.5 (L) 8.6 - 10.5 mg/dL Final   04/03/2024 9.4 8.6 - 10.5 mg/dL Final   04/02/2024 9.2 8.6 - 10.5 mg/dL Final   04/02/2024 9.3 8.6 - 10.5 mg/dL Final      PO4 No components found for: \"PO4\"   Albumin Albumin   Date Value Ref Range Status   04/05/2024 3.7 3.5 - 5.2 g/dL Final   04/05/2024 3.7 3.5 - 5.2 g/dL Final   04/04/2024 3.2 (L) 3.5 - 5.2 g/dL Final   04/02/2024 3.4 (L) 3.5 - 5.2 g/dL Final   04/02/2024 3.2 (L) 3.5 - 5.2 g/dL Final      Magnesium Magnesium   Date Value Ref Range Status   04/05/2024 2.3 1.6 - 2.4 mg/dL Final   04/05/2024 2.2 1.6 - 2.4 mg/dL Final   04/02/2024 1.9 1.6 - 2.4 mg/dL Final      Uric Acid No components found for: \"URIC ACID\"     Imaging Results (Last 72 Hours)       Procedure Component Value Units Date/Time    XR Chest 1 View [[id number]] Collected: 04/05/24 0411     Updated: 04/05/24 0415    Narrative:      XR CHEST 1 VW    Date of Exam: 4/5/2024 4:01 AM EDT    Indication: SOA    Comparison:4/2/2024.    Findings:  There are no airspace consolidations. No pleural fluid. No pneumothorax. The pulmonary vasculature appears within normal limits. The heart appears enlarged, stable as compared to the previous study. Stable pacer device. There is a new right internal   jugular CVC catheter present. It slightly medial course present.. No acute osseous abnormality identified.      Impression:      Impression:  No acute cardiopulmonary process identified. There is a right internal jugular CVC catheter with a slightly medial course distally with the tip " possibly within the azygos vein. Clinical correlation is recommended. Repositioning may be warranted.      Electronically Signed: Toyin Gant MD    4/5/2024 4:13 AM EDT    Workstation ID: [id number]    FL Upper GI Water Soluble [[id number]] Collected: 04/04/24 1717     Updated: 04/04/24 1722    Narrative:      DATE OF EXAM:  4/4/2024 10:43 AM     PROCEDURE:  FL UPPER GI WATER SOLUBLE-     INDICATIONS:  Upper abdominal pain, abnormal CT of the abdomen and pelvis  demonstrating free air, history of a peritoneal dialysis catheter.     COMPARISON:  CT of the abdomen and pelvis performed on 4/2/2024.     TECHNIQUE:    radiograph of the abdomen was obtained.  A single contrast  radiographic exam using water soluble contrast was performed imaging  through the esophagus and upper gastrointestinal tract.     Fluoroscopic Time:   2 minutes and 1 second     Number of Images:  10     FINDINGS:  Primary  KUB shows no contrast material which would preclude the  patient from undergoing an upper GI series. There is a peritoneal  dialysis catheter coiled within the pelvis. The patient has a  defibrillator in place. There are underlying degenerative changes and  scattered vascular calcifications.     Esophageal motility is within range of normal. There is no esophageal  ulcer, stricture, or mass. There was no spontaneous gastroesophageal  reflux seen. Single contrast imaging of the stomach appeared  unremarkable. The duodenal bulb and remaining duodenal C-loop appear  normal. There is no extraluminal contrast to indicate a perforated  esophagus, stomach, or duodenum.       Impression:      No evidence of a perforated esophagus, stomach, or duodenum.        Electronically Signed By-Jeremiah Magallon MD On:4/4/2024 5:20 PM       US Abdomen Limited [[id number]] Collected: 04/04/24 0717     Updated: 04/04/24 0724    Narrative:      US ABDOMEN LIMITED    Date of Exam: 4/4/2024 5:23 AM EDT    Indication: gallbladder  thickening.    Technique: Grayscale and color Doppler ultrasound evaluation of the right upper quadrant was performed.    Findings:  The liver shows homogeneous echotexture and is enlarged measuring 17.1 cm. The visualized portal vein is patent with hepatopetal flow. The visualized hepatic vein is patent there is mild amount of free fluid surrounding the liver. The gallbladder shows   normal sonolucency. There is no pericholecystic fluid. The gallbladder wall measures up to 3 mm. Negative Beth sign.    The common bile duct measures 4.5 mm. Pancreas not evaluated with gas shadowing limiting assessment. The right kidney measures 11 cm with no hydronephrosis or nephrolithiasis.      Impression:      Impression:  Mild hepatomegaly    No sonographic abnormality in the gallbladder    Mild amount of free fluid surrounding the liver        Electronically Signed: José Antonio Grayson MD    4/4/2024 7:22 AM EDT    Workstation ID: [id number]    IR NON-JOAN TEMP DIALYSIS ACC [[id number]] Collected: 04/03/24 1329     Updated: 04/03/24 1337    Narrative:      IR NON-TUNNELED TEMP DIALYSIS ACCESS    Date of Exam: 4/3/2024 12:43 PM EDT    Indication: Chronic renal failure.    Comparison: None available.    Technique:  A detailed explanation of the procedure including risks, benefits, and alternatives was provided. The patient's allergies and medications were reviewed. A procedure timeout was performed.      Fluoroscopic Time: 3 minutes and 11 seconds    Findings:  The patient was placed on the fluoroscopic table in the supine position. Ultrasound scanning confirmed patency of the right internal jugular vein. The right neck was prepped and draped utilizing a maximum sterile barrier technique. Utilizing ultrasound   guidance and local lidocaine anesthetic, a skin incision was made and a micropuncture needle system was utilized to access the right internal jugular vein. After placement of a guidewire and dilatation of the access site, a 12  Nepali triple lumen   nontunneled temporary hemodialysis central venous catheter was advanced with the tip terminating in the proximal right atrium. All 3 lumens were flushed per hospital protocol. The dialysis catheter was sutured in place with 2-0 Prolene suture and a   dressing was placed at the skin entry site. The patient tolerated the procedure well without immediate complications.      Impression:      Impression:    1. Ultrasound was utilized to gain vascular access into the patient's patent right internal jugular vein.  2. Technically successful fluoroscopic guided insertion of a 12 Nepali triple lumen nontunneled temporary hemodialysis central venous catheter.      Electronically Signed: Jeremiah Magallon MD    4/3/2024 1:35 PM EDT    Workstation ID: [id number]    CT Abdomen Pelvis Without Contrast [[id number]] Collected: 04/03/24 0015     Updated: 04/03/24 0022    Narrative:      CT ABDOMEN PELVIS WO CONTRAST    Date of Exam: 4/2/2024 11:59 PM EDT    Indication: possible free air.    Comparison: 7/6/2023, 4/2/2024, 4/1/2024.    Technique: Axial CT images were obtained of the abdomen and pelvis without the administration of contrast. Sagittal and coronal reconstructions were performed.  Automated exposure control and iterative reconstruction methods were used.      Findings:  Lung Bases:     The heart appears mildly enlarged. No focal consolidation identified. Trace left-sided pleural effusion present..    Liver:  Liver is normal in size and CT density. No focal lesions. A small amount of perihepatic ascites is present.    Biliary/Gallbladder:    The gallbladder is normal in caliber with no definite stones identified. A small amount of free fluid is seen surrounding the gallbladder with probable wall thickening. No definite stones identified. Thickening. The biliary tree is nondilated.    Spleen:  Spleen is normal in size and CT density.    Pancreas:    Pancreas is normal. There is no evidence of pancreatic mass or  peripancreatic fluid.    Kidneys:    Kidneys are normal in size. There are no stones or hydronephrosis.    Adrenals:    Adrenal glands are unremarkable.    Retroperitoneal/Lymph Nodes/Vasculature:    No retroperitoneal adenopathy is identified.    Gastrointestinal/Mesentery:    A small amount of free air is present within the anterior upper abdomen. This is new as compared to the previous study from yesterday.. Drain versus dialysis catheter is seen extending within the right hemipelvis from the anterior left hemiabdomen.   Stimulator device seen within the subcutaneous tissues of the left hemiabdomen. The bowel loops are non-dilated without wall thickening or mass. The appendix appears within normal limits. No evidence of obstruction. No free air. No mesenteric fluid   collections identified. No significant stool burden identified. No evidence of hernia. A small amount of free fluid is present within the pelvis. No definite focal collection identified. Limited evaluation due to lack of intravenous contrast..    Bladder:    The bladder is normal.    Genital:     Unremarkable          Bony Structures:     Visualized bony structures are consistent with the patient's age.        Impression:      Impression:    1. A small amount of free air present within the upper abdomen, new as compared to the previous CT from 4/1/2023 likely related to perforated viscus, likely from the stomach given the predominantly upper abdominal location of air. Surgical consultation   is recommended. No evidence of obstruction.  2. Gallbladder wall thickening with a small amount of pericholecystic fluid. Correlate with LFTs. No definite stones or biliary ductal dilatation though acute cholecystitis cannot be excluded. Reactive change related to small amount of free fluid and   ascites present throughout the abdomen and pelvis is also in the differential.  3. Probable peritoneal dialysis catheter present with a small amount of free fluid with  the tip within the right hemipelvis. No definite focal collection identified.  4.. Ancillary findings described above.            Electronically Signed: Toyin Gant MD    4/3/2024 12:20 AM EDT    Workstation ID: [id number]    XR Chest 1 View [[id number]] Collected: 04/02/24 2332     Updated: 04/02/24 2336    Narrative:      XR CHEST 1 VW    Date of Exam: 4/2/2024 11:19 PM EDT    Indication: soa    Comparison: 4/1/2024.    Findings:  There are no airspace consolidations. No pleural fluid. No pneumothorax. The pulmonary vasculature appears within normal limits. The heart appears mildly enlarged, stable as compared to the previous study.. No acute osseous abnormality identified. There   appears to be a new small amount of free air under the right hemidiaphragm.      Impression:      Impression:  No acute cardiopulmonary process. Suspected new free air under the right hemidiaphragm. CT evaluation recommended.      Electronically Signed: Toyin Gant MD    4/2/2024 11:34 PM EDT    Workstation ID: [id number]            Results for orders placed during the hospital encounter of 04/02/24    XR Chest 1 View    Narrative  XR CHEST 1 VW    Date of Exam: 4/5/2024 4:01 AM EDT    Indication: SOA    Comparison:4/2/2024.    Findings:  There are no airspace consolidations. No pleural fluid. No pneumothorax. The pulmonary vasculature appears within normal limits. The heart appears enlarged, stable as compared to the previous study. Stable pacer device. There is a new right internal  jugular CVC catheter present. It slightly medial course present.. No acute osseous abnormality identified.    Impression  Impression:  No acute cardiopulmonary process identified. There is a right internal jugular CVC catheter with a slightly medial course distally with the tip possibly within the azygos vein. Clinical correlation is recommended. Repositioning may be warranted.      Electronically Signed: Toyin Gant MD  4/5/2024 4:13 AM EDT  Workstation ID:  [id number]      XR Chest 1 View    Narrative  XR CHEST 1 VW    Date of Exam: 4/2/2024 11:19 PM EDT    Indication: soa    Comparison: 4/1/2024.    Findings:  There are no airspace consolidations. No pleural fluid. No pneumothorax. The pulmonary vasculature appears within normal limits. The heart appears mildly enlarged, stable as compared to the previous study.. No acute osseous abnormality identified. There  appears to be a new small amount of free air under the right hemidiaphragm.    Impression  Impression:  No acute cardiopulmonary process. Suspected new free air under the right hemidiaphragm. CT evaluation recommended.      Electronically Signed: Toyin Gant MD  4/2/2024 11:34 PM EDT  Workstation ID: [id number]      Results for orders placed during the hospital encounter of 04/01/24    XR Chest 1 View    Narrative  XR CHEST 1 VW    Date of Exam: 4/1/2024 7:25 PM EDT    Indication: dyspna    Comparison: 12/17/2023    Findings: No focal consolidation. No pneumothorax or pleural effusion. Cardiac size is normal. The visualized clavicles appear intact. No displaced rib fractures. The visualized upper abdomen is normal.    Impression  Impression: No acute cardiopulmonary disease.      Electronically Signed: Cyrus Stoner MD  4/1/2024 7:51 PM EDT  Workstation ID: [id number]      Results for orders placed during the hospital encounter of 06/09/21    Doppler Arterial Multi Level Lower Extremity - Bilateral CAR    Interpretation Summary  · Right Conclusion: The right LEONCIO is normal. Normal digital pressures.  · Left Conclusion: The left LEONCIO is moderately reduced. Waveforms are consistent with femoral disease and popliteal disease. Moderate digital ischemia.        ASSESSMENT / PLAN      Nausea and vomiting    Cardiomyopathy, dilated    ICD (implantable cardioverter-defibrillator), dual, in situ    Coronary artery disease involving native coronary artery of native heart without angina pectoris    Type 1 diabetes mellitus with  diabetic polyneuropathy    Mixed hyperlipidemia    Vitamin D deficiency    Chronic HFrEF (heart failure with reduced ejection fraction)    ESRD (end stage renal disease)    Anemia in chronic kidney disease    Iron deficiency anemia    Hypothyroidism due to Hashimoto's thyroiditis    Peripheral neuropathy    Shortness of breath    Hyponatremia    Abnormal CT of the abdomen    COPD (chronic obstructive pulmonary disease)    GERD (gastroesophageal reflux disease)    History of stroke    History of MI (myocardial infarction)    History of cardiac arrest    ESRD------On PD at home. S/P HD day before yesterday. STAT HD this AM     2. HTN WITH ESRD------BP ok. Continue home meds     3. VOLUME EXCESS/CHF-----Gently increase UF with HD     4. ANEMIA OF ESRD------H/H above threshold for EPO/iron     5. SECONDARY HYPERPARATHYROIDISM-------Follow Phos     6. DMI-------BS ok. Glucometers, SSI     7. HYPOALBUMINEMIA-------Protein supplements     8. RF ASSOCIATED HYPERPHOSPHATEMIA------Renvela as binder and follow Phos     9. HYPERLIPIDEMIA-------Statin     10. HYPOTHYROIDISM------On Synthroid.       11. CAD WITH ELEVATED TROPONIN-----per Cardiology    12. LACTIC ACIDOSIS/?SEPSIS-------Cx pending. IV NaHCO3 and STAT HD. Follow lactic acid levels. General surgery following for abdominal pain       Maylin Dumont MD  [facility]  [phone]  04/05/24  07:30 EDT

## 2024-04-05 NOTE — SIGNIFICANT NOTE
04/05/24 1534   OTHER   Discipline physical therapist   Rehab Time/Intention   Session Not Performed other (see comments)  (RN reports low blood sugar and hypotensive, not appropriate for PT this date. Will follow up when medically appropriate.)   Therapy Assessment/Plan (PT)   Criteria for Skilled Interventions Met (PT) yes;meets criteria   Recommendation   PT - Next Appointment 04/06/24

## 2024-04-05 NOTE — NURSING NOTE
The patient has had low blood pressure for most of the evening. A bolus of 250mL was given along with a midodrine tablet that was found an hour later in his stool still intact - See orders.  Around 0200, he became symptomatic with SOB, nausea, and was lethargic. Rapid response was called at 0205. Transferred to [facility]. Report given to nurse.
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
## 2024-04-06 NOTE — PLAN OF CARE
Goal Outcome Evaluation:      Patient comfort care. Family at bedside.                                        Problem: Adult Inpatient Plan of Care  Goal: Plan of Care Review  Outcome: Unable to Meet, Plan Revised  Goal: Patient-Specific Goal (Individualized)  Outcome: Unable to Meet, Plan Revised  Goal: Absence of Hospital-Acquired Illness or Injury  Outcome: Unable to Meet, Plan Revised  Goal: Optimal Comfort and Wellbeing  Outcome: Unable to Meet, Plan Revised  Goal: Readiness for Transition of Care  Outcome: Unable to Meet, Plan Revised     Problem: Pain Acute  Goal: Acceptable Pain Control and Functional Ability  Outcome: Unable to Meet, Plan Revised     Problem: Nausea and Vomiting  Goal: Fluid and Electrolyte Balance  Outcome: Unable to Meet, Plan Revised     Problem: Adjustment to Illness (Chronic Kidney Disease)  Goal: Optimal Coping with Chronic Illness  Outcome: Unable to Meet, Plan Revised     Problem: Electrolyte Imbalance (Chronic Kidney Disease)  Goal: Electrolyte Balance  Outcome: Unable to Meet, Plan Revised     Problem: Fluid Volume Excess (Chronic Kidney Disease)  Goal: Fluid Balance  Outcome: Unable to Meet, Plan Revised     Problem: Functional Decline (Chronic Kidney Disease)  Goal: Optimal Functional Ability  Outcome: Unable to Meet, Plan Revised     Problem: Hematologic Alteration (Chronic Kidney Disease)  Goal: Absence of Anemia Signs and Symptoms  Outcome: Unable to Meet, Plan Revised     Problem: Oral Intake Inadequate (Chronic Kidney Disease)  Goal: Optimal Oral Intake  Outcome: Unable to Meet, Plan Revised     Problem: Pain (Chronic Kidney Disease)  Goal: Acceptable Pain Control  Outcome: Unable to Meet, Plan Revised     Problem: Renal Function Impairment (Chronic Kidney Disease)  Goal: Minimize Renal Failure Effects  Outcome: Unable to Meet, Plan Revised     Problem: Asthma Comorbidity  Goal: Maintenance of Asthma Control  Outcome: Unable to Meet, Plan Revised     Problem: Behavioral  Health Comorbidity  Goal: Maintenance of Behavioral Health Symptom Control  Outcome: Unable to Meet, Plan Revised     Problem: COPD (Chronic Obstructive Pulmonary Disease) Comorbidity  Goal: Maintenance of COPD Symptom Control  Outcome: Unable to Meet, Plan Revised     Problem: Diabetes Comorbidity  Goal: Blood Glucose Level Within Targeted Range  Outcome: Unable to Meet, Plan Revised     Problem: Heart Failure Comorbidity  Goal: Maintenance of Heart Failure Symptom Control  Outcome: Unable to Meet, Plan Revised     Problem: Hypertension Comorbidity  Goal: Blood Pressure in Desired Range  Outcome: Unable to Meet, Plan Revised     Problem: Obstructive Sleep Apnea Risk or Actual Comorbidity Management  Goal: Unobstructed Breathing During Sleep  Outcome: Unable to Meet, Plan Revised     Problem: Osteoarthritis Comorbidity  Goal: Maintenance of Osteoarthritis Symptom Control  Outcome: Unable to Meet, Plan Revised     Problem: Pain Chronic (Persistent) (Comorbidity Management)  Goal: Acceptable Pain Control and Functional Ability  Outcome: Unable to Meet, Plan Revised     Problem: Seizure Disorder Comorbidity  Goal: Maintenance of Seizure Control  Outcome: Unable to Meet, Plan Revised     Problem: Fall Injury Risk  Goal: Absence of Fall and Fall-Related Injury  Outcome: Unable to Meet, Plan Revised     Problem: Skin Injury Risk Increased  Goal: Skin Health and Integrity  Outcome: Unable to Meet, Plan Revised     Problem: Adjustment to Illness (Sepsis/Septic Shock)  Goal: Optimal Coping  Outcome: Unable to Meet, Plan Revised     Problem: Bleeding (Sepsis/Septic Shock)  Goal: Absence of Bleeding  Outcome: Unable to Meet, Plan Revised     Problem: Glycemic Control Impaired (Sepsis/Septic Shock)  Goal: Blood Glucose Level Within Desired Range  Outcome: Unable to Meet, Plan Revised     Problem: Infection Progression (Sepsis/Septic Shock)  Goal: Absence of Infection Signs and Symptoms  Outcome: Unable to Meet, Plan Revised      Problem: Nutrition Impaired (Sepsis/Septic Shock)  Goal: Optimal Nutrition Intake  Outcome: Unable to Meet, Plan Revised

## 2024-04-06 NOTE — PROGRESS NOTES
General Surgery Progress Note    Name: Esdras Peralta ADMIT: 2024   : 1960  PCP: Erika Hernandez MD    MRN: [id number] LOS: 3 days   AGE/SEX: 63 y.o. male  ROOM: [address] [place] [address]    Chief Complaint   Patient presents with    Vomiting     Subjective     Patient seen and examined.  Per nursing, patient is less responsive.  During exam, patient was minimally arousable but unable to answer questions, would not open eyes upon request.  Was not able to follow commands.  LFTs trending up, lactic acid trending up, WBC count remains in 14s.    Objective     Scheduled Medications:        Active Infusions:       As Needed Medications:    acetaminophen **OR** acetaminophen **OR** acetaminophen    carboxymethylcellulose sod    diphenoxylate-atropine    glycopyrrolate **OR** glycopyrrolate **OR** glycopyrrolate **OR** glycopyrrolate    HYDROmorphone **OR** HYDROmorphone    HYDROmorphone **OR** HYDROmorphone    ipratropium-albuterol    LORazepam **OR** LORazepam **OR** LORazepam **OR** LORazepam **OR** LORazepam **OR** LORazepam    LORazepam **OR** LORazepam **OR** LORazepam **OR** LORazepam **OR** LORazepam **OR** LORazepam    LORazepam **OR** LORazepam **OR** LORazepam **OR** LORazepam **OR** LORazepam **OR** LORazepam    ondansetron ODT **OR** ondansetron    Scopolamine    Vital Signs  Vital Signs Patient Vitals for the past 24 hrs:   BP Temp Temp src Pulse Resp SpO2 Weight   24 1000 108/52 -- -- 66 -- 100 % --   24 0900 119/63 -- -- 67 -- 100 % --   24 0800 122/92 -- -- 66 -- 100 % --   24 0731 -- 96.9 °F (36.1 °C) Axillary -- 24 94 % --   24 0603 100/44 -- -- 66 -- 100 % --   24 0532 94/50 -- -- 66 -- 100 % --   24 0517 109/53 -- -- 67 -- 100 % --   24 0400 112/58 98.5 °F (36.9 °C) Oral 65 21 96 % 81.6 kg (179 lb 14.3 oz)   24 0346 107/44 -- -- 67 -- 94 % --   24 0331 108/64 -- -- 66 -- 96 % --   24 0217 (!) 106/38 -- -- 71 -- 98 % --    04/06/24 0116 111/61 -- -- 67 -- 90 % --   04/06/24 0100 100/54 -- -- 68 -- (!) 81 % --   04/06/24 0016 104/54 -- -- 63 -- 95 % --   04/05/24 2338 (!) 98/39 98 °F (36.7 °C) Oral 69 20 91 % --   04/05/24 2300 102/45 -- -- 68 -- 100 % --   04/05/24 2100 (!) 82/64 -- -- 68 -- 97 % --   04/05/24 2000 (!) 106/37 -- -- 67 -- (!) 71 % --   04/05/24 1937 -- 97.1 °F (36.2 °C) Oral 68 21 99 % --   04/05/24 1830 (!) 107/39 -- -- 65 -- 100 % --   04/05/24 1805 116/58 -- -- 68 -- 100 % --   04/05/24 1800 -- -- -- 65 -- 100 % --   04/05/24 1740 91/42 -- -- -- -- -- --   04/05/24 1735 (!) 84/53 -- -- -- -- -- --   04/05/24 1730 (!) 84/53 -- -- 70 -- 100 % --   04/05/24 1710 (!) 84/32 -- -- 64 -- 100 % --   04/05/24 1700 -- -- -- 64 -- -- --   04/05/24 1652 100/74 96.3 °F (35.7 °C) Axillary 66 21 -- --   04/05/24 1630 103/59 -- -- 70 -- 100 % --   04/05/24 1600 106/53 -- -- 67 -- 100 % --   04/05/24 1530 94/78 -- -- 69 -- 100 % --   04/05/24 1500 -- -- -- 75 -- (!) 79 % --   04/05/24 1430 94/52 -- -- 68 -- 99 % --   04/05/24 1415 -- -- -- -- 16 -- --   04/05/24 1400 102/60 -- -- 66 18 100 % --   04/05/24 1330 95/76 -- -- 65 18 -- --   04/05/24 1300 114/58 -- -- 66 17 -- --   04/05/24 1230 112/62 -- -- 63 16 92 % --   04/05/24 1205 -- -- -- -- 17 -- --   04/05/24 1201 -- 96 °F (35.6 °C) Rectal 66 18 94 % --   04/05/24 1200 -- -- -- 68 -- (!) 87 % --   04/05/24 1131 (!) 89/50 -- -- -- -- -- --   04/05/24 1130 (!) 89/50 -- -- 67 18 (!) 75 % --   04/05/24 1121 -- -- -- 65 -- (!) 79 % --   04/05/24 1117 (!) 84/48 -- -- 64 -- -- --   04/05/24 1100 (!) 89/34 -- -- 66 22 96 % --     I/O:  I/O last 3 completed shifts:  In: 4047 [P.O.:390; I.V.:1087]  Out: 2000     Physical Exam:  Physical Exam  Constitutional:       Comments: Minimal response to stimulation.  Does not follow commands.  Does not appear to be in distress.   Cardiovascular:      Rate and Rhythm: Normal rate.   Pulmonary:      Effort: No respiratory distress.      Comments: 3  L nasal cannula  Abdominal:      General: There is no distension.      Palpations: Abdomen is soft.      Comments: Soft, nondistended, does not appear to be tender to palpation.         Results Review:     CBC    Results from last 7 days   Lab Units 04/06/24 0427 04/05/24 0450 04/05/24 0215 04/04/24 2357 04/04/24  0300 04/03/24 0446 04/02/24 2254 04/01/24  1917   WBC 10*3/mm3 14.17* 14.43*  --  11.97* 11.61* 8.38 5.05 5.53   HEMOGLOBIN g/dL 12.4* 13.1  --  14.0 12.2* 13.1 13.1 13.0   HEMOGLOBIN, POC g/dL  --   --  14.2  --   --   --   --   --    PLATELETS 10*3/mm3 81* 123*  --  121* 124* 120* 139* 137*     BMP   Results from last 7 days   Lab Units 04/06/24 0427 04/05/24 0450 04/05/24 0215 04/05/24  0201 04/04/24 2357 04/04/24  0300 04/03/24 0446 04/02/24 2254 04/02/24  1343   SODIUM mmol/L 137 131*  --  128*  --  130* 132* 127* 131*   POTASSIUM mmol/L 4.8 5.1  --  6.6*  --  4.6 5.1 4.5 4.1   CHLORIDE mmol/L 86* 86*  --  86*  --  89* 91* 87* 92*   CO2 mmol/L 20.0* 18.0*  --  18.0*  --  21.0* 23.0 26.0 26.0   BUN mg/dL 39* 64*  --  63*  --  39* 50* 46* 45*   CREATININE mg/dL 4.51* 5.95* 6.84* 6.28*  --  4.50* 5.21* 5.58* 5.19*   GLUCOSE mg/dL 256* 142*  --  108*  --  189* 126* 140* 305*   MAGNESIUM mg/dL 2.2 2.3  --  2.2  --   --   --  1.9  --    PHOSPHORUS mg/dL 8.7* 9.4*  --   --  8.9*  --   --   --   --      Radiology(recent) XR Abdomen KUB    Result Date: 4/5/2024  Impression: Contrast has reached the level of the rectum. No evidence of obstruction. Electronically Signed: Mariella Ramsey MD  4/5/2024 1:43 PM EDT  Workstation ID: [id number]    XR Chest 1 View    Result Date: 4/5/2024  Impression: 1.Right IJ catheter remains medially angulated. Placement within the azygos vein cannot be excluded. This appears similar since the prior study. Recommend repositioning or better delineation of catheter position with CT. 2.Additional findings as detailed above. 3. Electronically Signed: Adama Cortes MD   4/5/2024 9:47 AM EDT  Workstation ID: [id number]    XR Chest 1 View    Result Date: 4/5/2024  Impression: No acute cardiopulmonary process identified. There is a right internal jugular CVC catheter with a slightly medial course distally with the tip possibly within the azygos vein. Clinical correlation is recommended. Repositioning may be warranted. Electronically Signed: Toyin Gant MD  4/5/2024 4:13 AM EDT  Workstation ID: [id number]    FL Upper GI Water Soluble    Result Date: 4/4/2024  No evidence of a perforated esophagus, stomach, or duodenum.   Electronically Signed By-Jeremiah Magallon MD On:4/4/2024 5:20 PM       I reviewed the patient's new clinical results.    Assessment & Plan       Nausea and vomiting    Cardiomyopathy, dilated    ICD (implantable cardioverter-defibrillator), dual, in situ    Coronary artery disease involving native coronary artery of native heart without angina pectoris    Type 1 diabetes mellitus with diabetic polyneuropathy    Mixed hyperlipidemia    Vitamin D deficiency    Chronic HFrEF (heart failure with reduced ejection fraction)    ESRD (end stage renal disease)    Anemia in chronic kidney disease    Iron deficiency anemia    Hypothyroidism due to Hashimoto's thyroiditis    Peripheral neuropathy    Shortness of breath    Hyponatremia    Abnormal CT of the abdomen    COPD (chronic obstructive pulmonary disease)    GERD (gastroesophageal reflux disease)    History of stroke    History of MI (myocardial infarction)    History of cardiac arrest      63 y.o. male admitted with nausea and vomiting, found to have abdominal free air of undetermined etiology    Current medical patient discussed with family.  They state understanding and expressed they would not like to proceed with any surgical intervention or intubation at this time.  Will most likely proceed with comfort measures.  Please call for any questions/concerns.        This note was created using Dragon Voice Recognition  software.    KAYLENE Raphael  04/06/24  10:42 EDT
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
## 2024-04-06 NOTE — CONSULTS
Patient unresponsive. Spouse and other family members at bedside. Spouse reports patient is Jehovah Witness and is supported by [employer]. Spiritual care is being received from [employer]. No needs for  support.     Cyrus Hickman

## 2024-04-06 NOTE — SIGNIFICANT NOTE
Reviewed labs this morning, lactate continued to trend up throughout the day yesterday.  However, I was not informed of any of these lactate values.  Strong suspicion for ischemic bowel.  Discussed with general surgery.  Subsequently, general surgery spoke with family and they opted for comfort measures.    Critical care signing off.  Patient to be transferred back to hospitalist service.    Electronically signed by Tyler Izquierdo MD, 04/06/24, 10:58 AM EDT.

## 2024-04-06 NOTE — DISCHARGE SUMMARY
[facility] Medicine Services  DEATH SUMMARY      Date of Death:  4/6/2024  Cause of Death: Ischemic bowel  Final Diagnosis: Ischemic bowel    Presenting Problem/History of Present Illness  Active Hospital Problems    Diagnosis  POA    **Nausea and vomiting [R11.2]  Yes    COPD (chronic obstructive pulmonary disease) [J44.9]  Yes    History of MI (myocardial infarction) [I25.2]  Not Applicable    History of cardiac arrest [Z86.74]  Not Applicable    GERD (gastroesophageal reflux disease) [K21.9]  Yes    Hyponatremia [E87.1]  Yes    Abnormal CT of the abdomen [R93.5]  Yes    Shortness of breath [R06.02]  Yes    Peripheral neuropathy [G62.9]  Yes    Hypothyroidism due to Hashimoto's thyroiditis [E03.8, E06.3]  Yes    Iron deficiency anemia [D50.9]  Yes    Anemia in chronic kidney disease [N18.9, D63.1]  Yes    ESRD (end stage renal disease) [N18.6]  Yes    Chronic HFrEF (heart failure with reduced ejection fraction) [I50.22]  Yes    Vitamin D deficiency [E55.9]  Yes    Mixed hyperlipidemia [E78.2]  Yes    ICD (implantable cardioverter-defibrillator), dual, in situ [Z95.810]  Yes    Cardiomyopathy, dilated [I42.0]  Yes    Type 1 diabetes mellitus with diabetic polyneuropathy [E10.42]  Yes    History of stroke [Z86.73]  Not Applicable    Coronary artery disease involving native coronary artery of native heart without angina pectoris [I25.10]  Yes      Resolved Hospital Problems    Diagnosis Date Resolved POA    Stage 4 chronic kidney disease [N18.4] 04/05/2024 Yes    Insulin pump titration [Z46.81] 04/05/2024 Not Applicable         Hospital Course  A 63 y.o. old male patient with PMH of ESRD on home peritoneal dialysis, ischemic cardiomyopathy, systolic heart failure with ejection fraction 20% per echo April 2023, CAD, diabetes mellitus type 1 with insulin pump, GERD, Hypertension, hyperlipidemia, and hypothyroid presents to the hospital with complaints of nausea, vomiting, diarrhea, and dyspnea.   HPI information is limited due to patient lethargic at time of assessment; information obtained from chart review and patient's spouse at bedside.  Patient was discharged on 4/2/2024 at 7:30 PM and returned to the emergency department 3 hours later.  He stated since he had been home that his shortness of air had increased with exertion especially if he bent over.  He denied abdominal pain, melena, hematochezia or hematemesis.  CT scan of abdomen was performed showing a small amount of free air present within the upper abdomen likely related to perforated viscus from the stomach given the predominantly upper abdominal location of air.  Patient is ESRD has been doing peritoneal dialysis via manual transfers at home.  General surgery was consulted and discussed his dialysis with him.  At that time the patient stated it was unusual for him to need to do manual transfers that he is usually able to hook up to the machine and stated it was definitely possible he could have some air in the lines during his exchanges.  General surgery discussed surgical options with patient and it was decided to medically manage for now due to patient not having any abdominal pain.  GI was consulted and wanted to consider a Gastrografin upper GI study to rule out perforation.  They started amitriptyline for restless legs at bedtime.  Patient remained relatively stable throughout that night and GI advance patient's diet to clear liquid.  Patient's liver enzymes were elevated AST 1784 and ALT 1152 GI feels that represents shock liver from Hypotension.  On the evening of 4/5/2024 hospitalist were consulted due to patient having low blood pressure and an episode of watery diarrhea with an intact midodrine pill found in the bedside commode.  Patient was symptomatic and nausea and feeling extremely short of breath and weak.  Patient was given albumin and a small 250 mL normal saline bolus and a new set of labs was obtained.  Few hours later fast  "team was called on patient due to worsening Hypotension and lethargy.  Patient's potassium was elevated at 6.6, he remains hyponatremic, his liver enzymes are continuing to rise and his ammonia level is 81.  Patient will be transferred to the intensive care unit for further evaluation and treatment.     Of note patient was admitted for 4/1/24 and discharged 4/2/2024: During that stay patient was short of breath and received peritoneal dialysis twice during inpatient with resolution of symptoms.  Patient was discharged on valsartan 40 mg daily and midodrine 2.5 every 8 hours for low blood pressure.\"     04/06/24  Patient's lactic acid had continued trending up.  General surgery discussed with patient's wife about exploratory laparotomy however given that patient is Islam and a DNR, family opted to go comfort at this time.  Hospitalist team was consulted to continue management. Pt passed shortly afterwards at 1532 with family by bedside.    Procedures Performed         Consults:   Consults       Date and Time Order Name Status Description    4/6/2024 10:13 AM Inpatient Hospitalist Consult Completed     4/5/2024 10:05 AM Inpatient Endocrinology Consult Completed     4/3/2024  7:34 AM Inpatient Gastroenterology Consult Completed     4/3/2024 12:38 AM IP Consult to General Surgery Completed     4/1/2024  9:13 PM Inpatient Cardiology Consult Completed     4/1/2024  9:13 PM Inpatient Nephrology Consult Completed                 Alisha Dawn MD  04/06/24  16:17 EDT   "

## 2024-04-06 NOTE — PLAN OF CARE
Update:    - Patient condition continues to worsen and have very guarded prognosis.  - Care goal changed to comfort care only.  - Endocrinology team will sign off.    Carlos Hoang MD  14:19 EDT  04/06/24

## 2024-04-08 RX ORDER — TICAGRELOR 90 MG/1
90 TABLET ORAL 2 TIMES DAILY
Qty: 180 TABLET | Refills: 0 | OUTPATIENT
Start: 2024-04-08

## 2024-04-10 LAB
BACTERIA SPEC AEROBE CULT: NORMAL
BACTERIA SPEC AEROBE CULT: NORMAL

## (undated) DEVICE — PERITONEAL DIALYSIS ACCESSORY,TWO PART TITANIUM LUER ADAPTER: Brand: ARGYLE

## (undated) DEVICE — BIOPATCH™ ANTIMICROBIAL DRESSING WITH CHLORHEXIDINE GLUCONATE IS A HYDROPHILLIC POLYURETHANE ABSORPTIVE FOAM WITH CHLORHEXIDINE GLUCONATE (CHG) WHICH INHIBITS BACTERIAL GROWTH UNDER THE DRESSING. THE DRESSING IS INTENDED TO BE USED TO ABSORB EXUDATE, COVER A WOUND CAUSED BY VASCULAR AND NONVASCULAR PERCUTANEOUS MEDICAL DEVICES DURING SURGERY, AS WELL AS REDUCE LOCAL INFECTION AND COLONIZATION OF MICROORGANISMS.: Brand: BIOPATCH

## (undated) DEVICE — 3M™ PATIENT PLATE, CORDED, SPLIT, LARGE, 40 PER CASE, 1179: Brand: 3M™

## (undated) DEVICE — ST EXT STANDARDBORE 2/NDLF Y/LV SLID/CLMP W/MLL 36IN

## (undated) DEVICE — CANNULA SEAL

## (undated) DEVICE — ST INF 2NDARY 20DRP VNT/NOVNT 30IN

## (undated) DEVICE — SLV SCD CALF HEMOFORCE DVT THERP REPROC MD

## (undated) DEVICE — PLASMABLADE PS200-040 4.0: Brand: PLASMABLADE™

## (undated) DEVICE — DRSNG WND BORDR/ADHS NONADHR/GZ LF 4X4IN STRL

## (undated) DEVICE — STPLR SKIN COUNT W/35STPL SS WHT DISP STRL

## (undated) DEVICE — SOLUTION,WATER,IRRIGATION,1000ML,STERILE: Brand: MEDLINE

## (undated) DEVICE — INTENDED FOR TISSUE SEPARATION, AND OTHER PROCEDURES THAT REQUIRE A SHARP SURGICAL BLADE TO PUNCTURE OR CUT.: Brand: BARD-PARKER ® CARBON RIB-BACK BLADES

## (undated) DEVICE — Device

## (undated) DEVICE — BLADELESS OBTURATOR: Brand: WECK VISTA

## (undated) DEVICE — BITEBLOCK ENDO W/STRAP 60F A/ LF DISP

## (undated) DEVICE — INSUFFLATION TUBING SET, ENDOFLATOR 50: Brand: N.A.

## (undated) DEVICE — ENDOPATH XCEL BLADELESS TROCARS WITH STABILITY SLEEVES: Brand: ENDOPATH XCEL

## (undated) DEVICE — UNDYED BRAIDED (POLYGLACTIN 910), SYNTHETIC ABSORBABLE SUTURE: Brand: COATED VICRYL

## (undated) DEVICE — VIOLET BRAIDED (POLYGLACTIN 910), SYNTHETIC ABSORBABLE SUTURE: Brand: COATED VICRYL

## (undated) DEVICE — UNDRGLV SURG BIOGEL PIMICROINDICATOR SYNTH SZ7.5PF STRL PR/2

## (undated) DEVICE — GAUZE,SPONGE,4"X4",16PLY,XRAY,STRL,LF: Brand: MEDLINE

## (undated) DEVICE — SOL NACL INJ .9PCT 100ML STRL

## (undated) DEVICE — DRSNG SURESITE123 8X12IN

## (undated) DEVICE — GENERAL LAPAROSCOPY CDS: Brand: MEDLINE INDUSTRIES, INC.

## (undated) DEVICE — PROVE COVER: Brand: UNBRANDED

## (undated) DEVICE — SUT ETHIB 0/0 MO6 I8IN CX45D

## (undated) DEVICE — ANTIBACTERIAL UNDYED BRAIDED (POLYGLACTIN 910), SYNTHETIC ABSORBABLE SUTURE: Brand: COATED VICRYL

## (undated) DEVICE — ADHS SKIN PREMIERPRO EXOFIN TOPICAL HI/VISC .5ML

## (undated) DEVICE — ENDOPATH XCEL WITH OPTIVIEW TECHNOLOGY UNIVERSAL TROCAR STABILITY SLEEVES: Brand: ENDOPATH XCEL OPTIVIEW

## (undated) DEVICE — ENDOPATH PNEUMONEEDLE INSUFFLATION NEEDLES WITH LUER LOCK CONNECTORS 150MM: Brand: ENDOPATH

## (undated) DEVICE — PACEMAKER CDS: Brand: MEDLINE INDUSTRIES, INC.

## (undated) DEVICE — TBG INSUFF MALE L/L W 12MM CON: Brand: MEDLINE INDUSTRIES, INC.

## (undated) DEVICE — CVR HNDL LT SURG ACCSSRY BLU STRL

## (undated) DEVICE — KT SURG TURNOVER 050

## (undated) DEVICE — REFLEX ONE, SKIN STAPLER, 35 WIDE: Brand: REFLEX

## (undated) DEVICE — SINGLE-USE BIOPSY FORCEPS: Brand: RADIAL JAW 4

## (undated) DEVICE — GLV SURG SENSICARE SLT PF LF 6 STRL

## (undated) DEVICE — BLANKT WARM UPPR/BDY ARM/OUT 57X196CM

## (undated) DEVICE — PK ENDO GI 50

## (undated) DEVICE — ENDOPATH XCEL WITH OPTIVIEW TECHNOLOGY BLADELESS TROCARS WITH STABILITY SLEEVES: Brand: ENDOPATH XCEL OPTIVIEW

## (undated) DEVICE — DRESSING, SURESITE SELECT, 2.8'X3.50': Brand: MEDLINE

## (undated) DEVICE — PACK,UNIVERSAL,NO GOWNS: Brand: MEDLINE

## (undated) DEVICE — ELECTRD DEFIB M/FUNC PROPADZ RADIOL 2PK

## (undated) DEVICE — ST IV INFUS ALARIS PUMP 26ML 125IN

## (undated) DEVICE — SOL NACL INJ .9PCT 500ML

## (undated) DEVICE — 3M™ IOBAN™ 2 ANTIMICROBIAL INCISE DRAPE 6650EZ: Brand: IOBAN™ 2

## (undated) DEVICE — SPNG GZ WOVN 4X4IN 12PLY 10/BX STRL